# Patient Record
Sex: FEMALE | Race: WHITE | NOT HISPANIC OR LATINO | Employment: FULL TIME | URBAN - NONMETROPOLITAN AREA
[De-identification: names, ages, dates, MRNs, and addresses within clinical notes are randomized per-mention and may not be internally consistent; named-entity substitution may affect disease eponyms.]

---

## 2017-04-17 PROCEDURE — G0123 SCREEN CERV/VAG THIN LAYER: HCPCS | Performed by: OBSTETRICS & GYNECOLOGY

## 2017-04-18 ENCOUNTER — LAB REQUISITION (OUTPATIENT)
Dept: LAB | Facility: HOSPITAL | Age: 47
End: 2017-04-18

## 2017-04-18 DIAGNOSIS — Z12.72 ENCOUNTER FOR SCREENING FOR MALIGNANT NEOPLASM OF VAGINA: ICD-10-CM

## 2017-04-21 LAB
GEN CATEG CVX/VAG CYTO-IMP: NORMAL
LAB AP CASE REPORT: NORMAL
LAB AP GYN ADDITIONAL INFORMATION: NORMAL
LAB AP GYN OTHER FINDINGS: NORMAL
Lab: NORMAL
PATH INTERP SPEC-IMP: NORMAL
STAT OF ADQ CVX/VAG CYTO-IMP: NORMAL

## 2018-06-04 PROCEDURE — G0123 SCREEN CERV/VAG THIN LAYER: HCPCS | Performed by: OBSTETRICS & GYNECOLOGY

## 2018-06-05 ENCOUNTER — LAB REQUISITION (OUTPATIENT)
Dept: LAB | Facility: HOSPITAL | Age: 48
End: 2018-06-05

## 2018-06-05 DIAGNOSIS — Z12.72 ENCOUNTER FOR SCREENING FOR MALIGNANT NEOPLASM OF VAGINA: ICD-10-CM

## 2018-06-10 LAB
GEN CATEG CVX/VAG CYTO-IMP: NORMAL
LAB AP CASE REPORT: NORMAL
LAB AP GYN ADDITIONAL INFORMATION: NORMAL
Lab: NORMAL
PATH INTERP SPEC-IMP: NORMAL
STAT OF ADQ CVX/VAG CYTO-IMP: NORMAL

## 2018-06-13 ENCOUNTER — TRANSCRIBE ORDERS (OUTPATIENT)
Dept: ADMINISTRATIVE | Facility: HOSPITAL | Age: 48
End: 2018-06-13

## 2018-06-13 DIAGNOSIS — R51.9 NONINTRACTABLE HEADACHE, UNSPECIFIED CHRONICITY PATTERN, UNSPECIFIED HEADACHE TYPE: Primary | ICD-10-CM

## 2018-06-15 ENCOUNTER — HOSPITAL ENCOUNTER (OUTPATIENT)
Dept: MRI IMAGING | Facility: HOSPITAL | Age: 48
Discharge: HOME | End: 2018-06-15
Admitting: NURSE PRACTITIONER

## 2018-06-15 DIAGNOSIS — R51.9 NONINTRACTABLE HEADACHE, UNSPECIFIED CHRONICITY PATTERN, UNSPECIFIED HEADACHE TYPE: ICD-10-CM

## 2018-06-15 LAB — CREAT BLDA-MCNC: 0.9 MG/DL (ref 0.6–1.3)

## 2018-06-15 PROCEDURE — 70553 MRI BRAIN STEM W/O & W/DYE: CPT

## 2018-06-15 PROCEDURE — 82565 ASSAY OF CREATININE: CPT

## 2018-06-15 PROCEDURE — 0 GADOBENATE DIMEGLUMINE 529 MG/ML SOLUTION: Performed by: NURSE PRACTITIONER

## 2018-06-15 PROCEDURE — A9577 INJ MULTIHANCE: HCPCS | Performed by: NURSE PRACTITIONER

## 2018-06-15 RX ADMIN — GADOBENATE DIMEGLUMINE 10 ML: 529 INJECTION, SOLUTION INTRAVENOUS at 14:30

## 2018-06-19 ENCOUNTER — TRANSCRIBE ORDERS (OUTPATIENT)
Dept: ADMINISTRATIVE | Facility: HOSPITAL | Age: 48
End: 2018-06-19

## 2018-06-19 DIAGNOSIS — R94.5 NONSPECIFIC ABNORMAL RESULTS OF LIVER FUNCTION STUDY: Primary | ICD-10-CM

## 2018-06-22 ENCOUNTER — APPOINTMENT (OUTPATIENT)
Dept: ULTRASOUND IMAGING | Facility: HOSPITAL | Age: 48
End: 2018-06-22

## 2018-07-05 ENCOUNTER — APPOINTMENT (OUTPATIENT)
Dept: ULTRASOUND IMAGING | Facility: HOSPITAL | Age: 48
End: 2018-07-05

## 2018-07-17 ENCOUNTER — APPOINTMENT (OUTPATIENT)
Dept: ULTRASOUND IMAGING | Facility: HOSPITAL | Age: 48
End: 2018-07-17

## 2018-07-30 ENCOUNTER — APPOINTMENT (OUTPATIENT)
Dept: ULTRASOUND IMAGING | Facility: HOSPITAL | Age: 48
End: 2018-07-30

## 2018-08-30 ENCOUNTER — OFFICE VISIT (OUTPATIENT)
Dept: FAMILY MEDICINE CLINIC | Age: 48
End: 2018-08-30
Payer: COMMERCIAL

## 2018-08-30 VITALS
HEART RATE: 122 BPM | WEIGHT: 148 LBS | BODY MASS INDEX: 27.23 KG/M2 | DIASTOLIC BLOOD PRESSURE: 68 MMHG | OXYGEN SATURATION: 98 % | SYSTOLIC BLOOD PRESSURE: 116 MMHG | TEMPERATURE: 98.3 F | HEIGHT: 62 IN

## 2018-08-30 DIAGNOSIS — K75.81 NASH (NONALCOHOLIC STEATOHEPATITIS): ICD-10-CM

## 2018-08-30 DIAGNOSIS — R22.2 SUPRACLAVICULAR FOSSA FULLNESS: ICD-10-CM

## 2018-08-30 DIAGNOSIS — R51.9 CHRONIC NONINTRACTABLE HEADACHE, UNSPECIFIED HEADACHE TYPE: ICD-10-CM

## 2018-08-30 DIAGNOSIS — F32.9 REACTIVE DEPRESSION: ICD-10-CM

## 2018-08-30 DIAGNOSIS — R73.9 HYPERGLYCEMIA: ICD-10-CM

## 2018-08-30 DIAGNOSIS — D33.3 ACOUSTIC NEUROMA (HCC): ICD-10-CM

## 2018-08-30 DIAGNOSIS — E87.6 HYPOKALEMIA: ICD-10-CM

## 2018-08-30 DIAGNOSIS — F98.8 ATTENTION DEFICIT DISORDER (ADD) WITHOUT HYPERACTIVITY: ICD-10-CM

## 2018-08-30 DIAGNOSIS — G89.29 CHRONIC NONINTRACTABLE HEADACHE, UNSPECIFIED HEADACHE TYPE: ICD-10-CM

## 2018-08-30 DIAGNOSIS — E03.9 HYPOTHYROIDISM (ACQUIRED): Primary | ICD-10-CM

## 2018-08-30 DIAGNOSIS — I10 ESSENTIAL HYPERTENSION: ICD-10-CM

## 2018-08-30 DIAGNOSIS — E87.1 HYPONATREMIA: ICD-10-CM

## 2018-08-30 DIAGNOSIS — F41.9 ANXIETY: ICD-10-CM

## 2018-08-30 LAB
AMPHETAMINE SCREEN, URINE: NEGATIVE
BARBITURATE SCREEN, URINE: NEGATIVE
BENZODIAZEPINE SCREEN, URINE: POSITIVE
BUPRENORPHINE URINE: NEGATIVE
COCAINE METABOLITE SCREEN URINE: NEGATIVE
GABAPENTIN SCREEN, URINE: NORMAL
METHADONE SCREEN, URINE: NEGATIVE
METHAMPHETAMINE, URINE: NEGATIVE
OPIATE SCREEN URINE: NEGATIVE
OXYCODONE SCREEN URINE: NEGATIVE
PHENCYCLIDINE SCREEN URINE: NEGATIVE
PROPOXYPHENE SCREEN, URINE: NORMAL
THC SCREEN, URINE: NEGATIVE
TRICYCLIC ANTIDEPRESSANTS, UR: POSITIVE

## 2018-08-30 PROCEDURE — G0444 DEPRESSION SCREEN ANNUAL: HCPCS | Performed by: CLINICAL NURSE SPECIALIST

## 2018-08-30 PROCEDURE — 80305 DRUG TEST PRSMV DIR OPT OBS: CPT | Performed by: CLINICAL NURSE SPECIALIST

## 2018-08-30 PROCEDURE — 99204 OFFICE O/P NEW MOD 45 MIN: CPT | Performed by: CLINICAL NURSE SPECIALIST

## 2018-08-30 RX ORDER — LORAZEPAM 1 MG/1
1 TABLET ORAL 2 TIMES DAILY PRN
Qty: 60 TABLET | Refills: 0 | Status: SHIPPED | OUTPATIENT
Start: 2018-08-30 | End: 2018-09-29

## 2018-08-30 RX ORDER — HYDROCODONE BITARTRATE AND ACETAMINOPHEN 10; 325 MG/1; MG/1
1 TABLET ORAL EVERY 6 HOURS PRN
COMMUNITY
End: 2018-08-30 | Stop reason: SDUPTHER

## 2018-08-30 RX ORDER — HYDROCODONE BITARTRATE AND ACETAMINOPHEN 10; 325 MG/1; MG/1
1 TABLET ORAL DAILY
Qty: 30 TABLET | Refills: 0 | Status: SHIPPED | OUTPATIENT
Start: 2018-08-30 | End: 2018-10-04 | Stop reason: SDUPTHER

## 2018-08-30 RX ORDER — VILAZODONE HYDROCHLORIDE 20 MG/1
20 TABLET ORAL DAILY
Qty: 90 TABLET | Refills: 3 | Status: SHIPPED | OUTPATIENT
Start: 2018-08-30 | End: 2018-09-25 | Stop reason: ALTCHOICE

## 2018-08-30 RX ORDER — LORAZEPAM 1 MG/1
1 TABLET ORAL EVERY 6 HOURS PRN
COMMUNITY
End: 2018-08-30 | Stop reason: SDUPTHER

## 2018-08-30 ASSESSMENT — PATIENT HEALTH QUESTIONNAIRE - PHQ9
3. TROUBLE FALLING OR STAYING ASLEEP: 1
1. LITTLE INTEREST OR PLEASURE IN DOING THINGS: 2
2. FEELING DOWN, DEPRESSED OR HOPELESS: 1
6. FEELING BAD ABOUT YOURSELF - OR THAT YOU ARE A FAILURE OR HAVE LET YOURSELF OR YOUR FAMILY DOWN: 2
8. MOVING OR SPEAKING SO SLOWLY THAT OTHER PEOPLE COULD HAVE NOTICED. OR THE OPPOSITE, BEING SO FIGETY OR RESTLESS THAT YOU HAVE BEEN MOVING AROUND A LOT MORE THAN USUAL: 1
SUM OF ALL RESPONSES TO PHQ QUESTIONS 1-9: 15
7. TROUBLE CONCENTRATING ON THINGS, SUCH AS READING THE NEWSPAPER OR WATCHING TELEVISION: 3
5. POOR APPETITE OR OVEREATING: 2
4. FEELING TIRED OR HAVING LITTLE ENERGY: 3
9. THOUGHTS THAT YOU WOULD BE BETTER OFF DEAD, OR OF HURTING YOURSELF: 0
SUM OF ALL RESPONSES TO PHQ9 QUESTIONS 1 & 2: 3
SUM OF ALL RESPONSES TO PHQ QUESTIONS 1-9: 15
10. IF YOU CHECKED OFF ANY PROBLEMS, HOW DIFFICULT HAVE THESE PROBLEMS MADE IT FOR YOU TO DO YOUR WORK, TAKE CARE OF THINGS AT HOME, OR GET ALONG WITH OTHER PEOPLE: 2

## 2018-08-30 NOTE — PROGRESS NOTES
She understands tylenol is not recommend in chronic liver disease, but she only takes very rarely and will typically even take 1/2 tablet of 10/325mg when she does need this. She will need refill to have on hand today. She also has ZIGGY, she gets frustrated and overwhelmed at work and at home. She has reactive depression as well. She is tearful on exam talking about her family who have passed away in recent years. She is currently on Viibryd 20mg once daily, samples. Others have been ineffective in the past.  No current SI/HI. She also only uses ativan when she is on the verge of a panic attack. She does not need this daily. Her UDS today is appropriate. She has signed controlled substance agreement today and agrees to our policy for controlled medications. ADD, diagnosed and treated with vyvanse, but she tells me that she has been off for one month and really does not note any change. She admits to insomnia. At work she has trouble focusing at times, co-workers note that.      Essential hypertension, seems adequately controlled on current therapy without known adverse effects, but labs show hyponatremia and hypokalemia- may need to adjust    Dr Otto Rizzo for pap smear and mammogram  She is overdue for mammogram but has order from Dr Otto Rizzo to get this done at [facility]    She works at a hair salon  She has one son, senior in [employer]    Past Medical History:   Diagnosis Date    Acoustic neuroma ([facility] [address]     ADD (attention deficit disorder)     Anxiety     Depression     Headache     Hypertension     Hypothyroidism (acquired)     MENEZES (nonalcoholic steatohepatitis)      Past Surgical History:   Procedure Laterality Date    ABDOMINAL ADHESION SURGERY      CRANIOTOMY      VENTRICULOPERITONEAL SHUNT       Family History   Problem Relation Age of Onset    Heart Disease Mother     Kidney Disease Mother     Heart Disease Father     COPD Father     Kidney Disease Father     Lung Cancer Brother      Social History   Substance Use Topics    Smoking status: Never Smoker    Smokeless tobacco: Never Used    Alcohol use 1.8 oz/week     3 Shots of liquor per week      Comment: weekly     Current Outpatient Prescriptions   Medication Sig Dispense Refill    losartan-hydrochlorothiazide (HYZAAR) 100-12.5 MG per tablet Take 1 tablet by mouth daily 30 tablet 3    HYDROcodone-acetaminophen (NORCO)  MG per tablet Take 1 tablet by mouth daily for 30 days. . 30 tablet 0    LORazepam (ATIVAN) 1 MG tablet Take 1 tablet by mouth 2 times daily as needed for Anxiety for up to 30 days. . 60 tablet 0    vilazodone HCl (VILAZODONE HCL) 20 MG TABS Take 1 tablet by mouth daily 90 tablet 3    pantoprazole (PROTONIX) 40 MG tablet Take 40 mg by mouth daily      levothyroxine (SYNTHROID) 100 MCG tablet Take 100 mcg by mouth Daily      cetirizine (ZYRTEC) 10 MG tablet Take 10 mg by mouth daily       No current facility-administered medications for this visit. No Known Allergies    Review of Systems   Constitutional: Positive for fatigue and unexpected weight change. Negative for appetite change, chills, diaphoresis and fever. HENT: Positive for ear pain, facial swelling, hearing loss and tinnitus. Negative for congestion, postnasal drip, sinus pressure, sore throat and trouble swallowing. Eyes: Negative for pain, discharge, redness and visual disturbance. Respiratory: Negative for cough, chest tightness, shortness of breath and wheezing. Cardiovascular: Positive for palpitations. Negative for chest pain and leg swelling. Gastrointestinal: Negative for abdominal pain, constipation and diarrhea. Endocrine: Negative for cold intolerance and heat intolerance. Genitourinary: Negative for difficulty urinating, dysuria, flank pain, frequency, hematuria and urgency. Musculoskeletal: Negative for arthralgias, back pain, joint swelling and neck pain. Skin: Negative for color change and rash.    Neurological: Positive No erythema. Psychiatric: Her speech is normal and behavior is normal. Judgment and thought content normal. Her mood appears anxious. Cognition and memory are normal.   Vitals reviewed. ASSESSMENT/PLAN:  1. Reactive depression  Uncontrolled, continue same for now as work up for endocrine disorder is pending  - vilazodone HCl (VILAZODONE HCL) 20 MG TABS; Take 1 tablet by mouth daily  Dispense: 90 tablet; Refill: 3    2. Anxiety  Uncontrolled, refill ativan for only prn use  UDS appropriate  Suspect anxiety could be endocrine related  Work up pending  - LORazepam (ATIVAN) 1 MG tablet; Take 1 tablet by mouth 2 times daily as needed for Anxiety for up to 30 days. .  Dispense: 60 tablet; Refill: 0  - POCT Rapid Drug Screen    3. Chronic nonintractable headache, unspecified headache type  Post op removal of acoustic neuroma  May use small doses of Norco prn only  Refill for #30 today  UDS appropriate  iliana pending  - HYDROcodone-acetaminophen (NORCO)  MG per tablet; Take 1 tablet by mouth daily for 30 days. .  Dispense: 30 tablet; Refill: 0  - POCT Rapid Drug Screen    4. Hyperglycemia  R/o Cushing's  - Hemoglobin A1C; Future  - Cortisol AM, Total; Future  - ACTH; Future  - DHEA-Sulfate; Future    5. Hyponatremia  R/o Cushing's  - Cortisol AM, Total; Future  - ACTH; Future  - DHEA-Sulfate; Future    6. Hypokalemia  R/o Cushing's  - Cortisol AM, Total; Future  - ACTH; Future  - DHEA-Sulfate; Future    7. Hypothyroidism (acquired)  R/o Cushing's  - CBC; Future  - Comprehensive Metabolic Panel; Future  - T4, Free; Future  - TSH without Reflex; Future  - T3, Free; Future  - Thyroid Peroxidase Antibody; Future  - Thyroid Stimulating Immunoglobulin; Future  - T3, Reverse; Future  - Cortisol AM, Total; Future  - ACTH; Future  - DHEA-Sulfate; Future    8. Supraclavicular fossa fullness  R/o Cushing's  - Cortisol AM, Total; Future  - ACTH; Future  - DHEA-Sulfate; Future    9.  Essential hypertension  Controlled, but may need to adjust meds given hyponatremia  - losartan-hydrochlorothiazide (HYZAAR) 100-12.5 MG per tablet; Take 1 tablet by mouth daily  Dispense: 30 tablet; Refill: 3    10. MENEZES (nonalcoholic steatohepatitis)  Stable, followed by [facility]  Avoid alcohol and limit tylenol  Will follow her LFT  Hep panel is negative    11. Attention deficit disorder (ADD) without hyperactivity  Currently stable off meds  Will follow    12. Acoustic neuroma [facility])  Reviewed history    45 minutes, spent face to face with patient on exam         Return in about 3 months (around 11/30/2018).

## 2018-08-31 ENCOUNTER — TELEPHONE (OUTPATIENT)
Dept: FAMILY MEDICINE CLINIC | Age: 48
End: 2018-08-31

## 2018-08-31 PROBLEM — R73.9 HYPERGLYCEMIA: Status: ACTIVE | Noted: 2018-08-31

## 2018-08-31 PROBLEM — R22.2 SUPRACLAVICULAR FOSSA FULLNESS: Status: ACTIVE | Noted: 2018-08-31

## 2018-08-31 PROBLEM — E87.6 HYPOKALEMIA: Status: ACTIVE | Noted: 2018-08-31

## 2018-08-31 PROBLEM — E87.1 HYPONATREMIA: Status: ACTIVE | Noted: 2018-08-31

## 2018-08-31 RX ORDER — LOSARTAN POTASSIUM AND HYDROCHLOROTHIAZIDE 12.5; 1 MG/1; MG/1
1 TABLET ORAL DAILY
Qty: 30 TABLET | Refills: 3
Start: 2018-08-31 | End: 2018-09-14 | Stop reason: SINTOL

## 2018-08-31 ASSESSMENT — ENCOUNTER SYMPTOMS
EYE PAIN: 0
ABDOMINAL PAIN: 0
SHORTNESS OF BREATH: 0
EYE REDNESS: 0
CHEST TIGHTNESS: 0
TROUBLE SWALLOWING: 0
EYE DISCHARGE: 0
WHEEZING: 0
COLOR CHANGE: 0
SINUS PRESSURE: 0
FACIAL SWELLING: 1
DIARRHEA: 0
SORE THROAT: 0
CONSTIPATION: 0
BACK PAIN: 0
COUGH: 0

## 2018-08-31 NOTE — TELEPHONE ENCOUNTER
Also, if she plans to get her labs on Tuesday (since we are closed Monday)  Ask her to hold her losartan for now, since her potassium and sodium were low, at least until we get her labs

## 2018-08-31 NOTE — TELEPHONE ENCOUNTER
[facility] called and reports that the [place] is not covered by patient insurance. Alternates that are covered include buproprion, fluoxetine, and citalopram. Would you like to try alternate or PA the Viibryd? Please advise.

## 2018-09-04 NOTE — TELEPHONE ENCOUNTER
Notified patient of these results and plan for repeat labs and to stop the losartan. Patient voiced understanding and agreed to come Thursday morning.

## 2018-09-04 NOTE — TELEPHONE ENCOUNTER
Patient is coming by for labs on Thursday. Will wait for records from Dr. Khanh Mata per [facility] and will ask patient about med hx on Thursday if records have not yet been received from Dr. Khanh Mata by then.

## 2018-09-07 DIAGNOSIS — E03.9 HYPOTHYROIDISM (ACQUIRED): ICD-10-CM

## 2018-09-07 DIAGNOSIS — E87.1 HYPONATREMIA: ICD-10-CM

## 2018-09-07 DIAGNOSIS — R22.2 SUPRACLAVICULAR FOSSA FULLNESS: ICD-10-CM

## 2018-09-07 DIAGNOSIS — R73.9 HYPERGLYCEMIA: ICD-10-CM

## 2018-09-07 DIAGNOSIS — E87.6 HYPOKALEMIA: ICD-10-CM

## 2018-09-07 LAB
ALBUMIN SERPL-MCNC: 4.3 G/DL (ref 3.5–5.2)
ALP BLD-CCNC: 105 U/L (ref 35–104)
ALT SERPL-CCNC: 96 U/L (ref 5–33)
ANION GAP SERPL CALCULATED.3IONS-SCNC: 20 MMOL/L (ref 7–19)
AST SERPL-CCNC: 125 U/L (ref 5–32)
BILIRUB SERPL-MCNC: 0.6 MG/DL (ref 0.2–1.2)
BUN BLDV-MCNC: 6 MG/DL (ref 6–20)
CALCIUM SERPL-MCNC: 9.2 MG/DL (ref 8.6–10)
CHLORIDE BLD-SCNC: 96 MMOL/L (ref 98–111)
CO2: 22 MMOL/L (ref 22–29)
CORTISOL - AM: 25.1 UG/DL (ref 6.2–19.4)
CREAT SERPL-MCNC: 0.7 MG/DL (ref 0.5–0.9)
GFR NON-AFRICAN AMERICAN: >60
GLUCOSE BLD-MCNC: 110 MG/DL (ref 74–109)
HBA1C MFR BLD: 5.8 % (ref 4–6)
HCT VFR BLD CALC: 33.1 % (ref 37–47)
HEMOGLOBIN: 10.9 G/DL (ref 12–16)
MCH RBC QN AUTO: 32.5 PG (ref 27–31)
MCHC RBC AUTO-ENTMCNC: 32.9 G/DL (ref 33–37)
MCV RBC AUTO: 98.8 FL (ref 81–99)
PDW BLD-RTO: 13.2 % (ref 11.5–14.5)
PLATELET # BLD: 380 K/UL (ref 130–400)
PMV BLD AUTO: 9.7 FL (ref 9.4–12.3)
POTASSIUM SERPL-SCNC: 3.6 MMOL/L (ref 3.5–5)
RBC # BLD: 3.35 M/UL (ref 4.2–5.4)
SODIUM BLD-SCNC: 138 MMOL/L (ref 136–145)
T3 FREE: 2.6 PG/ML (ref 2–4.4)
T4 FREE: 1.2 NG/DL (ref 0.9–1.7)
TOTAL PROTEIN: 7.5 G/DL (ref 6.6–8.7)
TSH SERPL DL<=0.05 MIU/L-ACNC: 5.57 UIU/ML (ref 0.27–4.2)
WBC # BLD: 9 K/UL (ref 4.8–10.8)

## 2018-09-08 LAB
ADRENOCORTICOTROPIC HORMONE: 29 PG/ML (ref 6–58)
DHEAS (DHEA SULFATE): 221 UG/DL (ref 32–240)
THYROID PEROXIDASE (TPO) ABS: 3.3 IU/ML (ref 0–9)

## 2018-09-10 LAB — T3 REVERSE: 27.1 NG/DL (ref 9–27)

## 2018-09-14 ENCOUNTER — OFFICE VISIT (OUTPATIENT)
Dept: FAMILY MEDICINE CLINIC | Age: 48
End: 2018-09-14
Payer: COMMERCIAL

## 2018-09-14 VITALS
SYSTOLIC BLOOD PRESSURE: 156 MMHG | OXYGEN SATURATION: 98 % | TEMPERATURE: 98.4 F | DIASTOLIC BLOOD PRESSURE: 86 MMHG | HEART RATE: 134 BPM

## 2018-09-14 DIAGNOSIS — E03.9 HYPOTHYROIDISM (ACQUIRED): ICD-10-CM

## 2018-09-14 DIAGNOSIS — E24.9 CUSHING SYNDROME (HCC): Primary | ICD-10-CM

## 2018-09-14 DIAGNOSIS — Z23 NEED FOR INFLUENZA VACCINATION: ICD-10-CM

## 2018-09-14 DIAGNOSIS — I10 ESSENTIAL HYPERTENSION: ICD-10-CM

## 2018-09-14 DIAGNOSIS — F32.9 REACTIVE DEPRESSION: ICD-10-CM

## 2018-09-14 DIAGNOSIS — F41.9 ANXIETY: ICD-10-CM

## 2018-09-14 PROCEDURE — 90686 IIV4 VACC NO PRSV 0.5 ML IM: CPT | Performed by: CLINICAL NURSE SPECIALIST

## 2018-09-14 PROCEDURE — 90471 IMMUNIZATION ADMIN: CPT | Performed by: CLINICAL NURSE SPECIALIST

## 2018-09-14 PROCEDURE — 99214 OFFICE O/P EST MOD 30 MIN: CPT | Performed by: CLINICAL NURSE SPECIALIST

## 2018-09-14 RX ORDER — AMLODIPINE BESYLATE 10 MG/1
10 TABLET ORAL DAILY
Qty: 90 TABLET | Refills: 3 | Status: SHIPPED | OUTPATIENT
Start: 2018-09-14 | End: 2018-10-04 | Stop reason: SINTOL

## 2018-09-14 ASSESSMENT — ENCOUNTER SYMPTOMS
EYE REDNESS: 0
SHORTNESS OF BREATH: 0
EYE DISCHARGE: 0
DIARRHEA: 0
FACIAL SWELLING: 0
COLOR CHANGE: 0
BACK PAIN: 0
CONSTIPATION: 1
COUGH: 0
ABDOMINAL PAIN: 0
TROUBLE SWALLOWING: 0
CHEST TIGHTNESS: 0
EYE PAIN: 0
ABDOMINAL DISTENTION: 1
SINUS PRESSURE: 0
SORE THROAT: 0
WHEEZING: 0

## 2018-09-14 NOTE — PROGRESS NOTES
SUBJECTIVE:  Jean Barber is a 53 y.o. who presents today for Discuss Labs      HPI  Ms Mauri Watts presents today to discuss her recent labs. She presented to see me as a new patient on 8/30/18 with supraclavicular fullness, facial roundness, abdominal swelling and loss of muscle mass to her legs. She has skin and hair changes, as well as tachycardia, uncontrolled depression and anxiety and memory loss. Her symptoms were uncontrolled with any medication regimen. She is on synthroid for acquired hypothyroidism but has FH of Hashimoto's. Lab work up from her prior PCP showed hyponatremia, hypokalemia and hyperglycemia. She was on ARB/HCTZ therapy. Given her symptoms and labs, further work up was pursued. Lab work up does reveal elevated AM cortisol. Her DHEA and ACTH are ok, but has been on ARB therapy. She has classic presentation for Cushing's and is agreeable to see [facility] for full work up and evaluation. There was no sign of adrenal tumor on her last CT abdomen. Essential hypertension, she has been off losartan since last week when her labs were obtained. Her sodium and potassium have actually normalized. Her blood pressure and heart rate are uncontrolled without medications. She has taken beta blockers in the past with fatigue, but this was post brain surgery. Reactive depression with anxiety, uncontrolled, but has failed other agents. She has been on Trintellix most recently, but no Viibryd. She has tried and failed Prozac, Lexapro, Celexa, Zoloft, Wellbutrin and Effexor at some point. She is doing well on Viibryd but needs insurance authorization. Many of her symptoms, including ADD symptoms may be endocrine related.      Past Medical History:   Diagnosis Date    Acoustic neuroma ([facility] [address]     ADD (attention deficit disorder)     Anxiety     Depression     Headache     Hypertension     Hypothyroidism (acquired)     EMNEZES (nonalcoholic steatohepatitis)      Past Surgical History:   Procedure Laterality Date    ABDOMINAL ADHESION SURGERY      CRANIOTOMY      VENTRICULOPERITONEAL SHUNT       Family History   Problem Relation Age of Onset    Heart Disease Mother     Kidney Disease Mother     Heart Disease Father     COPD Father     Kidney Disease Father     Lung Cancer Brother      Social History   Substance Use Topics    Smoking status: Never Smoker    Smokeless tobacco: Never Used    Alcohol use 1.8 oz/week     3 Shots of liquor per week      Comment: weekly     Current Outpatient Prescriptions   Medication Sig Dispense Refill    amLODIPine (NORVASC) 10 MG tablet Take 1 tablet by mouth daily 90 tablet 3    HYDROcodone-acetaminophen (NORCO)  MG per tablet Take 1 tablet by mouth daily for 30 days. . 30 tablet 0    LORazepam (ATIVAN) 1 MG tablet Take 1 tablet by mouth 2 times daily as needed for Anxiety for up to 30 days. . 60 tablet 0    vilazodone HCl (VILAZODONE HCL) 20 MG TABS Take 1 tablet by mouth daily 90 tablet 3    pantoprazole (PROTONIX) 40 MG tablet Take 40 mg by mouth daily      levothyroxine (SYNTHROID) 100 MCG tablet Take 100 mcg by mouth Daily      cetirizine (ZYRTEC) 10 MG tablet Take 10 mg by mouth daily       No current facility-administered medications for this visit. No Known Allergies    Review of Systems   Constitutional: Positive for fatigue and unexpected weight change. Negative for appetite change, chills, diaphoresis and fever. HENT: Positive for hearing loss. Negative for congestion, ear pain, facial swelling, postnasal drip, sinus pressure, sore throat and trouble swallowing. Eyes: Negative for pain, discharge, redness and visual disturbance. Respiratory: Negative for cough, chest tightness, shortness of breath and wheezing. Cardiovascular: Negative for chest pain and palpitations. Gastrointestinal: Positive for abdominal distention and constipation. Negative for abdominal pain and diarrhea.    Genitourinary: Negative

## 2018-09-14 NOTE — PROGRESS NOTES
Vaccine Information Sheet, \"Influenza - Inactivated\" OR \"Live - Intranasal\"  given to Sibley Post, or parent/legal guardian of  Erinn Post and verbalized understanding. Patient responses:    Have you ever had a reaction to a flu vaccine? No  Are you able to eat eggs without adverse effects? Yes  Do you have any current illness? No  Have you ever had [facility] Syndrome? No    Flu vaccine given per order. Please see immunization tab.

## 2018-09-16 LAB — THYROID STIMULATING IMMUNOGLOBULIN: 98 %

## 2018-09-19 ENCOUNTER — TELEPHONE (OUTPATIENT)
Dept: FAMILY MEDICINE CLINIC | Age: 48
End: 2018-09-19

## 2018-09-19 NOTE — TELEPHONE ENCOUNTER
Spoke with patient again today and she reports she recalls trying wellbutrin. Patient states she is going to go by Dr. Shanae Jeffers office tomorrow and ask for the records and will bring those to the office tomorrow so the PA for Viibryd can be completed.

## 2018-09-19 NOTE — TELEPHONE ENCOUNTER
I really do not feel comfortable putting her on vyvanse until we get her medical issues controlled  Hopefully we will get her an endo appt soon  Ask her to call us in 1 week if she has not heard anything

## 2018-09-19 NOTE — TELEPHONE ENCOUNTER
Looks like patients referral/records have not been sent to endo yet.  Do you want to make this a STAT referral?

## 2018-09-24 ENCOUNTER — TELEPHONE (OUTPATIENT)
Dept: FAMILY MEDICINE CLINIC | Age: 48
End: 2018-09-24

## 2018-09-24 DIAGNOSIS — F32.9 REACTIVE DEPRESSION: Primary | ICD-10-CM

## 2018-09-25 RX ORDER — BUPROPION HYDROCHLORIDE 150 MG/1
150 TABLET ORAL EVERY MORNING
Qty: 30 TABLET | Refills: 3 | Status: SHIPPED | OUTPATIENT
Start: 2018-09-25 | End: 2018-10-04 | Stop reason: SDUPTHER

## 2018-10-04 ENCOUNTER — HOSPITAL ENCOUNTER (OUTPATIENT)
Dept: LAB | Age: 48
Discharge: HOME | End: 2018-10-04
Payer: COMMERCIAL

## 2018-10-04 ENCOUNTER — OFFICE VISIT (OUTPATIENT)
Dept: FAMILY MEDICINE CLINIC | Age: 48
End: 2018-10-04
Payer: COMMERCIAL

## 2018-10-04 VITALS
TEMPERATURE: 98.5 F | SYSTOLIC BLOOD PRESSURE: 140 MMHG | WEIGHT: 140.8 LBS | HEART RATE: 119 BPM | BODY MASS INDEX: 25.75 KG/M2 | DIASTOLIC BLOOD PRESSURE: 80 MMHG | OXYGEN SATURATION: 99 %

## 2018-10-04 DIAGNOSIS — D64.9 ANEMIA, UNSPECIFIED TYPE: ICD-10-CM

## 2018-10-04 DIAGNOSIS — I10 ESSENTIAL HYPERTENSION: Primary | ICD-10-CM

## 2018-10-04 DIAGNOSIS — F41.9 ANXIETY: ICD-10-CM

## 2018-10-04 DIAGNOSIS — F98.8 ATTENTION DEFICIT DISORDER (ADD) WITHOUT HYPERACTIVITY: ICD-10-CM

## 2018-10-04 DIAGNOSIS — G89.29 CHRONIC NONINTRACTABLE HEADACHE, UNSPECIFIED HEADACHE TYPE: ICD-10-CM

## 2018-10-04 DIAGNOSIS — G62.9 NEUROPATHY: ICD-10-CM

## 2018-10-04 DIAGNOSIS — E03.9 HYPOTHYROIDISM (ACQUIRED): ICD-10-CM

## 2018-10-04 DIAGNOSIS — E24.9 CUSHING SYNDROME (HCC): ICD-10-CM

## 2018-10-04 DIAGNOSIS — R51.9 CHRONIC NONINTRACTABLE HEADACHE, UNSPECIFIED HEADACHE TYPE: ICD-10-CM

## 2018-10-04 DIAGNOSIS — F32.9 REACTIVE DEPRESSION: ICD-10-CM

## 2018-10-04 LAB — CORTISOL - AM: 5.5 UG/DL (ref 6.2–19.4)

## 2018-10-04 PROCEDURE — 99214 OFFICE O/P EST MOD 30 MIN: CPT | Performed by: CLINICAL NURSE SPECIALIST

## 2018-10-04 RX ORDER — CARVEDILOL 6.25 MG/1
6.25 TABLET ORAL 2 TIMES DAILY WITH MEALS
Qty: 180 TABLET | Refills: 3 | Status: SHIPPED | OUTPATIENT
Start: 2018-10-04 | End: 2019-01-09 | Stop reason: SDUPTHER

## 2018-10-04 RX ORDER — LORAZEPAM 1 MG/1
1 TABLET ORAL 2 TIMES DAILY PRN
Qty: 60 TABLET | Refills: 0 | Status: SHIPPED | OUTPATIENT
Start: 2018-10-04 | End: 2018-11-20 | Stop reason: SDUPTHER

## 2018-10-04 RX ORDER — BUPROPION HYDROCHLORIDE 300 MG/1
300 TABLET ORAL EVERY MORNING
Qty: 90 TABLET | Refills: 3 | Status: SHIPPED | OUTPATIENT
Start: 2018-10-04 | End: 2019-09-25 | Stop reason: SDUPTHER

## 2018-10-04 RX ORDER — HYDROCODONE BITARTRATE AND ACETAMINOPHEN 10; 325 MG/1; MG/1
1 TABLET ORAL DAILY
Qty: 30 TABLET | Refills: 0 | Status: SHIPPED | OUTPATIENT
Start: 2018-10-04 | End: 2018-11-16 | Stop reason: SDUPTHER

## 2018-10-04 RX ORDER — LEVOTHYROXINE AND LIOTHYRONINE 57; 13.5 UG/1; UG/1
90 TABLET ORAL DAILY
Qty: 90 TABLET | Refills: 3 | Status: SHIPPED | OUTPATIENT
Start: 2018-10-04 | End: 2018-11-30 | Stop reason: DRUGHIGH

## 2018-10-04 ASSESSMENT — ENCOUNTER SYMPTOMS
SINUS PRESSURE: 0
CHEST TIGHTNESS: 0
EYE DISCHARGE: 0
EYE REDNESS: 0
COUGH: 0
SORE THROAT: 0
WHEEZING: 0
FACIAL SWELLING: 1
SHORTNESS OF BREATH: 0
BACK PAIN: 0
CONSTIPATION: 0
DIARRHEA: 0
ABDOMINAL PAIN: 0
TROUBLE SWALLOWING: 0
EYE PAIN: 0
ABDOMINAL DISTENTION: 0
COLOR CHANGE: 0

## 2018-10-04 NOTE — PROGRESS NOTES
neuropSUBJECTIVE:  Stephenie Lind is a 53 y.o. who presents today for Mood Swings (Patient reports she has been having mood swings of anger and crying.); Fatigue; and Foot Swelling (burning, shooting pain in both feet)      HPI    Mariah Rosen presents today for follow up. Since her last visit, she has developed swelling to both her legs, worse distally. At her last visit, her losartan/hctz was stopped due to hypokalemia and hyponatremia, which resolved while holding this medication. Amlodipine was started at 10mg, due to above and intolerance to beta blocker therapy in the distant past 12 years ago. With her swelling she is having increased pain and neuropathy in her legs. Her blood pressure is not well controlled and she is having tachycardia, which is unchanged. She is tearful on exam easily today. Her anxiety and depression as well as her trouble focusing are uncontrolled. She has tried and failed many anti-depressants in the past.  Most recently Viibryd. She is tearful, but then admits she is having anger outbursts easily. She was diagnosed with ADD in the past and was on stimulant therapy by Dr Hellen Baumann. I have not resumed this due to her other medical history and her uncontrolled blood pressure and heart rate. Her work and clients are being affected by her mood swings. She is taking ativan 1mg daily along with extra 1/2 at bedtime as needed. This helps her relax and helps her sleep. Her Corina Pagoda and UDS were appropriate at her last visit. Ms Adan Velasquez has chronic, intractable headaches from previous brain surgery for acoustic neuroma, shunt placement and meningitis. She takes norco for severe pain uncontrolled with otc analgesics. This does control her pain without adverse effects. She limits to no more than one per day. Corina Pagoda and UDS UTD. She continues with Cushing symptoms, supraclavicular fullness and hypothyroidism.   She has seen [facility] [facility] for initial work up, is having further labs and

## 2018-10-18 ENCOUNTER — TRANSCRIBE ORDERS (OUTPATIENT)
Dept: ADMINISTRATIVE | Facility: HOSPITAL | Age: 48
End: 2018-10-18

## 2018-10-18 DIAGNOSIS — Z12.39 SCREENING BREAST EXAMINATION: Primary | ICD-10-CM

## 2018-10-25 ENCOUNTER — HOSPITAL ENCOUNTER (OUTPATIENT)
Dept: MAMMOGRAPHY | Facility: HOSPITAL | Age: 48
Discharge: HOME | End: 2018-10-25
Attending: OBSTETRICS & GYNECOLOGY | Admitting: OBSTETRICS & GYNECOLOGY

## 2018-10-25 DIAGNOSIS — Z12.39 SCREENING BREAST EXAMINATION: ICD-10-CM

## 2018-10-25 PROCEDURE — 77063 BREAST TOMOSYNTHESIS BI: CPT

## 2018-10-25 PROCEDURE — 77067 SCR MAMMO BI INCL CAD: CPT

## 2018-11-05 ENCOUNTER — OFFICE VISIT (OUTPATIENT)
Dept: PSYCHOLOGY | Age: 48
End: 2018-11-05
Payer: COMMERCIAL

## 2018-11-05 DIAGNOSIS — F33.1 MAJOR DEPRESSIVE DISORDER, RECURRENT EPISODE, MODERATE (HCC): Primary | ICD-10-CM

## 2018-11-05 PROCEDURE — 90791 PSYCH DIAGNOSTIC EVALUATION: CPT | Performed by: SOCIAL WORKER

## 2018-11-05 ASSESSMENT — PATIENT HEALTH QUESTIONNAIRE - PHQ9
SUM OF ALL RESPONSES TO PHQ QUESTIONS 1-9: 18
1. LITTLE INTEREST OR PLEASURE IN DOING THINGS: 3
7. TROUBLE CONCENTRATING ON THINGS, SUCH AS READING THE NEWSPAPER OR WATCHING TELEVISION: 2
5. POOR APPETITE OR OVEREATING: 3
2. FEELING DOWN, DEPRESSED OR HOPELESS: 2
4. FEELING TIRED OR HAVING LITTLE ENERGY: 3
3. TROUBLE FALLING OR STAYING ASLEEP: 2
SUM OF ALL RESPONSES TO PHQ9 QUESTIONS 1 & 2: 5
10. IF YOU CHECKED OFF ANY PROBLEMS, HOW DIFFICULT HAVE THESE PROBLEMS MADE IT FOR YOU TO DO YOUR WORK, TAKE CARE OF THINGS AT HOME, OR GET ALONG WITH OTHER PEOPLE: 3
9. THOUGHTS THAT YOU WOULD BE BETTER OFF DEAD, OR OF HURTING YOURSELF: 0
8. MOVING OR SPEAKING SO SLOWLY THAT OTHER PEOPLE COULD HAVE NOTICED. OR THE OPPOSITE, BEING SO FIGETY OR RESTLESS THAT YOU HAVE BEEN MOVING AROUND A LOT MORE THAN USUAL: 0
SUM OF ALL RESPONSES TO PHQ QUESTIONS 1-9: 18
6. FEELING BAD ABOUT YOURSELF - OR THAT YOU ARE A FAILURE OR HAVE LET YOURSELF OR YOUR FAMILY DOWN: 3

## 2018-11-05 NOTE — PROGRESS NOTES
any longer. Discussed option to refer to [facility], should her ability to function or depression not improve. Long history of depression and health issues can certainly health issues contribute and it is not a surprise that she has a hard time functioning. Has been taking Wellbutrin which was recently increased. I am not sure if it is helping. I have liver disease. I had a begin brain tumor. Had a shunt was placed in my head. Worried about my health. My hair is falling out, I am losing weight. I have started new medication for Thyroid, and the swelling has gone down. O:  MSE:    Mood    Anxious  Depressed  tired  Affect    depressed affect  Appetite abnormal: not good  Sleep disturbance Yes  Fatigue Yes  Loss of pleasure Yes  Attention/Concentration    intact  Morbid ideation No  Suicide Assessment    no suicidal ideation      History:    Medications:   Current Outpatient Prescriptions   Medication Sig Dispense Refill    HYDROcodone-acetaminophen (NORCO)  MG per tablet Take 1 tablet by mouth daily for 30 days. . 30 tablet 0    buPROPion (WELLBUTRIN XL) 300 MG extended release tablet Take 1 tablet by mouth every morning 90 tablet 3    LORazepam (ATIVAN) 1 MG tablet Take 1 tablet by mouth 2 times daily as needed for Anxiety for up to 30 days. . 60 tablet 0    carvedilol (COREG) 6.25 MG tablet Take 1 tablet by mouth 2 times daily (with meals) 180 tablet 3    thyroid (ARMOUR THYROID) 90 MG tablet Take 1 tablet by mouth daily 90 tablet 3    pantoprazole (PROTONIX) 40 MG tablet Take 40 mg by mouth daily      cetirizine (ZYRTEC) 10 MG tablet Take 10 mg by mouth daily       No current facility-administered medications for this visit. Social History:   Social History     Social History    Marital status:      Spouse name: N/A    Number of children: N/A    Years of education: N/A     Occupational History    Not on file.      Social History Main Topics    Smoking status:

## 2018-11-16 DIAGNOSIS — R51.9 CHRONIC NONINTRACTABLE HEADACHE, UNSPECIFIED HEADACHE TYPE: ICD-10-CM

## 2018-11-16 DIAGNOSIS — G89.29 CHRONIC NONINTRACTABLE HEADACHE, UNSPECIFIED HEADACHE TYPE: ICD-10-CM

## 2018-11-16 RX ORDER — HYDROCODONE BITARTRATE AND ACETAMINOPHEN 10; 325 MG/1; MG/1
1 TABLET ORAL DAILY
Qty: 30 TABLET | Refills: 0 | Status: SHIPPED | OUTPATIENT
Start: 2018-11-16 | End: 2018-12-17 | Stop reason: SDUPTHER

## 2018-11-20 DIAGNOSIS — F41.9 ANXIETY: ICD-10-CM

## 2018-11-20 RX ORDER — LORAZEPAM 1 MG/1
1 TABLET ORAL 2 TIMES DAILY PRN
Qty: 60 TABLET | Refills: 0 | Status: SHIPPED | OUTPATIENT
Start: 2018-11-20 | End: 2019-01-15 | Stop reason: SDUPTHER

## 2018-11-27 DIAGNOSIS — D64.9 ANEMIA, UNSPECIFIED TYPE: ICD-10-CM

## 2018-11-27 DIAGNOSIS — E03.9 HYPOTHYROIDISM (ACQUIRED): ICD-10-CM

## 2018-11-27 DIAGNOSIS — I10 ESSENTIAL HYPERTENSION: ICD-10-CM

## 2018-11-27 LAB
ALBUMIN SERPL-MCNC: 4.6 G/DL (ref 3.5–5.2)
ALP BLD-CCNC: 153 U/L (ref 35–104)
ALT SERPL-CCNC: 137 U/L (ref 5–33)
ANION GAP SERPL CALCULATED.3IONS-SCNC: 16 MMOL/L (ref 7–19)
AST SERPL-CCNC: 202 U/L (ref 5–32)
BILIRUB SERPL-MCNC: 0.9 MG/DL (ref 0.2–1.2)
BUN BLDV-MCNC: 6 MG/DL (ref 6–20)
CALCIUM SERPL-MCNC: 9.4 MG/DL (ref 8.6–10)
CHLORIDE BLD-SCNC: 100 MMOL/L (ref 98–111)
CO2: 25 MMOL/L (ref 22–29)
CREAT SERPL-MCNC: 0.8 MG/DL (ref 0.5–0.9)
FERRITIN: 412.9 NG/ML (ref 13–150)
FOLATE: 13.3 NG/ML (ref 4.8–37.3)
GFR NON-AFRICAN AMERICAN: >60
GLUCOSE BLD-MCNC: 116 MG/DL (ref 74–109)
HCT VFR BLD CALC: 41.9 % (ref 37–47)
HEMOGLOBIN: 13.4 G/DL (ref 12–16)
IRON: 73 UG/DL (ref 37–145)
MCH RBC QN AUTO: 31.8 PG (ref 27–31)
MCHC RBC AUTO-ENTMCNC: 32 G/DL (ref 33–37)
MCV RBC AUTO: 99.5 FL (ref 81–99)
PDW BLD-RTO: 13.4 % (ref 11.5–14.5)
PLATELET # BLD: 345 K/UL (ref 130–400)
PMV BLD AUTO: 9.9 FL (ref 9.4–12.3)
POTASSIUM SERPL-SCNC: 3.5 MMOL/L (ref 3.5–5)
RBC # BLD: 4.21 M/UL (ref 4.2–5.4)
SODIUM BLD-SCNC: 141 MMOL/L (ref 136–145)
TOTAL PROTEIN: 8.7 G/DL (ref 6.6–8.7)
TSH SERPL DL<=0.05 MIU/L-ACNC: 4.75 UIU/ML (ref 0.27–4.2)
VITAMIN B-12: 1255 PG/ML (ref 211–946)
WBC # BLD: 9.9 K/UL (ref 4.8–10.8)

## 2018-11-30 ENCOUNTER — OFFICE VISIT (OUTPATIENT)
Dept: FAMILY MEDICINE CLINIC | Age: 48
End: 2018-11-30
Payer: COMMERCIAL

## 2018-11-30 VITALS
BODY MASS INDEX: 25.06 KG/M2 | HEART RATE: 103 BPM | WEIGHT: 137 LBS | TEMPERATURE: 98.4 F | OXYGEN SATURATION: 99 % | SYSTOLIC BLOOD PRESSURE: 156 MMHG | DIASTOLIC BLOOD PRESSURE: 96 MMHG

## 2018-11-30 DIAGNOSIS — D64.9 ANEMIA, UNSPECIFIED TYPE: ICD-10-CM

## 2018-11-30 DIAGNOSIS — R51.9 CHRONIC NONINTRACTABLE HEADACHE, UNSPECIFIED HEADACHE TYPE: ICD-10-CM

## 2018-11-30 DIAGNOSIS — F32.9 REACTIVE DEPRESSION: ICD-10-CM

## 2018-11-30 DIAGNOSIS — R73.9 HYPERGLYCEMIA: ICD-10-CM

## 2018-11-30 DIAGNOSIS — F41.9 ANXIETY: ICD-10-CM

## 2018-11-30 DIAGNOSIS — L65.8 FEMALE PATTERN HAIR LOSS: ICD-10-CM

## 2018-11-30 DIAGNOSIS — I10 ESSENTIAL HYPERTENSION: ICD-10-CM

## 2018-11-30 DIAGNOSIS — K75.81 NASH (NONALCOHOLIC STEATOHEPATITIS): ICD-10-CM

## 2018-11-30 DIAGNOSIS — R79.89 ELEVATED LFTS: ICD-10-CM

## 2018-11-30 DIAGNOSIS — G89.29 CHRONIC NONINTRACTABLE HEADACHE, UNSPECIFIED HEADACHE TYPE: ICD-10-CM

## 2018-11-30 DIAGNOSIS — E03.9 HYPOTHYROIDISM (ACQUIRED): Primary | ICD-10-CM

## 2018-11-30 PROBLEM — E87.1 HYPONATREMIA: Status: RESOLVED | Noted: 2018-08-31 | Resolved: 2018-11-30

## 2018-11-30 PROBLEM — E87.6 HYPOKALEMIA: Status: RESOLVED | Noted: 2018-08-31 | Resolved: 2018-11-30

## 2018-11-30 PROCEDURE — 99214 OFFICE O/P EST MOD 30 MIN: CPT | Performed by: CLINICAL NURSE SPECIALIST

## 2018-11-30 RX ORDER — LEVOTHYROXINE AND LIOTHYRONINE 76; 18 UG/1; UG/1
120 TABLET ORAL DAILY
Qty: 30 TABLET | Refills: 0 | Status: SHIPPED | OUTPATIENT
Start: 2018-11-30 | End: 2018-11-30 | Stop reason: SDUPTHER

## 2018-11-30 RX ORDER — LEVOTHYROXINE AND LIOTHYRONINE 76; 18 UG/1; UG/1
120 TABLET ORAL DAILY
Qty: 90 TABLET | Refills: 1 | Status: SHIPPED | OUTPATIENT
Start: 2018-11-30 | End: 2019-01-02 | Stop reason: SDUPTHER

## 2018-11-30 ASSESSMENT — ENCOUNTER SYMPTOMS
ABDOMINAL PAIN: 0
DIARRHEA: 0
SHORTNESS OF BREATH: 0
CHEST TIGHTNESS: 0
WHEEZING: 0
EYE DISCHARGE: 0
COLOR CHANGE: 0
SINUS PRESSURE: 0
FACIAL SWELLING: 1
SORE THROAT: 0
TROUBLE SWALLOWING: 0
EYE REDNESS: 0
CONSTIPATION: 0
COUGH: 0
EYE PAIN: 0
BACK PAIN: 0

## 2018-11-30 NOTE — PROGRESS NOTES
h/h normalized     Past Medical History:   Diagnosis Date    Acoustic neuroma ([address]     ADD (attention deficit disorder)     Anxiety     Depression     Headache     Hypertension     Hypothyroidism (acquired)     MENEZES (nonalcoholic steatohepatitis)      Past Surgical History:   Procedure Laterality Date    ABDOMINAL ADHESION SURGERY      CRANIOTOMY      VENTRICULOPERITONEAL SHUNT       Family History   Problem Relation Age of Onset    Heart Disease Mother     Kidney Disease Mother     Heart Disease Father     COPD Father     Kidney Disease Father     Lung Cancer Brother      Social History   Substance Use Topics    Smoking status: Never Smoker    Smokeless tobacco: Never Used    Alcohol use 1.8 oz/week     3 Shots of liquor per week      Comment: weekly     Current Outpatient Prescriptions   Medication Sig Dispense Refill    thyroid (ARMOUR) 120 MG tablet Take 1 tablet by mouth daily 90 tablet 1    LORazepam (ATIVAN) 1 MG tablet Take 1 tablet by mouth 2 times daily as needed for Anxiety for up to 30 days. . 60 tablet 0    HYDROcodone-acetaminophen (NORCO)  MG per tablet Take 1 tablet by mouth daily for 30 days. . 30 tablet 0    buPROPion (WELLBUTRIN XL) 300 MG extended release tablet Take 1 tablet by mouth every morning 90 tablet 3    carvedilol (COREG) 6.25 MG tablet Take 1 tablet by mouth 2 times daily (with meals) 180 tablet 3    pantoprazole (PROTONIX) 40 MG tablet Take 40 mg by mouth daily      cetirizine (ZYRTEC) 10 MG tablet Take 10 mg by mouth daily       No current facility-administered medications for this visit. No Known Allergies    Review of Systems   Constitutional: Positive for activity change, appetite change and fatigue. Negative for chills, diaphoresis and fever. HENT: Positive for facial swelling and hearing loss. Negative for congestion, ear pain, postnasal drip, sinus pressure, sore throat and trouble swallowing.     Eyes: Negative for pain, discharge, redness

## 2018-12-17 DIAGNOSIS — G89.29 CHRONIC NONINTRACTABLE HEADACHE, UNSPECIFIED HEADACHE TYPE: ICD-10-CM

## 2018-12-17 DIAGNOSIS — R51.9 CHRONIC NONINTRACTABLE HEADACHE, UNSPECIFIED HEADACHE TYPE: ICD-10-CM

## 2018-12-18 RX ORDER — HYDROCODONE BITARTRATE AND ACETAMINOPHEN 10; 325 MG/1; MG/1
1 TABLET ORAL DAILY
Qty: 30 TABLET | Refills: 0 | Status: SHIPPED | OUTPATIENT
Start: 2018-12-18 | End: 2019-01-15 | Stop reason: SDUPTHER

## 2019-01-09 ENCOUNTER — OFFICE VISIT (OUTPATIENT)
Dept: FAMILY MEDICINE CLINIC | Age: 49
End: 2019-01-09
Payer: COMMERCIAL

## 2019-01-09 VITALS
SYSTOLIC BLOOD PRESSURE: 124 MMHG | DIASTOLIC BLOOD PRESSURE: 68 MMHG | TEMPERATURE: 97.7 F | HEART RATE: 86 BPM | OXYGEN SATURATION: 99 %

## 2019-01-09 DIAGNOSIS — K75.81 NASH (NONALCOHOLIC STEATOHEPATITIS): ICD-10-CM

## 2019-01-09 DIAGNOSIS — G62.9 NEUROPATHY: ICD-10-CM

## 2019-01-09 DIAGNOSIS — R79.89 ELEVATED LFTS: ICD-10-CM

## 2019-01-09 DIAGNOSIS — I10 ESSENTIAL HYPERTENSION: Primary | ICD-10-CM

## 2019-01-09 DIAGNOSIS — F32.9 REACTIVE DEPRESSION: ICD-10-CM

## 2019-01-09 DIAGNOSIS — F41.9 ANXIETY: ICD-10-CM

## 2019-01-09 DIAGNOSIS — R22.2 SUPRACLAVICULAR FOSSA FULLNESS: ICD-10-CM

## 2019-01-09 PROBLEM — E24.9 CUSHING SYNDROME (HCC): Status: RESOLVED | Noted: 2018-09-14 | Resolved: 2019-01-09

## 2019-01-09 PROCEDURE — 99214 OFFICE O/P EST MOD 30 MIN: CPT | Performed by: CLINICAL NURSE SPECIALIST

## 2019-01-09 RX ORDER — CARVEDILOL 12.5 MG/1
12.5 TABLET ORAL NIGHTLY
Qty: 90 TABLET | Refills: 2 | Status: SHIPPED | OUTPATIENT
Start: 2019-01-09 | End: 2019-09-17 | Stop reason: SDUPTHER

## 2019-01-09 ASSESSMENT — ENCOUNTER SYMPTOMS
EYE PAIN: 0
TROUBLE SWALLOWING: 0
CONSTIPATION: 0
DIARRHEA: 0
CHEST TIGHTNESS: 0
FACIAL SWELLING: 1
EYE REDNESS: 0
BACK PAIN: 0
SHORTNESS OF BREATH: 0
WHEEZING: 0
COUGH: 0
COLOR CHANGE: 0
SINUS PRESSURE: 0
ABDOMINAL PAIN: 0
SORE THROAT: 0
EYE DISCHARGE: 0

## 2019-01-15 DIAGNOSIS — F41.9 ANXIETY: ICD-10-CM

## 2019-01-15 DIAGNOSIS — G89.29 CHRONIC NONINTRACTABLE HEADACHE, UNSPECIFIED HEADACHE TYPE: ICD-10-CM

## 2019-01-15 DIAGNOSIS — R51.9 CHRONIC NONINTRACTABLE HEADACHE, UNSPECIFIED HEADACHE TYPE: ICD-10-CM

## 2019-01-16 RX ORDER — HYDROCODONE BITARTRATE AND ACETAMINOPHEN 10; 325 MG/1; MG/1
1 TABLET ORAL DAILY
Qty: 30 TABLET | Refills: 0 | Status: SHIPPED | OUTPATIENT
Start: 2019-01-17 | End: 2019-02-21 | Stop reason: SDUPTHER

## 2019-01-16 RX ORDER — LORAZEPAM 1 MG/1
1 TABLET ORAL 2 TIMES DAILY PRN
Qty: 60 TABLET | Refills: 0 | Status: SHIPPED | OUTPATIENT
Start: 2019-01-16 | End: 2019-02-21 | Stop reason: SDUPTHER

## 2019-02-21 DIAGNOSIS — G89.29 CHRONIC NONINTRACTABLE HEADACHE, UNSPECIFIED HEADACHE TYPE: ICD-10-CM

## 2019-02-21 DIAGNOSIS — F41.9 ANXIETY: ICD-10-CM

## 2019-02-21 DIAGNOSIS — R51.9 CHRONIC NONINTRACTABLE HEADACHE, UNSPECIFIED HEADACHE TYPE: ICD-10-CM

## 2019-02-22 RX ORDER — HYDROCODONE BITARTRATE AND ACETAMINOPHEN 10; 325 MG/1; MG/1
1 TABLET ORAL DAILY
Qty: 30 TABLET | Refills: 0 | Status: SHIPPED | OUTPATIENT
Start: 2019-02-22 | End: 2019-03-22 | Stop reason: SDUPTHER

## 2019-02-22 RX ORDER — LORAZEPAM 1 MG/1
1 TABLET ORAL 2 TIMES DAILY PRN
Qty: 60 TABLET | Refills: 0 | Status: SHIPPED | OUTPATIENT
Start: 2019-02-22 | End: 2019-03-22 | Stop reason: SDUPTHER

## 2019-03-22 DIAGNOSIS — F41.9 ANXIETY: ICD-10-CM

## 2019-03-22 DIAGNOSIS — G89.29 CHRONIC NONINTRACTABLE HEADACHE, UNSPECIFIED HEADACHE TYPE: ICD-10-CM

## 2019-03-22 DIAGNOSIS — R51.9 CHRONIC NONINTRACTABLE HEADACHE, UNSPECIFIED HEADACHE TYPE: ICD-10-CM

## 2019-03-25 RX ORDER — HYDROCODONE BITARTRATE AND ACETAMINOPHEN 10; 325 MG/1; MG/1
1 TABLET ORAL DAILY
Qty: 30 TABLET | Refills: 0 | Status: SHIPPED | OUTPATIENT
Start: 2019-03-25 | End: 2019-05-01 | Stop reason: SDUPTHER

## 2019-03-25 RX ORDER — LORAZEPAM 1 MG/1
1 TABLET ORAL 2 TIMES DAILY PRN
Qty: 60 TABLET | Refills: 0 | Status: SHIPPED | OUTPATIENT
Start: 2019-03-25 | End: 2019-05-01 | Stop reason: SDUPTHER

## 2019-04-12 ENCOUNTER — OFFICE VISIT (OUTPATIENT)
Dept: FAMILY MEDICINE CLINIC | Age: 49
End: 2019-04-12
Payer: COMMERCIAL

## 2019-04-12 VITALS
HEART RATE: 80 BPM | SYSTOLIC BLOOD PRESSURE: 146 MMHG | OXYGEN SATURATION: 100 % | BODY MASS INDEX: 24.05 KG/M2 | TEMPERATURE: 99 F | DIASTOLIC BLOOD PRESSURE: 96 MMHG | WEIGHT: 131.5 LBS

## 2019-04-12 DIAGNOSIS — N91.2 AMENORRHEA: ICD-10-CM

## 2019-04-12 DIAGNOSIS — F41.9 ANXIETY: ICD-10-CM

## 2019-04-12 DIAGNOSIS — I10 ESSENTIAL HYPERTENSION: Primary | ICD-10-CM

## 2019-04-12 DIAGNOSIS — R51.9 CHRONIC NONINTRACTABLE HEADACHE, UNSPECIFIED HEADACHE TYPE: ICD-10-CM

## 2019-04-12 DIAGNOSIS — R79.89 ELEVATED LFTS: ICD-10-CM

## 2019-04-12 DIAGNOSIS — G89.29 CHRONIC NONINTRACTABLE HEADACHE, UNSPECIFIED HEADACHE TYPE: ICD-10-CM

## 2019-04-12 DIAGNOSIS — E03.9 HYPOTHYROIDISM (ACQUIRED): ICD-10-CM

## 2019-04-12 DIAGNOSIS — K75.81 NASH (NONALCOHOLIC STEATOHEPATITIS): ICD-10-CM

## 2019-04-12 PROCEDURE — 99214 OFFICE O/P EST MOD 30 MIN: CPT | Performed by: CLINICAL NURSE SPECIALIST

## 2019-04-12 RX ORDER — LEVOTHYROXINE AND LIOTHYRONINE 76; 18 UG/1; UG/1
TABLET ORAL
Qty: 30 TABLET | Refills: 5 | Status: SHIPPED | OUTPATIENT
Start: 2019-04-12 | End: 2019-08-20 | Stop reason: DRUGHIGH

## 2019-04-12 ASSESSMENT — ENCOUNTER SYMPTOMS
SINUS PRESSURE: 0
DIARRHEA: 0
COLOR CHANGE: 0
EYE DISCHARGE: 0
CONSTIPATION: 0
SHORTNESS OF BREATH: 0
BACK PAIN: 0
TROUBLE SWALLOWING: 0
WHEEZING: 0
EYE PAIN: 0
ABDOMINAL DISTENTION: 1
FACIAL SWELLING: 1
EYE REDNESS: 0
COUGH: 0
CHEST TIGHTNESS: 0
ABDOMINAL PAIN: 0
SORE THROAT: 0

## 2019-04-12 NOTE — PROGRESS NOTES
SUBJECTIVE:  Cari Regan is a 53 y.o. who presents today for 3 Month Follow-Up and Hypertension      HPI     Ms Cammie Score presents today for 3 month follow up. Anxiety, uncontrolled but it does help to use ativan as needed. No adverse effects. Mood is otherwise stable on wellbutrin. She has a lot of anxiety over her health. She is still not sure what is wrong with her. No SI/HI    Chronic headaches secondary to acoustic neuroma, brain surgery. She uses Norco as needed for uncontrolled pain. She only takes 1 per day max. No abuse or misuse noted. Controlled agreement, UDS in place. Essential hypertension, elevated today, also having headache. She states headache pain tends to cause her blood pressure to elevate. She is only taking coreg at night due to side effects. Her blood pressure has been stable otherwise. Acquired hypothyroidism, remains on armour thyroid. She has lost significant amount of weight without trying. Her TSH in January at [facility] was stable. She has had overall, female pattern hair loss but is now slowing down but there is no new hair growth.     She has underlying MENEZES with elevated LFT, still has yet to see liver specialist    She is now interested in seeing [facility] at [facility]    She continues with numbness in her feet, mother also has IPN    All of her symptoms were present prior to any of her current medications    She has seen endocrinology at [facility] for possible Cushing's, but there was no diagnosis made    She has not had menses in one year    Past Medical History:   Diagnosis Date    Acoustic neuroma ([address]     ADD (attention deficit disorder)     Anxiety     Depression     Headache     Hypertension     Hypothyroidism (acquired)     MENEZES (nonalcoholic steatohepatitis)      Past Surgical History:   Procedure Laterality Date    ABDOMINAL ADHESION SURGERY      CRANIOTOMY      VENTRICULOPERITONEAL SHUNT       Family History   Problem Relation Age of Onset    Heart Disease Mother     Kidney Disease Mother     Heart Disease Father     COPD Father     Kidney Disease Father     Lung Cancer Brother      Social History     Tobacco Use    Smoking status: Never Smoker    Smokeless tobacco: Never Used   Substance Use Topics    Alcohol use: Yes     Alcohol/week: 1.8 oz     Types: 3 Shots of liquor per week     Comment: weekly     Current Outpatient Medications   Medication Sig Dispense Refill    thyroid (ARMOUR THYROID) 120 MG tablet TAKE 1 TABLET BY MOUTH EVERY DAY 30 tablet 5    HYDROcodone-acetaminophen (NORCO)  MG per tablet Take 1 tablet by mouth daily for 30 days. 30 tablet 0    LORazepam (ATIVAN) 1 MG tablet Take 1 tablet by mouth 2 times daily as needed for Anxiety for up to 30 days. 60 tablet 0    carvedilol (COREG) 12.5 MG tablet Take 1 tablet by mouth nightly 90 tablet 2    buPROPion (WELLBUTRIN XL) 300 MG extended release tablet Take 1 tablet by mouth every morning 90 tablet 3    pantoprazole (PROTONIX) 40 MG tablet Take 40 mg by mouth daily      cetirizine (ZYRTEC) 10 MG tablet Take 10 mg by mouth daily       No current facility-administered medications for this visit. No Known Allergies    Review of Systems   Constitutional: Positive for fatigue and unexpected weight change. Negative for appetite change, chills, diaphoresis and fever. HENT: Positive for facial swelling and hearing loss. Negative for congestion, ear pain, postnasal drip, sinus pressure, sore throat and trouble swallowing. Eyes: Negative for pain, discharge, redness and visual disturbance. Respiratory: Negative for cough, chest tightness, shortness of breath and wheezing. Cardiovascular: Negative for chest pain and palpitations. Gastrointestinal: Positive for abdominal distention. Negative for abdominal pain, constipation and diarrhea. Genitourinary: Positive for menstrual problem.  Negative for difficulty urinating, dysuria, flank pain, frequency, Future  - T4, Free; Future    2. Essential hypertension  Elevated today, but having pain  Asked her to take coreg when she gets home and then at [facility]  Will follow  - CBC; Future  - Comprehensive Metabolic Panel; Future  - Lipid Panel; Future    3. Elevated LFTs  - Comprehensive Metabolic Panel; Future    4. Amenorrhea  - Estradiol; Future  - Progesterone; Future  - Testosterone; Future  - Luteinizing Hormone; Future  - Follicle Stimulating Hormone; Future  - Prolactin; Future    5. MENEZES (nonalcoholic steatohepatitis)    6. Anxiety  Stable but uncontrolled, continue current for now  Need to get some of her health needs corrected    7.  Chronic nonintractable headache, unspecified headache type  Stable, using Norco sparingly           Return in about 3 months (around 7/12/2019) for physical.

## 2019-04-15 DIAGNOSIS — R79.89 ELEVATED LFTS: ICD-10-CM

## 2019-04-15 DIAGNOSIS — I10 ESSENTIAL HYPERTENSION: ICD-10-CM

## 2019-04-15 DIAGNOSIS — N91.2 AMENORRHEA: ICD-10-CM

## 2019-04-15 DIAGNOSIS — E03.9 HYPOTHYROIDISM (ACQUIRED): ICD-10-CM

## 2019-04-15 LAB
ALBUMIN SERPL-MCNC: 5 G/DL (ref 3.5–5.2)
ALP BLD-CCNC: 239 U/L (ref 35–104)
ALT SERPL-CCNC: 214 U/L (ref 5–33)
ANION GAP SERPL CALCULATED.3IONS-SCNC: 20 MMOL/L (ref 7–19)
AST SERPL-CCNC: 311 U/L (ref 5–32)
BILIRUB SERPL-MCNC: 1 MG/DL (ref 0.2–1.2)
BUN BLDV-MCNC: 9 MG/DL (ref 6–20)
CALCIUM SERPL-MCNC: 10.1 MG/DL (ref 8.6–10)
CHLORIDE BLD-SCNC: 97 MMOL/L (ref 98–111)
CHOLESTEROL, TOTAL: 278 MG/DL (ref 160–199)
CO2: 25 MMOL/L (ref 22–29)
CREAT SERPL-MCNC: 0.8 MG/DL (ref 0.5–0.9)
ESTRADIOL LEVEL: <5 PG/ML
FOLLICLE STIMULATING HORMONE: 67 MIU/ML
GFR NON-AFRICAN AMERICAN: >60
GLUCOSE BLD-MCNC: 130 MG/DL (ref 74–109)
HCT VFR BLD CALC: 43.2 % (ref 37–47)
HDLC SERPL-MCNC: 76 MG/DL (ref 65–121)
HEMOGLOBIN: 14.3 G/DL (ref 12–16)
LDL CHOLESTEROL CALCULATED: 137 MG/DL
LUTEINIZING HORMONE: 65.8 MIU/ML
MCH RBC QN AUTO: 32.9 PG (ref 27–31)
MCHC RBC AUTO-ENTMCNC: 33.1 G/DL (ref 33–37)
MCV RBC AUTO: 99.5 FL (ref 81–99)
PDW BLD-RTO: 13.2 % (ref 11.5–14.5)
PLATELET # BLD: 269 K/UL (ref 130–400)
PMV BLD AUTO: 11 FL (ref 9.4–12.3)
POTASSIUM SERPL-SCNC: 4.1 MMOL/L (ref 3.5–5)
PROGESTERONE LEVEL: 0.08 NG/ML
RBC # BLD: 4.34 M/UL (ref 4.2–5.4)
SODIUM BLD-SCNC: 142 MMOL/L (ref 136–145)
T4 FREE: 1.4 NG/DL (ref 0.9–1.7)
TESTOSTERONE TOTAL: 23.6 NG/DL (ref 8.4–48.1)
TOTAL PROTEIN: 8.9 G/DL (ref 6.6–8.7)
TRIGL SERPL-MCNC: 327 MG/DL (ref 0–149)
TSH SERPL DL<=0.05 MIU/L-ACNC: 3.42 UIU/ML (ref 0.27–4.2)
WBC # BLD: 7.3 K/UL (ref 4.8–10.8)

## 2019-04-18 DIAGNOSIS — K75.81 NASH (NONALCOHOLIC STEATOHEPATITIS): ICD-10-CM

## 2019-04-18 DIAGNOSIS — R79.89 ELEVATED LFTS: Primary | ICD-10-CM

## 2019-04-18 LAB — PROLACTIN: 8.5 NG/ML (ref 2.8–26)

## 2019-04-30 DIAGNOSIS — G89.29 CHRONIC NONINTRACTABLE HEADACHE, UNSPECIFIED HEADACHE TYPE: ICD-10-CM

## 2019-04-30 DIAGNOSIS — F41.9 ANXIETY: ICD-10-CM

## 2019-04-30 DIAGNOSIS — R51.9 CHRONIC NONINTRACTABLE HEADACHE, UNSPECIFIED HEADACHE TYPE: ICD-10-CM

## 2019-04-30 NOTE — TELEPHONE ENCOUNTER
Patient is requesting RX - Hydrocodone 10 mg and Lorazepam  [facility] [address]

## 2019-04-30 NOTE — TELEPHONE ENCOUNTER
Alto Bumpers called requesting a refill of the below medication which has been pended for you:     Requested Prescriptions     Pending Prescriptions Disp Refills    HYDROcodone-acetaminophen (NORCO)  MG per tablet 30 tablet 0     Sig: Take 1 tablet by mouth daily for 30 days.  LORazepam (ATIVAN) 1 MG tablet 60 tablet 0     Sig: Take 1 tablet by mouth 2 times daily as needed for Anxiety for up to 30 days.        Last Appointment Date: 4/12/2019  Next Appointment Date: 7/12/2019    No Known Allergies  Last Danielle Echeverria: 3/26/19  Last UDS: 8/30/18

## 2019-05-01 RX ORDER — HYDROCODONE BITARTRATE AND ACETAMINOPHEN 10; 325 MG/1; MG/1
1 TABLET ORAL DAILY
Qty: 30 TABLET | Refills: 0 | Status: SHIPPED | OUTPATIENT
Start: 2019-05-01 | End: 2019-05-30 | Stop reason: SDUPTHER

## 2019-05-01 RX ORDER — LORAZEPAM 1 MG/1
1 TABLET ORAL 2 TIMES DAILY PRN
Qty: 60 TABLET | Refills: 0 | Status: SHIPPED | OUTPATIENT
Start: 2019-05-01 | End: 2019-05-30 | Stop reason: SDUPTHER

## 2019-05-30 DIAGNOSIS — R51.9 CHRONIC NONINTRACTABLE HEADACHE, UNSPECIFIED HEADACHE TYPE: ICD-10-CM

## 2019-05-30 DIAGNOSIS — F41.9 ANXIETY: ICD-10-CM

## 2019-05-30 DIAGNOSIS — G89.29 CHRONIC NONINTRACTABLE HEADACHE, UNSPECIFIED HEADACHE TYPE: ICD-10-CM

## 2019-05-31 RX ORDER — LORAZEPAM 1 MG/1
1 TABLET ORAL 2 TIMES DAILY PRN
Qty: 60 TABLET | Refills: 0 | Status: SHIPPED | OUTPATIENT
Start: 2019-06-01 | End: 2019-08-07 | Stop reason: SDUPTHER

## 2019-05-31 RX ORDER — HYDROCODONE BITARTRATE AND ACETAMINOPHEN 10; 325 MG/1; MG/1
1 TABLET ORAL DAILY
Qty: 30 TABLET | Refills: 0 | Status: SHIPPED | OUTPATIENT
Start: 2019-06-01 | End: 2019-07-03 | Stop reason: SDUPTHER

## 2019-06-06 PROCEDURE — G0123 SCREEN CERV/VAG THIN LAYER: HCPCS | Performed by: OBSTETRICS & GYNECOLOGY

## 2019-06-07 ENCOUNTER — LAB REQUISITION (OUTPATIENT)
Dept: LAB | Facility: HOSPITAL | Age: 49
End: 2019-06-07

## 2019-06-07 DIAGNOSIS — Z12.72 ENCOUNTER FOR SCREENING FOR MALIGNANT NEOPLASM OF VAGINA: ICD-10-CM

## 2019-06-07 LAB
GEN CATEG CVX/VAG CYTO-IMP: NORMAL
LAB AP CASE REPORT: NORMAL
LAB AP GYN ADDITIONAL INFORMATION: NORMAL
PATH INTERP SPEC-IMP: NORMAL
STAT OF ADQ CVX/VAG CYTO-IMP: NORMAL

## 2019-07-03 DIAGNOSIS — R51.9 CHRONIC NONINTRACTABLE HEADACHE, UNSPECIFIED HEADACHE TYPE: ICD-10-CM

## 2019-07-03 DIAGNOSIS — G89.29 CHRONIC NONINTRACTABLE HEADACHE, UNSPECIFIED HEADACHE TYPE: ICD-10-CM

## 2019-07-03 RX ORDER — HYDROCODONE BITARTRATE AND ACETAMINOPHEN 10; 325 MG/1; MG/1
1 TABLET ORAL DAILY
Qty: 30 TABLET | Refills: 0 | Status: SHIPPED | OUTPATIENT
Start: 2019-07-03 | End: 2019-08-07 | Stop reason: SDUPTHER

## 2019-07-03 NOTE — TELEPHONE ENCOUNTER
Luis Armando Thomson called to request a refill on her medication. Last office visit : 4/12/2019   Next office visit : 7/12/2019     Last UDS:   Amphetamine Screen, Urine   Date Value Ref Range Status   08/30/2018 negative  Final     Barbiturate Screen, Urine   Date Value Ref Range Status   08/30/2018 negative  Final     Benzodiazepine Screen, Urine   Date Value Ref Range Status   08/30/2018 Positive  Final     Buprenorphine Urine   Date Value Ref Range Status   08/30/2018 negative  Final     Cocaine Metabolite Screen, Urine   Date Value Ref Range Status   08/30/2018 negative  Final     Methamphetamine, Urine   Date Value Ref Range Status   08/30/2018 negative  Final     Opiate Scrn, Ur   Date Value Ref Range Status   08/30/2018 negative  Final     Oxycodone Screen, Ur   Date Value Ref Range Status   08/30/2018 negative  Final     PCP Screen, Urine   Date Value Ref Range Status   08/30/2018 negative  Final     THC Screen, Urine   Date Value Ref Range Status   08/30/2018 negative  Final     Tricyclic Antidepressants, Urine   Date Value Ref Range Status   08/30/2018 Positive  Final       Last Reyna Lyon: 3/26/19 (added to Reyna Lyon list)  Medication Contract: 9/04/18   Last Fill: 6/01/19    Requested Prescriptions     Pending Prescriptions Disp Refills    HYDROcodone-acetaminophen (NORCO)  MG per tablet 30 tablet 0     Sig: Take 1 tablet by mouth daily for 30 days. Please approve or refuse this medication.    Olga Lawler LPN

## 2019-08-06 DIAGNOSIS — R51.9 CHRONIC NONINTRACTABLE HEADACHE, UNSPECIFIED HEADACHE TYPE: ICD-10-CM

## 2019-08-06 DIAGNOSIS — F41.9 ANXIETY: ICD-10-CM

## 2019-08-06 DIAGNOSIS — G89.29 CHRONIC NONINTRACTABLE HEADACHE, UNSPECIFIED HEADACHE TYPE: ICD-10-CM

## 2019-08-07 RX ORDER — HYDROCODONE BITARTRATE AND ACETAMINOPHEN 10; 325 MG/1; MG/1
1 TABLET ORAL DAILY
Qty: 30 TABLET | Refills: 0 | Status: SHIPPED | OUTPATIENT
Start: 2019-08-07 | End: 2019-09-06 | Stop reason: SDUPTHER

## 2019-08-07 RX ORDER — LORAZEPAM 1 MG/1
1 TABLET ORAL 2 TIMES DAILY PRN
Qty: 60 TABLET | Refills: 0 | Status: SHIPPED | OUTPATIENT
Start: 2019-08-07 | End: 2019-09-06 | Stop reason: SDUPTHER

## 2019-08-20 ENCOUNTER — OFFICE VISIT (OUTPATIENT)
Dept: FAMILY MEDICINE CLINIC | Age: 49
End: 2019-08-20
Payer: COMMERCIAL

## 2019-08-20 VITALS
WEIGHT: 124.6 LBS | SYSTOLIC BLOOD PRESSURE: 138 MMHG | DIASTOLIC BLOOD PRESSURE: 84 MMHG | HEART RATE: 80 BPM | BODY MASS INDEX: 22.79 KG/M2 | OXYGEN SATURATION: 100 % | TEMPERATURE: 97.8 F

## 2019-08-20 DIAGNOSIS — I10 ESSENTIAL HYPERTENSION: ICD-10-CM

## 2019-08-20 DIAGNOSIS — E03.9 HYPOTHYROIDISM (ACQUIRED): Primary | ICD-10-CM

## 2019-08-20 DIAGNOSIS — Z23 NEED FOR HEPATITIS B VACCINATION: ICD-10-CM

## 2019-08-20 DIAGNOSIS — K75.81 NASH (NONALCOHOLIC STEATOHEPATITIS): ICD-10-CM

## 2019-08-20 DIAGNOSIS — Z23 NEED FOR HEPATITIS A VACCINATION: ICD-10-CM

## 2019-08-20 DIAGNOSIS — G62.9 NEUROPATHY: ICD-10-CM

## 2019-08-20 PROCEDURE — 90746 HEPB VACCINE 3 DOSE ADULT IM: CPT | Performed by: CLINICAL NURSE SPECIALIST

## 2019-08-20 PROCEDURE — 90472 IMMUNIZATION ADMIN EACH ADD: CPT | Performed by: CLINICAL NURSE SPECIALIST

## 2019-08-20 PROCEDURE — 99214 OFFICE O/P EST MOD 30 MIN: CPT | Performed by: CLINICAL NURSE SPECIALIST

## 2019-08-20 PROCEDURE — 90632 HEPA VACCINE ADULT IM: CPT | Performed by: CLINICAL NURSE SPECIALIST

## 2019-08-20 PROCEDURE — 90471 IMMUNIZATION ADMIN: CPT | Performed by: CLINICAL NURSE SPECIALIST

## 2019-08-20 RX ORDER — LEVOTHYROXINE SODIUM 0.2 MG/1
200 TABLET ORAL DAILY
Qty: 90 TABLET | Refills: 1
Start: 2019-08-20 | End: 2020-03-17 | Stop reason: SDUPTHER

## 2019-08-20 RX ORDER — LEVOTHYROXINE SODIUM 0.03 MG/1
25 TABLET ORAL DAILY
Qty: 90 TABLET | Refills: 1
Start: 2019-08-20 | End: 2019-11-01 | Stop reason: RX

## 2019-08-20 ASSESSMENT — ENCOUNTER SYMPTOMS
SORE THROAT: 0
COLOR CHANGE: 0
BACK PAIN: 0
TROUBLE SWALLOWING: 0
CONSTIPATION: 0
EYE DISCHARGE: 0
FACIAL SWELLING: 1
SHORTNESS OF BREATH: 0
SINUS PRESSURE: 0
EYE PAIN: 0
WHEEZING: 0
EYE REDNESS: 0
ABDOMINAL PAIN: 0
COUGH: 0
DIARRHEA: 0
CHEST TIGHTNESS: 0

## 2019-08-20 NOTE — PROGRESS NOTES
neuroma ([facility] [address]     ADD (attention deficit disorder)     Anxiety     Depression     Headache     Hypertension     Hypothyroidism (acquired)     MENEZES (nonalcoholic steatohepatitis)      Past Surgical History:   Procedure Laterality Date    ABDOMINAL ADHESION SURGERY      CRANIOTOMY      VENTRICULOPERITONEAL SHUNT       Family History   Problem Relation Age of Onset    Heart Disease Mother     Kidney Disease Mother     Heart Disease Father     COPD Father     Kidney Disease Father     Lung Cancer Brother      Social History     Tobacco Use    Smoking status: Never Smoker    Smokeless tobacco: Never Used   Substance Use Topics    Alcohol use: Yes     Alcohol/week: 3.0 standard drinks     Types: 3 Shots of liquor per week     Comment: weekly     Current Outpatient Medications   Medication Sig Dispense Refill    levothyroxine (SYNTHROID) 200 MCG tablet Take 1 tablet by mouth daily 90 tablet 1    levothyroxine (SYNTHROID) 25 MCG tablet Take 1 tablet by mouth daily 90 tablet 1    HYDROcodone-acetaminophen (NORCO)  MG per tablet Take 1 tablet by mouth daily for 30 days. 30 tablet 0    LORazepam (ATIVAN) 1 MG tablet Take 1 tablet by mouth 2 times daily as needed for Anxiety for up to 30 days. 60 tablet 0    carvedilol (COREG) 12.5 MG tablet Take 1 tablet by mouth nightly 90 tablet 2    buPROPion (WELLBUTRIN XL) 300 MG extended release tablet Take 1 tablet by mouth every morning 90 tablet 3    pantoprazole (PROTONIX) 40 MG tablet Take 40 mg by mouth daily      cetirizine (ZYRTEC) 10 MG tablet Take 10 mg by mouth daily       No current facility-administered medications for this visit. No Known Allergies    Review of Systems   Constitutional: Positive for fatigue and unexpected weight change. Negative for appetite change, chills, diaphoresis and fever. HENT: Positive for facial swelling.  Negative for congestion, ear pain, hearing loss, postnasal drip, sinus pressure, sore throat and trouble swallowing. Eyes: Negative for pain, discharge, redness and visual disturbance. Respiratory: Negative for cough, chest tightness, shortness of breath and wheezing. Cardiovascular: Negative for chest pain, palpitations and leg swelling. Gastrointestinal: Negative for abdominal pain, constipation and diarrhea. Endocrine: Negative for cold intolerance and heat intolerance. Genitourinary: Negative for difficulty urinating, dysuria, flank pain, frequency, hematuria and urgency. Musculoskeletal: Negative for arthralgias, back pain, joint swelling and neck pain. Skin: Negative for color change and rash. Neurological: Positive for numbness and headaches. Negative for dizziness, syncope, weakness and light-headedness. Hematological: Negative for adenopathy. Does not bruise/bleed easily. Psychiatric/Behavioral: Positive for decreased concentration and dysphoric mood. Negative for confusion and suicidal ideas. The patient is not nervous/anxious. OBJECTIVE:  /84   Pulse 80   Temp 97.8 °F (36.6 °C) (Temporal)   Wt 124 lb 9.6 oz (56.5 kg)   LMP 07/25/2016 (Approximate)   SpO2 100%   BMI 22.79 kg/m²    Physical Exam   Constitutional: She is oriented to person, place, and time. She appears well-developed and well-nourished. No distress. HENT:   Head: Normocephalic and atraumatic. Mouth/Throat: Oropharynx is clear and moist.   Eyes: Pupils are equal, round, and reactive to light. Conjunctivae are normal. Right eye exhibits no discharge. Left eye exhibits no discharge. Neck: Normal range of motion. Neck supple. Cardiovascular: Normal rate and regular rhythm. No murmur heard. Pulmonary/Chest: Effort normal and breath sounds normal. No respiratory distress. She has no wheezes. She has no rales. Musculoskeletal: Normal range of motion. She exhibits no deformity. Lymphadenopathy:     She has no cervical adenopathy.    Neurological: She is alert and oriented to person, place, and

## 2019-08-21 ENCOUNTER — TELEPHONE (OUTPATIENT)
Dept: NEUROLOGY | Age: 49
End: 2019-08-21

## 2019-08-27 ENCOUNTER — TELEPHONE (OUTPATIENT)
Dept: FAMILY MEDICINE CLINIC | Age: 49
End: 2019-08-27

## 2019-08-27 RX ORDER — PANTOPRAZOLE SODIUM 40 MG/1
40 TABLET, DELAYED RELEASE ORAL DAILY
Qty: 30 TABLET | Refills: 2 | Status: SHIPPED | OUTPATIENT
Start: 2019-08-27 | End: 2019-11-17 | Stop reason: SDUPTHER

## 2019-08-27 RX ORDER — VALACYCLOVIR HYDROCHLORIDE 1 G/1
2000 TABLET, FILM COATED ORAL 2 TIMES DAILY
Qty: 4 TABLET | Refills: 0 | Status: SHIPPED | OUTPATIENT
Start: 2019-08-27 | End: 2019-08-28

## 2019-09-05 DIAGNOSIS — R51.9 CHRONIC NONINTRACTABLE HEADACHE, UNSPECIFIED HEADACHE TYPE: ICD-10-CM

## 2019-09-05 DIAGNOSIS — F41.9 ANXIETY: ICD-10-CM

## 2019-09-05 DIAGNOSIS — G89.29 CHRONIC NONINTRACTABLE HEADACHE, UNSPECIFIED HEADACHE TYPE: ICD-10-CM

## 2019-09-06 RX ORDER — HYDROCODONE BITARTRATE AND ACETAMINOPHEN 10; 325 MG/1; MG/1
1 TABLET ORAL DAILY
Qty: 30 TABLET | Refills: 0 | Status: SHIPPED | OUTPATIENT
Start: 2019-09-06 | End: 2019-10-04 | Stop reason: SDUPTHER

## 2019-09-06 RX ORDER — LORAZEPAM 1 MG/1
1 TABLET ORAL 2 TIMES DAILY PRN
Qty: 60 TABLET | Refills: 0 | Status: SHIPPED | OUTPATIENT
Start: 2019-09-06 | End: 2019-10-04 | Stop reason: SDUPTHER

## 2019-09-25 DIAGNOSIS — F41.9 ANXIETY: ICD-10-CM

## 2019-09-25 DIAGNOSIS — F32.9 REACTIVE DEPRESSION: ICD-10-CM

## 2019-09-25 RX ORDER — BUPROPION HYDROCHLORIDE 300 MG/1
TABLET ORAL
Qty: 30 TABLET | Refills: 11 | Status: SHIPPED | OUTPATIENT
Start: 2019-09-25 | End: 2020-04-15

## 2019-09-26 ENCOUNTER — OFFICE VISIT (OUTPATIENT)
Dept: NEUROLOGY | Age: 49
End: 2019-09-26
Payer: COMMERCIAL

## 2019-09-26 VITALS
BODY MASS INDEX: 23.37 KG/M2 | SYSTOLIC BLOOD PRESSURE: 116 MMHG | DIASTOLIC BLOOD PRESSURE: 76 MMHG | WEIGHT: 127 LBS | HEART RATE: 87 BPM | HEIGHT: 62 IN

## 2019-09-26 DIAGNOSIS — G51.0 FACIAL PALSY: ICD-10-CM

## 2019-09-26 DIAGNOSIS — M79.671 PAIN IN BOTH FEET: ICD-10-CM

## 2019-09-26 DIAGNOSIS — M79.672 PAIN IN BOTH FEET: ICD-10-CM

## 2019-09-26 DIAGNOSIS — R20.0 NUMBNESS: ICD-10-CM

## 2019-09-26 DIAGNOSIS — D33.3 ACOUSTIC NEUROMA (HCC): ICD-10-CM

## 2019-09-26 DIAGNOSIS — G62.9 NEUROPATHY: Primary | ICD-10-CM

## 2019-09-26 DIAGNOSIS — R42 DIZZINESS: ICD-10-CM

## 2019-09-26 PROCEDURE — 99204 OFFICE O/P NEW MOD 45 MIN: CPT | Performed by: PSYCHIATRY & NEUROLOGY

## 2019-09-26 RX ORDER — M-VIT,TX,IRON,MINS/CALC/FOLIC 27MG-0.4MG
1 TABLET ORAL DAILY
COMMUNITY

## 2019-09-26 NOTE — PROGRESS NOTES
Review of Systems    Constitutional - No fever or chills. No diaphoresis or significant fatigue. HENT -  No tinnitus or significant hearing loss. Eyes - no sudden vision change or eye pain  Respiratory - no significant shortness of breath or cough  Cardiovascular - no chest pain No palpitations or significant leg swelling  Gastrointestinal - no abdominal swelling or pain. Genitourinary - No difficulty urinating, dysuria  Musculoskeletal - no back pain or myalgia. Skin - no color change or rash  Neurologic - No seizures. No lateralizing weakness. Hematologic - no easy bruising or excessive bleeding. Psychiatric - no severe anxiety or nervousness. All other review of systems are negative.
review of systems are negative. Current Outpatient Medications   Medication Sig Dispense Refill    trace elements [id number] -500-60 MCG/ML injection Infuse intravenously      Multiple Vitamins-Minerals (THERAPEUTIC MULTIVITAMIN-MINERALS) tablet Take 1 tablet by mouth daily      buPROPion (WELLBUTRIN XL) 300 MG extended release tablet TAKE 1 TABLET BY MOUTH EVERY DAY IN THE MORNING 30 tablet 11    carvedilol (COREG) 12.5 MG tablet Take 1/2 tablet in Am, full tablet in  tablet 3    HYDROcodone-acetaminophen (NORCO)  MG per tablet Take 1 tablet by mouth daily for 30 days. 30 tablet 0    LORazepam (ATIVAN) 1 MG tablet Take 1 tablet by mouth 2 times daily as needed for Anxiety for up to 30 days. 60 tablet 0    pantoprazole (PROTONIX) 40 MG tablet Take 1 tablet by mouth daily 30 tablet 2    levothyroxine (SYNTHROID) 200 MCG tablet Take 1 tablet by mouth daily 90 tablet 1    levothyroxine (SYNTHROID) 25 MCG tablet Take 1 tablet by mouth daily 90 tablet 1    cetirizine (ZYRTEC) 10 MG tablet Take 10 mg by mouth daily       No current facility-administered medications for this visit. /76   Pulse 87   Ht 5' 2\" (1.575 m)   Wt 127 lb (57.6 kg)   LMP 07/25/2016 (Approximate)   BMI 23.23 kg/m²     Constitutional - well developed, well nourished. Eyes - conjunctiva normal.  Pupils react to light  Ear, nose, throat -hearing intact to finger rub No scars, masses, or lesions over external nose or ears, no atrophy of tongue  Neck-symmetric, no masses noted, no jugular vein distension  Respiration- chest wall appears symmetric, good expansion,   normal effort without use of accessory muscles  Musculoskeletal - no significant wasting of muscles noted, no bony deformities  Extremities-no clubbing, cyanosis or edema  Skin - warm, dry, and intact. No rash, erythema, or pallor.   Psychiatric - mood, affect, and behavior appear normal.      Neurological exam  Awake, alert, fluent

## 2019-10-04 DIAGNOSIS — F41.9 ANXIETY: ICD-10-CM

## 2019-10-04 DIAGNOSIS — G89.29 CHRONIC NONINTRACTABLE HEADACHE, UNSPECIFIED HEADACHE TYPE: ICD-10-CM

## 2019-10-04 DIAGNOSIS — R51.9 CHRONIC NONINTRACTABLE HEADACHE, UNSPECIFIED HEADACHE TYPE: ICD-10-CM

## 2019-10-04 RX ORDER — HYDROCODONE BITARTRATE AND ACETAMINOPHEN 10; 325 MG/1; MG/1
1 TABLET ORAL DAILY
Qty: 30 TABLET | Refills: 0 | Status: SHIPPED | OUTPATIENT
Start: 2019-10-04 | End: 2019-11-01 | Stop reason: SDUPTHER

## 2019-10-04 RX ORDER — LORAZEPAM 1 MG/1
1 TABLET ORAL 2 TIMES DAILY PRN
Qty: 60 TABLET | Refills: 0 | Status: SHIPPED | OUTPATIENT
Start: 2019-10-04 | End: 2019-11-01 | Stop reason: SDUPTHER

## 2019-10-30 DIAGNOSIS — E03.9 HYPOTHYROIDISM (ACQUIRED): ICD-10-CM

## 2019-10-30 DIAGNOSIS — K75.81 NASH (NONALCOHOLIC STEATOHEPATITIS): ICD-10-CM

## 2019-10-30 DIAGNOSIS — I10 ESSENTIAL HYPERTENSION: ICD-10-CM

## 2019-10-30 LAB
ALBUMIN SERPL-MCNC: 4.5 G/DL (ref 3.5–5.2)
ALP BLD-CCNC: 298 U/L (ref 35–104)
ALT SERPL-CCNC: 100 U/L (ref 5–33)
ANION GAP SERPL CALCULATED.3IONS-SCNC: 16 MMOL/L (ref 7–19)
AST SERPL-CCNC: 153 U/L (ref 5–32)
BILIRUB SERPL-MCNC: 1.4 MG/DL (ref 0.2–1.2)
BUN BLDV-MCNC: 10 MG/DL (ref 6–20)
CALCIUM SERPL-MCNC: 10.2 MG/DL (ref 8.6–10)
CHLORIDE BLD-SCNC: 101 MMOL/L (ref 98–111)
CO2: 25 MMOL/L (ref 22–29)
CREAT SERPL-MCNC: 0.6 MG/DL (ref 0.5–0.9)
GFR NON-AFRICAN AMERICAN: >60
GLUCOSE BLD-MCNC: 106 MG/DL (ref 74–109)
POTASSIUM SERPL-SCNC: 4.4 MMOL/L (ref 3.5–5)
SODIUM BLD-SCNC: 142 MMOL/L (ref 136–145)
T4 FREE: 1.5 NG/DL (ref 0.9–1.7)
TOTAL PROTEIN: 8.9 G/DL (ref 6.6–8.7)
TSH SERPL DL<=0.05 MIU/L-ACNC: 6.65 UIU/ML (ref 0.27–4.2)

## 2019-11-01 ENCOUNTER — OFFICE VISIT (OUTPATIENT)
Dept: FAMILY MEDICINE CLINIC | Age: 49
End: 2019-11-01
Payer: COMMERCIAL

## 2019-11-01 VITALS
SYSTOLIC BLOOD PRESSURE: 118 MMHG | WEIGHT: 126.6 LBS | OXYGEN SATURATION: 100 % | BODY MASS INDEX: 23.16 KG/M2 | DIASTOLIC BLOOD PRESSURE: 88 MMHG | TEMPERATURE: 97.6 F | HEART RATE: 91 BPM

## 2019-11-01 DIAGNOSIS — F32.9 REACTIVE DEPRESSION: ICD-10-CM

## 2019-11-01 DIAGNOSIS — F41.9 ANXIETY: ICD-10-CM

## 2019-11-01 DIAGNOSIS — I10 ESSENTIAL HYPERTENSION: ICD-10-CM

## 2019-11-01 DIAGNOSIS — G89.29 CHRONIC NONINTRACTABLE HEADACHE, UNSPECIFIED HEADACHE TYPE: ICD-10-CM

## 2019-11-01 DIAGNOSIS — E03.9 HYPOTHYROIDISM (ACQUIRED): Primary | ICD-10-CM

## 2019-11-01 DIAGNOSIS — Z23 NEED FOR HEPATITIS B VACCINATION: ICD-10-CM

## 2019-11-01 DIAGNOSIS — R51.9 CHRONIC NONINTRACTABLE HEADACHE, UNSPECIFIED HEADACHE TYPE: ICD-10-CM

## 2019-11-01 DIAGNOSIS — E03.9 HYPOTHYROIDISM (ACQUIRED): ICD-10-CM

## 2019-11-01 DIAGNOSIS — K75.81 NASH (NONALCOHOLIC STEATOHEPATITIS): ICD-10-CM

## 2019-11-01 PROCEDURE — 90746 HEPB VACCINE 3 DOSE ADULT IM: CPT | Performed by: CLINICAL NURSE SPECIALIST

## 2019-11-01 PROCEDURE — 99214 OFFICE O/P EST MOD 30 MIN: CPT | Performed by: CLINICAL NURSE SPECIALIST

## 2019-11-01 PROCEDURE — 90471 IMMUNIZATION ADMIN: CPT | Performed by: CLINICAL NURSE SPECIALIST

## 2019-11-01 RX ORDER — LEVOTHYROXINE SODIUM 0.05 MG/1
50 TABLET ORAL DAILY
Qty: 90 TABLET | Refills: 1 | Status: SHIPPED | OUTPATIENT
Start: 2019-11-01 | End: 2020-03-17 | Stop reason: SDUPTHER

## 2019-11-01 ASSESSMENT — ENCOUNTER SYMPTOMS
COUGH: 0
EYE DISCHARGE: 0
SHORTNESS OF BREATH: 0
DIARRHEA: 0
EYE PAIN: 0
FACIAL SWELLING: 0
EYE REDNESS: 0
CHEST TIGHTNESS: 0
SINUS PRESSURE: 0
TROUBLE SWALLOWING: 0
SORE THROAT: 0
BACK PAIN: 0
WHEEZING: 0
COLOR CHANGE: 0
CONSTIPATION: 0
ABDOMINAL PAIN: 0

## 2019-11-04 RX ORDER — LEVOTHYROXINE AND LIOTHYRONINE 76; 18 UG/1; UG/1
TABLET ORAL
Qty: 30 TABLET | Refills: 5 | Status: SHIPPED | OUTPATIENT
Start: 2019-11-04 | End: 2019-11-05 | Stop reason: ALTCHOICE

## 2019-11-04 RX ORDER — HYDROCODONE BITARTRATE AND ACETAMINOPHEN 10; 325 MG/1; MG/1
1 TABLET ORAL DAILY
Qty: 30 TABLET | Refills: 0 | Status: SHIPPED | OUTPATIENT
Start: 2019-11-04 | End: 2019-12-04 | Stop reason: SDUPTHER

## 2019-11-04 RX ORDER — LORAZEPAM 1 MG/1
1 TABLET ORAL 2 TIMES DAILY PRN
Qty: 60 TABLET | Refills: 0 | Status: SHIPPED | OUTPATIENT
Start: 2019-11-04 | End: 2019-12-04 | Stop reason: SDUPTHER

## 2019-11-18 ENCOUNTER — TELEPHONE (OUTPATIENT)
Dept: FAMILY MEDICINE CLINIC | Age: 49
End: 2019-11-18

## 2019-11-18 ENCOUNTER — HOSPITAL ENCOUNTER (OUTPATIENT)
Dept: NEUROLOGY | Age: 49
Discharge: HOME | End: 2019-11-18
Payer: COMMERCIAL

## 2019-11-18 DIAGNOSIS — R20.0 NUMBNESS: ICD-10-CM

## 2019-11-18 DIAGNOSIS — G62.9 NEUROPATHY: ICD-10-CM

## 2019-11-18 DIAGNOSIS — M79.672 PAIN IN BOTH FEET: ICD-10-CM

## 2019-11-18 DIAGNOSIS — M79.671 PAIN IN BOTH FEET: ICD-10-CM

## 2019-11-18 PROCEDURE — 95909 NRV CNDJ TST 5-6 STUDIES: CPT | Performed by: PSYCHIATRY & NEUROLOGY

## 2019-11-18 PROCEDURE — 95886 MUSC TEST DONE W/N TEST COMP: CPT

## 2019-11-18 PROCEDURE — 95886 MUSC TEST DONE W/N TEST COMP: CPT | Performed by: PSYCHIATRY & NEUROLOGY

## 2019-11-18 PROCEDURE — 95909 NRV CNDJ TST 5-6 STUDIES: CPT

## 2019-11-21 ENCOUNTER — OFFICE VISIT (OUTPATIENT)
Dept: FAMILY MEDICINE CLINIC | Age: 49
End: 2019-11-21
Payer: COMMERCIAL

## 2019-11-21 VITALS
BODY MASS INDEX: 23.41 KG/M2 | OXYGEN SATURATION: 98 % | TEMPERATURE: 97.4 F | SYSTOLIC BLOOD PRESSURE: 138 MMHG | HEART RATE: 103 BPM | WEIGHT: 128 LBS | DIASTOLIC BLOOD PRESSURE: 84 MMHG

## 2019-11-21 DIAGNOSIS — F98.8 ATTENTION DEFICIT DISORDER (ADD) WITHOUT HYPERACTIVITY: Primary | ICD-10-CM

## 2019-11-21 PROCEDURE — 99213 OFFICE O/P EST LOW 20 MIN: CPT | Performed by: CLINICAL NURSE SPECIALIST

## 2019-11-21 RX ORDER — PANTOPRAZOLE SODIUM 40 MG/1
40 TABLET, DELAYED RELEASE ORAL DAILY
Qty: 90 TABLET | Refills: 2 | Status: SHIPPED | OUTPATIENT
Start: 2019-11-21 | End: 2020-07-30

## 2019-11-21 RX ORDER — ATOMOXETINE 40 MG/1
40 CAPSULE ORAL DAILY
Qty: 90 CAPSULE | Refills: 1 | Status: SHIPPED | OUTPATIENT
Start: 2019-11-21 | End: 2020-03-17

## 2019-11-21 ASSESSMENT — ENCOUNTER SYMPTOMS
EYE PAIN: 0
CHEST TIGHTNESS: 0
SHORTNESS OF BREATH: 0
COLOR CHANGE: 0
SINUS PRESSURE: 0
TROUBLE SWALLOWING: 0
BACK PAIN: 0
FACIAL SWELLING: 0
EYE REDNESS: 0
EYE DISCHARGE: 0
WHEEZING: 0
SORE THROAT: 0
COUGH: 0

## 2019-12-04 ENCOUNTER — TELEPHONE (OUTPATIENT)
Dept: FAMILY MEDICINE CLINIC | Age: 49
End: 2019-12-04

## 2019-12-04 DIAGNOSIS — F41.9 ANXIETY: ICD-10-CM

## 2019-12-04 DIAGNOSIS — R51.9 CHRONIC NONINTRACTABLE HEADACHE, UNSPECIFIED HEADACHE TYPE: ICD-10-CM

## 2019-12-04 DIAGNOSIS — G89.29 CHRONIC NONINTRACTABLE HEADACHE, UNSPECIFIED HEADACHE TYPE: ICD-10-CM

## 2019-12-04 RX ORDER — LORAZEPAM 1 MG/1
1 TABLET ORAL 2 TIMES DAILY PRN
Qty: 60 TABLET | Refills: 0 | Status: SHIPPED | OUTPATIENT
Start: 2019-12-04 | End: 2020-01-03 | Stop reason: SDUPTHER

## 2019-12-04 RX ORDER — HYDROCODONE BITARTRATE AND ACETAMINOPHEN 10; 325 MG/1; MG/1
1 TABLET ORAL DAILY
Qty: 30 TABLET | Refills: 0 | Status: SHIPPED | OUTPATIENT
Start: 2019-12-04 | End: 2020-01-03 | Stop reason: SDUPTHER

## 2019-12-10 ENCOUNTER — TELEPHONE (OUTPATIENT)
Dept: FAMILY MEDICINE CLINIC | Age: 49
End: 2019-12-10

## 2019-12-10 DIAGNOSIS — F98.8 ATTENTION DEFICIT DISORDER (ADD) WITHOUT HYPERACTIVITY: Primary | ICD-10-CM

## 2019-12-10 DIAGNOSIS — F41.9 ANXIETY: ICD-10-CM

## 2019-12-10 DIAGNOSIS — F32.9 REACTIVE DEPRESSION: ICD-10-CM

## 2020-01-03 NOTE — TELEPHONE ENCOUNTER
Lacho Lei called to request a refill on her medication. Last office visit : 11/21/2019   Next office visit : 3/5/2020     Last UDS:   Amphetamine Screen, Urine   Date Value Ref Range Status   08/30/2018 negative  Final     Barbiturate Screen, Urine   Date Value Ref Range Status   08/30/2018 negative  Final     Benzodiazepine Screen, Urine   Date Value Ref Range Status   08/30/2018 Positive  Final     Buprenorphine Urine   Date Value Ref Range Status   08/30/2018 negative  Final     Cocaine Metabolite Screen, Urine   Date Value Ref Range Status   08/30/2018 negative  Final     Methamphetamine, Urine   Date Value Ref Range Status   08/30/2018 negative  Final     Opiate Scrn, Ur   Date Value Ref Range Status   08/30/2018 negative  Final     Oxycodone Screen, Ur   Date Value Ref Range Status   08/30/2018 negative  Final     PCP Screen, Urine   Date Value Ref Range Status   08/30/2018 negative  Final     THC Screen, Urine   Date Value Ref Range Status   08/30/2018 negative  Final     Tricyclic Antidepressants, Urine   Date Value Ref Range Status   08/30/2018 Positive  Final       Last Joanna Preciado: 12/06/19  Medication Contract: 9/04/18   Last Fill: 12/04/19    Requested Prescriptions     Pending Prescriptions Disp Refills    LORazepam (ATIVAN) 1 MG tablet 60 tablet 0     Sig: Take 1 tablet by mouth 2 times daily as needed for Anxiety for up to 30 days.  HYDROcodone-acetaminophen (NORCO)  MG per tablet 30 tablet 0     Sig: Take 1 tablet by mouth daily for 30 days. Please approve or refuse this medication.    Niesha Astorga LPN

## 2020-01-06 RX ORDER — HYDROCODONE BITARTRATE AND ACETAMINOPHEN 10; 325 MG/1; MG/1
1 TABLET ORAL DAILY
Qty: 30 TABLET | Refills: 0 | Status: SHIPPED | OUTPATIENT
Start: 2020-01-06 | End: 2020-02-05 | Stop reason: SDUPTHER

## 2020-01-06 RX ORDER — LORAZEPAM 1 MG/1
1 TABLET ORAL 2 TIMES DAILY PRN
Qty: 60 TABLET | Refills: 0 | Status: SHIPPED | OUTPATIENT
Start: 2020-01-06 | End: 2020-02-05 | Stop reason: SDUPTHER

## 2020-01-20 ENCOUNTER — TELEPHONE (OUTPATIENT)
Dept: FAMILY MEDICINE CLINIC | Age: 50
End: 2020-01-20

## 2020-01-20 NOTE — TELEPHONE ENCOUNTER
Pt left a message to be contacted. Called her back, she stated that she had been running a fever of 101 as well as having body aches. Advised pt to see Urgent Care due to our providers not having any open spots.

## 2020-01-21 ENCOUNTER — OFFICE VISIT (OUTPATIENT)
Dept: FAMILY MEDICINE CLINIC | Age: 50
End: 2020-01-21
Payer: COMMERCIAL

## 2020-01-21 VITALS
DIASTOLIC BLOOD PRESSURE: 90 MMHG | HEART RATE: 92 BPM | WEIGHT: 132 LBS | OXYGEN SATURATION: 99 % | BODY MASS INDEX: 24.14 KG/M2 | TEMPERATURE: 97.1 F | SYSTOLIC BLOOD PRESSURE: 138 MMHG

## 2020-01-21 LAB
INFLUENZA A ANTIBODY: NORMAL
INFLUENZA B ANTIBODY: NORMAL

## 2020-01-21 PROCEDURE — 99213 OFFICE O/P EST LOW 20 MIN: CPT | Performed by: CLINICAL NURSE SPECIALIST

## 2020-01-21 PROCEDURE — 87804 INFLUENZA ASSAY W/OPTIC: CPT | Performed by: CLINICAL NURSE SPECIALIST

## 2020-01-21 RX ORDER — AMOXICILLIN AND CLAVULANATE POTASSIUM 875; 125 MG/1; MG/1
1 TABLET, FILM COATED ORAL 2 TIMES DAILY
Qty: 14 TABLET | Refills: 0 | Status: SHIPPED | OUTPATIENT
Start: 2020-01-21 | End: 2020-01-28

## 2020-01-21 ASSESSMENT — ENCOUNTER SYMPTOMS
COUGH: 1
CHEST TIGHTNESS: 0
NAUSEA: 1
EYE DISCHARGE: 0
RHINORRHEA: 0
SORE THROAT: 0
SINUS PRESSURE: 0
FACIAL SWELLING: 0
WHEEZING: 0
EYE PAIN: 0
SHORTNESS OF BREATH: 0
VOMITING: 0

## 2020-01-21 NOTE — PROGRESS NOTES
SUBJECTIVE:  Teresa Pederson is a 51 y.o. who presents today for Fever; Headache; Generalized Body Aches; and Otalgia (right side)      HPI    Unknown Radhika presents today with symptoms that started Sunday. She first noticed headache, eyes burning and then fever. She has had right ear pain, generalized body aches, nasal congestion and cough. Temp as high as 101.4  She has been taking ibuprofen  Some mild nausea but no vomiting or diarrhea  No  symptoms  She relates some green nasal discharge and sputum first thing in the morning. She reports this prior to above symptoms started. Using nasal spray as well    Past Medical History:   Diagnosis Date    Acoustic neuroma ([address]     ADD (attention deficit disorder)     Anxiety     Depression     Headache     Hypertension     Hypothyroidism (acquired)     MENEZES (nonalcoholic steatohepatitis)      Past Surgical History:   Procedure Laterality Date    ABDOMINAL ADHESION SURGERY      CRANIOTOMY      VENTRICULOPERITONEAL SHUNT       Family History   Problem Relation Age of Onset    Heart Disease Mother     Kidney Disease Mother     Heart Disease Father     COPD Father     Kidney Disease Father     Lung Cancer Brother      Social History     Tobacco Use    Smoking status: Never Smoker    Smokeless tobacco: Never Used   Substance Use Topics    Alcohol use: Yes     Alcohol/week: 3.0 standard drinks     Types: 3 Shots of liquor per week     Comment: weekly     Current Outpatient Medications   Medication Sig Dispense Refill    amoxicillin-clavulanate (AUGMENTIN) 875-125 MG per tablet Take 1 tablet by mouth 2 times daily for 7 days 14 tablet 0    LORazepam (ATIVAN) 1 MG tablet Take 1 tablet by mouth 2 times daily as needed for Anxiety for up to 30 days. 60 tablet 0    HYDROcodone-acetaminophen (NORCO)  MG per tablet Take 1 tablet by mouth daily for 30 days.  30 tablet 0    atomoxetine (STRATTERA) 40 MG capsule Take 1 capsule by mouth daily 90 capsule 1   

## 2020-02-05 RX ORDER — LORAZEPAM 1 MG/1
1 TABLET ORAL 2 TIMES DAILY PRN
Qty: 60 TABLET | Refills: 0 | Status: SHIPPED | OUTPATIENT
Start: 2020-02-05 | End: 2020-03-04 | Stop reason: SDUPTHER

## 2020-02-05 RX ORDER — HYDROCODONE BITARTRATE AND ACETAMINOPHEN 10; 325 MG/1; MG/1
1 TABLET ORAL DAILY
Qty: 30 TABLET | Refills: 0 | Status: SHIPPED | OUTPATIENT
Start: 2020-02-05 | End: 2020-03-04 | Stop reason: SDUPTHER

## 2020-02-14 ENCOUNTER — TELEPHONE (OUTPATIENT)
Dept: FAMILY MEDICINE CLINIC | Age: 50
End: 2020-02-14

## 2020-02-14 NOTE — TELEPHONE ENCOUNTER
I could add prozac once daily or zoloft once daily. wellbutrin is one of the better options for seasonal affective disorder, but can also add prozac or zoloft. I know she has taken several things in the past, just make sure she has not had any side effects to either of these.

## 2020-02-17 RX ORDER — FLUOXETINE HYDROCHLORIDE 20 MG/1
20 CAPSULE ORAL DAILY
Qty: 30 CAPSULE | Refills: 5 | Status: SHIPPED | OUTPATIENT
Start: 2020-02-17 | End: 2020-04-10 | Stop reason: SDUPTHER

## 2020-03-02 NOTE — PATIENT INSTRUCTIONS
March 2, 2020     Patient: Mercedes Wang   YOB: 1988   Date of Visit: 3/2/2020       To Whom it May Concern:    Mercedes Wang is under my professional care  She was seen in my office on 3/2/2020  Please allow Priscila Eubanks to work from home, until further notice  If you have any questions or concerns, please don't hesitate to call           Sincerely,          Adrianna Marinelli MD
sometimes what we feel. If we constantly think unrealistic or alarming thoughts about ourselves or other folks, then our stress level is increased. 10. Relax Unrealistic Standards:  When we set unrealistic standards for ourselves, we usually can never reach them. If we do, we burn out quickly. Set reasonable goals and standards. 11. Reward Yourself:  Find ways to reward yourself when youve completed a minor or major task. We cannot always depend on others to recognize us, so we must develop our own reward system. 12. Become Assertive: Take steps to solve problems instead of feeling helpless. Distinguishing assertiveness (respecting others rights and your rights) from aggressiveness and passivity can do much to resolve internal stress. 13. Rediscover Humor:  Learn to laugh at yourself and your situation! 14. Increase Pleasurable Activities:  Take time to participate in fun, pleasurable, activities on a regular basis. DBT Emotional Regulation Skills    The goal of these skills is to: Increase awareness and understanding of emotions  Decrease frequency and intensity of unwanted emotions  Reduce vulnerability to unpleasant emotions  Decrease emotional suffering    ABC  PLEASE increases your resiliency    Accumulate Positive Emotions   Schedule one positive event each day   Be mindful of positive events when they occur--participate fully in the moment     Identify values or life goals for long term--identify small steps you can take towards achieving those goals. Build Mastery: Do one thing each day that builds a sense of accomplishment. Gradually increase the difficulty over time. [place] ahead of time!    Think of a problematic situation   Decide which coping skills you will use   Imagine the situation   Rehearse the coping skills   Practice relaxation      Treat Physical ILlness  Balance Eating  Avoid Mood-Altering Substances  Balance Sleep  Get Exercise    Grief Tips - Help for

## 2020-03-05 RX ORDER — LORAZEPAM 1 MG/1
1 TABLET ORAL 2 TIMES DAILY PRN
Qty: 60 TABLET | Refills: 0 | Status: SHIPPED | OUTPATIENT
Start: 2020-03-06 | End: 2020-04-03 | Stop reason: SDUPTHER

## 2020-03-05 RX ORDER — HYDROCODONE BITARTRATE AND ACETAMINOPHEN 10; 325 MG/1; MG/1
1 TABLET ORAL DAILY
Qty: 30 TABLET | Refills: 0 | Status: SHIPPED | OUTPATIENT
Start: 2020-03-06 | End: 2020-04-03 | Stop reason: SDUPTHER

## 2020-03-10 DIAGNOSIS — E03.9 HYPOTHYROIDISM (ACQUIRED): ICD-10-CM

## 2020-03-10 DIAGNOSIS — R20.0 NUMBNESS: ICD-10-CM

## 2020-03-10 DIAGNOSIS — G62.9 NEUROPATHY: ICD-10-CM

## 2020-03-10 DIAGNOSIS — M79.672 PAIN IN BOTH FEET: ICD-10-CM

## 2020-03-10 DIAGNOSIS — I10 ESSENTIAL HYPERTENSION: ICD-10-CM

## 2020-03-10 DIAGNOSIS — M79.671 PAIN IN BOTH FEET: ICD-10-CM

## 2020-03-10 LAB
ALBUMIN SERPL-MCNC: 5.5 G/DL (ref 3.5–5.2)
ALP BLD-CCNC: 210 U/L (ref 35–104)
ALT SERPL-CCNC: 108 U/L (ref 5–33)
ANION GAP SERPL CALCULATED.3IONS-SCNC: 18 MMOL/L (ref 7–19)
AST SERPL-CCNC: 112 U/L (ref 5–32)
BILIRUB SERPL-MCNC: 1 MG/DL (ref 0.2–1.2)
BUN BLDV-MCNC: 13 MG/DL (ref 6–20)
CALCIUM SERPL-MCNC: 9.9 MG/DL (ref 8.6–10)
CHLORIDE BLD-SCNC: 99 MMOL/L (ref 98–111)
CO2: 22 MMOL/L (ref 22–29)
CREAT SERPL-MCNC: 0.7 MG/DL (ref 0.5–0.9)
GFR NON-AFRICAN AMERICAN: >60
GLUCOSE BLD-MCNC: 65 MG/DL (ref 74–109)
HCT VFR BLD CALC: 44.3 % (ref 37–47)
HEMOGLOBIN: 14.2 G/DL (ref 12–16)
HIV-1 P24 AG: NORMAL
MCH RBC QN AUTO: 32.6 PG (ref 27–31)
MCHC RBC AUTO-ENTMCNC: 32.1 G/DL (ref 33–37)
MCV RBC AUTO: 101.6 FL (ref 81–99)
PDW BLD-RTO: 13.3 % (ref 11.5–14.5)
PLATELET # BLD: 253 K/UL (ref 130–400)
PMV BLD AUTO: 11.4 FL (ref 9.4–12.3)
POTASSIUM SERPL-SCNC: 3.6 MMOL/L (ref 3.5–5)
RAPID HIV 1&2: NORMAL
RBC # BLD: 4.36 M/UL (ref 4.2–5.4)
RHEUMATOID FACTOR: <10 IU/ML
SEDIMENTATION RATE, ERYTHROCYTE: 16 MM/HR (ref 0–20)
SODIUM BLD-SCNC: 139 MMOL/L (ref 136–145)
TOTAL PROTEIN: 9.2 G/DL (ref 6.6–8.7)
TSH SERPL DL<=0.05 MIU/L-ACNC: 1.2 UIU/ML (ref 0.27–4.2)
VITAMIN B-12: 1985 PG/ML (ref 211–946)
WBC # BLD: 7.5 K/UL (ref 4.8–10.8)

## 2020-03-12 LAB
LYME, EIA: 0.64 LIV (ref 0–1.2)
RPR: NORMAL

## 2020-03-13 LAB
ANA IGG, ELISA: ABNORMAL
C3 COMPLEMENT: 234 MG/DL (ref 88–201)
C4 COMPLEMENT: 30 MG/DL (ref 10–40)
METHYLMALONIC ACID: 0.13 UMOL/L (ref 0–0.4)
RHEUMATOID FACTOR: <10 IU/ML (ref 0–14)

## 2020-03-14 LAB
ALBUMIN SERPL-MCNC: 5 G/DL (ref 3.75–5.01)
ALPHA-1-GLOBULIN: 0.34 G/DL (ref 0.19–0.46)
ALPHA-2-GLOBULIN: 0.91 G/DL (ref 0.48–1.05)
BETA GLOBULIN: 1.09 G/DL (ref 0.48–1.1)
GAMMA GLOBULIN: 1.66 G/DL (ref 0.62–1.51)
PROTEIN ELECTROPHORESIS, SERUM: ABNORMAL
SPE/IFE INTERPRETATION: ABNORMAL
TOTAL PROTEIN: 9 G/DL (ref 6.3–8.2)
WEST NILE VIRUS, IGG: 0.64 IV
WEST NILE VIRUS, IGM: 0 IV

## 2020-03-17 ENCOUNTER — OFFICE VISIT (OUTPATIENT)
Dept: FAMILY MEDICINE CLINIC | Age: 50
End: 2020-03-17
Payer: COMMERCIAL

## 2020-03-17 VITALS
DIASTOLIC BLOOD PRESSURE: 92 MMHG | BODY MASS INDEX: 23.56 KG/M2 | TEMPERATURE: 99.2 F | SYSTOLIC BLOOD PRESSURE: 142 MMHG | OXYGEN SATURATION: 100 % | WEIGHT: 128.8 LBS | HEART RATE: 84 BPM

## 2020-03-17 PROBLEM — M79.672 PAIN IN BOTH FEET: Status: RESOLVED | Noted: 2019-09-26 | Resolved: 2020-03-17

## 2020-03-17 PROBLEM — M79.671 PAIN IN BOTH FEET: Status: RESOLVED | Noted: 2019-09-26 | Resolved: 2020-03-17

## 2020-03-17 PROBLEM — R20.0 NUMBNESS: Status: RESOLVED | Noted: 2019-09-26 | Resolved: 2020-03-17

## 2020-03-17 PROBLEM — R22.2 SUPRACLAVICULAR FOSSA FULLNESS: Status: RESOLVED | Noted: 2018-08-31 | Resolved: 2020-03-17

## 2020-03-17 LAB
AMPHETAMINE SCREEN, URINE: NEGATIVE
BARBITURATE SCREEN, URINE: NEGATIVE
BENZODIAZEPINE SCREEN, URINE: NEGATIVE
BUPRENORPHINE URINE: NEGATIVE
COCAINE METABOLITE SCREEN URINE: NEGATIVE
GABAPENTIN SCREEN, URINE: NORMAL
MDMA URINE: NEGATIVE
METHADONE SCREEN, URINE: NEGATIVE
METHAMPHETAMINE, URINE: NEGATIVE
OPIATE SCREEN URINE: POSITIVE
OXYCODONE SCREEN URINE: NEGATIVE
PHENCYCLIDINE SCREEN URINE: NEGATIVE
PROPOXYPHENE SCREEN, URINE: NORMAL
THC SCREEN, URINE: NEGATIVE
TRICYCLIC ANTIDEPRESSANTS, UR: NEGATIVE

## 2020-03-17 PROCEDURE — 80305 DRUG TEST PRSMV DIR OPT OBS: CPT | Performed by: CLINICAL NURSE SPECIALIST

## 2020-03-17 PROCEDURE — 90636 HEP A/HEP B VACC ADULT IM: CPT | Performed by: CLINICAL NURSE SPECIALIST

## 2020-03-17 PROCEDURE — 90471 IMMUNIZATION ADMIN: CPT | Performed by: CLINICAL NURSE SPECIALIST

## 2020-03-17 PROCEDURE — 99396 PREV VISIT EST AGE 40-64: CPT | Performed by: CLINICAL NURSE SPECIALIST

## 2020-03-17 RX ORDER — LEVOTHYROXINE SODIUM 200 MCG
200 TABLET ORAL DAILY
Qty: 90 TABLET | Refills: 3 | Status: SHIPPED | OUTPATIENT
Start: 2020-03-17 | End: 2021-02-23

## 2020-03-17 RX ORDER — LEVOTHYROXINE SODIUM 50 MCG
50 TABLET ORAL DAILY
Qty: 90 TABLET | Refills: 3 | Status: SHIPPED | OUTPATIENT
Start: 2020-03-17 | End: 2021-02-23

## 2020-03-17 ASSESSMENT — ENCOUNTER SYMPTOMS
COLOR CHANGE: 0
TROUBLE SWALLOWING: 0
EYE PAIN: 0
CHEST TIGHTNESS: 0
BACK PAIN: 0
ABDOMINAL PAIN: 0
SINUS PRESSURE: 0
SHORTNESS OF BREATH: 0
CONSTIPATION: 0
EYE DISCHARGE: 0
FACIAL SWELLING: 0
DIARRHEA: 0
EYE REDNESS: 0
SORE THROAT: 0
COUGH: 0
WHEEZING: 0

## 2020-03-17 NOTE — PROGRESS NOTES
SUBJECTIVE:  Gabriella Soria is a 51 y.o. who presents today for Annual Exam      HPI    Mrs China Woodall presents today for 3 month follow up and annual  exam.  Mammogram UTD  Pap UTD GYN  Immunizations UTD    Acquired hypothyroidism, TSH at goal on 250mcg per day, brand name synthroid only per endocrinology  She is feeling much better. Less fatigue. Less ADD symptoms. Weight is stable. Anxiety with depression, recently aggravated by SAD, but adding prozac to her wellbutrin and she is much improved. No longer tearful for no reason, less crying. No major depressive symptoms. No side effects to regimen. Using ativan as needed sparingly, no more than 1 or 2 per day. No misuse or abuse. MENEZES, stable with chronically elevated LFT. Saw hepatology at [facility], work up completed and mostly negative. Recommended low fat diet and exercise to improve MENEZES. She will complete Hep A and Hep B vaccines today. SPEP was normal at [facility] but abnormal when recently checked by Dr Kristine Brunson. She has been referred to hematology. C3 was minimally elevated as well. Essential hypertension, seems improved. Today elevated but has not taken her coreg yet. Seems to be tolerating this dose without side effects. No dizziness or syncope. No new headaches. Chronic headaches r/t prior craniotomy. She uses norco when needed, no more than one per day. No misuse or abuse. No refill needed today. Does not take with ativan. UDS due today.        Past Medical History:   Diagnosis Date    Acoustic neuroma ([facility] [address]     ADD (attention deficit disorder)     Anxiety     Depression     Headache     Hypertension     Hypothyroidism (acquired)     MENEZES (nonalcoholic steatohepatitis)      Past Surgical History:   Procedure Laterality Date    ABDOMINAL ADHESION SURGERY      CRANIOTOMY      VENTRICULOPERITONEAL SHUNT       Family History   Problem Relation Age of Onset    Heart Disease Mother     Kidney Disease Mother     Heart Disease Father     COPD Father     Kidney Disease Father     Lung Cancer Brother      Social History     Tobacco Use    Smoking status: Never Smoker    Smokeless tobacco: Never Used   Substance Use Topics    Alcohol use: Yes     Alcohol/week: 3.0 standard drinks     Types: 3 Shots of liquor per week     Comment: weekly     Current Outpatient Medications   Medication Sig Dispense Refill    SYNTHROID 50 MCG tablet Take 1 tablet by mouth Daily 90 tablet 3    SYNTHROID 200 MCG tablet Take 1 tablet by mouth Daily 90 tablet 3    HYDROcodone-acetaminophen (NORCO)  MG per tablet Take 1 tablet by mouth daily for 30 days. 30 tablet 0    LORazepam (ATIVAN) 1 MG tablet Take 1 tablet by mouth 2 times daily as needed for Anxiety for up to 30 days. 60 tablet 0    FLUoxetine (PROZAC) 20 MG capsule Take 1 capsule by mouth daily 30 capsule 5    pantoprazole (PROTONIX) 40 MG tablet Take 1 tablet by mouth daily 90 tablet 2    Multiple Vitamins-Minerals (THERAPEUTIC MULTIVITAMIN-MINERALS) tablet Take 1 tablet by mouth daily      buPROPion (WELLBUTRIN XL) 300 MG extended release tablet TAKE 1 TABLET BY MOUTH EVERY DAY IN THE MORNING 30 tablet 11    carvedilol (COREG) 12.5 MG tablet Take 1/2 tablet in Am, full tablet in  tablet 3    cetirizine (ZYRTEC) 10 MG tablet Take 10 mg by mouth daily       No current facility-administered medications for this visit. No Known Allergies    Review of Systems   Constitutional: Negative for appetite change, chills, diaphoresis, fatigue and fever. HENT: Positive for hearing loss. Negative for congestion, ear pain, facial swelling, postnasal drip, sinus pressure, sore throat and trouble swallowing. Eyes: Negative for pain, discharge, redness and visual disturbance. Respiratory: Negative for cough, chest tightness, shortness of breath and wheezing. Cardiovascular: Negative for chest pain, palpitations and leg swelling.    Gastrointestinal: Negative for abdominal pain, constipation and diarrhea. Endocrine: Negative for cold intolerance and heat intolerance. Genitourinary: Negative for difficulty urinating, dysuria, flank pain, frequency, hematuria and urgency. Musculoskeletal: Negative for arthralgias, back pain, joint swelling and neck pain. Skin: Negative for color change and rash. Neurological: Positive for dizziness, numbness and headaches. Negative for syncope, weakness and light-headedness. Hematological: Negative for adenopathy. Does not bruise/bleed easily. Psychiatric/Behavioral: Positive for dysphoric mood. Negative for confusion and suicidal ideas. The patient is nervous/anxious. OBJECTIVE:  BP (!) 142/92   Pulse 84   Temp 99.2 °F (37.3 °C) (Temporal)   Wt 128 lb 12.8 oz (58.4 kg)   LMP 07/25/2016 (Approximate)   SpO2 100%   BMI 23.56 kg/m²    Physical Exam  Vitals signs reviewed. Constitutional:       General: She is not in acute distress. Appearance: She is well-developed. She is not ill-appearing or toxic-appearing. HENT:      Head: Normocephalic and atraumatic. Eyes:      General:         Right eye: No discharge. Left eye: No discharge. Conjunctiva/sclera: Conjunctivae normal.      Pupils: Pupils are equal, round, and reactive to light. Neck:      Musculoskeletal: Normal range of motion and neck supple. Thyroid: No thyromegaly. Trachea: No tracheal deviation. Cardiovascular:      Rate and Rhythm: Normal rate and regular rhythm. Heart sounds: No murmur. Pulmonary:      Effort: Pulmonary effort is normal. No respiratory distress. Breath sounds: Normal breath sounds. No wheezing or rales. Abdominal:      General: Bowel sounds are normal. There is distension. Palpations: Abdomen is soft. Tenderness: There is no abdominal tenderness. There is no rebound. Genitourinary:     Vagina: Normal.   Musculoskeletal: Normal range of motion.          General: No swelling, tenderness or deformity. Lymphadenopathy:      Cervical: No cervical adenopathy. Skin:     General: Skin is warm and dry. Findings: No erythema or rash. Neurological:      General: No focal deficit present. Mental Status: She is alert and oriented to person, place, and time. Mental status is at baseline. Psychiatric:         Mood and Affect: Mood normal.         Behavior: Behavior normal.         Thought Content: Thought content normal.       Lab Results   Component Value Date    TSH 1.200 03/10/2020     Lab Results   Component Value Date     03/10/2020    K 3.6 03/10/2020    CL 99 03/10/2020    CO2 22 03/10/2020    BUN 13 03/10/2020    CREATININE 0.7 03/10/2020    GLUCOSE 65 (L) 03/10/2020    CALCIUM 9.9 03/10/2020    PROT 9.0 (H) 03/10/2020    PROT 9.2 (H) 03/10/2020    LABALBU 5.00 03/10/2020    LABALBU 5.5 (H) 03/10/2020    BILITOT 1.0 03/10/2020    ALKPHOS 210 (H) 03/10/2020     (H) 03/10/2020     (H) 03/10/2020    LABGLOM >60 03/10/2020       Lab Results   Component Value Date    WBC 7.5 03/10/2020    HGB 14.2 03/10/2020    HCT 44.3 03/10/2020    .6 (H) 03/10/2020     03/10/2020       ASSESSMENT/PLAN:  1. Encounter for well adult exam with abnormal findings  HM UTD    2. Hypothyroidism (acquired)  Controlled  Same dose, brand name only  - SYNTHROID 50 MCG tablet; Take 1 tablet by mouth Daily  Dispense: 90 tablet; Refill: 3  - SYNTHROID 200 MCG tablet; Take 1 tablet by mouth Daily  Dispense: 90 tablet; Refill: 3    3. Need for hepatitis A and B vaccination  - Hepatitis A-Hepatitis B (age 24y and older) IM (Twinrix)    4. Anxiety  controlled  - POCT Rapid Drug Screen    5. MENEZES (nonalcoholic steatohepatitis)  Stable, pending work up by hematology for elevated C3 and M spike on SPEP    6. Essential hypertension  controlled    7. Chronic nonintractable headache, unspecified headache type  controlled          Return in about 3 months (around 6/17/2020).

## 2020-03-24 ENCOUNTER — TELEPHONE (OUTPATIENT)
Dept: NEUROLOGY | Age: 50
End: 2020-03-24

## 2020-03-24 ENCOUNTER — TELEPHONE (OUTPATIENT)
Dept: HEMATOLOGY | Age: 50
End: 2020-03-24

## 2020-03-25 ENCOUNTER — OFFICE VISIT (OUTPATIENT)
Dept: HEMATOLOGY | Age: 50
End: 2020-03-25
Payer: COMMERCIAL

## 2020-03-25 ENCOUNTER — HOSPITAL ENCOUNTER (OUTPATIENT)
Dept: INFUSION THERAPY | Age: 50
Discharge: HOME | End: 2020-03-25
Payer: COMMERCIAL

## 2020-03-25 VITALS
HEIGHT: 62 IN | BODY MASS INDEX: 24.2 KG/M2 | HEART RATE: 100 BPM | DIASTOLIC BLOOD PRESSURE: 64 MMHG | TEMPERATURE: 98.5 F | SYSTOLIC BLOOD PRESSURE: 108 MMHG | OXYGEN SATURATION: 95 % | WEIGHT: 131.5 LBS

## 2020-03-25 DIAGNOSIS — R77.8 ABNORMAL SPEP: ICD-10-CM

## 2020-03-25 PROCEDURE — 99204 OFFICE O/P NEW MOD 45 MIN: CPT | Performed by: NURSE PRACTITIONER

## 2020-03-25 PROCEDURE — 80053 COMPREHEN METABOLIC PANEL: CPT

## 2020-03-25 PROCEDURE — 85025 COMPLETE CBC W/AUTO DIFF WBC: CPT

## 2020-03-25 PROCEDURE — 99212 OFFICE O/P EST SF 10 MIN: CPT

## 2020-03-25 PROCEDURE — 99202 OFFICE O/P NEW SF 15 MIN: CPT

## 2020-03-25 RX ORDER — DIPHENHYDRAMINE HCL 25 MG
50 CAPSULE ORAL NIGHTLY
COMMUNITY
Start: 2017-05-09

## 2020-03-25 ASSESSMENT — ENCOUNTER SYMPTOMS
CONSTIPATION: 0
COUGH: 0
WHEEZING: 0
VOMITING: 0
GASTROINTESTINAL NEGATIVE: 1
RESPIRATORY NEGATIVE: 1
SHORTNESS OF BREATH: 0
EYE DISCHARGE: 0
EYES NEGATIVE: 1
BACK PAIN: 0
EYE REDNESS: 0
BLOOD IN STOOL: 0
EYE PAIN: 0
DIARRHEA: 0
ABDOMINAL PAIN: 0
NAUSEA: 0
SORE THROAT: 0

## 2020-03-25 NOTE — PROGRESS NOTES
Initial consultation note      Pt Name: Boyd Nichole  YOB: 1970  MRN: [id number]    Date of evaluation: 3/25/2020  History Obtained From:  patient, electronic medical record    CHIEF COMPLAINT:    Chief Complaint   Patient presents with    New Patient     Abnormal SPEP     HISTORY OF PRESENT ILLNESS:    Boyd Nichole is a 51 y.o.  female who is being seen in initial hematology consultation for abnormal SPEP, referred from Dr. Erika Bustillos for further evaluation recommendations. Olga Carpio has a significant medical history to include nonalcoholic steatohepatitis, hypothyroidism, hypertension, depression/anxiety and a history of acoustic neuroma with craniotomy in 2006. She presented with complaints of chronic headaches and bilateral peripheral neuropathy, both of these symptoms are being managed by Dr. Erika Bustillos and JULI Levy. Olga Carpio does not have a history of renal disease, anemia or elevated calcium level. She reports being treated for meningitis in 2007. She reported intermittent history of alcohol use and presently drinking 1 mixed drink a day, discussed the importance of abstinence from alcohol use with her diagnosis of MENEZES. Labs requested by Dr. Erika Bustillos on 3/10/2020 revealed mildly elevated total protein and gammaglobulin along with C3 complement. Do not suspect diagnosis of multiple myeloma, although will further evaluate abnormal SPEP by completing myeloma studies. Labs on 3/10/2020:  · C3 complement 234 ()  · C4 complement 30  · GIOVANNI antibody not detected  · Rheumatoid Factor <10  · Sed rate 16  · Vitamin B12 1985  · Methylmalonic acid 0.13  · SPEP: Total protein 9.0 (6.3-8.2), gammaglobulin 1.66 (0.62-1.51), slight increase in gamma region with no M spike.   · Creatinine 0.7/GFR >60  · Calcium 9.9  · Total protein 9.2  · TSH 1.20  · HIV nonreactive  · Hemoglobin 14.2/.6  · Platelet count of 445,575  · WBC 7.5    Olga Carpio is routinely seen by her primary care provider JULI Hoffman, last visit on 3/17/2020 for her annual exam.  She is being evaluated by Dr. Claudia Isaac for peripheral neuropathy. EMG with nerve conduction study of the legs on was normal with no evidence of generalized polyneuropathy, sensory neuropathy or lumbosacral radiculopathy. In relation to [address] history of MENEZES, she has been evaluated by a hematologist at the [facility] and recommendation was for monitoring, low-fat diet and exercise. CMP on 3/10/2020 documented stable liver enzymes with a total bilirubin 1.0, alkaline phosphatase 210,  and , [facility] has chronically elevated alkaline phosphatase and transaminases. CBC today at initial consultation on 3/25/2020. commended WBC of 7.29, ANC 4.5, hemoglobin 13, MCV 99.7 and platelet count of 440,368. Reviewed labs from 3/10/2020 with matt and my recommendation for further evaluation of abnormal SPEP results.   We will delay follow-up appointment for 3 months due to recent pandemic crisis with COVID-19 virus and will contact [facility] via telephone if further interventions are warranted or concerns are identified     Past Medical History:    Past Medical History:   Diagnosis Date    Acoustic neuroma ([facility] [address]     ADD (attention deficit disorder)     Anxiety     Depression     Headache     Hypertension     Hypothyroidism (acquired)     MENEZES (nonalcoholic steatohepatitis)        Past Surgical History:    Past Surgical History:   Procedure Laterality Date    ABDOMINAL ADHESION SURGERY      CRANIOTOMY      VENTRICULOPERITONEAL SHUNT         Current Medications:    Current Outpatient Medications   Medication Sig Dispense Refill    diphenhydrAMINE (BENADRYL ALLERGY) 25 MG capsule Take 50 mg by mouth      SYNTHROID 50 MCG tablet Take 1 tablet by mouth Daily 90 tablet 3    SYNTHROID 200 MCG tablet Take 1 tablet by mouth Daily 90 tablet 3    HYDROcodone-acetaminophen (NORCO)  MG per tablet Take 1 tablet by mouth daily for 30 days. 30 tablet 0    LORazepam (ATIVAN) 1 MG tablet Take 1 tablet by mouth 2 times daily as needed for Anxiety for up to 30 days. 60 tablet 0    FLUoxetine (PROZAC) 20 MG capsule Take 1 capsule by mouth daily 30 capsule 5    pantoprazole (PROTONIX) 40 MG tablet Take 1 tablet by mouth daily 90 tablet 2    Multiple Vitamins-Minerals (THERAPEUTIC MULTIVITAMIN-MINERALS) tablet Take 1 tablet by mouth daily      buPROPion (WELLBUTRIN XL) 300 MG extended release tablet TAKE 1 TABLET BY MOUTH EVERY DAY IN THE MORNING 30 tablet 11    carvedilol (COREG) 12.5 MG tablet Take 1/2 tablet in Am, full tablet in  tablet 3    cetirizine (ZYRTEC) 10 MG tablet Take 10 mg by mouth daily       No current facility-administered medications for this visit. Allergies: No Known Allergies    Social History:    Social History     Tobacco Use    Smoking status: Never Smoker    Smokeless tobacco: Never Used   Substance Use Topics    Alcohol use: Yes     Alcohol/week: 3.0 standard drinks     Types: 3 Shots of liquor per week     Comment: weekly    Drug use: No       Family History:   Family History   Problem Relation Age of Onset    Heart Disease Mother     Kidney Disease Mother     Heart Disease Father     COPD Father     Kidney Disease Father     Lung Cancer Brother        Vitals:  Vitals:    03/25/20 1045   BP: 108/64   Pulse: 100   Temp: 98.5 °F (36.9 °C)   TempSrc: Oral   SpO2: 95%   Weight: 131 lb 8 oz (59.6 kg)   Height: 5' 2\" (1.575 m)        Subjective   REVIEW OF SYSTEMS:   Review of Systems   Constitutional: Negative. Negative for chills, diaphoresis and fever. HENT: Negative. Negative for congestion, ear pain, hearing loss, nosebleeds, sore throat and tinnitus. Eyes: Negative. Negative for pain, discharge and redness. Respiratory: Negative. Negative for cough, shortness of breath and wheezing. Cardiovascular: Negative. Negative for chest pain, palpitations and leg swelling. rebound. Hernia: No hernia is present. Musculoskeletal:         General: No tenderness or deformity. Comments: Range of motion within normal limits x4 extremities   Skin:     General: Skin is warm. Coloration: Skin is not pale. Findings: No erythema or rash. Neurological:      Mental Status: She is alert and oriented to person, place, and time. Cranial Nerves: No cranial nerve deficit. Coordination: Coordination normal.   Psychiatric:         Behavior: Behavior normal.         Thought Content: Thought content normal.         Labs: WBC of 7.29, ANC 4.5, hemoglobin 13, MCV 99.7 and platelet count of 494,790. CBC: No results for input(s): WBC, HGB, PLT in the last 72 hours. CMP: No results for input(s): GLUCOSE, BUN, CREATININE, BCR, CO2, CALCIUM, ALBUMIN, LABIL2, ALKPHOS, AST, ALT in the last 72 hours. Invalid input(s): EGFRIFNONA, EGFRIFAFRI, SODIUM, POTASSIUM, CHLORIDE, PROTENTOTREF, GLOBULIN, BILIRUBIN  Hepatic: No results for input(s): AST, ALT, ALB, BILITOT, ALKPHOS in the last 72 hours. Troponin: No results for input(s): TROPONINI in the last 72 hours. BNP: No results for input(s): BNP in the last 72 hours. Lipids: No results for input(s): CHOL, HDL in the last 72 hours. Invalid input(s): LDL  INR: No results for input(s): INR in the last 72 hours. ABG: No results for input(s): PHART, QKS6XZI, PO2ART, YNJ3WNO, H2HBZXDF, BEART in the last 72 hours. 30 Day lookback of cultures:    Blood Culture Recent: No results for input(s): BC in the last 720 hours. Gram Stain Recent: No results for input(s): LABGRAM in the last 720 hours. Resp Culture Recent: No results for input(s): CULTRESP in the last 720 hours. Body Fluid Recent : No results for input(s): BFCX in the last 720 hours. MRSA Recent : No results for input(s): [address] in the last 720 hours. Urine Culture Recent : No results for input(s): LABURIN in the last 720 hours.   Organism Recent : No results for but was not limited to time spent reviewing labs, imaging studies/ treatment plan and answering questions. This does not include charting.     Electronically signed by JULI Wesley on 3/25/2020 at 1:09 PM

## 2020-04-03 RX ORDER — HYDROCODONE BITARTRATE AND ACETAMINOPHEN 10; 325 MG/1; MG/1
1 TABLET ORAL DAILY
Qty: 30 TABLET | Refills: 0 | Status: SHIPPED | OUTPATIENT
Start: 2020-04-03 | End: 2020-05-08 | Stop reason: SDUPTHER

## 2020-04-03 RX ORDER — LORAZEPAM 1 MG/1
1 TABLET ORAL 2 TIMES DAILY PRN
Qty: 60 TABLET | Refills: 0 | Status: SHIPPED | OUTPATIENT
Start: 2020-04-03 | End: 2020-04-06

## 2020-04-06 ENCOUNTER — HOSPITAL ENCOUNTER (OUTPATIENT)
Age: 50
Setting detail: OBSERVATION
Discharge: HOME | End: 2020-04-07
Attending: PEDIATRICS | Admitting: HOSPITALIST
Payer: COMMERCIAL

## 2020-04-06 ENCOUNTER — APPOINTMENT (OUTPATIENT)
Dept: GENERAL RADIOLOGY | Age: 50
End: 2020-04-06
Payer: COMMERCIAL

## 2020-04-06 ENCOUNTER — APPOINTMENT (OUTPATIENT)
Dept: CT IMAGING | Age: 50
End: 2020-04-06
Payer: COMMERCIAL

## 2020-04-06 PROBLEM — R55 SYNCOPE AND COLLAPSE: Status: ACTIVE | Noted: 2020-04-06

## 2020-04-06 LAB
ALBUMIN SERPL-MCNC: 4.7 G/DL (ref 3.5–5.2)
ALP BLD-CCNC: 206 U/L (ref 35–104)
ALT SERPL-CCNC: 92 U/L (ref 5–33)
ANION GAP SERPL CALCULATED.3IONS-SCNC: 17 MMOL/L (ref 7–19)
AST SERPL-CCNC: 140 U/L (ref 5–32)
BACTERIA: NEGATIVE /HPF
BASOPHILS ABSOLUTE: 0.1 K/UL (ref 0–0.2)
BASOPHILS RELATIVE PERCENT: 0.7 % (ref 0–1)
BILIRUB SERPL-MCNC: 1.5 MG/DL (ref 0.2–1.2)
BILIRUBIN URINE: NEGATIVE
BLOOD, URINE: NEGATIVE
BUN BLDV-MCNC: 11 MG/DL (ref 6–20)
CALCIUM SERPL-MCNC: 10.2 MG/DL (ref 8.6–10)
CHLORIDE BLD-SCNC: 94 MMOL/L (ref 98–111)
CLARITY: ABNORMAL
CO2: 25 MMOL/L (ref 22–29)
COLOR: YELLOW
CREAT SERPL-MCNC: 0.6 MG/DL (ref 0.5–0.9)
EOSINOPHILS ABSOLUTE: 0.1 K/UL (ref 0–0.6)
EOSINOPHILS RELATIVE PERCENT: 0.8 % (ref 0–5)
EPITHELIAL CELLS, UA: 4 /HPF (ref 0–5)
GFR NON-AFRICAN AMERICAN: >60
GLUCOSE BLD-MCNC: 119 MG/DL (ref 74–109)
GLUCOSE URINE: NEGATIVE MG/DL
HCT VFR BLD CALC: 39.9 % (ref 37–47)
HEMOGLOBIN: 13.4 G/DL (ref 12–16)
HYALINE CASTS: 6 /HPF (ref 0–8)
IMMATURE GRANULOCYTES #: 0 K/UL
KETONES, URINE: NEGATIVE MG/DL
LACTIC ACID: 1.5 MMOL/L (ref 0.5–1.9)
LEUKOCYTE ESTERASE, URINE: ABNORMAL
LIPASE: 41 U/L (ref 13–60)
LYMPHOCYTES ABSOLUTE: 1.5 K/UL (ref 1.1–4.5)
LYMPHOCYTES RELATIVE PERCENT: 17.6 % (ref 20–40)
MCH RBC QN AUTO: 32.9 PG (ref 27–31)
MCHC RBC AUTO-ENTMCNC: 33.6 G/DL (ref 33–37)
MCV RBC AUTO: 98 FL (ref 81–99)
MONOCYTES ABSOLUTE: 0.7 K/UL (ref 0–0.9)
MONOCYTES RELATIVE PERCENT: 8.6 % (ref 0–10)
NEUTROPHILS ABSOLUTE: 5.9 K/UL (ref 1.5–7.5)
NEUTROPHILS RELATIVE PERCENT: 71.8 % (ref 50–65)
NITRITE, URINE: NEGATIVE
PDW BLD-RTO: 14.4 % (ref 11.5–14.5)
PH UA: 7 (ref 5–8)
PLATELET # BLD: 195 K/UL (ref 130–400)
PMV BLD AUTO: 10.6 FL (ref 9.4–12.3)
POTASSIUM REFLEX MAGNESIUM: 4.1 MMOL/L (ref 3.5–5)
PROTEIN UA: ABNORMAL MG/DL
RBC # BLD: 4.07 M/UL (ref 4.2–5.4)
RBC UA: 4 /HPF (ref 0–4)
SODIUM BLD-SCNC: 136 MMOL/L (ref 136–145)
SPECIFIC GRAVITY UA: 1.01 (ref 1–1.03)
TOTAL PROTEIN: 8.9 G/DL (ref 6.6–8.7)
TROPONIN: <0.01 NG/ML (ref 0–0.03)
TROPONIN: <0.01 NG/ML (ref 0–0.03)
URINE REFLEX TO CULTURE: YES
UROBILINOGEN, URINE: 1 E.U./DL
WBC # BLD: 8.2 K/UL (ref 4.8–10.8)
WBC UA: 1 /HPF (ref 0–5)

## 2020-04-06 PROCEDURE — G0378 HOSPITAL OBSERVATION PER HR: HCPCS

## 2020-04-06 PROCEDURE — 96361 HYDRATE IV INFUSION ADD-ON: CPT

## 2020-04-06 PROCEDURE — 85025 COMPLETE CBC W/AUTO DIFF WBC: CPT

## 2020-04-06 PROCEDURE — 80053 COMPREHEN METABOLIC PANEL: CPT

## 2020-04-06 PROCEDURE — 83605 ASSAY OF LACTIC ACID: CPT

## 2020-04-06 PROCEDURE — 96372 THER/PROPH/DIAG INJ SC/IM: CPT

## 2020-04-06 PROCEDURE — 2580000003 HC RX 258: Performed by: PEDIATRICS

## 2020-04-06 PROCEDURE — 93005 ELECTROCARDIOGRAM TRACING: CPT | Performed by: PEDIATRICS

## 2020-04-06 PROCEDURE — 96360 HYDRATION IV INFUSION INIT: CPT

## 2020-04-06 PROCEDURE — 6370000000 HC RX 637 (ALT 250 FOR IP): Performed by: HOSPITALIST

## 2020-04-06 PROCEDURE — 84484 ASSAY OF TROPONIN QUANT: CPT

## 2020-04-06 PROCEDURE — 71045 X-RAY EXAM CHEST 1 VIEW: CPT

## 2020-04-06 PROCEDURE — 70450 CT HEAD/BRAIN W/O DYE: CPT

## 2020-04-06 PROCEDURE — 6360000002 HC RX W HCPCS: Performed by: HOSPITALIST

## 2020-04-06 PROCEDURE — 36415 COLL VENOUS BLD VENIPUNCTURE: CPT

## 2020-04-06 PROCEDURE — 2580000003 HC RX 258: Performed by: HOSPITALIST

## 2020-04-06 PROCEDURE — 99285 EMERGENCY DEPT VISIT HI MDM: CPT

## 2020-04-06 PROCEDURE — 87086 URINE CULTURE/COLONY COUNT: CPT

## 2020-04-06 PROCEDURE — 81001 URINALYSIS AUTO W/SCOPE: CPT

## 2020-04-06 PROCEDURE — 83690 ASSAY OF LIPASE: CPT

## 2020-04-06 RX ORDER — SODIUM CHLORIDE 0.9 % (FLUSH) 0.9 %
10 SYRINGE (ML) INJECTION EVERY 12 HOURS SCHEDULED
Status: DISCONTINUED | OUTPATIENT
Start: 2020-04-06 | End: 2020-04-07 | Stop reason: HOSPADM

## 2020-04-06 RX ORDER — LEVOTHYROXINE SODIUM 0.05 MG/1
50 TABLET ORAL DAILY
Status: DISCONTINUED | OUTPATIENT
Start: 2020-04-07 | End: 2020-04-07 | Stop reason: HOSPADM

## 2020-04-06 RX ORDER — M-VIT,TX,IRON,MINS/CALC/FOLIC 27MG-0.4MG
1 TABLET ORAL DAILY
Status: DISCONTINUED | OUTPATIENT
Start: 2020-04-06 | End: 2020-04-07 | Stop reason: HOSPADM

## 2020-04-06 RX ORDER — LANOLIN ALCOHOL/MO/W.PET/CERES
3 CREAM (GRAM) TOPICAL NIGHTLY PRN
Status: DISCONTINUED | OUTPATIENT
Start: 2020-04-06 | End: 2020-04-07 | Stop reason: HOSPADM

## 2020-04-06 RX ORDER — CARVEDILOL 6.25 MG/1
6.25 TABLET ORAL 2 TIMES DAILY
Status: DISCONTINUED | OUTPATIENT
Start: 2020-04-06 | End: 2020-04-07 | Stop reason: HOSPADM

## 2020-04-06 RX ORDER — LEVOTHYROXINE SODIUM 0.1 MG/1
200 TABLET ORAL DAILY
Status: DISCONTINUED | OUTPATIENT
Start: 2020-04-07 | End: 2020-04-07 | Stop reason: HOSPADM

## 2020-04-06 RX ORDER — SODIUM CHLORIDE 9 MG/ML
1000 INJECTION, SOLUTION INTRAVENOUS CONTINUOUS
Status: DISCONTINUED | OUTPATIENT
Start: 2020-04-06 | End: 2020-04-07 | Stop reason: HOSPADM

## 2020-04-06 RX ORDER — MAGNESIUM SULFATE IN WATER 40 MG/ML
2 INJECTION, SOLUTION INTRAVENOUS PRN
Status: DISCONTINUED | OUTPATIENT
Start: 2020-04-06 | End: 2020-04-07 | Stop reason: HOSPADM

## 2020-04-06 RX ORDER — ACETAMINOPHEN 325 MG/1
650 TABLET ORAL EVERY 6 HOURS PRN
Status: DISCONTINUED | OUTPATIENT
Start: 2020-04-06 | End: 2020-04-07 | Stop reason: HOSPADM

## 2020-04-06 RX ORDER — POTASSIUM CHLORIDE 7.45 MG/ML
10 INJECTION INTRAVENOUS PRN
Status: DISCONTINUED | OUTPATIENT
Start: 2020-04-06 | End: 2020-04-07 | Stop reason: HOSPADM

## 2020-04-06 RX ORDER — PANTOPRAZOLE SODIUM 40 MG/1
40 TABLET, DELAYED RELEASE ORAL DAILY
Status: DISCONTINUED | OUTPATIENT
Start: 2020-04-07 | End: 2020-04-07 | Stop reason: HOSPADM

## 2020-04-06 RX ORDER — ONDANSETRON 4 MG/1
4 TABLET, ORALLY DISINTEGRATING ORAL EVERY 8 HOURS PRN
Status: DISCONTINUED | OUTPATIENT
Start: 2020-04-06 | End: 2020-04-07 | Stop reason: HOSPADM

## 2020-04-06 RX ORDER — SODIUM CHLORIDE 0.9 % (FLUSH) 0.9 %
10 SYRINGE (ML) INJECTION PRN
Status: DISCONTINUED | OUTPATIENT
Start: 2020-04-06 | End: 2020-04-07 | Stop reason: HOSPADM

## 2020-04-06 RX ORDER — HEPARIN SODIUM 5000 [USP'U]/ML
5000 INJECTION, SOLUTION INTRAVENOUS; SUBCUTANEOUS EVERY 8 HOURS SCHEDULED
Status: DISCONTINUED | OUTPATIENT
Start: 2020-04-06 | End: 2020-04-07 | Stop reason: HOSPADM

## 2020-04-06 RX ORDER — POTASSIUM CHLORIDE 20 MEQ/1
40 TABLET, EXTENDED RELEASE ORAL PRN
Status: DISCONTINUED | OUTPATIENT
Start: 2020-04-06 | End: 2020-04-07 | Stop reason: HOSPADM

## 2020-04-06 RX ORDER — POLYETHYLENE GLYCOL 3350 17 G/17G
17 POWDER, FOR SOLUTION ORAL DAILY PRN
Status: DISCONTINUED | OUTPATIENT
Start: 2020-04-06 | End: 2020-04-07 | Stop reason: HOSPADM

## 2020-04-06 RX ORDER — ONDANSETRON 2 MG/ML
4 INJECTION INTRAMUSCULAR; INTRAVENOUS EVERY 6 HOURS PRN
Status: DISCONTINUED | OUTPATIENT
Start: 2020-04-06 | End: 2020-04-07 | Stop reason: HOSPADM

## 2020-04-06 RX ORDER — ACETAMINOPHEN 650 MG/1
650 SUPPOSITORY RECTAL EVERY 6 HOURS PRN
Status: DISCONTINUED | OUTPATIENT
Start: 2020-04-06 | End: 2020-04-07 | Stop reason: HOSPADM

## 2020-04-06 RX ADMIN — SODIUM CHLORIDE 1000 ML: 9 INJECTION, SOLUTION INTRAVENOUS at 17:47

## 2020-04-06 RX ADMIN — CARVEDILOL 6.25 MG: 6.25 TABLET, FILM COATED ORAL at 21:26

## 2020-04-06 RX ADMIN — HEPARIN SODIUM 5000 UNITS: 5000 INJECTION INTRAVENOUS; SUBCUTANEOUS at 21:25

## 2020-04-06 RX ADMIN — MULTIPLE VITAMINS W/ MINERALS TAB 1 TABLET: TAB at 21:26

## 2020-04-06 RX ADMIN — SODIUM CHLORIDE, PRESERVATIVE FREE 10 ML: 5 INJECTION INTRAVENOUS at 21:26

## 2020-04-06 ASSESSMENT — PAIN SCALES - GENERAL
PAINLEVEL_OUTOF10: 0
PAINLEVEL_OUTOF10: 0

## 2020-04-07 VITALS
HEIGHT: 62 IN | HEART RATE: 81 BPM | BODY MASS INDEX: 23.45 KG/M2 | RESPIRATION RATE: 18 BRPM | DIASTOLIC BLOOD PRESSURE: 84 MMHG | TEMPERATURE: 97.2 F | SYSTOLIC BLOOD PRESSURE: 147 MMHG | OXYGEN SATURATION: 96 % | WEIGHT: 127.4 LBS

## 2020-04-07 LAB
ANION GAP SERPL CALCULATED.3IONS-SCNC: 17 MMOL/L (ref 7–19)
BASOPHILS ABSOLUTE: 0.1 K/UL (ref 0–0.2)
BASOPHILS RELATIVE PERCENT: 0.6 % (ref 0–1)
BUN BLDV-MCNC: 9 MG/DL (ref 6–20)
CALCIUM SERPL-MCNC: 9.5 MG/DL (ref 8.6–10)
CHLORIDE BLD-SCNC: 100 MMOL/L (ref 98–111)
CO2: 20 MMOL/L (ref 22–29)
CREAT SERPL-MCNC: 0.5 MG/DL (ref 0.5–0.9)
EKG P AXIS: NORMAL DEGREES
EKG P-R INTERVAL: NORMAL MS
EKG Q-T INTERVAL: 370 MS
EKG QRS DURATION: 96 MS
EKG QTC CALCULATION (BAZETT): 422 MS
EKG T AXIS: 14 DEGREES
EOSINOPHILS ABSOLUTE: 0.1 K/UL (ref 0–0.6)
EOSINOPHILS RELATIVE PERCENT: 0.6 % (ref 0–5)
GFR NON-AFRICAN AMERICAN: >60
GLUCOSE BLD-MCNC: 107 MG/DL (ref 74–109)
HCT VFR BLD CALC: 36.7 % (ref 37–47)
HEMOGLOBIN: 12.3 G/DL (ref 12–16)
IMMATURE GRANULOCYTES #: 0.1 K/UL
LV EF: 58 %
LVEF MODALITY: NORMAL
LYMPHOCYTES ABSOLUTE: 1.8 K/UL (ref 1.1–4.5)
LYMPHOCYTES RELATIVE PERCENT: 16.6 % (ref 20–40)
MCH RBC QN AUTO: 33.4 PG (ref 27–31)
MCHC RBC AUTO-ENTMCNC: 33.5 G/DL (ref 33–37)
MCV RBC AUTO: 99.7 FL (ref 81–99)
MONOCYTES ABSOLUTE: 1.2 K/UL (ref 0–0.9)
MONOCYTES RELATIVE PERCENT: 10.6 % (ref 0–10)
NEUTROPHILS ABSOLUTE: 7.8 K/UL (ref 1.5–7.5)
NEUTROPHILS RELATIVE PERCENT: 71.1 % (ref 50–65)
PDW BLD-RTO: 14.5 % (ref 11.5–14.5)
PLATELET # BLD: 169 K/UL (ref 130–400)
PMV BLD AUTO: 10.8 FL (ref 9.4–12.3)
POTASSIUM REFLEX MAGNESIUM: 3.9 MMOL/L (ref 3.5–5)
RBC # BLD: 3.68 M/UL (ref 4.2–5.4)
SODIUM BLD-SCNC: 137 MMOL/L (ref 136–145)
TROPONIN: <0.01 NG/ML (ref 0–0.03)
WBC # BLD: 11 K/UL (ref 4.8–10.8)

## 2020-04-07 PROCEDURE — 93010 ELECTROCARDIOGRAM REPORT: CPT | Performed by: INTERNAL MEDICINE

## 2020-04-07 PROCEDURE — 96372 THER/PROPH/DIAG INJ SC/IM: CPT

## 2020-04-07 PROCEDURE — 99220 PR INITIAL OBSERVATION CARE/DAY 70 MINUTES: CPT | Performed by: PSYCHIATRY & NEUROLOGY

## 2020-04-07 PROCEDURE — 2580000003 HC RX 258: Performed by: HOSPITALIST

## 2020-04-07 PROCEDURE — 6360000002 HC RX W HCPCS: Performed by: HOSPITALIST

## 2020-04-07 PROCEDURE — 6370000000 HC RX 637 (ALT 250 FOR IP): Performed by: HOSPITALIST

## 2020-04-07 PROCEDURE — G0378 HOSPITAL OBSERVATION PER HR: HCPCS

## 2020-04-07 PROCEDURE — 36415 COLL VENOUS BLD VENIPUNCTURE: CPT

## 2020-04-07 PROCEDURE — 80048 BASIC METABOLIC PNL TOTAL CA: CPT

## 2020-04-07 PROCEDURE — 84484 ASSAY OF TROPONIN QUANT: CPT

## 2020-04-07 PROCEDURE — 85025 COMPLETE CBC W/AUTO DIFF WBC: CPT

## 2020-04-07 PROCEDURE — 93306 TTE W/DOPPLER COMPLETE: CPT

## 2020-04-07 RX ORDER — BUPROPION HYDROCHLORIDE 150 MG/1
300 TABLET ORAL DAILY
Status: DISCONTINUED | OUTPATIENT
Start: 2020-04-07 | End: 2020-04-07 | Stop reason: HOSPADM

## 2020-04-07 RX ORDER — LORAZEPAM 0.5 MG/1
0.5 TABLET ORAL ONCE
Status: COMPLETED | OUTPATIENT
Start: 2020-04-07 | End: 2020-04-07

## 2020-04-07 RX ORDER — HYDROXYZINE HYDROCHLORIDE 10 MG/1
10 TABLET, FILM COATED ORAL 3 TIMES DAILY PRN
Status: DISCONTINUED | OUTPATIENT
Start: 2020-04-07 | End: 2020-04-07 | Stop reason: HOSPADM

## 2020-04-07 RX ORDER — FLUOXETINE HYDROCHLORIDE 20 MG/1
20 CAPSULE ORAL NIGHTLY
Status: DISCONTINUED | OUTPATIENT
Start: 2020-04-07 | End: 2020-04-07 | Stop reason: HOSPADM

## 2020-04-07 RX ORDER — HYDROXYZINE HYDROCHLORIDE 10 MG/1
10 TABLET, FILM COATED ORAL 3 TIMES DAILY PRN
Qty: 30 TABLET | Refills: 0 | Status: SHIPPED | OUTPATIENT
Start: 2020-04-07 | End: 2020-04-17

## 2020-04-07 RX ADMIN — CARVEDILOL 6.25 MG: 6.25 TABLET, FILM COATED ORAL at 09:53

## 2020-04-07 RX ADMIN — LORAZEPAM 0.5 MG: 0.5 TABLET ORAL at 00:08

## 2020-04-07 RX ADMIN — SODIUM CHLORIDE, PRESERVATIVE FREE 10 ML: 5 INJECTION INTRAVENOUS at 09:53

## 2020-04-07 RX ADMIN — HEPARIN SODIUM 5000 UNITS: 5000 INJECTION INTRAVENOUS; SUBCUTANEOUS at 05:38

## 2020-04-07 RX ADMIN — ACETAMINOPHEN 650 MG: 325 TABLET, FILM COATED ORAL at 09:57

## 2020-04-07 RX ADMIN — BUPROPION HYDROCHLORIDE 300 MG: 150 TABLET, FILM COATED, EXTENDED RELEASE ORAL at 09:53

## 2020-04-07 RX ADMIN — MULTIPLE VITAMINS W/ MINERALS TAB 1 TABLET: TAB at 09:53

## 2020-04-07 RX ADMIN — LEVOTHYROXINE SODIUM 50 MCG: 50 TABLET ORAL at 05:39

## 2020-04-07 RX ADMIN — LEVOTHYROXINE SODIUM 200 MCG: 100 TABLET ORAL at 05:39

## 2020-04-07 RX ADMIN — MELATONIN 3 MG: at 00:08

## 2020-04-07 RX ADMIN — ACETAMINOPHEN 650 MG: 325 TABLET, FILM COATED ORAL at 01:31

## 2020-04-07 RX ADMIN — PANTOPRAZOLE SODIUM 40 MG: 40 TABLET, DELAYED RELEASE ORAL at 09:53

## 2020-04-07 ASSESSMENT — ENCOUNTER SYMPTOMS
NAUSEA: 0
SHORTNESS OF BREATH: 0
BACK PAIN: 0
COUGH: 0
ABDOMINAL PAIN: 0
COLOR CHANGE: 0
EYE DISCHARGE: 0
VOMITING: 0
RHINORRHEA: 0

## 2020-04-07 ASSESSMENT — PAIN SCALES - GENERAL
PAINLEVEL_OUTOF10: 4
PAINLEVEL_OUTOF10: 7
PAINLEVEL_OUTOF10: 2
PAINLEVEL_OUTOF10: 5
PAINLEVEL_OUTOF10: 2
PAINLEVEL_OUTOF10: 5

## 2020-04-07 ASSESSMENT — PAIN DESCRIPTION - PAIN TYPE
TYPE: ACUTE PAIN

## 2020-04-07 ASSESSMENT — PAIN DESCRIPTION - LOCATION
LOCATION: HEAD

## 2020-04-07 NOTE — DISCHARGE SUMMARY
significant aortic regurgitation or stenosis is noted. Tricuspid valve is structurally normal.   Mild tricuspid regurgitation noted. Normal left ventricular size with preserved LV function and an estimated   ejection fraction of approximately 55-60%. Mild concentric left ventricular hypertrophy noted. No regional wall motion abnormalities. No evidence of left ventricular mass or thrombus noted. Normal right ventricular size with preserved RV function. Right ventricular systolic pressure of 33 mmHg noted. Ct Head Wo Contrast  1. No acute intracranial findings. 2. Chronic findings including position of the right frontal interventricular drainage as described above. Signed by Dr Jarad Mensah on 4/6/2020 4:58 PM    Xr Chest Portable  1. No radiographic evidence of acute cardiopulmonary process. Signed by Dr Gurpreet Cody on 4/6/2020 5:08 PM    Pertinent Labs:   CBC:   Recent Labs     04/06/20  1713 04/07/20  0122   WBC 8.2 11.0*   HGB 13.4 12.3    169     BMP:    Recent Labs     04/06/20  1713 04/07/20  0122    137   K 4.1 3.9   CL 94* 100   CO2 25 20*   BUN 11 9   CREATININE 0.6 0.5   GLUCOSE 119* 107     INR: No results for input(s): INR in the last 72 hours. Physical Exam:  Vital Signs: BP (!) 147/84   Pulse 81   Temp 97.2 °F (36.2 °C) (Temporal)   Resp 18   Ht 5' 2\" (1.575 m)   Wt 127 lb 6.4 oz (57.8 kg)   LMP 07/25/2016 (Approximate)   SpO2 96%   BMI 23.30 kg/m²   General appearance:. Alert and Cooperative   HEENT: Normocephalic. Chest: clear to auscultation bilaterally without wheezes or rhonchi. Cardiac: Normal heart tones with regular rate and rhythm, S1, S2 normal. No murmurs, gallops, or rubs auscultated. Abdomen:soft, non-tender; non-distended normal bowel sounds no masses, no organomegaly. Extremities: No clubbing or cyanosis. No peripheral edema. Peripheral pulses palpable.   Neurologic: chronic right sided facial droop otherwise no focal deficits     Discharge

## 2020-04-07 NOTE — H&P
excessive sweating / heat or cold intolerance  Psychiatric:  + depression /+ anxiety / Denies insomnia / mood changes  Skin:  Denies new rashes / lesions / skin hair or nail changes    14 point review of systems is negative except as specifically addressed above. Physical Examination:  BP (!) 153/79   Pulse 82   Temp 98.1 °F (36.7 °C) (Axillary)   Resp 18   Ht 5' 2\" (1.575 m)   Wt 128 lb (58.1 kg)   LMP 07/25/2016 (Approximate)   SpO2 93%   BMI 23.41 kg/m²   General appearance: alert, appears stated age, cooperative and no distress, sitting comfortably on side of bed  Head: Normocephalic, without obvious abnormality, atraumatic  Eyes: conjunctivae/corneas clear. PERRL, EOM's intact. Ears: normal external ears and nose, throat without exudate  Neck: no adenopathy, no carotid bruit, no JVD, supple, symmetrical, trachea midline   Lungs: clear to auscultation bilaterally,no rales or wheezes   Heart: regular rate and rhythm, S1, S2 normal, no murmur  Abdomen:soft, non-tender; non-distended, normal bowel sounds no masses, no organomegaly  Extremities:No lower extremity edema,  No erythema, no tenderness to palpation  Skin: Skin color, texture, turgor normal. No rashes or lesions  Lymphatic: No palpable lymph node enlargment  Neurologic: Alert and oriented X 3, normal strength and tone.  Chronic right facial droop  Psychiatric: Anxious, tearful     Diagnostic Data:  CBC:  Recent Labs     04/06/20  1713   WBC 8.2   HGB 13.4   HCT 39.9        BMP:  Recent Labs     04/06/20  1713      K 4.1   CL 94*   CO2 25   BUN 11   CREATININE 0.6   CALCIUM 10.2*     Recent Labs     04/06/20  1713   *   ALT 92*   BILITOT 1.5*   ALKPHOS 206*     Cardiac Enzymes:   Recent Labs     04/06/20  1713   TROPONINI <0.01     Urinalysis:  Lab Results   Component Value Date    NITRU Negative 04/06/2020    WBCUA 1 04/06/2020    BACTERIA NEGATIVE 04/06/2020    RBCUA 4 04/06/2020    BLOODU Negative 04/06/2020    SPECGRAV

## 2020-04-07 NOTE — PLAN OF CARE
Problem: Falls - Risk of:  Goal: Will remain free from falls  Description: Will remain free from falls  4/7/2020 0739 by Ann So RN  Outcome: Ongoing  4/7/2020 0044 by Abeba Ryan RN  Outcome: Ongoing  Goal: Absence of physical injury  Description: Absence of physical injury  4/7/2020 0739 by Ann So RN  Outcome: Ongoing  4/7/2020 0044 by Abeba Ryan RN  Outcome: Ongoing     Problem: Pain:  Goal: Pain level will decrease  Description: Pain level will decrease  Outcome: Ongoing  Goal: Control of acute pain  Description: Control of acute pain  Outcome: Ongoing  Goal: Control of chronic pain  Description: Control of chronic pain  Outcome: Ongoing

## 2020-04-07 NOTE — CONSULTS
[id number] [facility] Neurology Consult        Patient:   Olga Hayes  MR#:    [id number]  Account Number:                   [id number]      Room:    [facility]-   YOB: 1970  Date of Progress Note: 4/7/2020  Time of Note                           10:32 AM  Attending Physician:  Avril Marie MD  Consulting Physician:   Yeni Sánchez M.D.        [address]:  Syncope       HISTORY OF PRESENT ILLNESS:   This 51 y.o. female is seen for syncopal. On the day of admission she was working in her yard. She indicates she was feeling fine when with no warning she lost consciousness. This was witnessed by her 6year old niece. She lost consciousness for a few minutes and did bit the side of her tongue but there was no reported shaking or stiffening. She was slightly disoriented after this an felt much better after about 10 minutes. She denied any focal weakness, numbness, diplopia, dysarthria or visual change. She denies any history of stroke, seizure or syncope. She does have a history of acoustic neuroma removal 13 years ago with shunt placement due to meningitis and residual right sided facial paresis. She does have a history of neuropathy in her feet. REVIEW OF SYSTEMS:  Constitutional - No fever or chills. No diaphoresis or significant fatigue. HENT -  No tinnitus or significant hearing loss. Eyes - no sudden vision change or eye pain  Respiratory - no significant shortness of breath or cough  Cardiovascular - no chest pain No palpitations or significant leg swelling  Gastrointestinal - no abdominal swelling or pain. Genitourinary - No difficulty urinating, dysuria  Musculoskeletal - no back pain or myalgia. Skin - no color change or rash  Neurologic - No seizures. No lateralizing weakness. Hematologic - no easy bruising or excessive bleeding. Psychiatric - no severe anxiety or nervousness. All other review of systems are negative.       Past Medical History:      Diagnosis Date    Acoustic Oral, PRN **OR** potassium bicarb-citric acid (EFFER-K) effervescent tablet 40 mEq, 40 mEq, Oral, PRN **OR** potassium chloride 10 mEq/100 mL IVPB (Peripheral Line), 10 mEq, Intravenous, PRN, Haily Dubose MD    magnesium sulfate 2 g in 50 mL IVPB premix, 2 g, Intravenous, PRN, Haily Dubose MD    polyethylene glycol [facility]) packet 17 g, 17 g, Oral, Daily PRN, Haily Dubose MD    melatonin tablet 3 mg, 3 mg, Oral, Nightly PRN, Haily Dubose MD, 3 mg at 04/07/20 0008    ondansetron (ZOFRAN-ODT) disintegrating tablet 4 mg, 4 mg, Oral, Q8H PRN **OR** ondansetron (ZOFRAN) injection 4 mg, 4 mg, Intravenous, Q6H PRN, Haily Dubose MD    heparin (porcine) injection 5,000 Units, 5,000 Units, Subcutaneous, 3 times per day, Haily Dubose MD, 5,000 Units at 04/07/20 9447    Allergies:  Patient has no known allergies. Social History:   TOBACCO:   reports that she has never smoked. She has never used smokeless tobacco.  ETOH:   reports current alcohol use of about 3.0 standard drinks of alcohol per week. Family History:       Problem Relation Age of Onset    Heart Disease Mother     Kidney Disease Mother     Heart Disease Father     COPD Father     Kidney Disease Father     Lung Cancer Brother            Physical Exam:    Vitals: BP (!) 155/91   Pulse 76   Temp 97.5 °F (36.4 °C)   Resp 16   Ht 5' 2\" (1.575 m)   Wt 127 lb 6.4 oz (57.8 kg)   LMP 07/25/2016 (Approximate)   SpO2 94%   BMI 23.30 kg/m²     Constitutional - well developed, well nourished.     Eyes - conjunctiva normal.  Pupils react to light  Ear, nose, throat -hearing reduced on right to finger rub No scars, masses, or lesions over external nose or ears, no atrophy of tongue  Neck-symmetric, no masses noted, no jugular vein distension  Respiration- chest wall appears symmetric, good expansion,   normal effort without use of accessory muscles  Musculoskeletal - no significant wasting of muscles noted, no bony deformities  Extremities-no clubbing, cyanosis or edema  Skin - warm, dry, and intact. No rash, erythema, or pallor. Psychiatric - mood, affect, and behavior appear normal.      Neurological exam  Awake, alert, fluent oriented x 3 appropriate affect  Attention and concentration appear appropriate  Recent and remote memory appears unremarkable  Speech normal without dysarthria  No clear issues with language of fund of knowledge    Cranial Nerve Exam   CN II- Visual fields grossly unremarkable  CN III, IV,VI-EOMI, No nystagmus, conjugate eye movements, no ptosis  CN V-sensation intact to LT over face  CN VII-right upper and lower facial weakness not close right eyelid  CN VIII-Hearing reduced to finger rub right  CN IX and X- Palate not tested  CN XI-not test shoulder shrug  CN XII-Tongue midline with no fasciculations or fibrillations    Motor Exam  V/V throughout upper and lower extremities bilaterally, no cogwheeling, normal tone    Sensory Exam  Sensation reduced over feet    Reflexes   2+ biceps bilaterally  2+ brachioradialis  2+patella  2+ ankle jerks  No clonus ankles  No Barreto's sign bilateral hands    Tremors- no tremors in hands or head noted    Gait  Not tested    Coordination  Finger to nose-unremarkable        CBC:   Recent Labs     04/06/20  1713 04/07/20  0122   WBC 8.2 11.0*   HGB 13.4 12.3    169       BMP:    Recent Labs     04/06/20  1713 04/07/20  0122    137   K 4.1 3.9   CL 94* 100   CO2 25 20*   BUN 11 9   CREATININE 0.6 0.5   GLUCOSE 119* 107       Hepatic:   Recent Labs     04/06/20  1713   *   ALT 92*   BILITOT 1.5*   ALKPHOS 206*       Lipids: No results for input(s): CHOL, HDL in the last 72 hours. Invalid input(s): LDLCALCU    INR: No results for input(s): INR in the last 72 hours.     CT Head WO Contrast [[id number]]    Resulted: 04/06/20 1658    Updated: 04/06/20 1700    Narrative:     EXAM: CT HEAD WO CONTRAST -- 4/6/2020 3:45 PM  HISTORY: 51 years, Female, syncope  COMPARISON: 11/18/2009  DLP: 726 mGy cm. Automated exposure control was utilized to minimize  patient radiation dose. TECHNIQUE: Unenhanced axial CT images obtained from vertex to skull  base with coronal and sagittal reformats. FINDINGS:  Right frontal approach intraventricular catheter, tip crossing midline  approximating the left basal ganglia, position similar compared to  11/18/2009. Similar right frontal lobe subcortical encephalomalacia. Similar  encephalomalacia or postoperative changes at the right cerebellum,  lateral aspect. No evidence of acute intracranial hemorrhage. No mass effect or  midline shift. Ventricles, sulci, basilar cisterns are normal in size  and configuration for the patient's age. Globes, retrobulbar soft tissues, paranasal sinuses, mastoid air cells  and external auditory canals are within normal limits. Similar right  occipital craniotomy. Impression:     1. No acute intracranial findings. 2. Chronic findings including position of the right frontal  interventricular drainage as described above. Signed by Dr René Eisenberg on 4/6/2020 4:58 PM         Assessment and Plan     Syncope with no warning-no evidence of shaking/stiffening to suggest and epileptic process  Chronic right facial weakness secondary to acoustic neuroma resection  CT no acute changes-chronic right frontal and right cerebellum with right frontal intraventricular drain with no hydrocephalus    At this time would not suggest further neurological testing  Cardiac workup underway to assess for arrhythmia    Ok to DC from neuro standpoint    F/u PRN      Please feel free to call with any questions   [phone] (cell phone)    Dr Edward Chisholm certified in Neurology  Board Certified in [employer] Neurophysiology  Fellowship Trained in [employer] Neurophysiology    EMR Dragon/transcription disclaimer:Significant part of this  encounter note is electronic transcription/translation of spoken language to printed text.

## 2020-04-07 NOTE — ED PROVIDER NOTES
normal.      Left Ear: External ear normal.      Nose: Nose normal.      Mouth/Throat:      Mouth: Mucous membranes are moist.      Pharynx: Oropharynx is clear. No oropharyngeal exudate. Eyes:      General: No scleral icterus. Conjunctiva/sclera: Conjunctivae normal.      Pupils: Pupils are equal, round, and reactive to light. Neck:      Musculoskeletal: Neck supple. No neck rigidity. Cardiovascular:      Rate and Rhythm: Normal rate and regular rhythm. Pulses: Normal pulses. Heart sounds: Normal heart sounds. Pulmonary:      Effort: Pulmonary effort is normal.      Breath sounds: Normal breath sounds. Abdominal:      General: Bowel sounds are normal.      Palpations: Abdomen is soft. Tenderness: There is no abdominal tenderness. There is no guarding. Musculoskeletal:         General: No tenderness or deformity. Skin:     General: Skin is warm and dry. Capillary Refill: Capillary refill takes less than 2 seconds. Coloration: Skin is not jaundiced. Neurological:      Mental Status: She is alert and oriented to person, place, and time. Mental status is at baseline. GCS: GCS eye subscore is 4. GCS verbal subscore is 5. GCS motor subscore is 6. Cranial Nerves: Cranial nerve deficit (Chronic hearing loss on the right. Right facial paresis, also chronic. Pupils equal round and reactive to light.) and facial asymmetry present. No dysarthria. Sensory: No sensory deficit. Motor: No weakness, tremor, abnormal muscle tone, seizure activity or pronator drift. Coordination: Coordination normal.   Psychiatric:         Behavior: Behavior normal.      Comments: Patient appears anxious. DIAGNOSTIC RESULTS     EKG: All EKG's areinterpreted by the Emergency Department Physician who either signs or Co-signs this chart in the absence of a cardiologist.    EKG dated 4/6/2020 at 1740 9 PM: Normal sinus rhythm, rate 77. Multiple PVCs. PA 53 ms.   Possible left atrial enlargement. QTc 526. RADIOLOGY:  Non-plain film images such as CT, Ultrasound and MRI are read by the radiologist. Plain radiographic images are visualized and preliminarily interpreted bythe emergency physician with the below findings:        XR CHEST PORTABLE   Final Result   1. No radiographic evidence of acute cardiopulmonary process. Signed by Dr Demetrius Hearn on 4/6/2020 5:08 PM      CT Head WO Contrast   Final Result   1. No acute intracranial findings. 2. Chronic findings including position of the right frontal   interventricular drainage as described above. Signed by Dr Len Cooper on 4/6/2020 4:58 PM              LABS:  Labs Reviewed   CBC WITH AUTO DIFFERENTIAL - Abnormal; Notable for the following components:       Result Value    RBC 4.07 (*)     MCH 32.9 (*)     Neutrophils % 71.8 (*)     Lymphocytes % 17.6 (*)     All other components within normal limits   COMPREHENSIVE METABOLIC PANEL W/ REFLEX TO MG FOR LOW K - Abnormal; Notable for the following components:    Chloride 94 (*)     Glucose 119 (*)     Calcium 10.2 (*)     Total Protein 8.9 (*)     Total Bilirubin 1.5 (*)     Alkaline Phosphatase 206 (*)     ALT 92 (*)      (*)     All other components within normal limits   URINE RT REFLEX TO CULTURE - Abnormal; Notable for the following components:    Clarity, UA CLOUDY (*)     Protein, UA TRACE (*)     Leukocyte Esterase, Urine TRACE (*)     All other components within normal limits   MICROSCOPIC URINALYSIS - Abnormal; Notable for the following components:    Bacteria, UA NEGATIVE (*)     All other components within normal limits   CULTURE, URINE   TROPONIN   LIPASE   LACTIC ACID, PLASMA   POCT GLUCOSE       All other labs were within normal range or not returned as of this dictation.     EMERGENCY DEPARTMENT COURSE and DIFFERENTIAL DIAGNOSIS/MDM:   Vitals:    Vitals:    04/06/20 1535   BP: (!) 153/79   Pulse: 82   Resp: 18   Temp: 98.1 °F (36.7 °C)   TempSrc:

## 2020-04-08 ENCOUNTER — CARE COORDINATION (OUTPATIENT)
Dept: CARE COORDINATION | Age: 50
End: 2020-04-08

## 2020-04-08 LAB — URINE CULTURE, ROUTINE: NORMAL

## 2020-04-09 ENCOUNTER — TELEPHONE (OUTPATIENT)
Dept: INTERNAL MEDICINE | Age: 50
End: 2020-04-09

## 2020-04-09 NOTE — TELEPHONE ENCOUNTER
[facility] Transitions Initial Follow Up Call    Outreach made within 2 business days of discharge: Yes    Patient: Carla Villalobos Patient : 1970   MRN: [id number]   Reason for Admission:   Principal Problem:    Syncope and collapse  Active Problems:    Hypothyroidism (acquired)    Hypertension    MENEZES (nonalcoholic steatohepatitis)    Acoustic neuroma (HCC)    Neuropathy    Elevated LFTs    Abnormal SPEP  Resolved Problems:    * No resolved hospital problems. *    Admit date:  2020   Discharge Date: 20       Spoke with: patient    Discharge department/facility: [facility]    TCM Interactive Patient Contact:  Was patient able to fill all prescriptions: Yes  Was patient instructed to bring all medications to the follow-up visit: Yes  Is patient taking all medications as directed in the discharge summary? Yes  Does patient understand their discharge instructions: Yes  Does patient have questions or concerns that need addressed prior to 7-14 day follow up office visit: no    Per pt she is doing well. She has reviewed her tests results and doesn't understand all of them. [facility]. She is also keeping a log of her blood pressure readings.       Scheduled appointment with PCP within 7-14 days    Follow Up  Future Appointments   Date Time Provider Jaylen Blackwood   4/10/2020 12:45 PM Margorie Gallus, APRN [facility]   2020  8:45 AM [facility]   2020  9:45 AM SCHEDULE, L MED ONC MA L MED ONC Jana XIE   2020 10:45 AM JULI Sandra [facility]        Allen Avilez LPN

## 2020-04-10 ENCOUNTER — TELEMEDICINE (OUTPATIENT)
Dept: FAMILY MEDICINE CLINIC | Age: 50
End: 2020-04-10
Payer: COMMERCIAL

## 2020-04-10 VITALS
WEIGHT: 128 LBS | HEIGHT: 62 IN | SYSTOLIC BLOOD PRESSURE: 139 MMHG | BODY MASS INDEX: 23.55 KG/M2 | DIASTOLIC BLOOD PRESSURE: 90 MMHG

## 2020-04-10 PROCEDURE — 99496 TRANSJ CARE MGMT HIGH F2F 7D: CPT | Performed by: CLINICAL NURSE SPECIALIST

## 2020-04-10 RX ORDER — FLUOXETINE HYDROCHLORIDE 20 MG/1
20 CAPSULE ORAL NIGHTLY
Qty: 90 CAPSULE | Refills: 2 | Status: SHIPPED | OUTPATIENT
Start: 2020-04-10 | End: 2020-12-02 | Stop reason: SDUPTHER

## 2020-04-10 ASSESSMENT — ENCOUNTER SYMPTOMS
CHEST TIGHTNESS: 0
DIARRHEA: 0
COLOR CHANGE: 0
SORE THROAT: 0
TROUBLE SWALLOWING: 0
BACK PAIN: 0
SHORTNESS OF BREATH: 0
EYE PAIN: 0
COUGH: 0
SINUS PRESSURE: 0
ABDOMINAL PAIN: 0
EYE DISCHARGE: 0
WHEEZING: 0
FACIAL SWELLING: 0
EYE REDNESS: 0
CONSTIPATION: 0

## 2020-04-10 NOTE — PROGRESS NOTES
SUBJECTIVE:  Vadim Daniels is a 51 y.o. who presents today for Follow-Up from Hospital ([facility] )      HPI     VIRTUAL VISIT VIA TELEPHONE    _______________________________________________________________    Due to COVID 23 outbreak, patient's office visit was converted to telephone virtual visit. Patient was contacted and agreed to proceed with a telephone virtual visit. The risks and benefits of converting to a telephone virtual visit were discussed in light of the current infectious disease epidemic. Patient also understood that insurance coverage and co-pays are up to their individual insurance plans. Bennett Gudino was evaluated today via mychart video visit for hospital follow up, high complexity TCM visit. She admitted to [facility] on 4/6/20 with syncope and collapse. She reports feeling fine that morning, was working around her home as usual, taking care of her 2 nieces. The last thing she remembers was going to fold up a folding table. Her niece reports that Maya Moll eyes rolled back in her head and she passed out. They called a family member who called 46, but by the time they were called her son arrived home and she came to. They report she was out for approximately 5 minutes. She remembers waking up and feeling disoriented but quickly recovered. She went ahead and went to ER. Work up was negative for any acute findings on her labs or imaging. She was admitted overnight. She remains stable and symptom free. She was discharged home on 4/7/20. Her labs showed mild leukocytosis at discharge, but otherwise normal.  No new symptoms since discharge. No further syncope or dizziness. There was some concern about ativan being factor. She has chronic depression and anxiety. She uses ativan sparingly. She had taken for 10 days in a row, but then had not taken in 5 days leading up to this episode. She admits some increased stress in the past 2 weeks due to family issues, COVID and being off work.     Her tablet Take 1 tablet by mouth Daily 90 tablet 3    pantoprazole (PROTONIX) 40 MG tablet Take 1 tablet by mouth daily 90 tablet 2    Multiple Vitamins-Minerals (THERAPEUTIC MULTIVITAMIN-MINERALS) tablet Take 1 tablet by mouth daily      buPROPion (WELLBUTRIN XL) 300 MG extended release tablet TAKE 1 TABLET BY MOUTH EVERY DAY IN THE MORNING (Patient taking differently: nightly ) 30 tablet 11    carvedilol (COREG) 12.5 MG tablet Take 1/2 tablet in Am, full tablet in PM (Patient taking differently: Take 1/2 tablet in pm, full tablet in AM) 135 tablet 3    cetirizine (ZYRTEC) 10 MG tablet Take 10 mg by mouth daily       No current facility-administered medications for this visit. No Known Allergies    Review of Systems   Constitutional: Positive for activity change and fatigue. Negative for appetite change, chills, diaphoresis, fever and unexpected weight change. HENT: Positive for hearing loss. Negative for congestion, ear pain, facial swelling, postnasal drip, sinus pressure, sore throat and trouble swallowing. Eyes: Negative for pain, discharge, redness and visual disturbance. Respiratory: Negative for cough, chest tightness, shortness of breath and wheezing. Cardiovascular: Negative for chest pain and palpitations. Gastrointestinal: Negative for abdominal pain, constipation and diarrhea. Genitourinary: Negative for difficulty urinating, dysuria, flank pain, frequency, hematuria and urgency. Musculoskeletal: Negative for arthralgias, back pain, joint swelling and neck pain. Skin: Negative for color change and rash. Neurological: Positive for syncope and headaches. Negative for dizziness, weakness and light-headedness. Hematological: Negative for adenopathy. Does not bruise/bleed easily. Psychiatric/Behavioral: Positive for decreased concentration and dysphoric mood. Negative for confusion and suicidal ideas. The patient is nervous/anxious.          OBJECTIVE:  BP (!) 139/90   Ht 5' 2\"

## 2020-04-15 RX ORDER — BUPROPION HYDROCHLORIDE 300 MG/1
TABLET ORAL
Qty: 30 TABLET | Refills: 11 | Status: SHIPPED | OUTPATIENT
Start: 2020-04-15 | End: 2020-09-29

## 2020-04-15 NOTE — TELEPHONE ENCOUNTER
Yuriy Cunningham called requesting a refill of the below medication which has been pended for you:     Requested Prescriptions     Pending Prescriptions Disp Refills    buPROPion (WELLBUTRIN XL) 300 MG extended release tablet [Pharmacy Med Name: BUPROPION HCL  MG TABLET] 30 tablet 11     Sig: TAKE 1 TABLET BY MOUTH EVERY DAY IN THE MORNING       Last Appointment Date: 3/17/2020  Next Appointment Date: 6/18/2020    No Known Allergies

## 2020-05-08 RX ORDER — HYDROCODONE BITARTRATE AND ACETAMINOPHEN 10; 325 MG/1; MG/1
1 TABLET ORAL DAILY
Qty: 30 TABLET | Refills: 0 | Status: SHIPPED | OUTPATIENT
Start: 2020-05-08 | End: 2020-06-05 | Stop reason: SDUPTHER

## 2020-05-08 NOTE — TELEPHONE ENCOUNTER
Jennifer Galicia called requesting a refill of the below medication which has been pended for you:     Requested Prescriptions     Pending Prescriptions Disp Refills    HYDROcodone-acetaminophen (NORCO)  MG per tablet 30 tablet 0     Sig: Take 1 tablet by mouth daily for 30 days.        Last Appointment Date: 3/17/2020  Next Appointment Date: 6/18/2020  Clifford:3/4/2020  UDS:3/17/03472  Medication Contract:9/4/2018  Pharmacy:  Script is due    No Known Allergies

## 2020-06-05 RX ORDER — HYDROCODONE BITARTRATE AND ACETAMINOPHEN 10; 325 MG/1; MG/1
1 TABLET ORAL DAILY
Qty: 30 TABLET | Refills: 0 | Status: SHIPPED | OUTPATIENT
Start: 2020-06-05 | End: 2020-07-07 | Stop reason: SDUPTHER

## 2020-06-05 RX ORDER — LORAZEPAM 1 MG/1
1 TABLET ORAL 2 TIMES DAILY PRN
Qty: 60 TABLET | Refills: 0 | Status: SHIPPED | OUTPATIENT
Start: 2020-06-05 | End: 2020-07-05

## 2020-06-05 NOTE — TELEPHONE ENCOUNTER
Angie Bolton called to request a refill on her medication. Last office visit : 3/17/2020   Next office visit : 6/18/2020     Last UDS:   Amphetamine Screen, Urine   Date Value Ref Range Status   03/17/2020 negative  Final     Barbiturate Screen, Urine   Date Value Ref Range Status   03/17/2020 negative  Final     Benzodiazepine Screen, Urine   Date Value Ref Range Status   03/17/2020 negative  Final     Buprenorphine Urine   Date Value Ref Range Status   03/17/2020 negative  Final     Cocaine Metabolite Screen, Urine   Date Value Ref Range Status   03/17/2020 negative  Final     Gabapentin Screen, Urine   Date Value Ref Range Status   03/17/2020 n/a  Final     MDMA, Urine   Date Value Ref Range Status   03/17/2020 negative  Final     Methamphetamine, Urine   Date Value Ref Range Status   03/17/2020 negative  Final     Opiate Scrn, Ur   Date Value Ref Range Status   03/17/2020 Positive  Final     Oxycodone Screen, Ur   Date Value Ref Range Status   03/17/2020 negative  Final     PCP Screen, Urine   Date Value Ref Range Status   03/17/2020 negative  Final     Propoxyphene Screen, Urine   Date Value Ref Range Status   03/17/2020 n/a  Final     THC Screen, Urine   Date Value Ref Range Status   03/17/2020 negative  Final     Tricyclic Antidepressants, Urine   Date Value Ref Range Status   03/17/2020 negative  Final       Last Hannah Guzman: today  Medication Contract: needs update at next visit   Last Fill: 5/8/20    Requested Prescriptions     Pending Prescriptions Disp Refills    HYDROcodone-acetaminophen (NORCO)  MG per tablet 30 tablet 0     Sig: Take 1 tablet by mouth daily for 30 days.  LORazepam (ATIVAN) 1 MG tablet 60 tablet 0     Sig: Take 1 tablet by mouth 2 times daily as needed for Anxiety for up to 30 days. Please approve or refuse this medication.    He Olivares

## 2020-06-30 ENCOUNTER — TELEPHONE (OUTPATIENT)
Dept: HEMATOLOGY | Age: 50
End: 2020-06-30

## 2020-07-06 NOTE — TELEPHONE ENCOUNTER
Asher Pettit called requesting a refill of the below medication which has been pended for you:     Requested Prescriptions     Pending Prescriptions Disp Refills    HYDROcodone-acetaminophen (NORCO)  MG per tablet 30 tablet 0     Sig: Take 1 tablet by mouth daily for 30 days.  LORazepam (ATIVAN) 1 MG tablet 60 tablet 0     Sig: Take 1 tablet by mouth every 8 hours as needed for Anxiety for up to 30 days.        Last Appointment Date: 3/17/2020  Next Appointment Date: Visit date not found  Candy Tran:          UDS:3/17/2020 10:35 AM - Jose Murguia LPN     Component Collected Lab   Amphetamine Screen, Urine 03/17/2020 12:00 AM Unknown   negative    Barbiturate Screen, Urine 03/17/2020 12:00 AM Unknown   negative    Benzodiazepine Screen, Urine 03/17/2020 12:00 AM Unknown   negative    Buprenorphine Urine 03/17/2020 12:00 AM Unknown   negative    Cocaine Metabolite Screen, Urine 03/17/2020 12:00 AM Unknown   negative    Gabapentin Screen, Urine 03/17/2020 12:00 AM Unknown   n/a    MDMA, Urine 03/17/2020 12:00 AM Unknown   negative    Methamphetamine, Urine 03/17/2020 12:00 AM Unknown   negative    Methadone Screen, Urine 03/17/2020 12:00 AM Unknown   negative    Opiate Scrn, Ur 03/17/2020 12:00 AM Unknown   Positive    Oxycodone Screen, Ur 03/17/2020 12:00 AM Unknown   negative    PCP Screen, Urine 03/17/2020 12:00 AM Unknown   negative    Propoxyphene Screen, Urine 03/17/2020 12:00 AM Unknown   n/a    THC Screen, Urine 03/17/2020 12:00 AM Unknown   negative    Tricyclic Antidepressants, Urine 03/17/2020 12:00 AM Unknown   negative    Lab and Collection     POCT Rapid Drug Screen - 3/17/2020     Medication Contract:          Pharmacy:  Script is due    No Known Allergies

## 2020-07-07 RX ORDER — LORAZEPAM 1 MG/1
1 TABLET ORAL EVERY 8 HOURS PRN
Qty: 60 TABLET | Refills: 0 | Status: SHIPPED | OUTPATIENT
Start: 2020-07-07 | End: 2020-08-07 | Stop reason: SDUPTHER

## 2020-07-07 RX ORDER — HYDROCODONE BITARTRATE AND ACETAMINOPHEN 10; 325 MG/1; MG/1
1 TABLET ORAL DAILY
Qty: 30 TABLET | Refills: 0 | Status: SHIPPED | OUTPATIENT
Start: 2020-07-07 | End: 2020-08-07 | Stop reason: SDUPTHER

## 2020-07-16 ENCOUNTER — TELEPHONE (OUTPATIENT)
Dept: FAMILY MEDICINE CLINIC | Age: 50
End: 2020-07-16

## 2020-07-16 RX ORDER — VALACYCLOVIR HYDROCHLORIDE 1 G/1
2000 TABLET, FILM COATED ORAL 2 TIMES DAILY
Qty: 8 TABLET | Refills: 5 | Status: SHIPPED | OUTPATIENT
Start: 2020-07-16 | End: 2021-10-10

## 2020-07-16 NOTE — TELEPHONE ENCOUNTER
Patient is requesting to get a refill on her medication for cold sores. This medicine is not on the patients current med list.     Please advise.

## 2020-07-30 RX ORDER — PANTOPRAZOLE SODIUM 40 MG/1
TABLET, DELAYED RELEASE ORAL
Qty: 90 TABLET | Refills: 3 | Status: SHIPPED | OUTPATIENT
Start: 2020-07-30 | End: 2021-07-26

## 2020-08-06 NOTE — TELEPHONE ENCOUNTER
Randall Guerin called to request a refill on her medication. Last office visit : 4/10/2020   Next office visit : Visit date not found     Last UDS:   Amphetamine Screen, Urine   Date Value Ref Range Status   03/17/2020 negative  Final     Barbiturate Screen, Urine   Date Value Ref Range Status   03/17/2020 negative  Final     Benzodiazepine Screen, Urine   Date Value Ref Range Status   03/17/2020 negative  Final     Buprenorphine Urine   Date Value Ref Range Status   03/17/2020 negative  Final     Cocaine Metabolite Screen, Urine   Date Value Ref Range Status   03/17/2020 negative  Final     Gabapentin Screen, Urine   Date Value Ref Range Status   03/17/2020 n/a  Final     MDMA, Urine   Date Value Ref Range Status   03/17/2020 negative  Final     Methamphetamine, Urine   Date Value Ref Range Status   03/17/2020 negative  Final     Opiate Scrn, Ur   Date Value Ref Range Status   03/17/2020 Positive  Final     Oxycodone Screen, Ur   Date Value Ref Range Status   03/17/2020 negative  Final     PCP Screen, Urine   Date Value Ref Range Status   03/17/2020 negative  Final     Propoxyphene Screen, Urine   Date Value Ref Range Status   03/17/2020 n/a  Final     THC Screen, Urine   Date Value Ref Range Status   03/17/2020 negative  Final     Tricyclic Antidepressants, Urine   Date Value Ref Range Status   03/17/2020 negative  Final       Last Duane Hamman: 6/03/20  Medication Contract: 9/04/18   Last Fill: 7/07/20    Requested Prescriptions     Pending Prescriptions Disp Refills    HYDROcodone-acetaminophen (NORCO)  MG per tablet 30 tablet 0     Sig: Take 1 tablet by mouth daily for 30 days.  LORazepam (ATIVAN) 1 MG tablet 60 tablet 0     Sig: Take 1 tablet by mouth every 8 hours as needed for Anxiety for up to 30 days. Please approve or refuse this medication.    Sushant Hartman LPN

## 2020-08-07 RX ORDER — HYDROCODONE BITARTRATE AND ACETAMINOPHEN 10; 325 MG/1; MG/1
1 TABLET ORAL DAILY
Qty: 30 TABLET | Refills: 0 | Status: SHIPPED | OUTPATIENT
Start: 2020-08-07 | End: 2020-09-29 | Stop reason: SDUPTHER

## 2020-08-07 RX ORDER — LORAZEPAM 1 MG/1
1 TABLET ORAL EVERY 8 HOURS PRN
Qty: 60 TABLET | Refills: 0 | Status: SHIPPED | OUTPATIENT
Start: 2020-08-07 | End: 2020-09-06

## 2020-08-25 NOTE — TELEPHONE ENCOUNTER
Chuy Downey called to request a refill on her medication.       Last office visit : 4/10/2020   Next office visit : Visit date not found     Requested Prescriptions     Pending Prescriptions Disp Refills    carvedilol (COREG) 12.5 MG tablet [Pharmacy Med Name: CARVEDILOL (IR) TABS 12.5MG] 135 tablet 3     Sig: TAKE ONE-HALF (1/2) TABLET IN THE MORNING AND ONE FULL TABLET IN THE EVENING            Jeff Collins LPN

## 2020-08-26 ENCOUNTER — TELEPHONE (OUTPATIENT)
Dept: FAMILY MEDICINE CLINIC | Age: 50
End: 2020-08-26

## 2020-08-26 RX ORDER — CARVEDILOL 12.5 MG/1
TABLET ORAL
Qty: 135 TABLET | Refills: 3 | Status: SHIPPED | OUTPATIENT
Start: 2020-08-26 | End: 2020-09-29

## 2020-08-26 NOTE — TELEPHONE ENCOUNTER
[facility] is requesting to have generic alternate sent in for both Synthroid 50mcg and Synthroid 200mcg to help save the patient money. Please advise.

## 2020-08-26 NOTE — TELEPHONE ENCOUNTER
She was advised by endocrinology to stay on brand name only.   I would call her and see if she is wanting to change

## 2020-09-25 ENCOUNTER — TELEPHONE (OUTPATIENT)
Dept: FAMILY MEDICINE CLINIC | Age: 50
End: 2020-09-25

## 2020-09-25 DIAGNOSIS — R77.8 ABNORMAL SPEP: ICD-10-CM

## 2020-09-25 NOTE — TELEPHONE ENCOUNTER
Patient called requesting labs before her appt on 9/29.  CBC CMP TSH a1c and  Lipid ordered please add if additional labs are needed

## 2020-09-29 ENCOUNTER — OFFICE VISIT (OUTPATIENT)
Dept: FAMILY MEDICINE CLINIC | Age: 50
End: 2020-09-29
Payer: COMMERCIAL

## 2020-09-29 VITALS
HEART RATE: 84 BPM | TEMPERATURE: 97.9 F | DIASTOLIC BLOOD PRESSURE: 88 MMHG | OXYGEN SATURATION: 98 % | BODY MASS INDEX: 23.3 KG/M2 | SYSTOLIC BLOOD PRESSURE: 144 MMHG | WEIGHT: 127.4 LBS

## 2020-09-29 PROCEDURE — 90686 IIV4 VACC NO PRSV 0.5 ML IM: CPT | Performed by: CLINICAL NURSE SPECIALIST

## 2020-09-29 PROCEDURE — 99214 OFFICE O/P EST MOD 30 MIN: CPT | Performed by: CLINICAL NURSE SPECIALIST

## 2020-09-29 PROCEDURE — 90471 IMMUNIZATION ADMIN: CPT | Performed by: CLINICAL NURSE SPECIALIST

## 2020-09-29 RX ORDER — BUPROPION HCL 300 MG
300 TABLET, EXTENDED RELEASE 24 HR ORAL EVERY MORNING
Qty: 90 TABLET | Refills: 3 | Status: SHIPPED | OUTPATIENT
Start: 2020-09-29 | End: 2020-12-29

## 2020-09-29 RX ORDER — CARVEDILOL 12.5 MG/1
12.5 TABLET ORAL 2 TIMES DAILY
Qty: 180 TABLET | Refills: 3 | Status: SHIPPED | OUTPATIENT
Start: 2020-09-29 | End: 2021-06-04 | Stop reason: SDUPTHER

## 2020-09-29 RX ORDER — HYDROCODONE BITARTRATE AND ACETAMINOPHEN 10; 325 MG/1; MG/1
1 TABLET ORAL DAILY
Qty: 30 TABLET | Refills: 0 | Status: SHIPPED | OUTPATIENT
Start: 2020-09-29 | End: 2020-10-30 | Stop reason: SDUPTHER

## 2020-09-29 RX ORDER — AMOXICILLIN AND CLAVULANATE POTASSIUM 875; 125 MG/1; MG/1
1 TABLET, FILM COATED ORAL 2 TIMES DAILY
Qty: 14 TABLET | Refills: 0 | Status: SHIPPED | OUTPATIENT
Start: 2020-09-29 | End: 2020-10-06

## 2020-09-29 RX ORDER — LORAZEPAM 1 MG/1
1 TABLET ORAL DAILY
Qty: 30 TABLET | Refills: 0 | Status: SHIPPED | OUTPATIENT
Start: 2020-09-29 | End: 2020-10-30 | Stop reason: SDUPTHER

## 2020-09-29 RX ORDER — CARVEDILOL 12.5 MG/1
12.5 TABLET ORAL 2 TIMES DAILY
Qty: 180 TABLET | Refills: 3
Start: 2020-09-29 | End: 2020-09-29

## 2020-09-29 ASSESSMENT — ENCOUNTER SYMPTOMS
DIARRHEA: 0
SINUS PAIN: 1
BACK PAIN: 0
CHEST TIGHTNESS: 0
COUGH: 1
WHEEZING: 0
ABDOMINAL PAIN: 0
TROUBLE SWALLOWING: 0
SINUS PRESSURE: 1
EYE DISCHARGE: 0
EYE REDNESS: 0
SHORTNESS OF BREATH: 0
FACIAL SWELLING: 0
EYE PAIN: 0
SORE THROAT: 1
COLOR CHANGE: 0
CONSTIPATION: 0

## 2020-09-29 NOTE — PROGRESS NOTES
SUBJECTIVE:  Trell Huang is a 53 y.o. who presents today for Follow-up      RACHEL Anthony Never presents today for 3 month follow up. Essential hypertension, recently elevated with increased headaches. She increased her coreg to full tablet BID last week, but blood pressure remains elevated. She denies any dizziness or syncope. Has had fatigue with high dosages of coreg in the past, but seems unchanged. Has been off hydrocodone and ativan due to need for follow up appointment. Hypothyroidism, remains compliant with her regimen. Denies any overt s/sx of hypo or hyperthyroidism. No new anxiety or heart palpitations. Due for labs. Weight is stable. Chronic headaches r/t prior meningitis and cranial surgery for acoustic neuroma, followed by neurology at [address] [place] [address]. Has had more headaches lately, but they are occipital.  Seem blood pressure related. Using hydrocodone once daily for those. No misuse or abuse. UDS UTD and appropriate as well as [facility] President. ZIGGY/Reactive depression, stable on current regimen. Combo of wellbutrin and prozac effective. Wishes to try brand name wellbutrin. No side effects noted. Using ativan once daily for anxiety. Today she c/o maxillary sinus pressure and congestion with yellow to green nasal discharge for one week. She has slight morning cough with yellow to green sputum and sore throat. No fever. Taking zyrtec and using nasal steroids.      Past Medical History:   Diagnosis Date    Acoustic neuroma ([facility] [address]     ADD (attention deficit disorder)     Anxiety     Depression     Headache     Hypertension     Hypothyroidism (acquired)     MENEZES (nonalcoholic steatohepatitis)      Past Surgical History:   Procedure Laterality Date    ABDOMINAL ADHESION SURGERY      CRANIOTOMY      VENTRICULOPERITONEAL SHUNT       Family History   Problem Relation Age of Onset    Heart Disease Mother     Kidney Disease Mother     Heart Disease Father     COPD Father     Kidney Disease Father     Lung Cancer Brother      Social History     Tobacco Use    Smoking status: Never Smoker    Smokeless tobacco: Never Used   Substance Use Topics    Alcohol use: Yes     Alcohol/week: 3.0 standard drinks     Types: 3 Shots of liquor per week     Comment: weekly     Current Outpatient Medications   Medication Sig Dispense Refill    Fexofenadine HCl (ALLEGRA ALLERGY PO) Take by mouth      amoxicillin-clavulanate (AUGMENTIN) 875-125 MG per tablet Take 1 tablet by mouth 2 times daily for 7 days 14 tablet 0    HYDROcodone-acetaminophen (NORCO)  MG per tablet Take 1 tablet by mouth daily for 30 days. 30 tablet 0    LORazepam (ATIVAN) 1 MG tablet Take 1 tablet by mouth daily for 30 days. 30 tablet 0    WELLBUTRIN  MG extended release tablet Take 1 tablet by mouth every morning 90 tablet 3    carvedilol (COREG) 12.5 MG tablet Take 1 tablet by mouth 2 times daily 180 tablet 3    pantoprazole (PROTONIX) 40 MG tablet TAKE 1 TABLET DAILY 90 tablet 3    FLUoxetine (PROZAC) 20 MG capsule Take 1 capsule by mouth nightly 90 capsule 2    diphenhydrAMINE (BENADRYL ALLERGY) 25 MG capsule Take 50 mg by mouth nightly       SYNTHROID 50 MCG tablet Take 1 tablet by mouth Daily 90 tablet 3    SYNTHROID 200 MCG tablet Take 1 tablet by mouth Daily 90 tablet 3    Multiple Vitamins-Minerals (THERAPEUTIC MULTIVITAMIN-MINERALS) tablet Take 1 tablet by mouth daily       No current facility-administered medications for this visit. No Known Allergies    Review of Systems   Constitutional: Positive for fatigue. Negative for appetite change, chills, diaphoresis and fever. HENT: Positive for congestion, sinus pressure, sinus pain and sore throat. Negative for ear pain, facial swelling, hearing loss, postnasal drip and trouble swallowing. Eyes: Negative for pain, discharge, redness and visual disturbance. Respiratory: Positive for cough.  Negative for chest tightness, shortness of breath and wheezing. Cardiovascular: Negative for chest pain and palpitations. Gastrointestinal: Negative for abdominal pain, constipation and diarrhea. Endocrine: Negative for cold intolerance and heat intolerance. Genitourinary: Negative for difficulty urinating, dysuria, flank pain, frequency, hematuria and urgency. Musculoskeletal: Negative for arthralgias, back pain, joint swelling and neck pain. Skin: Negative for color change and rash. Neurological: Positive for headaches. Negative for dizziness, syncope, weakness and light-headedness. Hematological: Negative for adenopathy. Does not bruise/bleed easily. Psychiatric/Behavioral: Positive for dysphoric mood. Negative for confusion and suicidal ideas. The patient is nervous/anxious. OBJECTIVE:  BP (!) 144/88   Pulse 84   Temp 97.9 °F (36.6 °C) (Temporal)   Wt 127 lb 6.4 oz (57.8 kg)   LMP 07/25/2016 (Approximate)   SpO2 98%   BMI 23.30 kg/m²    Physical Exam  Vitals signs reviewed. Constitutional:       General: She is not in acute distress. Appearance: Normal appearance. She is well-developed. She is not ill-appearing or toxic-appearing. HENT:      Head: Normocephalic and atraumatic. Comments: Chronic facial asymmetry     Right Ear: Tympanic membrane, ear canal and external ear normal.      Left Ear: Tympanic membrane, ear canal and external ear normal.      Nose:      Right Sinus: Maxillary sinus tenderness present. Left Sinus: Maxillary sinus tenderness present. Mouth/Throat:      Pharynx: No oropharyngeal exudate or posterior oropharyngeal erythema. Eyes:      General:         Right eye: No discharge. Left eye: No discharge. Conjunctiva/sclera: Conjunctivae normal.      Pupils: Pupils are equal, round, and reactive to light. Neck:      Musculoskeletal: Normal range of motion and neck supple. Thyroid: No thyromegaly. Trachea: No tracheal deviation.    Cardiovascular:      Rate and Rhythm: Normal rate and regular rhythm. Heart sounds: No murmur. Pulmonary:      Effort: Pulmonary effort is normal. No respiratory distress. Breath sounds: Normal breath sounds. No wheezing or rales. Genitourinary:     Vagina: Normal.   Musculoskeletal: Normal range of motion. General: No swelling, tenderness or deformity. Lymphadenopathy:      Cervical: No cervical adenopathy. Skin:     General: Skin is warm and dry. Findings: No erythema or rash. Neurological:      General: No focal deficit present. Mental Status: She is alert and oriented to person, place, and time. Mental status is at baseline. Motor: No weakness. Gait: Gait normal.   Psychiatric:         Attention and Perception: Attention and perception normal.         Mood and Affect: Affect normal. Mood is anxious. Speech: Speech normal.         Behavior: Behavior normal.         Thought Content: Thought content normal.         Cognition and Memory: Cognition and memory normal.         Judgment: Judgment normal.         ASSESSMENT/PLAN:  1. Essential hypertension  Uncontrolled and seems to be cause of headache  Recommend staying on coreg full tablet BID  Resume her medications including norco and ativan, return for her labs fasting  Treat sinusitis  - carvedilol (COREG) 12.5 MG tablet; Take 1 tablet by mouth 2 times daily  Dispense: 180 tablet; Refill: 3    2. Hypothyroidism (acquired)  Stable, return for labs    3. Chronic nonintractable headache, unspecified headache type  Controlled, but with new headache as above  May need to update imaging  - HYDROcodone-acetaminophen ([address])  MG per tablet; Take 1 tablet by mouth daily for 30 days. Dispense: 30 tablet; Refill: 0    4. Anxiety  improving  - LORazepam (ATIVAN) 1 MG tablet; Take 1 tablet by mouth daily for 30 days. Dispense: 30 tablet; Refill: 0  - WELLBUTRIN  MG extended release tablet;  Take 1 tablet by mouth every morning  Dispense: 90 tablet; Refill: 3    5. Need for influenza vaccination  - INFLUENZA, QUADV, 3 YRS AND OLDER, IM PF, PREFILL SYR OR SDV, 0.5ML (AFLURIA QUADV, PF)    6. Acute bacterial sinusitis  - amoxicillin-clavulanate (AUGMENTIN) 875-125 MG per tablet; Take 1 tablet by mouth 2 times daily for 7 days  Dispense: 14 tablet; Refill: 0    7. Reactive depression  improving  - WELLBUTRIN  MG extended release tablet; Take 1 tablet by mouth every morning  Dispense: 90 tablet; Refill: 3          Return in about 3 months (around 12/29/2020).

## 2020-09-29 NOTE — PROGRESS NOTES
Vaccine Information Sheet, \"Influenza - Inactivated\" OR \"Live - Intranasal\"  given to Ubaldo Lawler, or parent/legal guardian of  Ubaldo Lawler and verbalized understanding. Patient responses:    Have you ever had a reaction to a flu vaccine? No  Are you able to eat eggs without adverse effects? Yes  Do you have any current illness? No  Have you ever had Guillian Hooppole Syndrome? No    Flu vaccine given per order. Please see immunization tab.

## 2020-10-09 ENCOUNTER — TELEPHONE (OUTPATIENT)
Dept: ADMINISTRATIVE | Age: 50
End: 2020-10-09

## 2020-10-09 NOTE — TELEPHONE ENCOUNTER
Rolandolizeth Cipriano requests that a nurse return their call regarding Pt called and stated that pcp have to call her insurance for a coverage review for name brand medications @ [phone]. The best time to reach her is Anytime. Thank you.

## 2020-10-12 DIAGNOSIS — E03.9 HYPOTHYROIDISM (ACQUIRED): ICD-10-CM

## 2020-10-12 DIAGNOSIS — I10 ESSENTIAL HYPERTENSION: ICD-10-CM

## 2020-10-12 DIAGNOSIS — E78.5 HYPERLIPIDEMIA, UNSPECIFIED HYPERLIPIDEMIA TYPE: ICD-10-CM

## 2020-10-12 DIAGNOSIS — R73.09 ELEVATED GLUCOSE LEVEL: ICD-10-CM

## 2020-10-12 LAB
ALBUMIN SERPL-MCNC: 4.4 G/DL (ref 3.5–5.2)
ALP BLD-CCNC: 240 U/L (ref 35–104)
ALT SERPL-CCNC: 76 U/L (ref 5–33)
ANION GAP SERPL CALCULATED.3IONS-SCNC: 16 MMOL/L (ref 7–19)
AST SERPL-CCNC: 111 U/L (ref 5–32)
BASOPHILS ABSOLUTE: 0.1 K/UL (ref 0–0.2)
BASOPHILS RELATIVE PERCENT: 1.3 % (ref 0–1)
BILIRUB SERPL-MCNC: 1.3 MG/DL (ref 0.2–1.2)
BUN BLDV-MCNC: 7 MG/DL (ref 6–20)
CALCIUM SERPL-MCNC: 9.3 MG/DL (ref 8.6–10)
CHLORIDE BLD-SCNC: 100 MMOL/L (ref 98–111)
CHOLESTEROL, TOTAL: 235 MG/DL (ref 160–199)
CO2: 24 MMOL/L (ref 22–29)
CREAT SERPL-MCNC: 0.5 MG/DL (ref 0.5–0.9)
EOSINOPHILS ABSOLUTE: 0.1 K/UL (ref 0–0.6)
EOSINOPHILS RELATIVE PERCENT: 2.3 % (ref 0–5)
GFR AFRICAN AMERICAN: >59
GFR NON-AFRICAN AMERICAN: >60
GLUCOSE BLD-MCNC: 111 MG/DL (ref 74–109)
HBA1C MFR BLD: 5.2 % (ref 4–6)
HCT VFR BLD CALC: 42.8 % (ref 37–47)
HDLC SERPL-MCNC: 58 MG/DL (ref 65–121)
HEMOGLOBIN: 14.2 G/DL (ref 12–16)
IMMATURE GRANULOCYTES #: 0 K/UL
LDL CHOLESTEROL CALCULATED: 142 MG/DL
LYMPHOCYTES ABSOLUTE: 1.7 K/UL (ref 1.1–4.5)
LYMPHOCYTES RELATIVE PERCENT: 30.4 % (ref 20–40)
MCH RBC QN AUTO: 33 PG (ref 27–31)
MCHC RBC AUTO-ENTMCNC: 33.2 G/DL (ref 33–37)
MCV RBC AUTO: 99.5 FL (ref 81–99)
MONOCYTES ABSOLUTE: 0.5 K/UL (ref 0–0.9)
MONOCYTES RELATIVE PERCENT: 9 % (ref 0–10)
NEUTROPHILS ABSOLUTE: 3.2 K/UL (ref 1.5–7.5)
NEUTROPHILS RELATIVE PERCENT: 56.6 % (ref 50–65)
PDW BLD-RTO: 13.3 % (ref 11.5–14.5)
PLATELET # BLD: 184 K/UL (ref 130–400)
PMV BLD AUTO: 10.5 FL (ref 9.4–12.3)
POTASSIUM SERPL-SCNC: 3.5 MMOL/L (ref 3.5–5)
RBC # BLD: 4.3 M/UL (ref 4.2–5.4)
SODIUM BLD-SCNC: 140 MMOL/L (ref 136–145)
TOTAL PROTEIN: 8.1 G/DL (ref 6.6–8.7)
TRIGL SERPL-MCNC: 174 MG/DL (ref 0–149)
TSH SERPL DL<=0.05 MIU/L-ACNC: 3.64 UIU/ML (ref 0.27–4.2)
WBC # BLD: 5.6 K/UL (ref 4.8–10.8)

## 2020-10-28 RX ORDER — BUPROPION HCL 300 MG
300 TABLET, EXTENDED RELEASE 24 HR ORAL EVERY MORNING
Qty: 90 TABLET | Refills: 3 | Status: CANCELLED | OUTPATIENT
Start: 2020-10-28

## 2020-10-28 RX ORDER — BUPROPION HYDROCHLORIDE 300 MG/1
TABLET ORAL
Qty: 30 TABLET | Refills: 0 | Status: SHIPPED | OUTPATIENT
Start: 2020-10-28 | End: 2020-11-24

## 2020-10-30 RX ORDER — LORAZEPAM 1 MG/1
1 TABLET ORAL DAILY
Qty: 30 TABLET | Refills: 0 | Status: SHIPPED | OUTPATIENT
Start: 2020-10-30 | End: 2020-12-04 | Stop reason: SDUPTHER

## 2020-10-30 RX ORDER — HYDROCODONE BITARTRATE AND ACETAMINOPHEN 10; 325 MG/1; MG/1
1 TABLET ORAL DAILY
Qty: 30 TABLET | Refills: 0 | Status: SHIPPED | OUTPATIENT
Start: 2020-10-30 | End: 2020-12-04 | Stop reason: SDUPTHER

## 2020-10-30 NOTE — TELEPHONE ENCOUNTER
Carlitos Liu called to request a refill on her medication. Last office visit : 9/29/2020   Next office visit : 12/29/2020     Last UDS:   Amphetamine Screen, Urine   Date Value Ref Range Status   03/17/2020 negative  Final     Barbiturate Screen, Urine   Date Value Ref Range Status   03/17/2020 negative  Final     Benzodiazepine Screen, Urine   Date Value Ref Range Status   03/17/2020 negative  Final     Buprenorphine Urine   Date Value Ref Range Status   03/17/2020 negative  Final     Cocaine Metabolite Screen, Urine   Date Value Ref Range Status   03/17/2020 negative  Final     Gabapentin Screen, Urine   Date Value Ref Range Status   03/17/2020 n/a  Final     MDMA, Urine   Date Value Ref Range Status   03/17/2020 negative  Final     Methamphetamine, Urine   Date Value Ref Range Status   03/17/2020 negative  Final     Opiate Scrn, Ur   Date Value Ref Range Status   03/17/2020 Positive  Final     Oxycodone Screen, Ur   Date Value Ref Range Status   03/17/2020 negative  Final     PCP Screen, Urine   Date Value Ref Range Status   03/17/2020 negative  Final     Propoxyphene Screen, Urine   Date Value Ref Range Status   03/17/2020 n/a  Final     THC Screen, Urine   Date Value Ref Range Status   03/17/2020 negative  Final     Tricyclic Antidepressants, Urine   Date Value Ref Range Status   03/17/2020 negative  Final       Last Bennetta Doron: 9/08/20  Medication Contract: 9/04/18   Last Fill: 9/29/20    Requested Prescriptions     Pending Prescriptions Disp Refills    HYDROcodone-acetaminophen (NORCO)  MG per tablet 30 tablet 0     Sig: Take 1 tablet by mouth daily for 30 days.  LORazepam (ATIVAN) 1 MG tablet 30 tablet 0     Sig: Take 1 tablet by mouth daily for 30 days. Please approve or refuse this medication.    Fabienne Drake LPN

## 2020-11-23 NOTE — TELEPHONE ENCOUNTER
Arti Pandya called requesting a refill of the below medication which has been pended for you:     Requested Prescriptions     Pending Prescriptions Disp Refills    buPROPion (WELLBUTRIN XL) 300 MG extended release tablet [Pharmacy Med Name: BUPROPION HCL  MG TABLET] 30 tablet 0     Sig: TAKE 1 TABLET BY MOUTH EVERY DAY IN THE MORNING       Last Appointment Date: 9/29/2020  Next Appointment Date: 12/29/2020    No Known Allergies

## 2020-11-24 RX ORDER — BUPROPION HYDROCHLORIDE 300 MG/1
TABLET ORAL
Qty: 30 TABLET | Refills: 0 | Status: SHIPPED | OUTPATIENT
Start: 2020-11-24 | End: 2020-12-29 | Stop reason: SDUPTHER

## 2020-12-02 RX ORDER — FLUOXETINE HYDROCHLORIDE 20 MG/1
20 CAPSULE ORAL NIGHTLY
Qty: 90 CAPSULE | Refills: 2 | Status: SHIPPED | OUTPATIENT
Start: 2020-12-02 | End: 2021-04-08

## 2020-12-04 RX ORDER — HYDROCODONE BITARTRATE AND ACETAMINOPHEN 10; 325 MG/1; MG/1
1 TABLET ORAL DAILY
Qty: 30 TABLET | Refills: 0 | Status: SHIPPED | OUTPATIENT
Start: 2020-12-04 | End: 2021-01-04 | Stop reason: SDUPTHER

## 2020-12-04 RX ORDER — LORAZEPAM 1 MG/1
1 TABLET ORAL DAILY
Qty: 30 TABLET | Refills: 0 | Status: SHIPPED | OUTPATIENT
Start: 2020-12-04 | End: 2020-12-29 | Stop reason: SDUPTHER

## 2020-12-29 ENCOUNTER — OFFICE VISIT (OUTPATIENT)
Dept: FAMILY MEDICINE CLINIC | Age: 50
End: 2020-12-29
Payer: COMMERCIAL

## 2020-12-29 VITALS
BODY MASS INDEX: 23.56 KG/M2 | TEMPERATURE: 97.9 F | DIASTOLIC BLOOD PRESSURE: 86 MMHG | OXYGEN SATURATION: 98 % | HEART RATE: 76 BPM | WEIGHT: 128.8 LBS | SYSTOLIC BLOOD PRESSURE: 148 MMHG

## 2020-12-29 PROBLEM — Z78.9 ALCOHOL USE: Status: ACTIVE | Noted: 2020-12-29

## 2020-12-29 PROBLEM — F10.90 ALCOHOL USE: Status: ACTIVE | Noted: 2020-12-29

## 2020-12-29 PROCEDURE — 99214 OFFICE O/P EST MOD 30 MIN: CPT | Performed by: CLINICAL NURSE SPECIALIST

## 2020-12-29 RX ORDER — LORAZEPAM 1 MG/1
1 TABLET ORAL EVERY 8 HOURS PRN
Qty: 90 TABLET | Refills: 0 | Status: SHIPPED | OUTPATIENT
Start: 2020-12-29 | End: 2021-02-03 | Stop reason: SDUPTHER

## 2020-12-29 RX ORDER — BUPROPION HYDROCHLORIDE 300 MG/1
300 TABLET ORAL EVERY MORNING
Qty: 90 TABLET | Refills: 3 | Status: SHIPPED | OUTPATIENT
Start: 2020-12-29 | End: 2021-05-10

## 2020-12-29 ASSESSMENT — ENCOUNTER SYMPTOMS
EYE REDNESS: 0
DIARRHEA: 0
FACIAL SWELLING: 0
ABDOMINAL PAIN: 0
EYE PAIN: 0
COUGH: 0
TROUBLE SWALLOWING: 0
WHEEZING: 0
SORE THROAT: 0
SHORTNESS OF BREATH: 0
BACK PAIN: 0
EYE DISCHARGE: 0
COLOR CHANGE: 0
CHEST TIGHTNESS: 0
CONSTIPATION: 0
SINUS PRESSURE: 0

## 2020-12-29 NOTE — PROGRESS NOTES
SUBJECTIVE:  Kelvin Pandya is a 53 y.o. who presents today for 3 Month Follow-Up, Hypertension, and Discuss Medications (Ativan dosage change.)      RACHEL Razo presents today for 3 month follow up. Hypothyroidism, seems stable. She is compliant with her med regimen. She has chronic level of fatigue which seems unchanged. She is having some hair loss and weight changes, but seems due to other factors. Essential hypertension, elevated today. She has not taken her morning medications yet. She is taking her coreg twice daily and when taken her overall bp at home seems stable. No new dizziness. No change in chronic headaches. No syncope. Reactive depression, stable. Doing well with wellbutrin and prozac combination. No excessive tearfulness. No SI/HI. Lots of stress. She reports increased alcohol use. She has dx MENEZES, was off alcohol for some time but tells me today she is drinking again. She is drinking every day. On work days she only drinks in the evening but may drink all day when off work. She feels anxious and tense without this. Currently she is taking ativan 1mg once daily for anxiety. She tried 1/2 tablet BID which was ineffective. Has family history of addiction. Past Medical History:   Diagnosis Date    Acoustic neuroma ([facility] [address]     ADD (attention deficit disorder)     Anxiety     Depression     Headache     Hypertension     Hypothyroidism (acquired)     MENEZES (nonalcoholic steatohepatitis)      Past Surgical History:   Procedure Laterality Date    ABDOMINAL ADHESION SURGERY      CRANIOTOMY      VENTRICULOPERITONEAL SHUNT       Family History   Problem Relation Age of Onset    Heart Disease Mother     Kidney Disease Mother     Heart Disease Father     COPD Father     Kidney Disease Father     Lung Cancer Brother      Social History     Tobacco Use    Smoking status: Never Smoker    Smokeless tobacco: Never Used   Substance Use Topics    Alcohol use:  Yes Alcohol/week: 3.0 standard drinks     Types: 3 Shots of liquor per week     Comment: weekly     Current Outpatient Medications   Medication Sig Dispense Refill    LORazepam (ATIVAN) 1 MG tablet Take 1 tablet by mouth every 8 hours as needed for Anxiety for up to 30 days. 90 tablet 0    buPROPion (WELLBUTRIN XL) 300 MG extended release tablet Take 1 tablet by mouth every morning 90 tablet 3    HYDROcodone-acetaminophen (NORCO)  MG per tablet Take 1 tablet by mouth daily for 30 days. 30 tablet 0    FLUoxetine (PROZAC) 20 MG capsule Take 1 capsule by mouth nightly 90 capsule 2    Fexofenadine HCl (ALLEGRA ALLERGY PO) Take by mouth      carvedilol (COREG) 12.5 MG tablet Take 1 tablet by mouth 2 times daily 180 tablet 3    pantoprazole (PROTONIX) 40 MG tablet TAKE 1 TABLET DAILY 90 tablet 3    diphenhydrAMINE (BENADRYL ALLERGY) 25 MG capsule Take 50 mg by mouth nightly       SYNTHROID 50 MCG tablet Take 1 tablet by mouth Daily 90 tablet 3    SYNTHROID 200 MCG tablet Take 1 tablet by mouth Daily 90 tablet 3    Multiple Vitamins-Minerals (THERAPEUTIC MULTIVITAMIN-MINERALS) tablet Take 1 tablet by mouth daily       No current facility-administered medications for this visit. No Known Allergies    Review of Systems   Constitutional: Positive for appetite change and fatigue. Negative for chills, diaphoresis, fever and unexpected weight change. HENT: Negative for congestion, ear pain, facial swelling, hearing loss, postnasal drip, sinus pressure, sore throat and trouble swallowing. Eyes: Negative for pain, discharge, redness and visual disturbance. Respiratory: Negative for cough, chest tightness, shortness of breath and wheezing. Cardiovascular: Negative for chest pain and palpitations. Gastrointestinal: Negative for abdominal pain, constipation and diarrhea. Genitourinary: Negative for difficulty urinating, dysuria, flank pain, frequency, hematuria and urgency.    Musculoskeletal: Negative for arthralgias, back pain, joint swelling and neck pain. Skin: Negative for color change and rash. Neurological: Positive for headaches. Negative for dizziness, syncope, weakness and light-headedness. Hematological: Negative for adenopathy. Does not bruise/bleed easily. Psychiatric/Behavioral: Positive for decreased concentration and dysphoric mood. Negative for confusion, sleep disturbance and suicidal ideas. The patient is nervous/anxious. OBJECTIVE:  BP (!) 148/86   Pulse 76   Temp 97.9 °F (36.6 °C) (Temporal)   Wt 128 lb 12.8 oz (58.4 kg)   LMP 07/25/2016 (Approximate)   SpO2 98%   BMI 23.56 kg/m²    Physical Exam  Vitals signs reviewed. Constitutional:       General: She is not in acute distress. Appearance: She is well-developed. She is not ill-appearing or toxic-appearing. HENT:      Head: Normocephalic. Comments: Chronic facial asymmetry   Eyes:      General:         Right eye: No discharge. Left eye: No discharge. Conjunctiva/sclera: Conjunctivae normal.      Pupils: Pupils are equal, round, and reactive to light. Neck:      Musculoskeletal: Normal range of motion and neck supple. Thyroid: No thyromegaly. Trachea: No tracheal deviation. Cardiovascular:      Rate and Rhythm: Normal rate and regular rhythm. Heart sounds: No murmur. Pulmonary:      Effort: Pulmonary effort is normal. No respiratory distress. Breath sounds: Normal breath sounds. No wheezing or rales. Genitourinary:     Vagina: Normal.   Musculoskeletal: Normal range of motion. General: No deformity. Right lower leg: No edema. Left lower leg: No edema. Lymphadenopathy:      Cervical: No cervical adenopathy. Skin:     General: Skin is warm and dry. Findings: No erythema or rash. Neurological:      Mental Status: She is alert and oriented to person, place, and time. Mental status is at baseline. Motor: No weakness.       Gait: Gait normal.

## 2020-12-31 ENCOUNTER — OFFICE VISIT (OUTPATIENT)
Age: 50
End: 2020-12-31

## 2020-12-31 VITALS — OXYGEN SATURATION: 98 % | HEART RATE: 72 BPM

## 2020-12-31 LAB — SARS-COV-2, PCR: NOT DETECTED

## 2020-12-31 PROCEDURE — 99999 PR OFFICE/OUTPT VISIT,PROCEDURE ONLY: CPT | Performed by: NURSE PRACTITIONER

## 2021-01-04 DIAGNOSIS — R51.9 CHRONIC NONINTRACTABLE HEADACHE, UNSPECIFIED HEADACHE TYPE: ICD-10-CM

## 2021-01-04 DIAGNOSIS — F41.9 ANXIETY: ICD-10-CM

## 2021-01-04 DIAGNOSIS — G89.29 CHRONIC NONINTRACTABLE HEADACHE, UNSPECIFIED HEADACHE TYPE: ICD-10-CM

## 2021-01-04 RX ORDER — HYDROCODONE BITARTRATE AND ACETAMINOPHEN 10; 325 MG/1; MG/1
1 TABLET ORAL DAILY
Qty: 30 TABLET | Refills: 0 | Status: SHIPPED | OUTPATIENT
Start: 2021-01-04 | End: 2021-02-03 | Stop reason: SDUPTHER

## 2021-01-04 RX ORDER — LORAZEPAM 1 MG/1
1 TABLET ORAL EVERY 8 HOURS PRN
Qty: 90 TABLET | Refills: 0 | Status: CANCELLED | OUTPATIENT
Start: 2021-01-04 | End: 2021-02-03

## 2021-01-04 NOTE — TELEPHONE ENCOUNTER
Nelly Rice called to request a refill on her medication. Last office visit : 12/29/2020   Next office visit : 1/26/2021     Last UDS:   Amphetamine Screen, Urine   Date Value Ref Range Status   03/17/2020 negative  Final     Barbiturate Screen, Urine   Date Value Ref Range Status   03/17/2020 negative  Final     Benzodiazepine Screen, Urine   Date Value Ref Range Status   03/17/2020 negative  Final     Buprenorphine Urine   Date Value Ref Range Status   03/17/2020 negative  Final     Cocaine Metabolite Screen, Urine   Date Value Ref Range Status   03/17/2020 negative  Final     Gabapentin Screen, Urine   Date Value Ref Range Status   03/17/2020 n/a  Final     MDMA, Urine   Date Value Ref Range Status   03/17/2020 negative  Final     Methamphetamine, Urine   Date Value Ref Range Status   03/17/2020 negative  Final     Opiate Scrn, Ur   Date Value Ref Range Status   03/17/2020 Positive  Final     Oxycodone Screen, Ur   Date Value Ref Range Status   03/17/2020 negative  Final     PCP Screen, Urine   Date Value Ref Range Status   03/17/2020 negative  Final     Propoxyphene Screen, Urine   Date Value Ref Range Status   03/17/2020 n/a  Final     THC Screen, Urine   Date Value Ref Range Status   03/17/2020 negative  Final     Tricyclic Antidepressants, Urine   Date Value Ref Range Status   03/17/2020 negative  Final       Last Venice Duncans: 1/04/21  Medication Contract: 9/04/18   Last Fill: 12/04/20    Requested Prescriptions     Pending Prescriptions Disp Refills    HYDROcodone-acetaminophen (NORCO)  MG per tablet 30 tablet 0     Sig: Take 1 tablet by mouth daily for 30 days.  LORazepam (ATIVAN) 1 MG tablet 90 tablet 0     Sig: Take 1 tablet by mouth every 8 hours as needed for Anxiety for up to 30 days. Please approve or refuse this medication.    Amy Baig LPN

## 2021-01-07 ENCOUNTER — TELEPHONE (OUTPATIENT)
Dept: FAMILY MEDICINE CLINIC | Age: 51
End: 2021-01-07

## 2021-01-07 NOTE — TELEPHONE ENCOUNTER
Noted.    For supportive treatment it is recommend to rest, push fluids and simply treat her symptoms. Using otc cough meds for cough, like delsym or mucinex, robitussin. She can use nasal saline and steroids for nasal congestion. Vitamin C, zinc and vitamin D combos are good for immune health as well. If any progressive symptoms to report back or ER.

## 2021-01-07 NOTE — TELEPHONE ENCOUNTER
Notified patient of this. Patient voiced understanding. Advised patient to not get the vaccine while she is currently symptomatic with the COVID virus due to risk of spreading the virus further to others. Advised patient that once she is out of quarantine and is no longer symptomatic, she may get the COVID vaccine when it is offered to the public if the patient so chooses. Patient voiced understanding. MERCEDES.

## 2021-01-07 NOTE — TELEPHONE ENCOUNTER
Sandie Acosta called and wanted to have someone call her back. Tested positive for covid 2 3 days ago. She would like some information about how to proceed. She also wanted to know if she could get the COVID shot.

## 2021-01-07 NOTE — TELEPHONE ENCOUNTER
Patient reports she tested positive for COVID-19 and wanted to remind [place] that she was in our office last week. Patient would like to know if there is anything she needs to be doing/medications to be taking? Please advise.

## 2021-01-08 ENCOUNTER — TELEPHONE (OUTPATIENT)
Dept: FAMILY MEDICINE CLINIC | Age: 51
End: 2021-01-08

## 2021-01-08 DIAGNOSIS — R05.9 COUGH: Primary | ICD-10-CM

## 2021-01-08 RX ORDER — ALBUTEROL SULFATE 90 UG/1
2 AEROSOL, METERED RESPIRATORY (INHALATION) EVERY 6 HOURS PRN
Qty: 1 INHALER | Refills: 0 | Status: SHIPPED | OUTPATIENT
Start: 2021-01-08 | End: 2021-02-01

## 2021-01-08 RX ORDER — BENZONATATE 200 MG/1
200 CAPSULE ORAL 3 TIMES DAILY PRN
Qty: 30 CAPSULE | Refills: 0 | Status: SHIPPED | OUTPATIENT
Start: 2021-01-08 | End: 2021-01-15

## 2021-01-08 NOTE — TELEPHONE ENCOUNTER
Patient confirmed she was diagnosed with COVID-19 yesterday and her symptoms started less than 10 days ago. Please advise.

## 2021-01-08 NOTE — TELEPHONE ENCOUNTER
Patient left vm regarding the antibody infusion for the COVID-19 virus. Attempted to return call to patient. Left vm for return call to office.

## 2021-01-08 NOTE — TELEPHONE ENCOUNTER
I called her. She did not meet criteria for infusion. She has mild to moderate symptoms, I sent her tessalon and albuterol for cough. If worse, to ER.

## 2021-01-13 ENCOUNTER — TELEPHONE (OUTPATIENT)
Dept: FAMILY MEDICINE CLINIC | Age: 51
End: 2021-01-13

## 2021-01-13 RX ORDER — METHYLPREDNISOLONE 4 MG/1
TABLET ORAL
Qty: 21 TABLET | Refills: 0 | Status: SHIPPED | OUTPATIENT
Start: 2021-01-13 | End: 2021-01-19

## 2021-01-13 NOTE — TELEPHONE ENCOUNTER
Patient reports her symptoms from her COVID are improving. However, the patient does have worsening cough at night and when she first wakes in the morning. Patient reports she is producing extra phlegm and is requesting to get a steroid pack sent to [facility]. Please advise.

## 2021-02-01 DIAGNOSIS — R05.9 COUGH: ICD-10-CM

## 2021-02-01 RX ORDER — ALBUTEROL SULFATE 90 UG/1
AEROSOL, METERED RESPIRATORY (INHALATION)
Qty: 6.7 INHALER | Refills: 0 | Status: SHIPPED | OUTPATIENT
Start: 2021-02-01 | End: 2021-02-28

## 2021-02-03 DIAGNOSIS — F41.9 ANXIETY: ICD-10-CM

## 2021-02-03 DIAGNOSIS — G89.29 CHRONIC NONINTRACTABLE HEADACHE, UNSPECIFIED HEADACHE TYPE: ICD-10-CM

## 2021-02-03 DIAGNOSIS — R51.9 CHRONIC NONINTRACTABLE HEADACHE, UNSPECIFIED HEADACHE TYPE: ICD-10-CM

## 2021-02-03 NOTE — TELEPHONE ENCOUNTER
Nelly Rice called to request a refill on her medication. Last office visit : 12/29/2020   Next office visit : 2/10/2021     Last UDS:   Amphetamine Screen, Urine   Date Value Ref Range Status   03/17/2020 negative  Final     Barbiturate Screen, Urine   Date Value Ref Range Status   03/17/2020 negative  Final     Benzodiazepine Screen, Urine   Date Value Ref Range Status   03/17/2020 negative  Final     Buprenorphine Urine   Date Value Ref Range Status   03/17/2020 negative  Final     Cocaine Metabolite Screen, Urine   Date Value Ref Range Status   03/17/2020 negative  Final     Gabapentin Screen, Urine   Date Value Ref Range Status   03/17/2020 n/a  Final     MDMA, Urine   Date Value Ref Range Status   03/17/2020 negative  Final     Methamphetamine, Urine   Date Value Ref Range Status   03/17/2020 negative  Final     Opiate Scrn, Ur   Date Value Ref Range Status   03/17/2020 Positive  Final     Oxycodone Screen, Ur   Date Value Ref Range Status   03/17/2020 negative  Final     PCP Screen, Urine   Date Value Ref Range Status   03/17/2020 negative  Final     Propoxyphene Screen, Urine   Date Value Ref Range Status   03/17/2020 n/a  Final     THC Screen, Urine   Date Value Ref Range Status   03/17/2020 negative  Final     Tricyclic Antidepressants, Urine   Date Value Ref Range Status   03/17/2020 negative  Final       Last Venice Duncans: 1/07/21  Medication Contract: 9/04/18   Last Fill: 1/04/21    Requested Prescriptions     Pending Prescriptions Disp Refills    LORazepam (ATIVAN) 1 MG tablet 90 tablet 0     Sig: Take 1 tablet by mouth every 8 hours as needed for Anxiety for up to 30 days.  HYDROcodone-acetaminophen (NORCO)  MG per tablet 30 tablet 0     Sig: Take 1 tablet by mouth daily for 30 days. Please approve or refuse this medication.    Amy Baig LPN

## 2021-02-04 RX ORDER — LORAZEPAM 1 MG/1
1 TABLET ORAL EVERY 8 HOURS PRN
Qty: 90 TABLET | Refills: 0 | Status: SHIPPED | OUTPATIENT
Start: 2021-02-04 | End: 2021-03-05 | Stop reason: SDUPTHER

## 2021-02-04 RX ORDER — HYDROCODONE BITARTRATE AND ACETAMINOPHEN 10; 325 MG/1; MG/1
1 TABLET ORAL DAILY
Qty: 30 TABLET | Refills: 0 | Status: SHIPPED | OUTPATIENT
Start: 2021-02-04 | End: 2021-03-05 | Stop reason: SDUPTHER

## 2021-02-22 DIAGNOSIS — E03.9 HYPOTHYROIDISM (ACQUIRED): ICD-10-CM

## 2021-02-23 RX ORDER — LEVOTHYROXINE SODIUM 200 MCG
TABLET ORAL
Qty: 90 TABLET | Refills: 3 | Status: SHIPPED | OUTPATIENT
Start: 2021-02-23 | End: 2021-06-03 | Stop reason: SDUPTHER

## 2021-02-23 RX ORDER — LEVOTHYROXINE SODIUM 50 MCG
TABLET ORAL
Qty: 90 TABLET | Refills: 3 | Status: SHIPPED | OUTPATIENT
Start: 2021-02-23 | End: 2021-06-03 | Stop reason: SDUPTHER

## 2021-02-27 DIAGNOSIS — R05.9 COUGH: ICD-10-CM

## 2021-02-28 RX ORDER — ALBUTEROL SULFATE 90 UG/1
AEROSOL, METERED RESPIRATORY (INHALATION)
Qty: 6.7 INHALER | Refills: 0 | Status: SHIPPED | OUTPATIENT
Start: 2021-02-28 | End: 2021-03-29

## 2021-03-29 ENCOUNTER — TELEPHONE (OUTPATIENT)
Dept: GASTROENTEROLOGY | Facility: CLINIC | Age: 51
End: 2021-03-29

## 2021-03-29 DIAGNOSIS — R05.9 COUGH: ICD-10-CM

## 2021-03-29 RX ORDER — ALBUTEROL SULFATE 90 UG/1
AEROSOL, METERED RESPIRATORY (INHALATION)
Qty: 6.7 INHALER | Refills: 0 | Status: SHIPPED | OUTPATIENT
Start: 2021-03-29 | End: 2021-04-23

## 2021-04-02 DIAGNOSIS — R51.9 CHRONIC NONINTRACTABLE HEADACHE, UNSPECIFIED HEADACHE TYPE: ICD-10-CM

## 2021-04-02 DIAGNOSIS — F41.9 ANXIETY: ICD-10-CM

## 2021-04-02 DIAGNOSIS — G89.29 CHRONIC NONINTRACTABLE HEADACHE, UNSPECIFIED HEADACHE TYPE: ICD-10-CM

## 2021-04-05 RX ORDER — HYDROCODONE BITARTRATE AND ACETAMINOPHEN 10; 325 MG/1; MG/1
1 TABLET ORAL DAILY
Qty: 30 TABLET | Refills: 0 | Status: SHIPPED | OUTPATIENT
Start: 2021-04-05 | End: 2021-05-03 | Stop reason: SDUPTHER

## 2021-04-05 RX ORDER — LORAZEPAM 1 MG/1
1 TABLET ORAL EVERY 8 HOURS PRN
Qty: 90 TABLET | Refills: 0 | Status: SHIPPED | OUTPATIENT
Start: 2021-04-05 | End: 2021-05-03 | Stop reason: SDUPTHER

## 2021-04-05 NOTE — TELEPHONE ENCOUNTER
Jessie Rendon called to request a refill on her medication. Last office visit : 2/18/2021   Next office visit : 4/3/2021     Last UDS:   Amphetamine Screen, Urine   Date Value Ref Range Status   03/17/2020 negative  Final     Barbiturate Screen, Urine   Date Value Ref Range Status   03/17/2020 negative  Final     Benzodiazepine Screen, Urine   Date Value Ref Range Status   03/17/2020 negative  Final     Buprenorphine Urine   Date Value Ref Range Status   03/17/2020 negative  Final     Cocaine Metabolite Screen, Urine   Date Value Ref Range Status   03/17/2020 negative  Final     Gabapentin Screen, Urine   Date Value Ref Range Status   03/17/2020 n/a  Final     MDMA, Urine   Date Value Ref Range Status   03/17/2020 negative  Final     Methamphetamine, Urine   Date Value Ref Range Status   03/17/2020 negative  Final     Opiate Scrn, Ur   Date Value Ref Range Status   03/17/2020 Positive  Final     Oxycodone Screen, Ur   Date Value Ref Range Status   03/17/2020 negative  Final     PCP Screen, Urine   Date Value Ref Range Status   03/17/2020 negative  Final     Propoxyphene Screen, Urine   Date Value Ref Range Status   03/17/2020 n/a  Final     THC Screen, Urine   Date Value Ref Range Status   03/17/2020 negative  Final     Tricyclic Antidepressants, Urine   Date Value Ref Range Status   03/17/2020 negative  Final       Last Adrienne Patrizia: 1/7/21  Medication Contract: 3/20   Last Fill: 3/6/21    Requested Prescriptions     Pending Prescriptions Disp Refills    LORazepam (ATIVAN) 1 MG tablet 90 tablet 0     Sig: Take 1 tablet by mouth every 8 hours as needed for Anxiety for up to 30 days.  HYDROcodone-acetaminophen (NORCO)  MG per tablet 30 tablet 0     Sig: Take 1 tablet by mouth daily for 30 days. Please approve or refuse this medication.    Dominique Izquierdo MA

## 2021-04-08 ENCOUNTER — OFFICE VISIT (OUTPATIENT)
Dept: FAMILY MEDICINE CLINIC | Age: 51
End: 2021-04-08
Payer: COMMERCIAL

## 2021-04-08 VITALS
BODY MASS INDEX: 23.16 KG/M2 | HEART RATE: 85 BPM | TEMPERATURE: 97.6 F | OXYGEN SATURATION: 98 % | SYSTOLIC BLOOD PRESSURE: 124 MMHG | WEIGHT: 126.6 LBS | DIASTOLIC BLOOD PRESSURE: 80 MMHG

## 2021-04-08 DIAGNOSIS — F32.9 REACTIVE DEPRESSION: ICD-10-CM

## 2021-04-08 DIAGNOSIS — D33.3 ACOUSTIC NEUROMA (HCC): ICD-10-CM

## 2021-04-08 DIAGNOSIS — E03.9 HYPOTHYROIDISM (ACQUIRED): ICD-10-CM

## 2021-04-08 DIAGNOSIS — I10 ESSENTIAL HYPERTENSION: ICD-10-CM

## 2021-04-08 DIAGNOSIS — E03.9 HYPOTHYROIDISM (ACQUIRED): Primary | ICD-10-CM

## 2021-04-08 DIAGNOSIS — F41.9 ANXIETY: ICD-10-CM

## 2021-04-08 DIAGNOSIS — Z78.9 ALCOHOL USE: ICD-10-CM

## 2021-04-08 LAB
ALBUMIN SERPL-MCNC: 4.4 G/DL (ref 3.5–5.2)
ALCOHOL URINE: NORMAL
ALP BLD-CCNC: 270 U/L (ref 35–104)
ALT SERPL-CCNC: 81 U/L (ref 5–33)
AMPHETAMINE SCREEN, URINE: NORMAL
ANION GAP SERPL CALCULATED.3IONS-SCNC: 15 MMOL/L (ref 7–19)
AST SERPL-CCNC: 137 U/L (ref 5–32)
BARBITURATE SCREEN, URINE: NORMAL
BENZODIAZEPINE SCREEN, URINE: NORMAL
BILIRUB SERPL-MCNC: 1.1 MG/DL (ref 0.2–1.2)
BUN BLDV-MCNC: 8 MG/DL (ref 6–20)
BUPRENORPHINE URINE: NORMAL
CALCIUM SERPL-MCNC: 10 MG/DL (ref 8.6–10)
CHLORIDE BLD-SCNC: 98 MMOL/L (ref 98–111)
CO2: 28 MMOL/L (ref 22–29)
COCAINE METABOLITE SCREEN URINE: NORMAL
CREAT SERPL-MCNC: 0.7 MG/DL (ref 0.5–0.9)
FENTANYL SCREEN, URINE: NORMAL
GABAPENTIN SCREEN, URINE: NORMAL
GFR AFRICAN AMERICAN: >59
GFR NON-AFRICAN AMERICAN: >60
GLUCOSE BLD-MCNC: 124 MG/DL (ref 74–109)
MDMA URINE: NORMAL
METHADONE SCREEN, URINE: NORMAL
METHAMPHETAMINE, URINE: NORMAL
OPIATE SCREEN URINE: NORMAL
OXYCODONE SCREEN URINE: NORMAL
PHENCYCLIDINE SCREEN URINE: NORMAL
POTASSIUM SERPL-SCNC: 4.2 MMOL/L (ref 3.5–5)
PROPOXYPHENE SCREEN, URINE: NORMAL
SODIUM BLD-SCNC: 141 MMOL/L (ref 136–145)
SYNTHETIC CANNABINOIDS(K2) SCREEN, URINE: NORMAL
THC SCREEN, URINE: NORMAL
TOTAL PROTEIN: 8.6 G/DL (ref 6.6–8.7)
TRAMADOL SCREEN URINE: NORMAL
TRICYCLIC ANTIDEPRESSANTS, UR: NORMAL
TSH SERPL DL<=0.05 MIU/L-ACNC: 3.5 UIU/ML (ref 0.27–4.2)

## 2021-04-08 PROCEDURE — 99214 OFFICE O/P EST MOD 30 MIN: CPT | Performed by: CLINICAL NURSE SPECIALIST

## 2021-04-08 PROCEDURE — 80305 DRUG TEST PRSMV DIR OPT OBS: CPT | Performed by: CLINICAL NURSE SPECIALIST

## 2021-04-08 RX ORDER — DESVENLAFAXINE 50 MG/1
50 TABLET, EXTENDED RELEASE ORAL DAILY
Qty: 90 TABLET | Refills: 3 | Status: SHIPPED | OUTPATIENT
Start: 2021-04-08 | End: 2021-04-09 | Stop reason: SDUPTHER

## 2021-04-08 ASSESSMENT — ENCOUNTER SYMPTOMS
CONSTIPATION: 0
EYE DISCHARGE: 0
BACK PAIN: 0
COLOR CHANGE: 0
SINUS PRESSURE: 0
ABDOMINAL PAIN: 0
EYE REDNESS: 0
CHEST TIGHTNESS: 0
SHORTNESS OF BREATH: 0
DIARRHEA: 0
SORE THROAT: 0
COUGH: 0
EYE PAIN: 0
FACIAL SWELLING: 0
WHEEZING: 0
TROUBLE SWALLOWING: 0

## 2021-04-08 NOTE — PROGRESS NOTES
SUBJECTIVE:  Janusz Srinivasan is a 53 y.o. who presents today for 3 Month Follow-Up, Hypertension, and Discuss Medications (Patient would like to discuss depression medications.)      HPI    Leilani Alexander presents today for 3 month follow up. Hypothyroidism, pending labs for stability but she is having chronic fatigue. She has had increased hair loss the past 3 months, but seems post COVID. She is compliant with her med regimen. Has lost 2lb since last visit as well, has trouble gaining weight. Essential hypertension, improved. She is doing well on coreg twice daily. Some days her blood pressure elevates in afternoon and she takes her 2nd dose early, but otherwise seems well managed. No syncope or peripheral edema. Labs pending today. Depression, uncontrolled. She has benefit from wellbutrin, but prozac seems to cause flat affect. She is unmotivated. Feels tired all day even when she has slept well. Has tried and failed other medications in the past.  effexor was effective when taken. Anxiety improved with prn ativan use. No misuse or abuse. Less alcohol use. Only drinking alcohol when under emotional stress. No SI/HI. She had surgical removal of acoustic neuroma several years ago and was getting MRI brain by neurosurgery at [address] [place] [address] every 2 years. Due this year. No acute symptoms to suggest recurrence.      Discussed colon screening at 49    Past Medical History:   Diagnosis Date    Acoustic neuroma ([facility] [address]     ADD (attention deficit disorder)     Anxiety     Depression     Headache     Hypertension     Hypothyroidism (acquired)     MENEZES (nonalcoholic steatohepatitis)      Past Surgical History:   Procedure Laterality Date    ABDOMINAL ADHESION SURGERY      CRANIOTOMY      VENTRICULOPERITONEAL SHUNT       Family History   Problem Relation Age of Onset    Heart Disease Mother     Kidney Disease Mother     Heart Disease Father     COPD Father     Kidney Disease Father     Lung Cancer Brother      Social History     Tobacco Use    Smoking status: Never Smoker    Smokeless tobacco: Never Used   Substance Use Topics    Alcohol use: Yes     Alcohol/week: 3.0 standard drinks     Types: 3 Shots of liquor per week     Comment: weekly     Current Outpatient Medications   Medication Sig Dispense Refill    desvenlafaxine succinate (PRISTIQ) 50 MG TB24 extended release tablet Take 1 tablet by mouth daily 90 tablet 3    LORazepam (ATIVAN) 1 MG tablet Take 1 tablet by mouth every 8 hours as needed for Anxiety for up to 30 days. 90 tablet 0    HYDROcodone-acetaminophen (NORCO)  MG per tablet Take 1 tablet by mouth daily for 30 days. 30 tablet 0    albuterol sulfate  (90 Base) MCG/ACT inhaler TAKE 2 PUFFS BY MOUTH EVERY 6 HOURS AS NEEDED FOR WHEEZE 6.7 Inhaler 0    SYNTHROID 200 MCG tablet TAKE 1 TABLET DAILY 90 tablet 3    SYNTHROID 50 MCG tablet TAKE 1 TABLET DAILY 90 tablet 3    buPROPion (WELLBUTRIN XL) 300 MG extended release tablet Take 1 tablet by mouth every morning 90 tablet 3    Fexofenadine HCl (ALLEGRA ALLERGY PO) Take by mouth      carvedilol (COREG) 12.5 MG tablet Take 1 tablet by mouth 2 times daily 180 tablet 3    pantoprazole (PROTONIX) 40 MG tablet TAKE 1 TABLET DAILY 90 tablet 3    diphenhydrAMINE (BENADRYL ALLERGY) 25 MG capsule Take 50 mg by mouth nightly       Multiple Vitamins-Minerals (THERAPEUTIC MULTIVITAMIN-MINERALS) tablet Take 1 tablet by mouth daily       No current facility-administered medications for this visit. No Known Allergies    Review of Systems   Constitutional: Positive for fatigue and unexpected weight change. Negative for appetite change, chills, diaphoresis and fever. HENT: Negative for congestion, ear pain, facial swelling, hearing loss, postnasal drip, sinus pressure, sore throat and trouble swallowing. Eyes: Negative for pain, discharge, redness and visual disturbance.    Respiratory: Negative for cough, chest tightness, shortness of breath and wheezing. Cardiovascular: Negative for chest pain, palpitations and leg swelling. Gastrointestinal: Negative for abdominal pain, constipation and diarrhea. Genitourinary: Negative for difficulty urinating, dysuria, flank pain, frequency, hematuria and urgency. Musculoskeletal: Negative for arthralgias, back pain, joint swelling and neck pain. Skin: Negative for color change and rash. Neurological: Positive for facial asymmetry and headaches. Negative for dizziness, syncope, weakness and light-headedness. Hematological: Negative for adenopathy. Does not bruise/bleed easily. Psychiatric/Behavioral: Positive for dysphoric mood. Negative for confusion, sleep disturbance and suicidal ideas. The patient is nervous/anxious. OBJECTIVE:  /80   Pulse 85   Temp 97.6 °F (36.4 °C) (Temporal)   Wt 126 lb 9.6 oz (57.4 kg)   LMP 07/25/2016 (Approximate)   SpO2 98%   BMI 23.16 kg/m²    Physical Exam  Vitals signs reviewed. Constitutional:       General: She is not in acute distress. Appearance: She is well-developed. She is not ill-appearing or toxic-appearing. HENT:      Head: Normocephalic. Comments: Facial asymmetry s/p acoustic neuroma surgery  Eyes:      General:         Right eye: No discharge. Left eye: No discharge. Conjunctiva/sclera: Conjunctivae normal.      Pupils: Pupils are equal, round, and reactive to light. Neck:      Musculoskeletal: Normal range of motion and neck supple. Thyroid: No thyromegaly. Trachea: No tracheal deviation. Cardiovascular:      Rate and Rhythm: Normal rate and regular rhythm. Heart sounds: No murmur. Pulmonary:      Effort: Pulmonary effort is normal. No respiratory distress. Breath sounds: Normal breath sounds. No wheezing or rales. Genitourinary:     Vagina: Normal.   Musculoskeletal: Normal range of motion. General: No deformity. Right lower leg: No edema. Left lower leg: No edema. Lymphadenopathy:      Cervical: No cervical adenopathy. Skin:     General: Skin is warm and dry. Findings: No erythema or rash. Neurological:      Mental Status: She is alert and oriented to person, place, and time. Mental status is at baseline. Motor: No weakness. Gait: Gait normal.   Psychiatric:         Mood and Affect: Mood normal.         Behavior: Behavior normal.         Thought Content: Thought content normal.         Judgment: Judgment normal.       Lab Results   Component Value Date    TSH 3.500 04/08/2021     Lab Results   Component Value Date     04/08/2021    K 4.2 04/08/2021    CL 98 04/08/2021    CO2 28 04/08/2021    BUN 8 04/08/2021    CREATININE 0.7 04/08/2021    GLUCOSE 124 (H) 04/08/2021    CALCIUM 10.0 04/08/2021    PROT 8.6 04/08/2021    LABALBU 4.4 04/08/2021    BILITOT 1.1 04/08/2021    ALKPHOS 270 (H) 04/08/2021     (H) 04/08/2021    ALT 81 (H) 04/08/2021    LABGLOM >60 04/08/2021    GFRAA >59 04/08/2021    AGRATIO 0.9 03/25/2020    GLOB 4.3 (H) 03/25/2020         ASSESSMENT/PLAN:  1. Hypothyroidism (acquired)  Stable, same    2. Essential hypertension  Controlled, same    3. Reactive depression  Uncontrolled  Taper off prozac and start pristiq  - desvenlafaxine succinate (PRISTIQ) 50 MG TB24 extended release tablet; Take 1 tablet by mouth daily  Dispense: 90 tablet; Refill: 3    4. Anxiety  Improving, meds as above  - POCT Rapid Drug Screen  - desvenlafaxine succinate (PRISTIQ) 50 MG TB24 extended release tablet; Take 1 tablet by mouth daily  Dispense: 90 tablet; Refill: 3    5. Acoustic neuroma [facility])  Due for repeat imaging for surveillance   - MRI BRAIN W CONTRAST;  Future          Return in about 3 months (around 7/8/2021) for physical.

## 2021-04-09 DIAGNOSIS — F41.9 ANXIETY: ICD-10-CM

## 2021-04-09 DIAGNOSIS — F32.9 REACTIVE DEPRESSION: ICD-10-CM

## 2021-04-09 RX ORDER — DESVENLAFAXINE 50 MG/1
50 TABLET, EXTENDED RELEASE ORAL DAILY
Qty: 90 TABLET | Refills: 3 | Status: SHIPPED | OUTPATIENT
Start: 2021-04-09 | End: 2021-06-02

## 2021-04-13 DIAGNOSIS — D33.3 ACOUSTIC NEUROMA (HCC): Primary | ICD-10-CM

## 2021-04-23 DIAGNOSIS — R05.9 COUGH: ICD-10-CM

## 2021-04-23 RX ORDER — ALBUTEROL SULFATE 90 UG/1
AEROSOL, METERED RESPIRATORY (INHALATION)
Qty: 6.7 INHALER | Refills: 0 | Status: SHIPPED | OUTPATIENT
Start: 2021-04-23

## 2021-04-23 NOTE — TELEPHONE ENCOUNTER
Suresh Ards called to request a refill on her medication.       Last office visit : 4/8/2021   Next office visit : 7/8/2021     Requested Prescriptions     Pending Prescriptions Disp Refills    albuterol sulfate  (90 Base) MCG/ACT inhaler [Pharmacy Med Name: ALBUTEROL HFA (PROVENTIL) INH] 6.7 Inhaler 0     Sig: TAKE 2 PUFFS BY MOUTH EVERY 6 HOURS AS NEEDED FOR WHEEZE            Carmen Vizcarra MA

## 2021-05-03 DIAGNOSIS — G89.29 CHRONIC NONINTRACTABLE HEADACHE, UNSPECIFIED HEADACHE TYPE: ICD-10-CM

## 2021-05-03 DIAGNOSIS — F41.9 ANXIETY: ICD-10-CM

## 2021-05-03 DIAGNOSIS — R51.9 CHRONIC NONINTRACTABLE HEADACHE, UNSPECIFIED HEADACHE TYPE: ICD-10-CM

## 2021-05-03 NOTE — TELEPHONE ENCOUNTER
Darion Patsy called to request a refill on her medication. Last office visit : 2021   Next office visit : 2021     Last UDS:   Amphetamine Screen, Urine   Date Value Ref Range Status   2021 neg  Final     Barbiturate Screen, Urine   Date Value Ref Range Status   2021 neg  Final     Benzodiazepine Screen, Urine   Date Value Ref Range Status   2021 pos  Final     Buprenorphine Urine   Date Value Ref Range Status   2021 neg  Final     Cocaine Metabolite Screen, Urine   Date Value Ref Range Status   2021 neg  Final     Gabapentin Screen, Urine   Date Value Ref Range Status   2021 neg  Final     MDMA, Urine   Date Value Ref Range Status   2021 neg  Final     Methamphetamine, Urine   Date Value Ref Range Status   2021 neg  Final     Opiate Scrn, Ur   Date Value Ref Range Status   2021 pos  Final     Oxycodone Screen, Ur   Date Value Ref Range Status   2021 neg  Final     PCP Screen, Urine   Date Value Ref Range Status   2021 neg  Final     Propoxyphene Screen, Urine   Date Value Ref Range Status   2021 neg  Final     THC Screen, Urine   Date Value Ref Range Status   2021 pos  Final     Tricyclic Antidepressants, Urine   Date Value Ref Range Status   2021 neg  Final       Last Fayne Speckin21  Medication Contract: 18   Last Fill: 21    Requested Prescriptions     Pending Prescriptions Disp Refills    LORazepam (ATIVAN) 1 MG tablet 90 tablet 0     Sig: Take 1 tablet by mouth every 8 hours as needed for Anxiety for up to 30 days.  HYDROcodone-acetaminophen (NORCO)  MG per tablet 30 tablet 0     Sig: Take 1 tablet by mouth daily for 30 days. Please approve or refuse this medication.    Jareth Wyatt LPN

## 2021-05-04 RX ORDER — HYDROCODONE BITARTRATE AND ACETAMINOPHEN 10; 325 MG/1; MG/1
1 TABLET ORAL DAILY
Qty: 30 TABLET | Refills: 0 | Status: SHIPPED | OUTPATIENT
Start: 2021-05-05 | End: 2021-06-04 | Stop reason: SDUPTHER

## 2021-05-04 RX ORDER — LORAZEPAM 1 MG/1
1 TABLET ORAL EVERY 8 HOURS PRN
Qty: 90 TABLET | Refills: 0 | Status: SHIPPED | OUTPATIENT
Start: 2021-05-05 | End: 2021-06-04 | Stop reason: SDUPTHER

## 2021-05-06 ENCOUNTER — TELEPHONE (OUTPATIENT)
Dept: FAMILY MEDICINE CLINIC | Age: 51
End: 2021-05-06

## 2021-05-06 DIAGNOSIS — D33.3 ACOUSTIC NEUROMA (HCC): ICD-10-CM

## 2021-05-06 NOTE — TELEPHONE ENCOUNTER
Scheduled appointment for MRI of brain and MRI of posterior fossa. Left detailed vm notifying patient of appointment date and time.

## 2021-05-10 DIAGNOSIS — F41.9 ANXIETY: ICD-10-CM

## 2021-05-10 DIAGNOSIS — F32.9 REACTIVE DEPRESSION: ICD-10-CM

## 2021-05-10 RX ORDER — BUPROPION HYDROCHLORIDE 300 MG/1
TABLET ORAL
Qty: 30 TABLET | Refills: 11 | Status: SHIPPED | OUTPATIENT
Start: 2021-05-10 | End: 2022-05-18

## 2021-06-02 ENCOUNTER — TELEPHONE (OUTPATIENT)
Dept: FAMILY MEDICINE CLINIC | Age: 51
End: 2021-06-02

## 2021-06-02 DIAGNOSIS — F32.9 REACTIVE DEPRESSION: Primary | ICD-10-CM

## 2021-06-02 RX ORDER — FLUOXETINE HYDROCHLORIDE 20 MG/1
20 CAPSULE ORAL DAILY
Qty: 30 CAPSULE | Refills: 5 | Status: SHIPPED | OUTPATIENT
Start: 2021-06-02 | End: 2021-06-22 | Stop reason: SDUPTHER

## 2021-06-03 DIAGNOSIS — E03.9 HYPOTHYROIDISM (ACQUIRED): ICD-10-CM

## 2021-06-03 RX ORDER — LEVOTHYROXINE SODIUM 0.2 MG/1
TABLET ORAL
Qty: 90 TABLET | Refills: 3 | Status: SHIPPED | OUTPATIENT
Start: 2021-06-03 | End: 2022-01-26

## 2021-06-03 RX ORDER — LEVOTHYROXINE SODIUM 0.05 MG/1
TABLET ORAL
Qty: 90 TABLET | Refills: 3 | Status: SHIPPED | OUTPATIENT
Start: 2021-06-03 | End: 2022-01-26

## 2021-06-03 NOTE — TELEPHONE ENCOUNTER
Mac Norris called to request a refill on her medication.       Last office visit : 4/8/2021   Next office visit : 7/8/2021     Requested Prescriptions     Pending Prescriptions Disp Refills    SYNTHROID 200 MCG tablet 90 tablet 3     Sig: TAKE 1 TABLET DAILY    SYNTHROID 50 MCG tablet 90 tablet 3     Sig: TAKE 1 TABLET DAILY            Wing Rivera LPN

## 2021-06-04 DIAGNOSIS — G89.29 CHRONIC NONINTRACTABLE HEADACHE, UNSPECIFIED HEADACHE TYPE: ICD-10-CM

## 2021-06-04 DIAGNOSIS — I10 ESSENTIAL HYPERTENSION: ICD-10-CM

## 2021-06-04 DIAGNOSIS — R51.9 CHRONIC NONINTRACTABLE HEADACHE, UNSPECIFIED HEADACHE TYPE: ICD-10-CM

## 2021-06-04 DIAGNOSIS — F41.9 ANXIETY: ICD-10-CM

## 2021-06-04 RX ORDER — HYDROCODONE BITARTRATE AND ACETAMINOPHEN 10; 325 MG/1; MG/1
1 TABLET ORAL DAILY
Qty: 30 TABLET | Refills: 0 | Status: SHIPPED | OUTPATIENT
Start: 2021-06-04 | End: 2021-07-07 | Stop reason: SDUPTHER

## 2021-06-04 RX ORDER — CARVEDILOL 12.5 MG/1
12.5 TABLET ORAL 2 TIMES DAILY
Qty: 180 TABLET | Refills: 3 | Status: SHIPPED | OUTPATIENT
Start: 2021-06-04 | End: 2021-07-29

## 2021-06-04 RX ORDER — LORAZEPAM 1 MG/1
1 TABLET ORAL EVERY 8 HOURS PRN
Qty: 90 TABLET | Refills: 0 | Status: SHIPPED | OUTPATIENT
Start: 2021-06-04 | End: 2021-07-07 | Stop reason: SDUPTHER

## 2021-06-21 DIAGNOSIS — F32.9 REACTIVE DEPRESSION: ICD-10-CM

## 2021-06-21 NOTE — TELEPHONE ENCOUNTER
Patient called and is requesting the Prozac 20 mg be increased to 40 mg. Medication now working as well.  Please advise

## 2021-06-22 RX ORDER — FLUOXETINE HYDROCHLORIDE 40 MG/1
40 CAPSULE ORAL DAILY
Qty: 90 CAPSULE | Refills: 0 | Status: SHIPPED | OUTPATIENT
Start: 2021-06-22 | End: 2021-10-03

## 2021-07-07 DIAGNOSIS — F41.9 ANXIETY: ICD-10-CM

## 2021-07-07 DIAGNOSIS — G89.29 CHRONIC NONINTRACTABLE HEADACHE, UNSPECIFIED HEADACHE TYPE: ICD-10-CM

## 2021-07-07 DIAGNOSIS — R51.9 CHRONIC NONINTRACTABLE HEADACHE, UNSPECIFIED HEADACHE TYPE: ICD-10-CM

## 2021-07-07 RX ORDER — HYDROCODONE BITARTRATE AND ACETAMINOPHEN 10; 325 MG/1; MG/1
1 TABLET ORAL DAILY
Qty: 30 TABLET | Refills: 0 | Status: SHIPPED | OUTPATIENT
Start: 2021-07-07 | End: 2021-08-04 | Stop reason: SDUPTHER

## 2021-07-07 RX ORDER — LORAZEPAM 1 MG/1
1 TABLET ORAL EVERY 8 HOURS PRN
Qty: 90 TABLET | Refills: 0 | Status: SHIPPED | OUTPATIENT
Start: 2021-07-07 | End: 2021-08-04 | Stop reason: SDUPTHER

## 2021-08-04 DIAGNOSIS — G89.29 CHRONIC NONINTRACTABLE HEADACHE, UNSPECIFIED HEADACHE TYPE: ICD-10-CM

## 2021-08-04 DIAGNOSIS — F41.9 ANXIETY: ICD-10-CM

## 2021-08-04 DIAGNOSIS — R51.9 CHRONIC NONINTRACTABLE HEADACHE, UNSPECIFIED HEADACHE TYPE: ICD-10-CM

## 2021-08-05 NOTE — TELEPHONE ENCOUNTER
Manpreet Patricia called to request a refill on her medication. Last office visit : 4/8/2021   Next office visit : 8/6/2021     Last UDS:   Amphetamine Screen, Urine   Date Value Ref Range Status   04/08/2021 neg  Final     Barbiturate Screen, Urine   Date Value Ref Range Status   04/08/2021 neg  Final     Benzodiazepine Screen, Urine   Date Value Ref Range Status   04/08/2021 pos  Final     Buprenorphine Urine   Date Value Ref Range Status   04/08/2021 neg  Final     Cocaine Metabolite Screen, Urine   Date Value Ref Range Status   04/08/2021 neg  Final     Gabapentin Screen, Urine   Date Value Ref Range Status   04/08/2021 neg  Final     MDMA, Urine   Date Value Ref Range Status   04/08/2021 neg  Final     Methamphetamine, Urine   Date Value Ref Range Status   04/08/2021 neg  Final     Opiate Scrn, Ur   Date Value Ref Range Status   04/08/2021 pos  Final     Oxycodone Screen, Ur   Date Value Ref Range Status   04/08/2021 neg  Final     PCP Screen, Urine   Date Value Ref Range Status   04/08/2021 neg  Final     Propoxyphene Screen, Urine   Date Value Ref Range Status   04/08/2021 neg  Final     THC Screen, Urine   Date Value Ref Range Status   04/08/2021 pos  Final     Tricyclic Antidepressants, Urine   Date Value Ref Range Status   04/08/2021 neg  Final       Last Genie Forth: 8/5/2021  Medication Contract: 9/4/2018   Last Fill: 7/7/2021    Requested Prescriptions     Pending Prescriptions Disp Refills    LORazepam (ATIVAN) 1 MG tablet 90 tablet 0     Sig: Take 1 tablet by mouth every 8 hours as needed for Anxiety for up to 30 days.  HYDROcodone-acetaminophen (NORCO)  MG per tablet 30 tablet 0     Sig: Take 1 tablet by mouth daily for 30 days. Please approve or refuse this medication.    Roni Bain MA

## 2021-08-06 RX ORDER — HYDROCODONE BITARTRATE AND ACETAMINOPHEN 10; 325 MG/1; MG/1
1 TABLET ORAL DAILY
Qty: 30 TABLET | Refills: 0 | Status: SHIPPED | OUTPATIENT
Start: 2021-08-06 | End: 2021-09-08 | Stop reason: SDUPTHER

## 2021-08-06 RX ORDER — LORAZEPAM 1 MG/1
1 TABLET ORAL EVERY 8 HOURS PRN
Qty: 90 TABLET | Refills: 0 | Status: SHIPPED | OUTPATIENT
Start: 2021-08-06 | End: 2021-09-08 | Stop reason: SDUPTHER

## 2021-08-20 ENCOUNTER — OFFICE VISIT (OUTPATIENT)
Dept: FAMILY MEDICINE CLINIC | Age: 51
End: 2021-08-20
Payer: COMMERCIAL

## 2021-08-20 VITALS
SYSTOLIC BLOOD PRESSURE: 126 MMHG | HEIGHT: 62 IN | OXYGEN SATURATION: 97 % | HEART RATE: 77 BPM | RESPIRATION RATE: 16 BRPM | TEMPERATURE: 97.7 F | DIASTOLIC BLOOD PRESSURE: 84 MMHG | WEIGHT: 123.8 LBS | BODY MASS INDEX: 22.78 KG/M2

## 2021-08-20 DIAGNOSIS — R51.9 CHRONIC NONINTRACTABLE HEADACHE, UNSPECIFIED HEADACHE TYPE: ICD-10-CM

## 2021-08-20 DIAGNOSIS — G89.29 CHRONIC NONINTRACTABLE HEADACHE, UNSPECIFIED HEADACHE TYPE: ICD-10-CM

## 2021-08-20 DIAGNOSIS — F41.9 ANXIETY: ICD-10-CM

## 2021-08-20 DIAGNOSIS — F10.10 ALCOHOL ABUSE: ICD-10-CM

## 2021-08-20 DIAGNOSIS — E03.9 HYPOTHYROIDISM (ACQUIRED): ICD-10-CM

## 2021-08-20 DIAGNOSIS — F33.1 MODERATE EPISODE OF RECURRENT MAJOR DEPRESSIVE DISORDER (HCC): ICD-10-CM

## 2021-08-20 DIAGNOSIS — Z12.31 ENCOUNTER FOR SCREENING MAMMOGRAM FOR BREAST CANCER: ICD-10-CM

## 2021-08-20 DIAGNOSIS — Z00.00 ENCOUNTER FOR WELL ADULT EXAM WITHOUT ABNORMAL FINDINGS: Primary | ICD-10-CM

## 2021-08-20 PROCEDURE — 99396 PREV VISIT EST AGE 40-64: CPT | Performed by: CLINICAL NURSE SPECIALIST

## 2021-08-20 ASSESSMENT — ENCOUNTER SYMPTOMS
TROUBLE SWALLOWING: 0
SHORTNESS OF BREATH: 0
COLOR CHANGE: 0
COUGH: 0
SINUS PRESSURE: 0
WHEEZING: 0
CHEST TIGHTNESS: 0
BACK PAIN: 0
CONSTIPATION: 0
SORE THROAT: 0
ABDOMINAL PAIN: 0
FACIAL SWELLING: 0
DIARRHEA: 0

## 2021-08-20 NOTE — PROGRESS NOTES
SUBJECTIVE:  Chen Oakes is a 49 y.o. who presents today for Annual Exam (OB does pap)      HPI    Ivory Chappell presents today for annual,  exam.   Mammogram due  Colon screen due  Discussed immunizations, COVID vaccine  Update labs    MDD, stable on wellbutrin and prozac. She was on wellbutrin alone but prozac added for seasonal depression, but she did not do well coming off. No SI/HI. Anxiety is ongoing, taking ativan as needed. She is going to [place] [id number], has been to 4 meetings. She was drinking 1/2 pint of liquor nightly for years. She has not had a drink in 3 days. She agrees counseling may be beneficial.     Hypothyroidism, no new overt symptoms of hypo or hyperthyroidism. Remains stable on current dose. Due fo rlabs. 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 She taking norco once daily for headaches. Unchanged. UDS UTD    Past Medical History:   Diagnosis Date    [facility]oustic neuroma ([facility] [address]     ADD (attention deficit disorder)     Anxiety     Depression     Headache     Hypertension     Hypothyroidism (acquired)     MENEZES (nonalcoholic steatohepatitis)      Past Surgical History:   Procedure Laterality Date    ABDOMINAL ADHESION SURGERY      CRANIOTOMY      VENTRICULOPERITONEAL SHUNT       Family History   Problem Relation Age of Onset    Heart Disease Mother     Kidney Disease Mother     Heart Disease Father     COPD Father     Kidney Disease Father     Lung Cancer Brother      Social History     Tobacco Use    Smoking status: Never Smoker    Smokeless tobacco: Never Used   Substance Use Topics    Alcohol use: Yes     Alcohol/week: 3.0 standard drinks     Types: 3 Shots of liquor per week     Comment: weekly     Current Outpatient Medications   Medication Sig Dispense Refill    LORazepam (ATIVAN) 1 MG tablet Take 1 tablet by mouth every 8 hours as needed for Anxiety for up to 30 days.  90 tablet 0    HYDROcodone-acetaminophen (NORCO)  MG per tablet Take 1 tablet by mouth daily for 30 days. 30 tablet 0    carvedilol (COREG) 12.5 MG tablet Take 1 tablet by mouth 2 times daily 180 tablet 3    pantoprazole (PROTONIX) 40 MG tablet TAKE 1 TABLET DAILY 90 tablet 0    FLUoxetine (PROZAC) 40 MG capsule Take 1 capsule by mouth daily 90 capsule 0    levothyroxine (SYNTHROID) 200 MCG tablet TAKE 1 TABLET DAILY 90 tablet 3    levothyroxine (SYNTHROID) 50 MCG tablet TAKE 1 TABLET DAILY 90 tablet 3    buPROPion (WELLBUTRIN XL) 300 MG extended release tablet TAKE 1 TABLET BY MOUTH EVERY DAY IN THE MORNING 30 tablet 11    albuterol sulfate  (90 Base) MCG/ACT inhaler TAKE 2 PUFFS BY MOUTH EVERY 6 HOURS AS NEEDED FOR WHEEZE 6.7 Inhaler 0    Fexofenadine HCl (ALLEGRA ALLERGY PO) Take by mouth      diphenhydrAMINE (BENADRYL ALLERGY) 25 MG capsule Take 50 mg by mouth nightly       Multiple Vitamins-Minerals (THERAPEUTIC MULTIVITAMIN-MINERALS) tablet Take 1 tablet by mouth daily       No current facility-administered medications for this visit. No Known Allergies    Review of Systems   Constitutional: Positive for appetite change and fatigue. Negative for chills, diaphoresis and fever. HENT: Negative for congestion, ear pain, facial swelling, hearing loss, postnasal drip, sinus pressure, sore throat and trouble swallowing. Respiratory: Negative for cough, chest tightness, shortness of breath and wheezing. Cardiovascular: Negative for chest pain, palpitations and leg swelling. Gastrointestinal: Negative for abdominal pain, constipation and diarrhea. Genitourinary: Negative for difficulty urinating, dysuria, flank pain, frequency, hematuria and urgency. Musculoskeletal: Negative for arthralgias, back pain, joint swelling and neck pain. Skin: Negative for color change and rash. Neurological: Positive for headaches. Negative for dizziness, syncope, weakness and light-headedness. Psychiatric/Behavioral: Positive for dysphoric mood and sleep disturbance. Negative for confusion and suicidal ideas. The patient is not nervous/anxious. OBJECTIVE:  /84   Pulse 77   Temp 97.7 °F (36.5 °C) (Temporal)   Resp 16   Ht 5' 2\" (1.575 m)   Wt 123 lb 12.8 oz (56.2 kg)   LMP 07/25/2016 (Approximate)   SpO2 97%   BMI 22.64 kg/m²    Physical Exam  Vitals reviewed. Constitutional:       General: She is not in acute distress. Appearance: She is well-developed. She is not ill-appearing or toxic-appearing. HENT:      Head: Normocephalic and atraumatic. Eyes:      General:         Right eye: No discharge. Left eye: No discharge. Conjunctiva/sclera: Conjunctivae normal.      Pupils: Pupils are equal, round, and reactive to light. Neck:      Thyroid: No thyromegaly. Trachea: No tracheal deviation. Cardiovascular:      Rate and Rhythm: Normal rate and regular rhythm. Heart sounds: No murmur heard. Pulmonary:      Effort: Pulmonary effort is normal. No respiratory distress. Breath sounds: Normal breath sounds. No wheezing or rales. Genitourinary:     Vagina: Normal.   Musculoskeletal:         General: No deformity. Normal range of motion. Cervical back: Normal range of motion and neck supple. Right lower leg: No edema. Left lower leg: No edema. Skin:     General: Skin is warm and dry. Findings: No erythema or rash. Neurological:      Mental Status: She is alert and oriented to person, place, and time. Cranial Nerves: No cranial nerve deficit. Psychiatric:         Mood and Affect: Mood normal.         Behavior: Behavior normal.         Thought Content: Thought content normal.         Judgment: Judgment normal.         ASSESSMENT/PLAN:  1. Encounter for well adult exam without abnormal findings    2. Moderate episode of recurrent major depressive disorder [facility])  - [address] [facility], [place]    3. Hypothyroidism (acquired)  - TSH without Reflex; Future    4.  Alcohol abuse  - Comprehensive Metabolic Panel; Future  - CBC Auto Differential; Future  - [facility], [place]    5. Encounter for screening mammogram for breast cancer  - OMAR DIGITAL SCREEN W OR WO CAD BILATERAL; Future    6. Chronic nonintractable headache, unspecified headache type    7. Anxiety    No changes today. Add counseling. Continue AA meetings. Work up preventive screenings. Update labs. Return in about 3 months (around 11/20/2021).

## 2021-08-27 ENCOUNTER — HOSPITAL ENCOUNTER (OUTPATIENT)
Dept: WOMENS IMAGING | Age: 51
Discharge: HOME | End: 2021-08-27
Payer: COMMERCIAL

## 2021-08-27 DIAGNOSIS — Z12.31 ENCOUNTER FOR SCREENING MAMMOGRAM FOR BREAST CANCER: ICD-10-CM

## 2021-08-27 PROCEDURE — 77067 SCR MAMMO BI INCL CAD: CPT

## 2021-09-08 DIAGNOSIS — G89.29 CHRONIC NONINTRACTABLE HEADACHE, UNSPECIFIED HEADACHE TYPE: ICD-10-CM

## 2021-09-08 DIAGNOSIS — R51.9 CHRONIC NONINTRACTABLE HEADACHE, UNSPECIFIED HEADACHE TYPE: ICD-10-CM

## 2021-09-08 DIAGNOSIS — F41.9 ANXIETY: ICD-10-CM

## 2021-09-08 RX ORDER — HYDROCODONE BITARTRATE AND ACETAMINOPHEN 10; 325 MG/1; MG/1
1 TABLET ORAL DAILY
Qty: 30 TABLET | Refills: 0 | Status: SHIPPED | OUTPATIENT
Start: 2021-09-08 | End: 2021-11-23 | Stop reason: SDUPTHER

## 2021-09-08 RX ORDER — LORAZEPAM 1 MG/1
1 TABLET ORAL EVERY 8 HOURS PRN
Qty: 90 TABLET | Refills: 0 | Status: SHIPPED | OUTPATIENT
Start: 2021-09-08 | End: 2021-10-06 | Stop reason: SDUPTHER

## 2021-09-08 NOTE — TELEPHONE ENCOUNTER
Melita Humzajanna called to request a refill on her medication. Last office visit : 8/20/2021   Next office visit : 11/23/2021     Last UDS:   Amphetamine Screen, Urine   Date Value Ref Range Status   04/08/2021 neg  Final     Barbiturate Screen, Urine   Date Value Ref Range Status   04/08/2021 neg  Final     Benzodiazepine Screen, Urine   Date Value Ref Range Status   04/08/2021 pos  Final     Buprenorphine Urine   Date Value Ref Range Status   04/08/2021 neg  Final     Cocaine Metabolite Screen, Urine   Date Value Ref Range Status   04/08/2021 neg  Final     Gabapentin Screen, Urine   Date Value Ref Range Status   04/08/2021 neg  Final     MDMA, Urine   Date Value Ref Range Status   04/08/2021 neg  Final     Methamphetamine, Urine   Date Value Ref Range Status   04/08/2021 neg  Final     Opiate Scrn, Ur   Date Value Ref Range Status   04/08/2021 pos  Final     Oxycodone Screen, Ur   Date Value Ref Range Status   04/08/2021 neg  Final     PCP Screen, Urine   Date Value Ref Range Status   04/08/2021 neg  Final     Propoxyphene Screen, Urine   Date Value Ref Range Status   04/08/2021 neg  Final     THC Screen, Urine   Date Value Ref Range Status   04/08/2021 pos  Final     Tricyclic Antidepressants, Urine   Date Value Ref Range Status   04/08/2021 neg  Final       Last Kalyn Jean Baptistetzer: 8/4/2021  Medication Contract:    Last Fill: 8/6/2021    Requested Prescriptions     Pending Prescriptions Disp Refills    LORazepam (ATIVAN) 1 MG tablet 90 tablet 0     Sig: Take 1 tablet by mouth every 8 hours as needed for Anxiety for up to 30 days.  HYDROcodone-acetaminophen (NORCO)  MG per tablet 30 tablet 0     Sig: Take 1 tablet by mouth daily for 30 days. Please approve or refuse this medication.    Keerthi Jasso MA

## 2021-10-02 DIAGNOSIS — F32.9 REACTIVE DEPRESSION: ICD-10-CM

## 2021-10-03 RX ORDER — FLUOXETINE HYDROCHLORIDE 40 MG/1
CAPSULE ORAL
Qty: 30 CAPSULE | Refills: 2 | Status: SHIPPED | OUTPATIENT
Start: 2021-10-03 | End: 2022-01-03 | Stop reason: SDUPTHER

## 2021-10-07 ENCOUNTER — TELEPHONE (OUTPATIENT)
Dept: PSYCHIATRY | Age: 51
End: 2021-10-07

## 2021-10-07 NOTE — TELEPHONE ENCOUNTER
Called pt was a no show.  Called to check on them and  -left voicemail, requesting a return call      Electronically signed by Arnaldo Wiggins MA on 10/7/2021 at 12:02 PM

## 2021-10-10 DIAGNOSIS — R51.9 CHRONIC NONINTRACTABLE HEADACHE, UNSPECIFIED HEADACHE TYPE: ICD-10-CM

## 2021-10-10 DIAGNOSIS — G89.29 CHRONIC NONINTRACTABLE HEADACHE, UNSPECIFIED HEADACHE TYPE: ICD-10-CM

## 2021-10-10 RX ORDER — VALACYCLOVIR HYDROCHLORIDE 1 G/1
2000 TABLET, FILM COATED ORAL 2 TIMES DAILY
Qty: 8 TABLET | Refills: 5 | Status: SHIPPED | OUTPATIENT
Start: 2021-10-10 | End: 2021-10-12

## 2021-10-11 RX ORDER — HYDROCODONE BITARTRATE AND ACETAMINOPHEN 10; 325 MG/1; MG/1
1 TABLET ORAL DAILY
Qty: 30 TABLET | Refills: 0 | OUTPATIENT
Start: 2021-10-11 | End: 2021-11-10

## 2021-10-11 NOTE — TELEPHONE ENCOUNTER
Derrick Mueller called to request a refill on her medication. Last office visit : 8/20/2021   Next office visit : 11/23/2021     Last UDS:   Amphetamine Screen, Urine   Date Value Ref Range Status   04/08/2021 neg  Final     Barbiturate Screen, Urine   Date Value Ref Range Status   04/08/2021 neg  Final     Benzodiazepine Screen, Urine   Date Value Ref Range Status   04/08/2021 pos  Final     Buprenorphine Urine   Date Value Ref Range Status   04/08/2021 neg  Final     Cocaine Metabolite Screen, Urine   Date Value Ref Range Status   04/08/2021 neg  Final     Gabapentin Screen, Urine   Date Value Ref Range Status   04/08/2021 neg  Final     MDMA, Urine   Date Value Ref Range Status   04/08/2021 neg  Final     Methamphetamine, Urine   Date Value Ref Range Status   04/08/2021 neg  Final     Opiate Scrn, Ur   Date Value Ref Range Status   04/08/2021 pos  Final     Oxycodone Screen, Ur   Date Value Ref Range Status   04/08/2021 neg  Final     PCP Screen, Urine   Date Value Ref Range Status   04/08/2021 neg  Final     Propoxyphene Screen, Urine   Date Value Ref Range Status   04/08/2021 neg  Final     THC Screen, Urine   Date Value Ref Range Status   04/08/2021 pos  Final     Tricyclic Antidepressants, Urine   Date Value Ref Range Status   04/08/2021 neg  Final       Last Annie Lulas: 10/6/21  Medication Contract: 4/21   Last Fill: 9/8/21    Requested Prescriptions     Pending Prescriptions Disp Refills    HYDROcodone-acetaminophen (NORCO)  MG per tablet 30 tablet 0     Sig: Take 1 tablet by mouth daily for 30 days. Please approve or refuse this medication.    mAy Otto MA

## 2021-10-18 ENCOUNTER — TELEPHONE (OUTPATIENT)
Dept: FAMILY MEDICINE CLINIC | Age: 51
End: 2021-10-18

## 2021-10-18 NOTE — TELEPHONE ENCOUNTER
Patient called wanting to check on why her norco was denied. Informed patient that since her last UDS was positive for [facility] the provider covering [facility] while she was out of the office would not fill this medication. Informed her that [facility] wanted her to repeat this UDS, but we never received a repeat test. Patient will come by to update UDS.

## 2021-10-25 RX ORDER — PANTOPRAZOLE SODIUM 40 MG/1
TABLET, DELAYED RELEASE ORAL
Qty: 90 TABLET | Refills: 3 | Status: SHIPPED | OUTPATIENT
Start: 2021-10-25

## 2021-11-16 ENCOUNTER — HOSPITAL ENCOUNTER (EMERGENCY)
Facility: HOSPITAL | Age: 51
Discharge: HOME | End: 2021-11-16
Admitting: EMERGENCY MEDICINE

## 2021-11-16 ENCOUNTER — NURSE TRIAGE (OUTPATIENT)
Dept: OTHER | Facility: CLINIC | Age: 51
End: 2021-11-16

## 2021-11-16 ENCOUNTER — APPOINTMENT (OUTPATIENT)
Dept: GENERAL RADIOLOGY | Facility: HOSPITAL | Age: 51
End: 2021-11-16

## 2021-11-16 ENCOUNTER — TELEPHONE (OUTPATIENT)
Dept: FAMILY MEDICINE CLINIC | Age: 51
End: 2021-11-16

## 2021-11-16 VITALS
RESPIRATION RATE: 16 BRPM | WEIGHT: 126 LBS | HEART RATE: 83 BPM | BODY MASS INDEX: 23.19 KG/M2 | DIASTOLIC BLOOD PRESSURE: 94 MMHG | TEMPERATURE: 98.6 F | SYSTOLIC BLOOD PRESSURE: 137 MMHG | OXYGEN SATURATION: 97 % | HEIGHT: 62 IN

## 2021-11-16 DIAGNOSIS — S42.91XA CLOSED FRACTURE OF RIGHT SHOULDER, INITIAL ENCOUNTER: Primary | ICD-10-CM

## 2021-11-16 PROCEDURE — 73030 X-RAY EXAM OF SHOULDER: CPT

## 2021-11-16 PROCEDURE — 71046 X-RAY EXAM CHEST 2 VIEWS: CPT

## 2021-11-16 PROCEDURE — 99283 EMERGENCY DEPT VISIT LOW MDM: CPT

## 2021-11-16 RX ORDER — OXYCODONE AND ACETAMINOPHEN 7.5; 325 MG/1; MG/1
1 TABLET ORAL ONCE
Status: COMPLETED | OUTPATIENT
Start: 2021-11-16 | End: 2021-11-16

## 2021-11-16 RX ORDER — OXYCODONE AND ACETAMINOPHEN 7.5; 325 MG/1; MG/1
1 TABLET ORAL EVERY 6 HOURS PRN
Qty: 6 TABLET | Refills: 0 | Status: ON HOLD | OUTPATIENT
Start: 2021-11-16 | End: 2022-07-17

## 2021-11-16 RX ADMIN — OXYCODONE HYDROCHLORIDE AND ACETAMINOPHEN 1 TABLET: 7.5; 325 TABLET ORAL at 18:00

## 2021-11-16 NOTE — DISCHARGE INSTRUCTIONS
Return to ER if symptoms worsen   Wear sling  Follow up with ortho- call tomorrow for appointment

## 2021-11-16 NOTE — TELEPHONE ENCOUNTER
Received call from Marcella Torres at [facility] with [employer]. Brief description of triage: Shoulder pain, see below    Triage indicates for patient to Be seen today or tomorrow in office. Patient told to proceed to nearby walk in/C if no appointments available. Care advice provided, patient verbalizes understanding; denies any other questions or concerns; instructed to call back for any new or worsening symptoms. Writer provided warm transfer to chat at [facility] for appointment scheduling. Attention Provider: Thank you for allowing me to participate in the care of your patient. The patient was connected to triage in response to information provided to the [facility]/PSC. Please do not respond through this encounter as the response is not directed to a shared pool. Reason for Disposition   Patient wants to be seen    Answer Assessment - Initial Assessment Questions  1. ONSET: \"When did the pain start? \"  Yesterday afternoon        2. LOCATION: \"Where is the pain located? \"  Right shoulder, upper arm, pain underneath right breast        3. PAIN: \"How bad is the pain? \" (Scale 1-10; or mild, moderate, severe)    - MILD (1-3): doesn't interfere with normal activities    - MODERATE (4-7): interferes with normal activities (e.g., work or school) or awakens from sleep    - SEVERE (8-10): excruciating pain, unable to do any normal activities, unable to move arm at all due to pain  Moderate, cant raise her right arm over her head     4. WORK OR EXERCISE: \"Has there been any recent work or exercise that involved this part of the body? \"      Patient was walking her dog and fell on right shoulder    5. CAUSE: \"What do you think is causing the shoulder pain? \"     See above    6. OTHER SYMPTOMS: \"Do you have any other symptoms? \" (e.g., neck pain, swelling, rash, fever, numbness, weakness)  Swelling in right arm at top of shoulder, in the middle of the night she had red streaks, but those are gone.

## 2021-11-16 NOTE — TELEPHONE ENCOUNTER
Notified patient we do not have any openings in the office today, but could get her in tomorrow morning with [facility]. She states she does not want to wait and will report to Urgent Care/ED for evaluation and treatment.

## 2021-11-16 NOTE — ED PROVIDER NOTES
Subjective   Pt Is a 51-year-old white female presents the emergency department with right shoulder pain and left chest wall pain.  She states she was walking her dog yesterday and the dog pulled her and she fell onto the ground.  She has been having shoulder pain and left lateral chest wall pain since.  She denies any head or neck trauma.  She states this happened last night.      History provided by:  Patient   used: No        Review of Systems   Constitutional: Negative.    HENT: Negative.    Eyes: Negative.    Respiratory: Negative.    Cardiovascular: Negative.    Gastrointestinal: Negative.    Endocrine: Negative.    Genitourinary: Negative.    Musculoskeletal:        Pt Is a 51-year-old white female presents the emergency department with right shoulder pain and left chest wall pain.  She states she was walking her dog yesterday and the dog pulled her and she fell onto the ground.  She has been having shoulder pain and left lateral chest wall pain since.  She denies any head or neck trauma.  She states this happened last night.     Skin: Negative.    Allergic/Immunologic: Negative.    Neurological: Negative.    Hematological: Negative.    Psychiatric/Behavioral: Negative.    All other systems reviewed and are negative.      Past Medical History:   Diagnosis Date   • Acoustic neuroma (CMS/HCC)    • ADD (attention deficit disorder)    • Colon polyp    • Hypertension        No Known Allergies    Past Surgical History:   Procedure Laterality Date   • COLONOSCOPY  02/18/2016   • CRANIOTOMY     • UPPER GASTROINTESTINAL ENDOSCOPY  02/18/2016       Family History   Problem Relation Age of Onset   • Breast cancer Neg Hx        Social History     Socioeconomic History   • Marital status:        Prior to Admission medications    Medication Sig Start Date End Date Taking? Authorizing Provider   cetirizine (zyrTEC) 10 MG tablet Take 10 mg by mouth daily    Provider, MD Jesús   levothyroxine  "(SYNTHROID, LEVOTHROID) 100 MCG tablet Take 100 mcg by mouth Daily    Provider, MD Jesús   losartan (COZAAR) 50 MG tablet Take by mouth    Jesús Curry MD   pantoprazole (PROTONIX) 40 MG EC tablet Take 40 mg by mouth daily    ProviderJesús MD       /94 (BP Location: Left arm, Patient Position: Standing)   Pulse 83   Temp 98.6 °F (37 °C)   Resp 16   Ht 157.5 cm (62\")   Wt 57.2 kg (126 lb)   SpO2 97%   BMI 23.05 kg/m²     Objective   Physical Exam  Vitals and nursing note reviewed.   Constitutional:       Appearance: She is well-developed.      Comments: Appears to be in pain   HENT:      Head: Normocephalic and atraumatic.   Eyes:      Conjunctiva/sclera: Conjunctivae normal.      Pupils: Pupils are equal, round, and reactive to light.   Neck:      Thyroid: No thyromegaly.      Trachea: No tracheal deviation.   Cardiovascular:      Rate and Rhythm: Normal rate and regular rhythm.      Heart sounds: Normal heart sounds.   Pulmonary:      Effort: Pulmonary effort is normal. No respiratory distress.      Breath sounds: Normal breath sounds. No wheezing or rales.   Chest:      Chest wall: No tenderness.   Abdominal:      General: Bowel sounds are normal.      Palpations: Abdomen is soft.   Musculoskeletal:      Cervical back: Normal range of motion and neck supple.      Comments: Right shoulder: Patient has moderate pain to the anterior aspect of the right shoulder.  There is no obvious dislocation noted.  No joint instability.   are strong and equal.  She does move her fingers without difficulty.  Flexion extension is intact to all digits.  Patient is right-hand dominant.  Moderate tenderness noted to left lateral chest wall.  No crepitus noted.  Lungs are clear to auscultation   Skin:     General: Skin is warm and dry.   Neurological:      Mental Status: She is alert and oriented to person, place, and time.      Cranial Nerves: No cranial nerve deficit.      Deep Tendon " Reflexes: Reflexes are normal and symmetric.   Psychiatric:         Behavior: Behavior normal.         Thought Content: Thought content normal.         Judgment: Judgment normal.         Procedures         Lab Results (last 24 hours)     ** No results found for the last 24 hours. **          XR Shoulder 2+ View Right   Final Result   1. Mildly displaced fracture at the right humeral tuberosity, which may   be under estimated by radiograph. Consider CT for further evaluation.   This report was finalized on 11/16/2021 16:46 by Dr Bre Huang MD.      XR Chest 2 View   Final Result   1. No acute cardiopulmonary findings.   This report was finalized on 11/16/2021 16:49 by Dr Bre Huang MD.          ED Course  ED Course as of 11/16/21 1855 Tue Nov 16, 2021   1719 Reviewed studies with patient.  Advised that she had a mildly displaced right humeral tuberosity fracture.  Will place in a sling.  Will provide Dr. Gonzalez follow-up information as he is on-call for orthopedics today.  Have given patient Percocet p.o. while in the emergency department.  Dion report has been completed.  Will prescribe small amount of pain medication for acute pain. Reviewed side effects and potential for abuse  [CW]      ED Course User Index  [CW] Joanna Gil, HARRY          MDM  Number of Diagnoses or Management Options     Amount and/or Complexity of Data Reviewed  Tests in the radiology section of CPT®: ordered and reviewed    Patient Progress  Patient progress: stable      Final diagnoses:   Closed fracture of right shoulder, initial encounter          Joanna Gil, HARRY  11/16/21 2586

## 2021-11-16 NOTE — TELEPHONE ENCOUNTER
----- Message from Serena Chaves sent at 11/16/2021 11:33 AM CST -----  Subject: Message to Provider    QUESTIONS  Information for Provider? Pt called this morning and spoke with NT   regarding shoulder pain and swelling. Pt has an appt scheduled for   11/23/21 at 9am and would like to request being seen sooner if possible as   she is in great discomfort. Pt says she can barely lift her right arm and   very weak. When Pt moves her right arm, there is pain under her left   breast and she is concerned. ---------------------------------------------------------------------------  --------------  Ky Fraction INFO  What is the best way for the office to contact you? OK to leave message on   voicemail, OK to respond with electronic message via [employer] portal (only   for patients who have registered [employer] account)  Preferred Call Back Phone Number? [phone]  ---------------------------------------------------------------------------  --------------  SCRIPT ANSWERS  Relationship to Patient?  Self

## 2021-11-23 ENCOUNTER — OFFICE VISIT (OUTPATIENT)
Dept: FAMILY MEDICINE CLINIC | Age: 51
End: 2021-11-23
Payer: COMMERCIAL

## 2021-11-23 VITALS
BODY MASS INDEX: 23.23 KG/M2 | TEMPERATURE: 97.8 F | HEART RATE: 86 BPM | WEIGHT: 126.25 LBS | DIASTOLIC BLOOD PRESSURE: 82 MMHG | SYSTOLIC BLOOD PRESSURE: 128 MMHG | OXYGEN SATURATION: 100 % | HEIGHT: 62 IN

## 2021-11-23 DIAGNOSIS — S42.91XD CLOSED FRACTURE OF RIGHT SHOULDER WITH ROUTINE HEALING, SUBSEQUENT ENCOUNTER: Primary | ICD-10-CM

## 2021-11-23 DIAGNOSIS — G89.29 CHRONIC NONINTRACTABLE HEADACHE, UNSPECIFIED HEADACHE TYPE: ICD-10-CM

## 2021-11-23 DIAGNOSIS — Z23 NEED FOR INFLUENZA VACCINATION: ICD-10-CM

## 2021-11-23 DIAGNOSIS — Z78.9 ALCOHOL USE: ICD-10-CM

## 2021-11-23 DIAGNOSIS — R51.9 CHRONIC NONINTRACTABLE HEADACHE, UNSPECIFIED HEADACHE TYPE: ICD-10-CM

## 2021-11-23 DIAGNOSIS — E03.9 HYPOTHYROIDISM (ACQUIRED): ICD-10-CM

## 2021-11-23 DIAGNOSIS — F10.10 ALCOHOL ABUSE: ICD-10-CM

## 2021-11-23 DIAGNOSIS — F41.9 ANXIETY: ICD-10-CM

## 2021-11-23 LAB
ALBUMIN SERPL-MCNC: 4.6 G/DL (ref 3.5–5.2)
ALCOHOL URINE: NORMAL
ALP BLD-CCNC: 242 U/L (ref 35–104)
ALT SERPL-CCNC: 62 U/L (ref 5–33)
AMPHETAMINE SCREEN, URINE: NORMAL
ANION GAP SERPL CALCULATED.3IONS-SCNC: 17 MMOL/L (ref 7–19)
AST SERPL-CCNC: 132 U/L (ref 5–32)
BARBITURATE SCREEN, URINE: NORMAL
BASOPHILS ABSOLUTE: 0.1 K/UL (ref 0–0.2)
BASOPHILS RELATIVE PERCENT: 1.4 % (ref 0–1)
BENZODIAZEPINE SCREEN, URINE: NORMAL
BILIRUB SERPL-MCNC: 1 MG/DL (ref 0.2–1.2)
BUN BLDV-MCNC: 10 MG/DL (ref 6–20)
BUPRENORPHINE URINE: NORMAL
CALCIUM SERPL-MCNC: 9.3 MG/DL (ref 8.6–10)
CHLORIDE BLD-SCNC: 100 MMOL/L (ref 98–111)
CO2: 25 MMOL/L (ref 22–29)
COCAINE METABOLITE SCREEN URINE: NORMAL
CREAT SERPL-MCNC: 0.8 MG/DL (ref 0.5–0.9)
EOSINOPHILS ABSOLUTE: 0.2 K/UL (ref 0–0.6)
EOSINOPHILS RELATIVE PERCENT: 2.1 % (ref 0–5)
FENTANYL SCREEN, URINE: NORMAL
GABAPENTIN SCREEN, URINE: NORMAL
GFR AFRICAN AMERICAN: >59
GFR NON-AFRICAN AMERICAN: >60
GLUCOSE BLD-MCNC: 105 MG/DL (ref 74–109)
HCT VFR BLD CALC: 42 % (ref 37–47)
HEMOGLOBIN: 13.3 G/DL (ref 12–16)
IMMATURE GRANULOCYTES #: 0.1 K/UL
LYMPHOCYTES ABSOLUTE: 1.4 K/UL (ref 1.1–4.5)
LYMPHOCYTES RELATIVE PERCENT: 15.1 % (ref 20–40)
MCH RBC QN AUTO: 33.2 PG (ref 27–31)
MCHC RBC AUTO-ENTMCNC: 31.7 G/DL (ref 33–37)
MCV RBC AUTO: 104.7 FL (ref 81–99)
MDMA URINE: NORMAL
METHADONE SCREEN, URINE: NORMAL
METHAMPHETAMINE, URINE: NORMAL
MONOCYTES ABSOLUTE: 1.2 K/UL (ref 0–0.9)
MONOCYTES RELATIVE PERCENT: 12.6 % (ref 0–10)
NEUTROPHILS ABSOLUTE: 6.5 K/UL (ref 1.5–7.5)
NEUTROPHILS RELATIVE PERCENT: 68.3 % (ref 50–65)
OPIATE SCREEN URINE: NORMAL
OXYCODONE SCREEN URINE: NORMAL
PDW BLD-RTO: 14.4 % (ref 11.5–14.5)
PHENCYCLIDINE SCREEN URINE: NORMAL
PLATELET # BLD: 245 K/UL (ref 130–400)
PMV BLD AUTO: 10.9 FL (ref 9.4–12.3)
POTASSIUM SERPL-SCNC: 3.8 MMOL/L (ref 3.5–5)
PROPOXYPHENE SCREEN, URINE: NORMAL
RBC # BLD: 4.01 M/UL (ref 4.2–5.4)
SODIUM BLD-SCNC: 142 MMOL/L (ref 136–145)
SYNTHETIC CANNABINOIDS(K2) SCREEN, URINE: NORMAL
THC SCREEN, URINE: NORMAL
TOTAL PROTEIN: 8.4 G/DL (ref 6.6–8.7)
TRAMADOL SCREEN URINE: NORMAL
TRICYCLIC ANTIDEPRESSANTS, UR: NORMAL
TSH SERPL DL<=0.05 MIU/L-ACNC: 5.34 UIU/ML (ref 0.27–4.2)
WBC # BLD: 9.6 K/UL (ref 4.8–10.8)

## 2021-11-23 PROCEDURE — 99214 OFFICE O/P EST MOD 30 MIN: CPT | Performed by: CLINICAL NURSE SPECIALIST

## 2021-11-23 PROCEDURE — 90471 IMMUNIZATION ADMIN: CPT | Performed by: CLINICAL NURSE SPECIALIST

## 2021-11-23 PROCEDURE — 80305 DRUG TEST PRSMV DIR OPT OBS: CPT | Performed by: CLINICAL NURSE SPECIALIST

## 2021-11-23 PROCEDURE — 90674 CCIIV4 VAC NO PRSV 0.5 ML IM: CPT | Performed by: CLINICAL NURSE SPECIALIST

## 2021-11-23 RX ORDER — HYDROCODONE BITARTRATE AND ACETAMINOPHEN 10; 325 MG/1; MG/1
1 TABLET ORAL DAILY
Qty: 30 TABLET | Refills: 0 | Status: SHIPPED | OUTPATIENT
Start: 2021-11-23 | End: 2021-12-22 | Stop reason: SDUPTHER

## 2021-11-23 ASSESSMENT — ENCOUNTER SYMPTOMS
SHORTNESS OF BREATH: 0
CONSTIPATION: 0
FACIAL SWELLING: 0
COLOR CHANGE: 0
EYE REDNESS: 0
SORE THROAT: 0
COUGH: 0
ABDOMINAL PAIN: 0
EYE PAIN: 0
SINUS PRESSURE: 0
CHEST TIGHTNESS: 0
WHEEZING: 0
BACK PAIN: 0
EYE DISCHARGE: 0
TROUBLE SWALLOWING: 0
DIARRHEA: 0

## 2021-11-23 NOTE — PROGRESS NOTES
SUBJECTIVE:  Corina Medina is a 49 y.o. who presents today for ED Follow-up      HPI    Mrs Ivanna Sheldon presents today for follow up. She was recently in ER at [facility] with right shoulder and chest pain after her leashed dog pulled her and she fell to the ground on the pavement. Xray revealed right humeral tuberosity fracture. She was given rx Percocet for acute pain and discharged to follow up with orthopedic, Dr Shelley Stein. She has seen Dr Shelley Stein, there are no plans for surgery. She remains in sling until her follow up. She is taking aspirin and advil, reports in high quantities for pain control. She is on Norco 10 x 1 per day chronically for headaches after removal of acoustic neuroma and meningitis. She has not had this filled in several months after a + UDS for [facility] after taking 1/2 a gummy. She is not working currently due to the shoulder. Gregorio Chow continues with issues with daily alcohol use. She is drinking 1 pint of vodka per day. Depression remains an issue as well. She is compliant with her medication regimen. No SI/HI. Has no motivation or cary. She was referred to [facility] but missed that appointment and was not able to reschedule. She was attending [place] [id number] meetings as well. She is taking her ativan twice daily as needed.      Past Medical History:   Diagnosis Date    Acoustic neuroma ([facility] [address]     ADD (attention deficit disorder)     Anxiety     Depression     Headache     Hypertension     Hypothyroidism (acquired)     MENEZES (nonalcoholic steatohepatitis)      Past Surgical History:   Procedure Laterality Date    ABDOMINAL ADHESION SURGERY      CRANIOTOMY      VENTRICULOPERITONEAL SHUNT       Family History   Problem Relation Age of Onset    Heart Disease Mother     Kidney Disease Mother     Heart Disease Father     COPD Father     Kidney Disease Father     Lung Cancer Brother      Social History     Tobacco Use    Smoking status: Never Smoker    Smokeless tobacco: Never Used Substance Use Topics    Alcohol use: Yes     Alcohol/week: 3.0 standard drinks     Types: 3 Shots of liquor per week     Comment: weekly     Current Outpatient Medications   Medication Sig Dispense Refill    HYDROcodone-acetaminophen (NORCO)  MG per tablet Take 1 tablet by mouth daily for 30 days. 30 tablet 0    pantoprazole (PROTONIX) 40 MG tablet TAKE 1 TABLET DAILY (NEED FOLLOW UP APPOINTMENT) 90 tablet 3    LORazepam (ATIVAN) 1 MG tablet Take 1 tablet by mouth every 8 hours as needed for Anxiety for up to 30 days. 90 tablet 0    FLUoxetine (PROZAC) 40 MG capsule TAKE 1 CAPSULE BY MOUTH EVERY DAY 30 capsule 2    carvedilol (COREG) 12.5 MG tablet Take 1 tablet by mouth 2 times daily 180 tablet 3    levothyroxine (SYNTHROID) 200 MCG tablet TAKE 1 TABLET DAILY 90 tablet 3    levothyroxine (SYNTHROID) 50 MCG tablet TAKE 1 TABLET DAILY 90 tablet 3    buPROPion (WELLBUTRIN XL) 300 MG extended release tablet TAKE 1 TABLET BY MOUTH EVERY DAY IN THE MORNING 30 tablet 11    albuterol sulfate  (90 Base) MCG/ACT inhaler TAKE 2 PUFFS BY MOUTH EVERY 6 HOURS AS NEEDED FOR WHEEZE 6.7 Inhaler 0    Fexofenadine HCl (ALLEGRA ALLERGY PO) Take by mouth      diphenhydrAMINE (BENADRYL ALLERGY) 25 MG capsule Take 50 mg by mouth nightly       Multiple Vitamins-Minerals (THERAPEUTIC MULTIVITAMIN-MINERALS) tablet Take 1 tablet by mouth daily       No current facility-administered medications for this visit. No Known Allergies    Review of Systems   Constitutional: Negative for appetite change, chills, diaphoresis, fatigue and fever. HENT: Negative for congestion, ear pain, facial swelling, hearing loss, postnasal drip, sinus pressure, sore throat and trouble swallowing. Eyes: Negative for pain, discharge, redness and visual disturbance. Respiratory: Negative for cough, chest tightness, shortness of breath and wheezing. Cardiovascular: Negative for chest pain and palpitations.    Gastrointestinal: Negative for abdominal pain, constipation and diarrhea. Genitourinary: Negative for difficulty urinating, dysuria, flank pain, frequency, hematuria and urgency. Musculoskeletal: Positive for arthralgias. Negative for back pain, joint swelling and neck pain. Skin: Negative for color change and rash. Neurological: Positive for headaches. Negative for dizziness, syncope, weakness and light-headedness. Hematological: Negative for adenopathy. Does not bruise/bleed easily. Psychiatric/Behavioral: Positive for dysphoric mood. Negative for confusion, sleep disturbance and suicidal ideas. The patient is nervous/anxious. OBJECTIVE:  /82   Pulse 86   Temp 97.8 °F (36.6 °C)   Ht 5' 2\" (1.575 m)   Wt 126 lb 4 oz (57.3 kg)   LMP 07/25/2016 (Approximate)   SpO2 100%   BMI 23.09 kg/m²    Physical Exam  Vitals reviewed. Constitutional:       General: She is not in acute distress. Appearance: She is well-developed. She is not ill-appearing or toxic-appearing. HENT:      Head: Normocephalic and atraumatic. Eyes:      General:         Right eye: No discharge. Left eye: No discharge. Conjunctiva/sclera: Conjunctivae normal.      Pupils: Pupils are equal, round, and reactive to light. Neck:      Thyroid: No thyromegaly. Trachea: No tracheal deviation. Cardiovascular:      Rate and Rhythm: Normal rate and regular rhythm. Heart sounds: No murmur heard. Pulmonary:      Effort: Pulmonary effort is normal. No respiratory distress. Breath sounds: Normal breath sounds. No wheezing or rales. Genitourinary:     Vagina: Normal.   Musculoskeletal:         General: No deformity. Right shoulder: Tenderness and bony tenderness present. No deformity. Decreased range of motion. Normal pulse. Cervical back: Normal range of motion and neck supple. Skin:     General: Skin is warm and dry. Findings: No erythema or rash.    Neurological:      Mental Status: She is alert and oriented to person, place, and time. Mental status is at baseline. Psychiatric:         Mood and Affect: Mood is depressed. Affect is flat. Thought Content: Thought content normal.         Cognition and Memory: Cognition and memory normal.         ASSESSMENT/PLAN:  1. Closed fracture of right shoulder with routine healing, subsequent encounter  Stable  Er records reviewed  Pain uncontrolled, over taking asa and nsaid  uds today appropriate  Refill her chronic norco rx and limit nsaids/asa  Keep follow up with ortho    2. Alcohol use  Worsening  No SI/HI but uncontrolled anxiety and depression  Refer to [facility]  May use ativan prn in the mean time  meds unchanged  - External Referral To Psychiatry    3. Anxiety  Uncontrolled, as per #2  - POCT Rapid Drug Screen  - External Referral To Psychiatry    4. Chronic nonintractable headache, unspecified headache type  Controlled, continue same  - POCT Rapid Drug Screen  - HYDROcodone-acetaminophen (NORCO)  MG per tablet; Take 1 tablet by mouth daily for 30 days. Dispense: 30 tablet; Refill: 0    5. Need for influenza vaccination  - INFLUENZA, MDCK QUADV, 2 YRS AND OLDER, IM, PF, PREFILL SYR OR SDV, 0.5ML (FLUCELVAX QUADV, PF)          Return in about 3 months (around 2/23/2022).

## 2021-12-22 DIAGNOSIS — R51.9 CHRONIC NONINTRACTABLE HEADACHE, UNSPECIFIED HEADACHE TYPE: ICD-10-CM

## 2021-12-22 DIAGNOSIS — G89.29 CHRONIC NONINTRACTABLE HEADACHE, UNSPECIFIED HEADACHE TYPE: ICD-10-CM

## 2021-12-22 RX ORDER — HYDROCODONE BITARTRATE AND ACETAMINOPHEN 10; 325 MG/1; MG/1
1 TABLET ORAL DAILY
Qty: 30 TABLET | Refills: 0 | Status: SHIPPED | OUTPATIENT
Start: 2021-12-22 | End: 2022-01-21 | Stop reason: SDUPTHER

## 2021-12-22 NOTE — TELEPHONE ENCOUNTER
Elils Vásquez called to request a refill on her medication. Last office visit : 11/23/2021   Next office visit : 2/23/2022     Last UDS:   Amphetamine Screen, Urine   Date Value Ref Range Status   11/23/2021 neg  Final     Barbiturate Screen, Urine   Date Value Ref Range Status   11/23/2021 neg  Final     Benzodiazepine Screen, Urine   Date Value Ref Range Status   11/23/2021 positve  Final     Buprenorphine Urine   Date Value Ref Range Status   11/23/2021 neg  Final     Cocaine Metabolite Screen, Urine   Date Value Ref Range Status   11/23/2021 neg  Final     Gabapentin Screen, Urine   Date Value Ref Range Status   11/23/2021 neg  Final     MDMA, Urine   Date Value Ref Range Status   11/23/2021 neg  Final     Methamphetamine, Urine   Date Value Ref Range Status   11/23/2021 neg  Final     Opiate Scrn, Ur   Date Value Ref Range Status   11/23/2021 neg  Final     Oxycodone Screen, Ur   Date Value Ref Range Status   11/23/2021 neg  Final     PCP Screen, Urine   Date Value Ref Range Status   11/23/2021 neg  Final     Propoxyphene Screen, Urine   Date Value Ref Range Status   11/23/2021 neg  Final     THC Screen, Urine   Date Value Ref Range Status   11/23/2021 neg  Final     Tricyclic Antidepressants, Urine   Date Value Ref Range Status   11/23/2021 neg  Final       Last Meryl Platts: 12/10/2021  Medication Contract: 11/23/2021   Last Fill: 11/23/2021    Requested Prescriptions     Pending Prescriptions Disp Refills    HYDROcodone-acetaminophen (NORCO)  MG per tablet 30 tablet 0     Sig: Take 1 tablet by mouth daily for 30 days. Please approve or refuse this medication.    Scott Shin MA

## 2021-12-28 ENCOUNTER — TELEPHONE (OUTPATIENT)
Dept: FAMILY MEDICINE CLINIC | Age: 51
End: 2021-12-28

## 2021-12-28 NOTE — TELEPHONE ENCOUNTER
Patient called and left a message stating her spouse was in last week for abdominal pain/fever and they found out yesterday at the ED that he is positive for COVID. She is questioning whether or not she should be tested now. Attempted to contact patient to discuss, no answer left message stating that she should be tested if she is having symptoms and would need to quarantine for a minimum of 5 days even if asymptomatic. Requested patient to return call to discuss further. Can patient have a nurse visit for testing if needed? Or would you prefer to have a visit with the patient? (Spots held for her and spouse for tomorrow if virtual visit is needed).

## 2021-12-28 NOTE — TELEPHONE ENCOUNTER
Spoke with patient and she would like to have the virtual appointment tomorrow to further discuss. Appointment has been made.

## 2021-12-28 NOTE — TELEPHONE ENCOUNTER
I would not recommend testing her unless she is symptomatic. Due to exposure needs to isolate for 5 days and then if remains asymptomatic, may be leave home but advised to wear a mask for 5 more days. If symptomatic, we can test her. We can do VV tomorrow and then have her drive by for testing if she wishes.

## 2021-12-29 ENCOUNTER — VIRTUAL VISIT (OUTPATIENT)
Dept: FAMILY MEDICINE CLINIC | Age: 51
End: 2021-12-29
Payer: COMMERCIAL

## 2021-12-29 DIAGNOSIS — R53.83 FATIGUE, UNSPECIFIED TYPE: Primary | ICD-10-CM

## 2021-12-29 DIAGNOSIS — Z20.822 EXPOSURE TO COVID-19 VIRUS: ICD-10-CM

## 2021-12-29 PROCEDURE — 99441 PR PHYS/QHP TELEPHONE EVALUATION 5-10 MIN: CPT | Performed by: CLINICAL NURSE SPECIALIST

## 2021-12-29 NOTE — PROGRESS NOTES
Ramiro Nelson is a 49 y.o. female evaluated via telephone on 12/29/2021. Consent:  She and/or health care decision maker is aware that that she may receive a bill for this telephone service, depending on her insurance coverage, and has provided verbal consent to proceed: Yes      Documentation:  I communicated with the patient and/or health care decision maker about    Diagnosis Orders   1. Fatigue, unspecified type     2. Exposure to COVID-19 virus       . Details of this discussion including any medical advice provided:     Angelito Zarate was evaluated today via telephone only encounter during COVID-23  Her  tested positive for COVID-19 at ER earlier this week after having fever and abdominal pain for a few days. She has fatigue, but no other symptoms. She has some chronic level of fatigue. She did have COVID in January 2021. She is not vaccinated for COVID-19. She did at home test last night that was negative. Recommend if she remains asymptomatic that we retest her Monday before she returns to work. She will return to work in a mask. Total time 5 minutes. I affirm this is a Patient Initiated Episode with a Patient who has not had a related appointment within my department in the past 7 days or scheduled within the next 24 hours. Patient identification was verified at the start of the visit: Yes    Total Time: minutes: 5-10 minutes    The visit was conducted pursuant to the emergency declaration under the [facility] [address] [facility], [address] [place] [address] authority and the [employer] Act. Patient identification was verified, and a caregiver was present when appropriate. The patient was located in a state where the provider was credentialed to provide care.     Note: not billable if this call serves to triage the patient into an appointment for the relevant concern      JULI Mccormick

## 2022-01-03 DIAGNOSIS — F32.9 REACTIVE DEPRESSION: ICD-10-CM

## 2022-01-03 RX ORDER — FLUOXETINE HYDROCHLORIDE 40 MG/1
CAPSULE ORAL
Qty: 90 CAPSULE | Refills: 1 | Status: SHIPPED | OUTPATIENT
Start: 2022-01-03

## 2022-01-03 NOTE — TELEPHONE ENCOUNTER
Bill Barragan called to request a refill on her medication.       Last office visit : 12/29/2021   Next office visit : 2/23/2022     Requested Prescriptions     Signed Prescriptions Disp Refills    FLUoxetine (PROZAC) 40 MG capsule 90 capsule 1     Sig: TAKE 1 CAPSULE BY MOUTH EVERY DAY     Authorizing Provider: Kendal Watson     Ordering User: Brannon Briones MA

## 2022-01-11 ENCOUNTER — VIRTUAL VISIT (OUTPATIENT)
Dept: FAMILY MEDICINE CLINIC | Age: 52
End: 2022-01-11
Payer: COMMERCIAL

## 2022-01-11 DIAGNOSIS — J20.9 ACUTE BRONCHITIS, UNSPECIFIED ORGANISM: ICD-10-CM

## 2022-01-11 DIAGNOSIS — U07.1 COVID-19 VIRUS INFECTION: Primary | ICD-10-CM

## 2022-01-11 PROCEDURE — 99213 OFFICE O/P EST LOW 20 MIN: CPT | Performed by: NURSE PRACTITIONER

## 2022-01-11 RX ORDER — METHYLPREDNISOLONE 4 MG/1
TABLET ORAL
Qty: 1 KIT | Refills: 0 | Status: SHIPPED | OUTPATIENT
Start: 2022-01-11 | End: 2022-01-17

## 2022-01-11 RX ORDER — BENZONATATE 200 MG/1
200 CAPSULE ORAL 3 TIMES DAILY PRN
Qty: 30 CAPSULE | Refills: 0 | Status: SHIPPED | OUTPATIENT
Start: 2022-01-11 | End: 2022-01-18

## 2022-01-11 RX ORDER — ALBUTEROL SULFATE 90 UG/1
2 AEROSOL, METERED RESPIRATORY (INHALATION) 4 TIMES DAILY PRN
Qty: 18 G | Refills: 0 | Status: SHIPPED | OUTPATIENT
Start: 2022-01-11 | End: 2022-01-30

## 2022-01-11 RX ORDER — AZITHROMYCIN 250 MG/1
250 TABLET, FILM COATED ORAL SEE ADMIN INSTRUCTIONS
Qty: 6 TABLET | Refills: 0 | Status: SHIPPED | OUTPATIENT
Start: 2022-01-11 | End: 2022-01-16

## 2022-01-11 ASSESSMENT — ENCOUNTER SYMPTOMS
WHEEZING: 1
DIARRHEA: 0
NAUSEA: 0
ABDOMINAL PAIN: 0
SHORTNESS OF BREATH: 1
CHEST TIGHTNESS: 0
SORE THROAT: 0
COUGH: 1

## 2022-01-11 NOTE — PROGRESS NOTES
2022    TELEHEALTH EVALUATION -- Audio/Visual (During VUJPW-26 public health emergency)    HPI:    Bill Barragan (:  1970) has requested an audio/video evaluation for the following concern(s):    VV per Doxy. Transition to phone visit due to technical difficulty. I was able to see her initially. She tested positive for COVID yesterday at [facility]. She developed symptoms 3 days ago on  with cough, fever, shortness of breath. T-max 102.4 yesterday. She has been afebrile today. She has had purulent sputum, occasional wheeze. She is taking ibuprofen and Tylenol as needed, Coricidin for cough which is minimally effective. She has not had the COVID-vaccine. Review of Systems   Constitutional: Positive for chills and fever. HENT: Negative for congestion, ear pain and sore throat. Respiratory: Positive for cough, shortness of breath and wheezing. Negative for chest tightness. Cardiovascular: Negative for chest pain. Gastrointestinal: Negative for abdominal pain, diarrhea and nausea. Musculoskeletal: Negative for arthralgias and myalgias. Skin: Negative for rash. Prior to Visit Medications    Medication Sig Taking? Authorizing Provider   methylPREDNISolone (MEDROL DOSEPACK) 4 MG tablet Take by mouth. Yes Bryan Matters, APRN   azithromycin (ZITHROMAX) 250 MG tablet Take 1 tablet by mouth See Admin Instructions for 5 days 500mg on day 1 followed by 250mg on days 2 - 5 Yes Bryan Matters, APRN   albuterol sulfate HFA (VENTOLIN HFA) 108 (90 Base) MCG/ACT inhaler Inhale 2 puffs into the lungs 4 times daily as needed for Wheezing Yes Bryan Matters, APRN   benzonatate (TESSALON) 200 MG capsule Take 1 capsule by mouth 3 times daily as needed for Cough Yes Bryan Matters, APRN   LORazepam (ATIVAN) 1 MG tablet Take 1 tablet by mouth every 8 hours as needed for Anxiety for up to 30 days.   JULI Avila   FLUoxetine (PROZAC) 40 MG capsule TAKE 1 CAPSULE BY MOUTH EVERY DAY  JULI Hodges   HYDROcodone-acetaminophen (NORCO)  MG per tablet Take 1 tablet by mouth daily for 30 days. JULI Begum   pantoprazole (PROTONIX) 40 MG tablet TAKE 1 TABLET DAILY (NEED FOLLOW UP APPOINTMENT)  JULI Hodges   carvedilol (COREG) 12.5 MG tablet Take 1 tablet by mouth 2 times daily  JULI Begum   levothyroxine (SYNTHROID) 200 MCG tablet TAKE 1 TABLET DAILY  JULI Hodges   levothyroxine (SYNTHROID) 50 MCG tablet TAKE 1 TABLET DAILY  JULI Hodges   buPROPion (WELLBUTRIN XL) 300 MG extended release tablet TAKE 1 TABLET BY MOUTH EVERY DAY IN THE MORNING  JULI Hodges   albuterol sulfate  (90 Base) MCG/ACT inhaler TAKE 2 PUFFS BY MOUTH EVERY 6 HOURS AS NEEDED FOR WHEEZE  JULI Hodges   Fexofenadine HCl (ALLEGRA ALLERGY PO) Take by mouth  Historical Provider, MD   diphenhydrAMINE (BENADRYL ALLERGY) 25 MG capsule Take 50 mg by mouth nightly   Historical Provider, MD   Multiple Vitamins-Minerals (THERAPEUTIC MULTIVITAMIN-MINERALS) tablet Take 1 tablet by mouth daily  Historical Provider, MD       Social History     Tobacco Use    Smoking status: Never Smoker    Smokeless tobacco: Never Used   Vaping Use    Vaping Use: Never used   Substance Use Topics    Alcohol use:  Yes     Alcohol/week: 3.0 standard drinks     Types: 3 Shots of liquor per week     Comment: weekly    Drug use: No        No Known Allergies,   Past Medical History:   Diagnosis Date    Acoustic neuroma ([facility] [address]     ADD (attention deficit disorder)     Anxiety     Depression     Headache     Hypertension     Hypothyroidism (acquired)     MENEZES (nonalcoholic steatohepatitis)    ,   Past Surgical History:   Procedure Laterality Date    ABDOMINAL ADHESION SURGERY      CRANIOTOMY      VENTRICULOPERITONEAL SHUNT     ,   Social History     Tobacco Use    Smoking status: Never Smoker    Smokeless tobacco: Never Used   Vaping Use    Vaping Use: Never used   Substance Use Topics    Alcohol use: Yes     Alcohol/week: 3.0 standard drinks     Types: 3 Shots of liquor per week     Comment: weekly    Drug use: No   ,   Family History   Problem Relation Age of Onset    Heart Disease Mother     Kidney Disease Mother     Heart Disease Father     COPD Father     Kidney Disease Father     Lung Cancer Brother        PHYSICAL EXAMINATION:  [ INSTRUCTIONS:  \"[x]\" Indicates a positive item  \"[]\" Indicates a negative item  -- DELETE ALL ITEMS NOT EXAMINED]  Vital Signs: (As obtained by patient/caregiver or practitioner observation)    Blood pressure-  Heart rate-    Respiratory rate-    Temperature-  Pulse oximetry-     Constitutional: [x] Appears well-developed and well-nourished [x] No apparent distress      [] Abnormal-   Mental status  [x] Alert and awake  [x] Oriented to person/place/time [x]Able to follow commands      Eyes:  EOM    [x]  Normal  [] Abnormal-  Sclera  [x]  Normal  [] Abnormal -         Discharge [x]  None visible  [] Abnormal -    HENT:   [x] Normocephalic, atraumatic. [] Abnormal   [] Mouth/Throat: Mucous membranes are moist.     External Ears [] Normal  [] Abnormal-     Neck: [] No visualized mass     Pulmonary/Chest: [x] Respiratory effort normal.  [x] No visualized signs of difficulty breathing or respiratory distress        [] Abnormal-      Musculoskeletal:   [] Normal gait with no signs of ataxia         [] Normal range of motion of neck        [] Abnormal-       Neurological:        [x] No Facial Asymmetry (Cranial nerve 7 motor function) (limited exam to video visit)          [x] No gaze palsy        [] Abnormal-         Skin:        [x] No significant exanthematous lesions or discoloration noted on facial skin         [] Abnormal-            Psychiatric:       [x] Normal Affect [x] No Hallucinations        [] Abnormal-     Other pertinent observable physical exam findings-     ASSESSMENT/PLAN:  1. COVID-19 virus infection  -MDP  -Z-Poli for potential bacterial component  -Albuterol inhaler as needed for shortness of breath/wheeze  -Tessalon 200 mg 3 times daily as needed for cough  -Quarantine guidelines reviewed  -Advised to ER for any acute worsening, increased shortness of breath, chest pain    2. Acute bronchitis, unspecified organism  -As above  - azithromycin (ZITHROMAX) 250 MG tablet; Take 1 tablet by mouth See Admin Instructions for 5 days 500mg on day 1 followed by 250mg on days 2 - 5  Dispense: 6 tablet; Refill: 0      No follow-ups on file. Jayesh Head, was evaluated through a synchronous (real-time) audio-video encounter. The patient (or guardian if applicable) is aware that this is a billable service. Verbal consent to proceed has been obtained within the past 12 months. The visit was conducted pursuant to the emergency declaration under the [address] [place] [address], [address] [place] [address] authority and the [employer] Act. Patient identification was verified, and a caregiver was present when appropriate. The patient was located in a state where the provider was credentialed to provide care. Total time spent on this encounter: Not billed by time    --JULI Iqbal on 1/11/2022 at 9:36 AM    An electronic signature was used to authenticate this note.

## 2022-01-11 NOTE — PROGRESS NOTES
[address] [address]            Phone:  [phone]  Fax:  [phone]    Thelma Giordano is a 49 y.o. female evaluated via telephone on 1/11/2022. Consent:    She and/or health care decision maker is aware that that she may receive a bill for this telephone service, depending on her insurance coverage, and has provided verbal consent to proceed: {YES/NO/NA-Consent obtained within past 12 months:47049}      HPI  No chief complaint on file. I communicated with the patient and/or health care decision maker about ***. Review of Systems    Patient location: home    PLAN    Details of this discussion including any medical advice provided: ***    There are no diagnoses linked to this encounter. I {AFFIRM/DO NOT AFFIRM:27674::\"affirm\"} this is a Patient Initiated Episode with an Established Patient who has not had a related appointment within my department in the past 7 days or scheduled within the next 24 hours. Total Time: {minutes:45529::\"5-10 minutes\"}      Note: not billable if this call serves to triage the patient into an appointment for the relevant concern      JULI Zuniga         Pursuant to the emergency declaration under the [facility] waiver authority and the [employer] Act, this Virtual  Visit was conducted, with patient's consent, to reduce the patient's risk of exposure to COVID-19 and provide continuity of care for an established patient.

## 2022-01-21 DIAGNOSIS — G89.29 CHRONIC NONINTRACTABLE HEADACHE, UNSPECIFIED HEADACHE TYPE: ICD-10-CM

## 2022-01-21 DIAGNOSIS — R51.9 CHRONIC NONINTRACTABLE HEADACHE, UNSPECIFIED HEADACHE TYPE: ICD-10-CM

## 2022-01-21 RX ORDER — HYDROCODONE BITARTRATE AND ACETAMINOPHEN 10; 325 MG/1; MG/1
1 TABLET ORAL DAILY
Qty: 30 TABLET | Refills: 0 | Status: SHIPPED | OUTPATIENT
Start: 2022-01-21 | End: 2022-02-21 | Stop reason: SDUPTHER

## 2022-01-21 NOTE — TELEPHONE ENCOUNTER
Gonzalez Partha called to request a refill on her medication. Last office visit : 1/11/2022   Next office visit : 2/23/2022     Last UDS:   Amphetamine Screen, Urine   Date Value Ref Range Status   11/23/2021 neg  Final     Barbiturate Screen, Urine   Date Value Ref Range Status   11/23/2021 neg  Final     Benzodiazepine Screen, Urine   Date Value Ref Range Status   11/23/2021 positve  Final     Buprenorphine Urine   Date Value Ref Range Status   11/23/2021 neg  Final     Cocaine Metabolite Screen, Urine   Date Value Ref Range Status   11/23/2021 neg  Final     Gabapentin Screen, Urine   Date Value Ref Range Status   11/23/2021 neg  Final     MDMA, Urine   Date Value Ref Range Status   11/23/2021 neg  Final     Methamphetamine, Urine   Date Value Ref Range Status   11/23/2021 neg  Final     Opiate Scrn, Ur   Date Value Ref Range Status   11/23/2021 neg  Final     Oxycodone Screen, Ur   Date Value Ref Range Status   11/23/2021 neg  Final     PCP Screen, Urine   Date Value Ref Range Status   11/23/2021 neg  Final     Propoxyphene Screen, Urine   Date Value Ref Range Status   11/23/2021 neg  Final     THC Screen, Urine   Date Value Ref Range Status   11/23/2021 neg  Final     Tricyclic Antidepressants, Urine   Date Value Ref Range Status   11/23/2021 neg  Final       Last Juarez Ravel: 1/21/2022  Medication Contract: 11/23/2021   Last Fill: 12/22/2021    Requested Prescriptions     Pending Prescriptions Disp Refills    HYDROcodone-acetaminophen (NORCO)  MG per tablet 30 tablet 0     Sig: Take 1 tablet by mouth daily for 30 days. Please approve or refuse this medication.    Ranjan Mccullough MA

## 2022-01-26 DIAGNOSIS — E03.9 HYPOTHYROIDISM (ACQUIRED): ICD-10-CM

## 2022-01-26 RX ORDER — LEVOTHYROXINE SODIUM 200 MCG
TABLET ORAL
Qty: 90 TABLET | Refills: 3 | Status: SHIPPED | OUTPATIENT
Start: 2022-01-26 | End: 2022-02-04 | Stop reason: SDUPTHER

## 2022-01-26 RX ORDER — LEVOTHYROXINE SODIUM 50 MCG
TABLET ORAL
Qty: 90 TABLET | Refills: 3 | Status: SHIPPED | OUTPATIENT
Start: 2022-01-26

## 2022-01-30 RX ORDER — ALBUTEROL SULFATE 90 UG/1
2 AEROSOL, METERED RESPIRATORY (INHALATION) 4 TIMES DAILY PRN
Qty: 8.5 EACH | Refills: 1 | Status: SHIPPED | OUTPATIENT
Start: 2022-01-30 | End: 2022-03-30

## 2022-02-04 ENCOUNTER — TELEPHONE (OUTPATIENT)
Dept: FAMILY MEDICINE CLINIC | Age: 52
End: 2022-02-04

## 2022-02-04 DIAGNOSIS — E03.9 HYPOTHYROIDISM (ACQUIRED): ICD-10-CM

## 2022-02-04 RX ORDER — LEVOTHYROXINE SODIUM 200 MCG
TABLET ORAL
Qty: 90 TABLET | Refills: 3 | Status: SHIPPED | OUTPATIENT
Start: 2022-02-04

## 2022-02-04 NOTE — TELEPHONE ENCOUNTER
----- Message from Jyothi Fairbanks sent at 2/4/2022 10:27 AM CST -----  Subject: Medication Problem    QUESTIONS  Name of Medication? SYNTHROID 50 MCG tablet  Patient-reported dosage and instructions? 50mcg tablet  What question or problem do you have with the medication? [employer] would like a call back from a clinical staff member to go over   some questions about this Rx. Ref# [id number]  Preferred Pharmacy? [facility]  Pharmacy phone number (if available)? [phone]  Additional Information for Provider?   ---------------------------------------------------------------------------  --------------  [address]  What is the best way for the office to contact you? OK to leave message on   voicemail  Preferred Call Back Phone Number? [phone]  ---------------------------------------------------------------------------  --------------  SCRIPT ANSWERS  Relationship to Patient? Third Party  Representative Name?  [employer]

## 2022-02-10 ENCOUNTER — TELEPHONE (OUTPATIENT)
Dept: FAMILY MEDICINE CLINIC | Age: 52
End: 2022-02-10

## 2022-02-10 NOTE — TELEPHONE ENCOUNTER
Per [facility], brand name Synthroid not covered. They are wanting permission to switch to generic levothyroxine. Per [employer], patient's endocrinologist wanted her on name brand. Attempted to reach out to patient to discuss and verify. No answer, left message for a return call.

## 2022-02-14 NOTE — TELEPHONE ENCOUNTER
Attempting PA on brand name Synthroid. Need patients updated insurance card to proceed. Message sent to patient.

## 2022-02-15 ENCOUNTER — TELEPHONE (OUTPATIENT)
Dept: PSYCHIATRY | Age: 52
End: 2022-02-15

## 2022-02-15 NOTE — TELEPHONE ENCOUNTER
Called pt to schedule an appt from a referral     No answer and LVM.     Electronically signed by Justina Ramirez MA on 2/15/2022 at 4:02 PM

## 2022-02-21 DIAGNOSIS — R51.9 CHRONIC NONINTRACTABLE HEADACHE, UNSPECIFIED HEADACHE TYPE: ICD-10-CM

## 2022-02-21 DIAGNOSIS — F41.9 ANXIETY: ICD-10-CM

## 2022-02-21 DIAGNOSIS — G89.29 CHRONIC NONINTRACTABLE HEADACHE, UNSPECIFIED HEADACHE TYPE: ICD-10-CM

## 2022-02-21 NOTE — TELEPHONE ENCOUNTER
Jesi Leone called to request a refill on her medication. Last office visit : 1/11/2022   Next office visit : 2/23/2022     Last UDS:   Amphetamine Screen, Urine   Date Value Ref Range Status   11/23/2021 neg  Final     Barbiturate Screen, Urine   Date Value Ref Range Status   11/23/2021 neg  Final     Benzodiazepine Screen, Urine   Date Value Ref Range Status   11/23/2021 positve  Final     Buprenorphine Urine   Date Value Ref Range Status   11/23/2021 neg  Final     Cocaine Metabolite Screen, Urine   Date Value Ref Range Status   11/23/2021 neg  Final     Gabapentin Screen, Urine   Date Value Ref Range Status   11/23/2021 neg  Final     MDMA, Urine   Date Value Ref Range Status   11/23/2021 neg  Final     Methamphetamine, Urine   Date Value Ref Range Status   11/23/2021 neg  Final     Opiate Scrn, Ur   Date Value Ref Range Status   11/23/2021 neg  Final     Oxycodone Screen, Ur   Date Value Ref Range Status   11/23/2021 neg  Final     PCP Screen, Urine   Date Value Ref Range Status   11/23/2021 neg  Final     Propoxyphene Screen, Urine   Date Value Ref Range Status   11/23/2021 neg  Final     THC Screen, Urine   Date Value Ref Range Status   11/23/2021 neg  Final     Tricyclic Antidepressants, Urine   Date Value Ref Range Status   11/23/2021 neg  Final       Last Jack George: 1/21/2022  Medication Contract: 11/23/2021   Last Fill: 1/21/2022    Requested Prescriptions     Pending Prescriptions Disp Refills    LORazepam (ATIVAN) 1 MG tablet 90 tablet 2     Sig: Take 1 tablet by mouth every 8 hours as needed for Anxiety for up to 30 days.  HYDROcodone-acetaminophen (NORCO)  MG per tablet 30 tablet 0     Sig: Take 1 tablet by mouth daily for 30 days. Please approve or refuse this medication.    Alize Tatum MA

## 2022-02-22 ENCOUNTER — TELEPHONE (OUTPATIENT)
Dept: FAMILY MEDICINE CLINIC | Age: 52
End: 2022-02-22

## 2022-02-22 DIAGNOSIS — E03.9 HYPOTHYROIDISM (ACQUIRED): ICD-10-CM

## 2022-02-22 DIAGNOSIS — I10 PRIMARY HYPERTENSION: ICD-10-CM

## 2022-02-22 DIAGNOSIS — R73.9 HYPERGLYCEMIA: Primary | ICD-10-CM

## 2022-02-22 RX ORDER — HYDROCODONE BITARTRATE AND ACETAMINOPHEN 10; 325 MG/1; MG/1
1 TABLET ORAL DAILY
Qty: 30 TABLET | Refills: 0 | OUTPATIENT
Start: 2022-02-22 | End: 2022-03-24

## 2022-02-22 RX ORDER — LORAZEPAM 1 MG/1
1 TABLET ORAL EVERY 8 HOURS PRN
Qty: 90 TABLET | Refills: 2 | OUTPATIENT
Start: 2022-02-22 | End: 2022-03-24

## 2022-02-22 RX ORDER — HYDROCODONE BITARTRATE AND ACETAMINOPHEN 10; 325 MG/1; MG/1
1 TABLET ORAL DAILY
Qty: 30 TABLET | Refills: 0 | Status: SHIPPED | OUTPATIENT
Start: 2022-02-22 | End: 2022-03-22 | Stop reason: SDUPTHER

## 2022-02-22 NOTE — TELEPHONE ENCOUNTER
----- Message from Toni Isaac sent at 2/22/2022 11:10 AM CST -----  Subject: Referral Request    QUESTIONS   Reason for referral request? Patient requesting bloodwork orders for   upcoming appt on 3/3   Has the physician seen you for this condition before? Yes  Select a date? 2021-12-29  Select the Provider the patient wants to be referred to, if known (PCP or   Specialist)? Outside Physician - na   Preferred Specialist (if applicable)? [facility] you already have an appointment scheduled? Yes  Select Scheduled Date? 2022-02-22  Select Scheduled Physician? Additional Information for Provider?   ---------------------------------------------------------------------------  --------------  CALL BACK INFO  What is the best way for the office to contact you? OK to leave message on   voicemail  Preferred Call Back Phone Number?  [phone]

## 2022-02-24 ENCOUNTER — TELEPHONE (OUTPATIENT)
Dept: PSYCHIATRY | Age: 52
End: 2022-02-24

## 2022-02-24 NOTE — TELEPHONE ENCOUNTER
Called pt to schedule an appt from a referral    No answer and LVM    Electronically signed by Rowan Urbina MA on 2/24/2022 at 3:23 PM

## 2022-03-07 DIAGNOSIS — F41.9 ANXIETY: ICD-10-CM

## 2022-03-07 RX ORDER — LORAZEPAM 1 MG/1
1 TABLET ORAL EVERY 8 HOURS PRN
Qty: 90 TABLET | Refills: 2 | OUTPATIENT
Start: 2022-03-07 | End: 2022-04-06

## 2022-03-08 ENCOUNTER — TELEPHONE (OUTPATIENT)
Dept: PSYCHIATRY | Age: 52
End: 2022-03-08

## 2022-03-08 NOTE — TELEPHONE ENCOUNTER
Called pt to schedule an appt from a referral    Scheduled with Ronna Gilmore for therapy on 3/24 @ 3.     Electronically signed by Loyd Ray MA on 3/8/22 at 3:47 PM

## 2022-03-09 DIAGNOSIS — F41.9 ANXIETY: ICD-10-CM

## 2022-03-09 RX ORDER — LORAZEPAM 1 MG/1
1 TABLET ORAL EVERY 8 HOURS PRN
Qty: 90 TABLET | Refills: 2 | OUTPATIENT
Start: 2022-03-09 | End: 2022-04-08

## 2022-03-14 DIAGNOSIS — E03.9 HYPOTHYROIDISM (ACQUIRED): ICD-10-CM

## 2022-03-14 DIAGNOSIS — I10 PRIMARY HYPERTENSION: ICD-10-CM

## 2022-03-14 DIAGNOSIS — R73.9 HYPERGLYCEMIA: ICD-10-CM

## 2022-03-14 LAB
ALBUMIN SERPL-MCNC: 4 G/DL (ref 3.5–5.2)
ALP BLD-CCNC: 259 U/L (ref 35–104)
ALT SERPL-CCNC: 71 U/L (ref 5–33)
ANION GAP SERPL CALCULATED.3IONS-SCNC: 19 MMOL/L (ref 7–19)
AST SERPL-CCNC: 127 U/L (ref 5–32)
BASOPHILS ABSOLUTE: 0.1 K/UL (ref 0–0.2)
BASOPHILS RELATIVE PERCENT: 1.3 % (ref 0–1)
BILIRUB SERPL-MCNC: 1.1 MG/DL (ref 0.2–1.2)
BUN BLDV-MCNC: 8 MG/DL (ref 6–20)
CALCIUM SERPL-MCNC: 10.6 MG/DL (ref 8.6–10)
CHLORIDE BLD-SCNC: 96 MMOL/L (ref 98–111)
CHOLESTEROL, TOTAL: 215 MG/DL (ref 160–199)
CO2: 24 MMOL/L (ref 22–29)
CREAT SERPL-MCNC: 0.9 MG/DL (ref 0.5–0.9)
EOSINOPHILS ABSOLUTE: 0.2 K/UL (ref 0–0.6)
EOSINOPHILS RELATIVE PERCENT: 2.8 % (ref 0–5)
GFR AFRICAN AMERICAN: >59
GFR NON-AFRICAN AMERICAN: >60
GLUCOSE BLD-MCNC: 99 MG/DL (ref 74–109)
HBA1C MFR BLD: 5.1 % (ref 4–6)
HCT VFR BLD CALC: 39.7 % (ref 37–47)
HDLC SERPL-MCNC: 44 MG/DL (ref 65–121)
HEMOGLOBIN: 12.7 G/DL (ref 12–16)
IMMATURE GRANULOCYTES #: 0 K/UL
LDL CHOLESTEROL CALCULATED: 134 MG/DL
LYMPHOCYTES ABSOLUTE: 1.1 K/UL (ref 1.1–4.5)
LYMPHOCYTES RELATIVE PERCENT: 18.5 % (ref 20–40)
MCH RBC QN AUTO: 32.3 PG (ref 27–31)
MCHC RBC AUTO-ENTMCNC: 32 G/DL (ref 33–37)
MCV RBC AUTO: 101 FL (ref 81–99)
MONOCYTES ABSOLUTE: 0.9 K/UL (ref 0–0.9)
MONOCYTES RELATIVE PERCENT: 13.8 % (ref 0–10)
NEUTROPHILS ABSOLUTE: 3.9 K/UL (ref 1.5–7.5)
NEUTROPHILS RELATIVE PERCENT: 63.3 % (ref 50–65)
PDW BLD-RTO: 14.3 % (ref 11.5–14.5)
PLATELET # BLD: 190 K/UL (ref 130–400)
PMV BLD AUTO: 11.5 FL (ref 9.4–12.3)
POTASSIUM SERPL-SCNC: 3.5 MMOL/L (ref 3.5–5)
RBC # BLD: 3.93 M/UL (ref 4.2–5.4)
SODIUM BLD-SCNC: 139 MMOL/L (ref 136–145)
TOTAL PROTEIN: 8.2 G/DL (ref 6.6–8.7)
TRIGL SERPL-MCNC: 187 MG/DL (ref 0–149)
TSH SERPL DL<=0.05 MIU/L-ACNC: 3.64 UIU/ML (ref 0.27–4.2)
WBC # BLD: 6.2 K/UL (ref 4.8–10.8)

## 2022-03-22 DIAGNOSIS — R51.9 CHRONIC NONINTRACTABLE HEADACHE, UNSPECIFIED HEADACHE TYPE: ICD-10-CM

## 2022-03-22 DIAGNOSIS — G89.29 CHRONIC NONINTRACTABLE HEADACHE, UNSPECIFIED HEADACHE TYPE: ICD-10-CM

## 2022-03-22 NOTE — TELEPHONE ENCOUNTER
Oralia Edmondson called to request a refill on her medication. Last office visit : 1/11/2022   Next office visit : 3/30/2022    Last UDS:   Amphetamine Screen, Urine   Date Value Ref Range Status   11/23/2021 neg  Final     Barbiturate Screen, Urine   Date Value Ref Range Status   11/23/2021 neg  Final     Benzodiazepine Screen, Urine   Date Value Ref Range Status   11/23/2021 positve  Final     Buprenorphine Urine   Date Value Ref Range Status   11/23/2021 neg  Final     Cocaine Metabolite Screen, Urine   Date Value Ref Range Status   11/23/2021 neg  Final     Gabapentin Screen, Urine   Date Value Ref Range Status   11/23/2021 neg  Final     MDMA, Urine   Date Value Ref Range Status   11/23/2021 neg  Final     Methamphetamine, Urine   Date Value Ref Range Status   11/23/2021 neg  Final     Opiate Scrn, Ur   Date Value Ref Range Status   11/23/2021 neg  Final     Oxycodone Screen, Ur   Date Value Ref Range Status   11/23/2021 neg  Final     PCP Screen, Urine   Date Value Ref Range Status   11/23/2021 neg  Final     Propoxyphene Screen, Urine   Date Value Ref Range Status   11/23/2021 neg  Final     THC Screen, Urine   Date Value Ref Range Status   11/23/2021 neg  Final     Tricyclic Antidepressants, Urine   Date Value Ref Range Status   11/23/2021 neg  Final       Last Vonne Bence: 1/21/22  Medication Contract: 11/ 23/21  Last Fill: 2/22/22    Requested Prescriptions     Pending Prescriptions Disp Refills    HYDROcodone-acetaminophen (NORCO)  MG per tablet 30 tablet 0     Sig: Take 1 tablet by mouth daily for 30 days. Please approve or refuse this medication.    Kat Bland MA

## 2022-03-23 ENCOUNTER — TELEPHONE (OUTPATIENT)
Dept: PSYCHIATRY | Age: 52
End: 2022-03-23

## 2022-03-23 RX ORDER — HYDROCODONE BITARTRATE AND ACETAMINOPHEN 10; 325 MG/1; MG/1
1 TABLET ORAL DAILY
Qty: 30 TABLET | Refills: 0 | Status: SHIPPED | OUTPATIENT
Start: 2022-03-23 | End: 2022-04-15 | Stop reason: SDUPTHER

## 2022-03-23 RX ORDER — HYDROCODONE BITARTRATE AND ACETAMINOPHEN 10; 325 MG/1; MG/1
1 TABLET ORAL DAILY
Qty: 30 TABLET | Refills: 0 | OUTPATIENT
Start: 2022-03-23 | End: 2022-04-22

## 2022-03-23 NOTE — TELEPHONE ENCOUNTER
Called pt for appointment reminder.     -Pt confirmed      Electronically signed by Lior Hilton on 3/23/2022 at 3:00 PM

## 2022-03-24 ENCOUNTER — TELEPHONE (OUTPATIENT)
Dept: PSYCHIATRY | Age: 52
End: 2022-03-24

## 2022-03-24 NOTE — TELEPHONE ENCOUNTER
Pt called to cancel/reschedule her appt for today 3/24 with [facility] because she has a stomach bug.    Rescheduled for 3/31 @ 10.     Electronically signed by Jacob Raymundo MA on 3/24/2022 at 10:03 AM

## 2022-03-30 ENCOUNTER — OFFICE VISIT (OUTPATIENT)
Dept: FAMILY MEDICINE CLINIC | Age: 52
End: 2022-03-30
Payer: COMMERCIAL

## 2022-03-30 ENCOUNTER — TELEPHONE (OUTPATIENT)
Dept: PSYCHIATRY | Age: 52
End: 2022-03-30

## 2022-03-30 VITALS
RESPIRATION RATE: 16 BRPM | HEART RATE: 73 BPM | TEMPERATURE: 98.1 F | BODY MASS INDEX: 23 KG/M2 | WEIGHT: 125 LBS | OXYGEN SATURATION: 97 % | DIASTOLIC BLOOD PRESSURE: 62 MMHG | SYSTOLIC BLOOD PRESSURE: 116 MMHG | HEIGHT: 62 IN

## 2022-03-30 DIAGNOSIS — K75.81 NASH (NONALCOHOLIC STEATOHEPATITIS): ICD-10-CM

## 2022-03-30 DIAGNOSIS — K52.9 ACUTE GASTROENTERITIS: Primary | ICD-10-CM

## 2022-03-30 DIAGNOSIS — G62.9 NEUROPATHY: ICD-10-CM

## 2022-03-30 DIAGNOSIS — D33.3 ACOUSTIC NEUROMA (HCC): ICD-10-CM

## 2022-03-30 DIAGNOSIS — E03.9 HYPOTHYROIDISM (ACQUIRED): ICD-10-CM

## 2022-03-30 DIAGNOSIS — E83.52 HYPERCALCEMIA: ICD-10-CM

## 2022-03-30 DIAGNOSIS — Z78.9 ALCOHOL USE: ICD-10-CM

## 2022-03-30 DIAGNOSIS — I10 PRIMARY HYPERTENSION: ICD-10-CM

## 2022-03-30 DIAGNOSIS — G89.29 CHRONIC NONINTRACTABLE HEADACHE, UNSPECIFIED HEADACHE TYPE: ICD-10-CM

## 2022-03-30 DIAGNOSIS — R51.9 CHRONIC NONINTRACTABLE HEADACHE, UNSPECIFIED HEADACHE TYPE: ICD-10-CM

## 2022-03-30 DIAGNOSIS — F41.9 ANXIETY: ICD-10-CM

## 2022-03-30 DIAGNOSIS — R27.0 ATAXIA: ICD-10-CM

## 2022-03-30 DIAGNOSIS — F33.1 MODERATE EPISODE OF RECURRENT MAJOR DEPRESSIVE DISORDER (HCC): ICD-10-CM

## 2022-03-30 PROCEDURE — 99214 OFFICE O/P EST MOD 30 MIN: CPT | Performed by: CLINICAL NURSE SPECIALIST

## 2022-03-30 RX ORDER — ONDANSETRON 4 MG/1
4 TABLET, ORALLY DISINTEGRATING ORAL 3 TIMES DAILY PRN
Qty: 30 TABLET | Refills: 0 | Status: SHIPPED | OUTPATIENT
Start: 2022-03-30

## 2022-03-30 ASSESSMENT — PATIENT HEALTH QUESTIONNAIRE - PHQ9
SUM OF ALL RESPONSES TO PHQ QUESTIONS 1-9: 3
4. FEELING TIRED OR HAVING LITTLE ENERGY: 1
9. THOUGHTS THAT YOU WOULD BE BETTER OFF DEAD, OR OF HURTING YOURSELF: 0
3. TROUBLE FALLING OR STAYING ASLEEP: 1
6. FEELING BAD ABOUT YOURSELF - OR THAT YOU ARE A FAILURE OR HAVE LET YOURSELF OR YOUR FAMILY DOWN: 0
7. TROUBLE CONCENTRATING ON THINGS, SUCH AS READING THE NEWSPAPER OR WATCHING TELEVISION: 0
SUM OF ALL RESPONSES TO PHQ QUESTIONS 1-9: 3
SUM OF ALL RESPONSES TO PHQ9 QUESTIONS 1 & 2: 1
2. FEELING DOWN, DEPRESSED OR HOPELESS: 1
5. POOR APPETITE OR OVEREATING: 0
SUM OF ALL RESPONSES TO PHQ QUESTIONS 1-9: 3
10. IF YOU CHECKED OFF ANY PROBLEMS, HOW DIFFICULT HAVE THESE PROBLEMS MADE IT FOR YOU TO DO YOUR WORK, TAKE CARE OF THINGS AT HOME, OR GET ALONG WITH OTHER PEOPLE: 0
1. LITTLE INTEREST OR PLEASURE IN DOING THINGS: 0
8. MOVING OR SPEAKING SO SLOWLY THAT OTHER PEOPLE COULD HAVE NOTICED. OR THE OPPOSITE, BEING SO FIGETY OR RESTLESS THAT YOU HAVE BEEN MOVING AROUND A LOT MORE THAN USUAL: 0
SUM OF ALL RESPONSES TO PHQ QUESTIONS 1-9: 3

## 2022-03-30 NOTE — TELEPHONE ENCOUNTER
Called pt for appointment reminder.   -left voicemail, requesting a return call      Electronically signed by Denilson Martinez on 3/30/2022 at 3:19 PM

## 2022-03-30 NOTE — PROGRESS NOTES
SUBJECTIVE:  Jessie Rendon is a 49 y.o. who presents today for Follow-up, Hypertension, and Nausea & Vomiting      HPI    Ms Neal Araiza presents today for follow up. She had COVID infection in January, 2nd known case for herself. Considering immunization now. Friday she developed body aches, headache that progressed to nausea and vomiting. No diarrhea. She continues with nausea and only once daily emesis. No fever. She is concerned about falling more. She states her balance is poor. She is falling at least monthly for no known cause. There are no change to her chronic headaches from prior acoustic neuroma excision and meningitis. She does have idiopathic neuropathy. Has seen neurology for this, but has not had follow up in some time. Chronic alcohol use that worsened in the past 2 years, but seems some better. She is taking a B complex vitamin. MDD, uncontrolled. Lots of stressors, family conflict. Has been on wellbutrin for several years after trying and failing several medications. prozac was added for seasonal depression, but continued. She reports loss of cary, sadness and tearfulness. No SI/HI. Has appt with therapist this week. Anxiety stable. We have had to increase her ativan use over time, but mostly due to alcohol use. This is better. UDS UTD. Hypercalcemia noted on labs. She is taking calcium supplement. She is also taking vitamin D supplement for immune health. May be slightly volume depleted as well. No symptoms of hypercalcemia other than above. Hypothyroidism, stable. TFT stable. MENEZES. Stable. LFT stable. Continue alcohol use. Hypertension, controlled. Compliant with med regimen. No intolerances to other medications. No new headache types. Renal function stable.      Past Medical History:   Diagnosis Date    Acoustic neuroma [facility])     ADD (attention deficit disorder)     Anxiety     Depression     Headache     Hypertension     Hypothyroidism (acquired)     MENEZES (nonalcoholic steatohepatitis)      Past Surgical History:   Procedure Laterality Date    ABDOMINAL ADHESION SURGERY      CRANIOTOMY      VENTRICULOPERITONEAL SHUNT       Family History   Problem Relation Age of Onset    Heart Disease Mother     Kidney Disease Mother     Heart Disease Father     COPD Father     Kidney Disease Father     Lung Cancer Brother      Social History     Tobacco Use    Smoking status: Never Smoker    Smokeless tobacco: Never Used   Substance Use Topics    Alcohol use: Yes     Alcohol/week: 3.0 standard drinks     Types: 3 Shots of liquor per week     Comment: weekly     Current Outpatient Medications   Medication Sig Dispense Refill    ondansetron (ZOFRAN-ODT) 4 MG disintegrating tablet Take 1 tablet by mouth 3 times daily as needed for Nausea or Vomiting 30 tablet 0    HYDROcodone-acetaminophen (NORCO)  MG per tablet Take 1 tablet by mouth daily for 30 days. 30 tablet 0    SYNTHROID 200 MCG tablet TAKE 1 TABLET DAILY 90 tablet 3    SYNTHROID 50 MCG tablet TAKE 1 TABLET DAILY 90 tablet 3    LORazepam (ATIVAN) 1 MG tablet Take 1 tablet by mouth every 8 hours as needed for Anxiety for up to 30 days.  90 tablet 2    FLUoxetine (PROZAC) 40 MG capsule TAKE 1 CAPSULE BY MOUTH EVERY DAY 90 capsule 1    pantoprazole (PROTONIX) 40 MG tablet TAKE 1 TABLET DAILY (NEED FOLLOW UP APPOINTMENT) 90 tablet 3    carvedilol (COREG) 12.5 MG tablet Take 1 tablet by mouth 2 times daily 180 tablet 3    buPROPion (WELLBUTRIN XL) 300 MG extended release tablet TAKE 1 TABLET BY MOUTH EVERY DAY IN THE MORNING 30 tablet 11    albuterol sulfate  (90 Base) MCG/ACT inhaler TAKE 2 PUFFS BY MOUTH EVERY 6 HOURS AS NEEDED FOR WHEEZE 6.7 Inhaler 0    Fexofenadine HCl (ALLEGRA ALLERGY PO) Take by mouth      diphenhydrAMINE (BENADRYL ALLERGY) 25 MG capsule Take 50 mg by mouth nightly       Multiple Vitamins-Minerals (THERAPEUTIC MULTIVITAMIN-MINERALS) tablet Take 1 tablet by mouth daily       No current facility-administered medications for this visit. No Known Allergies    Review of Systems   Constitutional: Positive for activity change, appetite change and fatigue. Negative for chills, diaphoresis and fever. HENT: Negative for congestion, ear pain, facial swelling, hearing loss, postnasal drip, sinus pressure, sore throat and trouble swallowing. Eyes: Negative for pain, discharge, redness and visual disturbance. Respiratory: Negative for cough, chest tightness, shortness of breath and wheezing. Cardiovascular: Negative for chest pain and palpitations. Gastrointestinal: Negative for abdominal pain, constipation and diarrhea. Genitourinary: Negative for difficulty urinating, dysuria, flank pain, frequency, hematuria and urgency. Musculoskeletal: Positive for gait problem. Negative for arthralgias, back pain, joint swelling and neck pain. Skin: Negative for color change and rash. Neurological: Positive for numbness and headaches. Negative for dizziness, syncope, weakness and light-headedness. Hematological: Negative for adenopathy. Does not bruise/bleed easily. Psychiatric/Behavioral: Positive for dysphoric mood. Negative for confusion and suicidal ideas. The patient is not nervous/anxious. OBJECTIVE:  /62   Pulse 73   Temp 98.1 °F (36.7 °C) (Temporal)   Resp 16   Ht 5' 2\" (1.575 m)   Wt 125 lb (56.7 kg)   LMP 07/25/2016 (Approximate)   SpO2 97%   BMI 22.86 kg/m²    Physical Exam  Vitals reviewed. Constitutional:       General: She is not in acute distress. Appearance: She is well-developed. She is not ill-appearing or toxic-appearing. HENT:      Head:      Comments: Chronic right sided facial paralysis  Eyes:      General:         Right eye: No discharge. Left eye: No discharge. Conjunctiva/sclera: Conjunctivae normal.      Pupils: Pupils are equal, round, and reactive to light.    Neck:      Thyroid: No thyromegaly. Trachea: No tracheal deviation. Cardiovascular:      Rate and Rhythm: Normal rate and regular rhythm. Heart sounds: No murmur heard. Pulmonary:      Effort: Pulmonary effort is normal. No respiratory distress. Breath sounds: Normal breath sounds. No wheezing or rales. Genitourinary:     Vagina: Normal.   Musculoskeletal:         General: No deformity. Normal range of motion. Cervical back: Neck supple. Lymphadenopathy:      Cervical: No cervical adenopathy. Skin:     General: Skin is warm and dry. Findings: No erythema or rash. Neurological:      General: No focal deficit present. Mental Status: She is alert and oriented to person, place, and time. Psychiatric:         Mood and Affect: Mood normal.         Behavior: Behavior normal.         Thought Content:  Thought content normal.         Judgment: Judgment normal.       Lab Results   Component Value Date    WBC 6.2 03/14/2022    HGB 12.7 03/14/2022    HCT 39.7 03/14/2022    .0 (H) 03/14/2022     03/14/2022     Lab Results   Component Value Date     03/14/2022    K 3.5 03/14/2022    CL 96 (L) 03/14/2022    CO2 24 03/14/2022    BUN 8 03/14/2022    CREATININE 0.9 03/14/2022    GLUCOSE 99 03/14/2022    CALCIUM 10.6 (H) 03/14/2022    PROT 8.2 03/14/2022    LABALBU 4.0 03/14/2022    BILITOT 1.1 03/14/2022    ALKPHOS 259 (H) 03/14/2022     (H) 03/14/2022    ALT 71 (H) 03/14/2022    LABGLOM >60 03/14/2022    GFRAA >59 03/14/2022    AGRATIO 0.9 03/25/2020    GLOB 4.3 (H) 03/25/2020       Lab Results   Component Value Date    TSH 3.640 03/14/2022     Lab Results   Component Value Date    CHOL 215 (H) 03/14/2022    CHOL 235 (H) 10/12/2020    CHOL 278 (H) 04/15/2019     Lab Results   Component Value Date    TRIG 187 (H) 03/14/2022    TRIG 174 (H) 10/12/2020    TRIG 327 (H) 04/15/2019     Lab Results   Component Value Date    HDL 44 (L) 03/14/2022    HDL 58 (L) 10/12/2020    HDL 76 04/15/2019 Lab Results   Component Value Date    LDLCALC 134 03/14/2022    [address] 142 10/12/2020    LDLCALC 137 04/15/2019     No results found for: LABVLDL, VLDL  No results found for: CHOLHDLRATIO  Lab Results   Component Value Date    LABA1C 5.1 03/14/2022     No results found for: EAG    ASSESSMENT/PLAN:  1. Acute gastroenteritis  Improving. HCANO  rx zofran prn.   - ondansetron (ZOFRAN-ODT) 4 MG disintegrating tablet; Take 1 tablet by mouth 3 times daily as needed for Nausea or Vomiting  Dispense: 30 tablet; Refill: 0    2. Hypercalcemia  Mild. May be volume related. Encouraged adequate water intake  Reduce extra calcium and D supplements. Will trend    3. Neuropathy  Worsening. Suspect somewhat alcohol related. Continue B complex vitamin  May be contributing to ataxia and falling  Follow up with neuro    4. Moderate episode of recurrent major depressive disorder ([facility] [address]  Uncontrolled  Same meds for now  appt with therapy- then regroup on her meds. 5. Anxiety  Stable. Same. 6. Chronic nonintractable headache, unspecified headache type  - MRI BRAIN W WO CONTRAST; Future    7. Hypothyroidism (acquired)  Stable. same    8. MENEZES (nonalcoholic steatohepatitis)  Stable. Avoid alcohol. Trend labs. 9. Primary hypertension  Controlled. Continue same    10. Alcohol use  Improved. Appraised efforts. 11. Ataxia  worsening  - MRI BRAIN W WO CONTRAST; Future    12. Acoustic neuroma [facility])  - MRI BRAIN W WO CONTRAST; Future          Return in about 3 months (around 6/30/2022).

## 2022-03-31 ENCOUNTER — OFFICE VISIT (OUTPATIENT)
Dept: PSYCHIATRY | Age: 52
End: 2022-03-31
Payer: COMMERCIAL

## 2022-03-31 DIAGNOSIS — F39 MOOD DISORDER (HCC): ICD-10-CM

## 2022-03-31 DIAGNOSIS — F41.1 GENERALIZED ANXIETY DISORDER WITH PANIC ATTACKS: Primary | ICD-10-CM

## 2022-03-31 DIAGNOSIS — F41.0 GENERALIZED ANXIETY DISORDER WITH PANIC ATTACKS: Primary | ICD-10-CM

## 2022-03-31 PROCEDURE — 90791 PSYCH DIAGNOSTIC EVALUATION: CPT

## 2022-03-31 ASSESSMENT — PATIENT HEALTH QUESTIONNAIRE - PHQ9
SUM OF ALL RESPONSES TO PHQ QUESTIONS 1-9: 21
5. POOR APPETITE OR OVEREATING: 3
8. MOVING OR SPEAKING SO SLOWLY THAT OTHER PEOPLE COULD HAVE NOTICED. OR THE OPPOSITE, BEING SO FIGETY OR RESTLESS THAT YOU HAVE BEEN MOVING AROUND A LOT MORE THAN USUAL: 0
4. FEELING TIRED OR HAVING LITTLE ENERGY: 3
SUM OF ALL RESPONSES TO PHQ9 QUESTIONS 1 & 2: 6
3. TROUBLE FALLING OR STAYING ASLEEP: 3
6. FEELING BAD ABOUT YOURSELF - OR THAT YOU ARE A FAILURE OR HAVE LET YOURSELF OR YOUR FAMILY DOWN: 3
DEPRESSION UNABLE TO ASSESS: FUNCTIONAL CAPACITY MOTIVATION LIMITS ACCURACY
SUM OF ALL RESPONSES TO PHQ QUESTIONS 1-9: 22
7. TROUBLE CONCENTRATING ON THINGS, SUCH AS READING THE NEWSPAPER OR WATCHING TELEVISION: 3
SUM OF ALL RESPONSES TO PHQ QUESTIONS 1-9: 22
9. THOUGHTS THAT YOU WOULD BE BETTER OFF DEAD, OR OF HURTING YOURSELF: 1
2. FEELING DOWN, DEPRESSED OR HOPELESS: 3
SUM OF ALL RESPONSES TO PHQ QUESTIONS 1-9: 22
10. IF YOU CHECKED OFF ANY PROBLEMS, HOW DIFFICULT HAVE THESE PROBLEMS MADE IT FOR YOU TO DO YOUR WORK, TAKE CARE OF THINGS AT HOME, OR GET ALONG WITH OTHER PEOPLE: 3
1. LITTLE INTEREST OR PLEASURE IN DOING THINGS: 3

## 2022-03-31 ASSESSMENT — ENCOUNTER SYMPTOMS
SINUS PRESSURE: 0
CONSTIPATION: 0
SORE THROAT: 0
BACK PAIN: 0
ABDOMINAL PAIN: 0
COLOR CHANGE: 0
EYE PAIN: 0
EYE REDNESS: 0
COUGH: 0
WHEEZING: 0
EYE DISCHARGE: 0
DIARRHEA: 0
CHEST TIGHTNESS: 0
TROUBLE SWALLOWING: 0
SHORTNESS OF BREATH: 0
FACIAL SWELLING: 0

## 2022-03-31 ASSESSMENT — COLUMBIA-SUICIDE SEVERITY RATING SCALE - C-SSRS
2. HAVE YOU ACTUALLY HAD ANY THOUGHTS OF KILLING YOURSELF?: NO
1. WITHIN THE PAST MONTH, HAVE YOU WISHED YOU WERE DEAD OR WISHED YOU COULD GO TO SLEEP AND NOT WAKE UP?: NO
6. HAVE YOU EVER DONE ANYTHING, STARTED TO DO ANYTHING, OR PREPARED TO DO ANYTHING TO END YOUR LIFE?: NO

## 2022-03-31 NOTE — PATIENT INSTRUCTIONS
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
## 2022-04-15 DIAGNOSIS — F41.9 ANXIETY: ICD-10-CM

## 2022-04-15 DIAGNOSIS — R51.9 CHRONIC NONINTRACTABLE HEADACHE, UNSPECIFIED HEADACHE TYPE: ICD-10-CM

## 2022-04-15 DIAGNOSIS — G89.29 CHRONIC NONINTRACTABLE HEADACHE, UNSPECIFIED HEADACHE TYPE: ICD-10-CM

## 2022-04-15 RX ORDER — LORAZEPAM 1 MG/1
1 TABLET ORAL EVERY 8 HOURS PRN
Qty: 90 TABLET | Refills: 2 | Status: SHIPPED | OUTPATIENT
Start: 2022-04-15 | End: 2022-06-23 | Stop reason: SDUPTHER

## 2022-04-15 RX ORDER — HYDROCODONE BITARTRATE AND ACETAMINOPHEN 10; 325 MG/1; MG/1
1 TABLET ORAL DAILY
Qty: 30 TABLET | Refills: 0 | Status: SHIPPED | OUTPATIENT
Start: 2022-04-22 | End: 2022-05-23 | Stop reason: SDUPTHER

## 2022-04-15 NOTE — TELEPHONE ENCOUNTER
Dolores Shannon called to request a refill on her medication. Last office visit : 3/30/2022   Next office visit : 7/1/2022     Last UDS:   Amphetamine Screen, Urine   Date Value Ref Range Status   11/23/2021 neg  Final     Barbiturate Screen, Urine   Date Value Ref Range Status   11/23/2021 neg  Final     Benzodiazepine Screen, Urine   Date Value Ref Range Status   11/23/2021 positve  Final     Buprenorphine Urine   Date Value Ref Range Status   11/23/2021 neg  Final     Cocaine Metabolite Screen, Urine   Date Value Ref Range Status   11/23/2021 neg  Final     Gabapentin Screen, Urine   Date Value Ref Range Status   11/23/2021 neg  Final     MDMA, Urine   Date Value Ref Range Status   11/23/2021 neg  Final     Methamphetamine, Urine   Date Value Ref Range Status   11/23/2021 neg  Final     Opiate Scrn, Ur   Date Value Ref Range Status   11/23/2021 neg  Final     Oxycodone Screen, Ur   Date Value Ref Range Status   11/23/2021 neg  Final     PCP Screen, Urine   Date Value Ref Range Status   11/23/2021 neg  Final     Propoxyphene Screen, Urine   Date Value Ref Range Status   11/23/2021 neg  Final     THC Screen, Urine   Date Value Ref Range Status   11/23/2021 neg  Final     Tricyclic Antidepressants, Urine   Date Value Ref Range Status   11/23/2021 neg  Final       Last [place]: 4/15/2022  Medication Contract: 3/31/2022   Last Fill: 3/13/2022; 3/23/2022    Requested Prescriptions     Pending Prescriptions Disp Refills    LORazepam (ATIVAN) 1 MG tablet 90 tablet 2     Sig: Take 1 tablet by mouth every 8 hours as needed for Anxiety for up to 30 days.  HYDROcodone-acetaminophen (NORCO)  MG per tablet 30 tablet 0     Sig: Take 1 tablet by mouth daily for 30 days. Please approve or refuse this medication.    Viviana Lambert MA

## 2022-04-21 ENCOUNTER — TELEPHONE (OUTPATIENT)
Dept: FAMILY MEDICINE CLINIC | Age: 52
End: 2022-04-21

## 2022-04-21 DIAGNOSIS — F33.1 MODERATE EPISODE OF RECURRENT MAJOR DEPRESSIVE DISORDER (HCC): Primary | ICD-10-CM

## 2022-04-21 NOTE — TELEPHONE ENCOUNTER
Patient called stating that she thinks she needs a change in her depression/anxiety medication. She states the bupropion does okay, but she thinks she needs something different, especially at night time. Please advise if approved or if she needs an appointment to discuss.

## 2022-04-21 NOTE — TELEPHONE ENCOUNTER
Has she ever taken abilify? I would recommend we add that to the wellbutrin vs stopping the medication and changing.

## 2022-04-22 RX ORDER — ARIPIPRAZOLE 5 MG/1
5 TABLET ORAL DAILY
Qty: 90 TABLET | Refills: 3 | Status: SHIPPED | OUTPATIENT
Start: 2022-04-22 | End: 2022-04-26 | Stop reason: SDUPTHER

## 2022-04-26 ENCOUNTER — TELEPHONE (OUTPATIENT)
Dept: FAMILY MEDICINE CLINIC | Age: 52
End: 2022-04-26

## 2022-04-26 DIAGNOSIS — F33.1 MODERATE EPISODE OF RECURRENT MAJOR DEPRESSIVE DISORDER (HCC): ICD-10-CM

## 2022-04-26 RX ORDER — ARIPIPRAZOLE 5 MG/1
5 TABLET ORAL DAILY
Qty: 90 TABLET | Refills: 3 | Status: SHIPPED | OUTPATIENT
Start: 2022-04-26

## 2022-04-26 NOTE — TELEPHONE ENCOUNTER
Irismichael Inman called to request a refill on her medication.       Last office visit : 3/30/2022   Next office visit : 7/1/2022     Requested Prescriptions     Signed Prescriptions Disp Refills    ARIPiprazole (ABILIFY) 5 MG tablet 90 tablet 3     Sig: Take 1 tablet by mouth daily     Authorizing Provider: Miryam Vega     Ordering User: Chidi Lui MA

## 2022-04-26 NOTE — TELEPHONE ENCOUNTER
----- Message from Alexeyevaristo Vahid sent at 4/26/2022  5:05 PM CDT -----  Subject: Message to Provider    QUESTIONS  Information for Provider? pt is wanting Abilify called in for her to   [address] [facility], [address] [facility]   [phone]  ---------------------------------------------------------------------------  --------------  Sarmad EVANS  What is the best way for the office to contact you? OK to leave message on   voicemail  Preferred Call Back Phone Number? [phone]  ---------------------------------------------------------------------------  --------------  SCRIPT ANSWERS  Relationship to Patient?  Self

## 2022-04-27 ENCOUNTER — TELEPHONE (OUTPATIENT)
Dept: PSYCHIATRY | Age: 52
End: 2022-04-27

## 2022-04-27 NOTE — TELEPHONE ENCOUNTER
Called pt for appointment reminder.     -left voicemail, requesting a return call      Electronically signed by Chai Davila MA on 4/27/2022 at 10:58 AM

## 2022-04-28 ENCOUNTER — OFFICE VISIT (OUTPATIENT)
Dept: PSYCHIATRY | Age: 52
End: 2022-04-28
Payer: COMMERCIAL

## 2022-04-28 DIAGNOSIS — F41.1 GENERALIZED ANXIETY DISORDER WITH PANIC ATTACKS: Primary | ICD-10-CM

## 2022-04-28 DIAGNOSIS — F39 MOOD DISORDER (HCC): ICD-10-CM

## 2022-04-28 DIAGNOSIS — F41.0 GENERALIZED ANXIETY DISORDER WITH PANIC ATTACKS: Primary | ICD-10-CM

## 2022-04-28 PROCEDURE — 90834 PSYTX W PT 45 MINUTES: CPT

## 2022-04-28 ASSESSMENT — ANXIETY QUESTIONNAIRES
2. NOT BEING ABLE TO STOP OR CONTROL WORRYING: 3
5. BEING SO RESTLESS THAT IT IS HARD TO SIT STILL: 0
GAD7 TOTAL SCORE: 11
4. TROUBLE RELAXING: 0
1. FEELING NERVOUS, ANXIOUS, OR ON EDGE: 2
IF YOU CHECKED OFF ANY PROBLEMS ON THIS QUESTIONNAIRE, HOW DIFFICULT HAVE THESE PROBLEMS MADE IT FOR YOU TO DO YOUR WORK, TAKE CARE OF THINGS AT HOME, OR GET ALONG WITH OTHER PEOPLE: VERY DIFFICULT
6. BECOMING EASILY ANNOYED OR IRRITABLE: 0
3. WORRYING TOO MUCH ABOUT DIFFERENT THINGS: 3
7. FEELING AFRAID AS IF SOMETHING AWFUL MIGHT HAPPEN: 3

## 2022-04-28 ASSESSMENT — PATIENT HEALTH QUESTIONNAIRE - PHQ9
SUM OF ALL RESPONSES TO PHQ QUESTIONS 1-9: 19
4. FEELING TIRED OR HAVING LITTLE ENERGY: 3
7. TROUBLE CONCENTRATING ON THINGS, SUCH AS READING THE NEWSPAPER OR WATCHING TELEVISION: 3
8. MOVING OR SPEAKING SO SLOWLY THAT OTHER PEOPLE COULD HAVE NOTICED. OR THE OPPOSITE, BEING SO FIGETY OR RESTLESS THAT YOU HAVE BEEN MOVING AROUND A LOT MORE THAN USUAL: 0
2. FEELING DOWN, DEPRESSED OR HOPELESS: 3
9. THOUGHTS THAT YOU WOULD BE BETTER OFF DEAD, OR OF HURTING YOURSELF: 0
SUM OF ALL RESPONSES TO PHQ9 QUESTIONS 1 & 2: 6
5. POOR APPETITE OR OVEREATING: 3
6. FEELING BAD ABOUT YOURSELF - OR THAT YOU ARE A FAILURE OR HAVE LET YOURSELF OR YOUR FAMILY DOWN: 3
10. IF YOU CHECKED OFF ANY PROBLEMS, HOW DIFFICULT HAVE THESE PROBLEMS MADE IT FOR YOU TO DO YOUR WORK, TAKE CARE OF THINGS AT HOME, OR GET ALONG WITH OTHER PEOPLE: 3
SUM OF ALL RESPONSES TO PHQ QUESTIONS 1-9: 19
3. TROUBLE FALLING OR STAYING ASLEEP: 1
1. LITTLE INTEREST OR PLEASURE IN DOING THINGS: 3

## 2022-04-28 NOTE — PROGRESS NOTES
Therapy Progress Note  Eugenie Ellis M.S., [facility]  4/28/2022  10:08 AM CDT   10:50 AM    Patient location  : Rekha URRUTIA location : [address] [place] [address]      Time spent with Patient: 42 minutes  This is patient's second  Therapy appointment. Reason for Consult:  depression, anxiety and stress  Referring Provider: No referring provider defined for this encounter. Lora Joseph ,a 49 y.o. female, for follow up visit. Pt provided informed consent for the behavioral health program. Discussed with patient model of service to include the limits of confidentiality (i.e. abuse reporting, suicide intervention, etc.) and short-term intervention focused approach. Discussed no show and late cancellation policy. Pt indicated understanding. S:  Pt reports feeling ok today but tired. Pt reports she normally just watches tv or sleeps to distract her mind. Pt reports getting overwhelmed easily. Pt reports she feels like her brain is scattered. Pt reports drinking a pint of alcohol everyday. Pt reports she starts drinking when her mind will not shut off. Pt reports she and her  went to a [employer] and she surprisingly had a good time. Pt reports normally, her  stays in his room and she stays in hers. Pt reports her son is having ongoing way issues right now and has been very disrespectful to her. Pt calm, cooperative, denies SI, HI,AHV at time time. Today, therapist completed al initial assessments, see below. Also therapist and pt discussed handout of relaxation method. Pt provided handout for if/when she needed to use these relaxation techniques for depression or anxiety. Pt voiced understanding and agreement. Pt reports she has been working on the breathing techniques from last session and it seems to have helped some. Therapist and pt also discussed the importance of mindfulness and staying in the here and now.  Therapist provided reflective listening/validation, Male   Other Topics Concern    Not on file   Social History Narrative    Not on file     Social Determinants of Health     Financial Resource Strain:     Difficulty of Paying Living Expenses: Not on file   Food Insecurity:     Worried About [address] in the Last Year: Not on file    [place] of Food in the Last Year: Not on file   Transportation Needs:     Lack of Transportation (Medical): Not on file    Lack of Transportation (Non-Medical): Not on file   Physical Activity:     Days of Exercise per Week: Not on file    Minutes of Exercise per Session: Not on file   Stress:     Feeling of Stress : Not on file   Social Connections:     Frequency of Communication with Friends and Family: Not on file    Frequency of Social Gatherings with Friends and Family: Not on file    Attends Zoroastrian Services: Not on file    Active Member of [address] [place] [address] or Organizations: Not on file    Attends Club or Organization Meetings: Not on file    Marital Status: Not on file   Intimate Partner Violence:     Fear of Current or Ex-Partner: Not on file    Emotionally Abused: Not on file    Physically Abused: Not on file    Sexually Abused: Not on file   Housing Stability:     Unable to Pay for Housing in the Last Year: Not on file    Number of [place] in the Last Year: Not on file    Unstable Housing in the Last Year: Not on file       Medications:   Current Outpatient Medications   Medication Sig Dispense Refill    ARIPiprazole (ABILIFY) 5 MG tablet Take 1 tablet by mouth daily 90 tablet 3    LORazepam (ATIVAN) 1 MG tablet Take 1 tablet by mouth every 8 hours as needed for Anxiety for up to 30 days. 90 tablet 2    HYDROcodone-acetaminophen (NORCO)  MG per tablet Take 1 tablet by mouth daily for 30 days.  30 tablet 0    ondansetron (ZOFRAN-ODT) 4 MG disintegrating tablet Take 1 tablet by mouth 3 times daily as needed for Nausea or Vomiting 30 tablet 0    SYNTHROID 200 MCG tablet TAKE 1 TABLET DAILY 90 tablet 3    SYNTHROID 50 MCG tablet TAKE 1 TABLET DAILY 90 tablet 3    FLUoxetine (PROZAC) 40 MG capsule TAKE 1 CAPSULE BY MOUTH EVERY DAY 90 capsule 1    pantoprazole (PROTONIX) 40 MG tablet TAKE 1 TABLET DAILY (NEED FOLLOW UP APPOINTMENT) 90 tablet 3    carvedilol (COREG) 12.5 MG tablet Take 1 tablet by mouth 2 times daily 180 tablet 3    buPROPion (WELLBUTRIN XL) 300 MG extended release tablet TAKE 1 TABLET BY MOUTH EVERY DAY IN THE MORNING 30 tablet 11    albuterol sulfate  (90 Base) MCG/ACT inhaler TAKE 2 PUFFS BY MOUTH EVERY 6 HOURS AS NEEDED FOR WHEEZE 6.7 Inhaler 0    Fexofenadine HCl (ALLEGRA ALLERGY PO) Take by mouth      diphenhydrAMINE (BENADRYL ALLERGY) 25 MG capsule Take 50 mg by mouth nightly       Multiple Vitamins-Minerals (THERAPEUTIC MULTIVITAMIN-MINERALS) tablet Take 1 tablet by mouth daily       No current facility-administered medications for this visit. Social History:   Social History     Socioeconomic History    Marital status:      Spouse name: Not on file    Number of children: Not on file    Years of education: Not on file    Highest education level: Not on file   Occupational History    Not on file   Tobacco Use    Smoking status: Never Smoker    Smokeless tobacco: Never Used   Vaping Use    Vaping Use: Never used   Substance and Sexual Activity    Alcohol use: Yes     Alcohol/week: 3.0 standard drinks     Types: 3 Shots of liquor per week     Comment: weekly    Drug use: No    Sexual activity: Yes     Partners: Male   Other Topics Concern    Not on file   Social History Narrative    Not on file     Social Determinants of Health     Financial Resource Strain:     Difficulty of Paying Living Expenses: Not on file   Food Insecurity:     Worried About Running Out of Food in the Last Year: Not on file    [place] of Food in the Last Year: Not on file   Transportation Needs:     Lack of Transportation (Medical):  Not on file    Lack of Transportation (Non-Medical): Not on file   Physical Activity:     Days of Exercise per Week: Not on file    Minutes of Exercise per Session: Not on file   Stress:     Feeling of Stress : Not on file   Social Connections:     Frequency of Communication with Friends and Family: Not on file    Frequency of Social Gatherings with Friends and Family: Not on file    Attends Protestant Services: Not on file    Active Member of [address] [place] [address] [employer] or Organizations: Not on file    Attends Club or Organization Meetings: Not on file    Marital Status: Not on file   Intimate Partner Violence:     Fear of Current or Ex-Partner: Not on file    Emotionally Abused: Not on file    Physically Abused: Not on file    Sexually Abused: Not on file   Housing Stability:     Unable to Pay for Housing in the Last Year: Not on file    Number of [place] in the Last Year: Not on file    Unstable Housing in the Last Year: Not on file       TOBACCO:   reports that she has never smoked. She has never used smokeless tobacco.  ETOH:   reports current alcohol use of about 3.0 standard drinks of alcohol per week. Family History:   Family History   Problem Relation Age of Onset    Heart Disease Mother     Kidney Disease Mother     Heart Disease Father     COPD Father     Kidney Disease Father     Lung Cancer Brother        Diagnosis:    Mood disorder  Generalized anxiety disordr with panic attacks      Diagnosis Date    Acoustic neuroma ([facility] [address]     ADD (attention deficit disorder)     Anxiety     Depression     Headache     Hypertension     Hypothyroidism (acquired)     MENEZES (nonalcoholic steatohepatitis)      Problems with primary support group and Housing problems    Plan:  1. Continue medication management  2. CBT to target cognitive distortions  3. Discuss therapeutic goals    A. Able to show and/or verbalize a decrease in signs/symptoms of depression.               Germaine Self to show and/or verbalize a decrease in sign/symptoms of anxiety. Cheryl Flores to show and/or verbalize an increase in self-esteem and feelings of self-worth.               D. Develop and begin to utilize healthy coping skills/strategies to target happiness, self care, and grief    Pt interventions:  Trained in relaxation strategies, Trained in improving communication skills, Provided education, Discussed self-care (sleep, nutrition, rewarding activities, social support, exercise), Established rapport and Supportive techniques      Gertrudis Mackey MS, [facility]

## 2022-04-28 NOTE — PATIENT INSTRUCTIONS
Deep Breathing    © 2017 [employer] [facility]  Provided by [email or web address]    Deep Breathing: a relaxation technique performed by purposefully taking slow, deep breaths. When practiced regularly, deep breathing provides both immediate and long-term relief from stress and anxiety. How Deep Breathing Works    During periods of anxiety, the body triggers a set of symptoms called the stress response. Breathing becomes shallow and rapid, heart rate increases, and muscles become tense. In opposition to the stress response is the relaxation response. Breathing becomes deeper and slower, and the symptoms of anxiety fade away. Deep breathing triggers this response. Instructions    Sit back or lie down in a comfortable position. Close your eyes, if you would like to do so. When you're learning, try placing a hand on your stomach. If you breathe deeply enough, you should notice it rising and falling with each inhalation and exhalation. 1 Inhale. Breathe in slowly through your nose for 4 seconds. 2 Pause. Hold the air in your lungs for 4 seconds. 3 Exhale. Breathe out slowly through your mouth for 6 seconds. Tip: Pucker your lips, as if you are blowing through a straw, to slow your exhalation. 4 Repeat. Practice for at least 2 minutes, but preferably 5 to 10 minutes. Tips   If it isn't working, slow down! The most common mistake is breathing too fast. Time each    step in your head, counting slowly as you do so.  Counting out your breaths serves a second purpose. It takes your mind off the source of your    anxiety. Whenever you catch your mind wandering, simply return your focus to counting.  The times we use for each step are suggestions, and can be lengthened or decreased. Lengthen the time if it feels natural to do so, or decrease the time if you feel discomfort. b

## 2022-05-11 DIAGNOSIS — F41.9 ANXIETY: ICD-10-CM

## 2022-05-11 RX ORDER — LORAZEPAM 1 MG/1
1 TABLET ORAL EVERY 8 HOURS PRN
Qty: 90 TABLET | Refills: 2 | OUTPATIENT
Start: 2022-05-11 | End: 2022-06-10

## 2022-05-11 RX ORDER — CIPROFLOXACIN HYDROCHLORIDE 3.5 MG/ML
1 SOLUTION/ DROPS TOPICAL 4 TIMES DAILY
Qty: 10 ML | Refills: 0 | Status: SHIPPED | OUTPATIENT
Start: 2022-05-11 | End: 2022-05-21

## 2022-05-12 ENCOUNTER — TELEPHONE (OUTPATIENT)
Dept: PSYCHIATRY | Age: 52
End: 2022-05-12

## 2022-05-12 NOTE — TELEPHONE ENCOUNTER
Called and confirmed appt with pt  for [email or web address]    Electronically signed by Ifeoma Duval on 5/12/2022 at 2:12 PM

## 2022-05-13 ENCOUNTER — TELEPHONE (OUTPATIENT)
Dept: PSYCHIATRY | Age: 52
End: 2022-05-13

## 2022-05-13 NOTE — TELEPHONE ENCOUNTER
Pt called to reschedule the appt that she missed today with Rafita Velarde because he son didn't get to her house in time to take over her responsibilities in time. Rescheduled for 6/16 @ 9.       Electronically signed by Nathalie Cash MA on 5/13/2022 at 11:49 AM

## 2022-05-18 DIAGNOSIS — F41.9 ANXIETY: ICD-10-CM

## 2022-05-18 DIAGNOSIS — F32.9 REACTIVE DEPRESSION: ICD-10-CM

## 2022-05-18 RX ORDER — BUPROPION HYDROCHLORIDE 300 MG/1
TABLET ORAL
Qty: 30 TABLET | Refills: 11 | Status: SHIPPED | OUTPATIENT
Start: 2022-05-18

## 2022-05-19 ENCOUNTER — TELEPHONE (OUTPATIENT)
Dept: FAMILY MEDICINE CLINIC | Age: 52
End: 2022-05-19

## 2022-05-23 DIAGNOSIS — R51.9 CHRONIC NONINTRACTABLE HEADACHE, UNSPECIFIED HEADACHE TYPE: ICD-10-CM

## 2022-05-23 DIAGNOSIS — G89.29 CHRONIC NONINTRACTABLE HEADACHE, UNSPECIFIED HEADACHE TYPE: ICD-10-CM

## 2022-05-23 NOTE — TELEPHONE ENCOUNTER
Markie Hansen called to request a refill on her medication. Last office visit : 3/30/2022   Next office visit : 7/1/2022     Last UDS:   Amphetamine Screen, Urine   Date Value Ref Range Status   11/23/2021 neg  Final     Barbiturate Screen, Urine   Date Value Ref Range Status   11/23/2021 neg  Final     Benzodiazepine Screen, Urine   Date Value Ref Range Status   11/23/2021 positve  Final     Buprenorphine Urine   Date Value Ref Range Status   11/23/2021 neg  Final     Cocaine Metabolite Screen, Urine   Date Value Ref Range Status   11/23/2021 neg  Final     Gabapentin Screen, Urine   Date Value Ref Range Status   11/23/2021 neg  Final     MDMA, Urine   Date Value Ref Range Status   11/23/2021 neg  Final     Methamphetamine, Urine   Date Value Ref Range Status   11/23/2021 neg  Final     Opiate Scrn, Ur   Date Value Ref Range Status   11/23/2021 neg  Final     Oxycodone Screen, Ur   Date Value Ref Range Status   11/23/2021 neg  Final     PCP Screen, Urine   Date Value Ref Range Status   11/23/2021 neg  Final     Propoxyphene Screen, Urine   Date Value Ref Range Status   11/23/2021 neg  Final     THC Screen, Urine   Date Value Ref Range Status   11/23/2021 neg  Final     Tricyclic Antidepressants, Urine   Date Value Ref Range Status   11/23/2021 neg  Final       Last Leodis Mealy: 4/15/22  Medication Contract: 11/23/21   Last Fill: 4/15/22    Requested Prescriptions     Pending Prescriptions Disp Refills    HYDROcodone-acetaminophen (NORCO)  MG per tablet 30 tablet 0     Sig: Take 1 tablet by mouth daily for 30 days. Please approve or refuse this medication.    Micha Mills MA

## 2022-05-24 RX ORDER — HYDROCODONE BITARTRATE AND ACETAMINOPHEN 10; 325 MG/1; MG/1
1 TABLET ORAL DAILY
Qty: 30 TABLET | Refills: 0 | OUTPATIENT
Start: 2022-05-24 | End: 2022-06-23

## 2022-05-24 RX ORDER — HYDROCODONE BITARTRATE AND ACETAMINOPHEN 10; 325 MG/1; MG/1
1 TABLET ORAL DAILY
Qty: 30 TABLET | Refills: 0 | Status: SHIPPED | OUTPATIENT
Start: 2022-05-24 | End: 2022-06-23 | Stop reason: SDUPTHER

## 2022-06-23 DIAGNOSIS — R51.9 CHRONIC NONINTRACTABLE HEADACHE, UNSPECIFIED HEADACHE TYPE: ICD-10-CM

## 2022-06-23 DIAGNOSIS — G89.29 CHRONIC NONINTRACTABLE HEADACHE, UNSPECIFIED HEADACHE TYPE: ICD-10-CM

## 2022-06-23 DIAGNOSIS — F41.9 ANXIETY: ICD-10-CM

## 2022-06-24 DIAGNOSIS — F41.9 ANXIETY: ICD-10-CM

## 2022-06-24 DIAGNOSIS — G89.29 CHRONIC NONINTRACTABLE HEADACHE, UNSPECIFIED HEADACHE TYPE: ICD-10-CM

## 2022-06-24 DIAGNOSIS — R51.9 CHRONIC NONINTRACTABLE HEADACHE, UNSPECIFIED HEADACHE TYPE: ICD-10-CM

## 2022-06-24 RX ORDER — HYDROCODONE BITARTRATE AND ACETAMINOPHEN 10; 325 MG/1; MG/1
1 TABLET ORAL DAILY
Qty: 30 TABLET | Refills: 0 | Status: SHIPPED | OUTPATIENT
Start: 2022-06-24 | End: 2022-07-26 | Stop reason: SDUPTHER

## 2022-06-24 RX ORDER — HYDROCODONE BITARTRATE AND ACETAMINOPHEN 10; 325 MG/1; MG/1
1 TABLET ORAL DAILY
Qty: 30 TABLET | Refills: 0 | Status: SHIPPED | OUTPATIENT
Start: 2022-06-24 | End: 2022-06-24 | Stop reason: SDUPTHER

## 2022-06-24 RX ORDER — LORAZEPAM 1 MG/1
1 TABLET ORAL EVERY 8 HOURS PRN
Qty: 90 TABLET | Refills: 2 | Status: SHIPPED | OUTPATIENT
Start: 2022-06-24 | End: 2022-06-24 | Stop reason: SDUPTHER

## 2022-06-24 RX ORDER — LORAZEPAM 1 MG/1
1 TABLET ORAL EVERY 8 HOURS PRN
Qty: 90 TABLET | Refills: 2 | Status: SHIPPED | OUTPATIENT
Start: 2022-06-24 | End: 2022-07-24

## 2022-07-07 ENCOUNTER — TELEMEDICINE (OUTPATIENT)
Dept: FAMILY MEDICINE CLINIC | Age: 52
End: 2022-07-07
Payer: COMMERCIAL

## 2022-07-07 ENCOUNTER — TELEPHONE (OUTPATIENT)
Dept: PSYCHIATRY | Age: 52
End: 2022-07-07

## 2022-07-07 DIAGNOSIS — B96.89 ACUTE BACTERIAL SINUSITIS: Primary | ICD-10-CM

## 2022-07-07 DIAGNOSIS — J01.90 ACUTE BACTERIAL SINUSITIS: Primary | ICD-10-CM

## 2022-07-07 PROCEDURE — 99213 OFFICE O/P EST LOW 20 MIN: CPT | Performed by: NURSE PRACTITIONER

## 2022-07-07 RX ORDER — PREDNISONE 10 MG/1
10 TABLET ORAL DAILY
Qty: 7 TABLET | Refills: 0 | Status: SHIPPED | OUTPATIENT
Start: 2022-07-07 | End: 2022-07-14

## 2022-07-07 RX ORDER — AMOXICILLIN AND CLAVULANATE POTASSIUM 875; 125 MG/1; MG/1
1 TABLET, FILM COATED ORAL 2 TIMES DAILY
Qty: 20 TABLET | Refills: 0 | Status: SHIPPED | OUTPATIENT
Start: 2022-07-07 | End: 2022-07-17

## 2022-07-07 RX ORDER — FLUTICASONE PROPIONATE 50 MCG
2 SPRAY, SUSPENSION (ML) NASAL DAILY
Qty: 16 G | Refills: 3 | Status: SHIPPED | OUTPATIENT
Start: 2022-07-07

## 2022-07-07 SDOH — ECONOMIC STABILITY: FOOD INSECURITY: WITHIN THE PAST 12 MONTHS, YOU WORRIED THAT YOUR FOOD WOULD RUN OUT BEFORE YOU GOT MONEY TO BUY MORE.: NEVER TRUE

## 2022-07-07 SDOH — ECONOMIC STABILITY: FOOD INSECURITY: WITHIN THE PAST 12 MONTHS, THE FOOD YOU BOUGHT JUST DIDN'T LAST AND YOU DIDN'T HAVE MONEY TO GET MORE.: NEVER TRUE

## 2022-07-07 ASSESSMENT — ENCOUNTER SYMPTOMS
SORE THROAT: 1
GASTROINTESTINAL NEGATIVE: 1
RESPIRATORY NEGATIVE: 1
EYES NEGATIVE: 1
SINUS PRESSURE: 1

## 2022-07-07 ASSESSMENT — SOCIAL DETERMINANTS OF HEALTH (SDOH): HOW HARD IS IT FOR YOU TO PAY FOR THE VERY BASICS LIKE FOOD, HOUSING, MEDICAL CARE, AND HEATING?: NOT HARD AT ALL

## 2022-07-07 NOTE — TELEPHONE ENCOUNTER
Called pt for appointment reminder.     -left voicemail, requesting a return call      Electronically signed by Vitor Escudero MA on 7/7/2022 at 11:09 AM

## 2022-07-07 NOTE — PROGRESS NOTES
[address] [address]     Phone:  [phone]  Fax:  [phone]      2022    TELEHEALTH EVALUATION -- Audio/Visual (During [facility] public health emergency)    HPI:    Chief Complaint   Patient presents with    Pharyngitis     Sick x2 weeks    Congestion    Otalgia    Fatigue     stopped Abilify         Rajan Rae (:  1970) has requested an audio/video evaluation for the following concern(s): This is a VV for patient having complaints of sinus congestion, sore throat and ear pain. She reports that symptoms have been ongoing for greater than 2 weeks. She denies high fever. She denies any chills. She reports Abilify giving her brain fog so she stopped this medication. Review of Systems   Constitutional: Positive for fatigue. HENT: Positive for congestion, sinus pressure and sore throat. Eyes: Negative. Respiratory: Negative. Cardiovascular: Negative. Gastrointestinal: Negative. Endocrine: Negative. Genitourinary: Negative. Musculoskeletal: Negative. Skin: Negative. Neurological: Positive for headaches. Hematological: Negative. Psychiatric/Behavioral: Negative. Prior to Visit Medications    Medication Sig Taking? Authorizing Provider   amoxicillin-clavulanate (AUGMENTIN) 875-125 MG per tablet Take 1 tablet by mouth 2 times daily for 10 days Yes JULI Christensen   predniSONE (DELTASONE) 10 MG tablet Take 1 tablet by mouth daily for 7 days Yes JULI Christensen   fluticasone (FLONASE) 50 MCG/ACT nasal spray 2 sprays by Nasal route daily Yes JULI Christensen   HYDROcodone-acetaminophen (NORCO)  MG per tablet Take 1 tablet by mouth daily for 30 days. Yes JULI Tohmas   LORazepam (ATIVAN) 1 MG tablet Take 1 tablet by mouth every 8 hours as needed for Anxiety for up to 30 days.  Yes JULI Thomas   buPROPion (WELLBUTRIN XL) 300 MG extended release tablet TAKE 1 TABLET BY MOUTH EVERY VENTRICULOPERITONEAL SHUNT     ,   Family History   Problem Relation Age of Onset    Heart Disease Mother     Kidney Disease Mother     Heart Disease Father     COPD Father     Kidney Disease Father     Lung Cancer Brother        PHYSICAL EXAMINATION:  [ INSTRUCTIONS:  \"[x]\" Indicates a positive item  \"[]\" Indicates a negative item  -- DELETE ALL ITEMS NOT EXAMINED]  Vital Signs: (As obtained by patient/caregiver at home)        Constitutional: [x] Appears well-developed and well-nourished [x] No apparent distress      [] Abnormal   Mental status  [x] Alert and awake  [x] Oriented to person/place/time [x]Able to follow commands        Eyes:  EOM    [x]  Normal  [] Abnormal-  Sclera  [x]  Normal  [] Abnormal -         Discharge []  None visible  [] Abnormal -    HENT:   [x] Normocephalic, atraumatic. [] Abnormal   [x] Mouth/Throat: Mucous membranes are moist.     External Ears [x] Normal  [] Abnormal-    Neck: [x] No visualized mass     Pulmonary/Chest: [x] Respiratory effort normal.  [x] No visualized signs of difficulty breathing or respiratory distress        [] Abnormal      Musculoskeletal:   [x] Normal gait with no signs of ataxia         [x] Normal range of motion of neck        [] Abnormal       Neurological:        [x] No Facial Asymmetry (Cranial nerve 7 motor function) (limited exam to video visit)          [] No gaze palsy        [] Abnormal         Skin:        [x] No significant exanthematous lesions or discoloration noted on facial skin         [] Abnormal            Psychiatric:       [x] Normal Affect [] Abnormal        [] No Hallucinations    Other pertinent observable physical exam findings:-    Due to this being a TeleHealth encounter, evaluation of the following organ systems is limited: Vitals/Constitutional/EENT/Resp/CV/GI//MS/Neuro/Skin/Heme-Lymph-Imm. ASSESSMENT/PLAN:  1. Acute bacterial sinusitis    - amoxicillin-clavulanate (AUGMENTIN) 875-125 MG per tablet;  Take 1 tablet by mouth 2 times daily for 10 days  Dispense: 20 tablet; Refill: 0  - predniSONE (DELTASONE) 10 MG tablet; Take 1 tablet by mouth daily for 7 days  Dispense: 7 tablet; Refill: 0  - fluticasone (FLONASE) 50 MCG/ACT nasal spray; 2 sprays by Nasal route daily  Dispense: 16 g; Refill: 3      Return if symptoms worsen or fail to improve. An  electronic signature was used to authenticate this note. --JULI Naranjo on 7/7/2022 at 10:47 AM        Pursuant to the emergency declaration under the [facility], [facility] waiver authority and the [employer] Act, this Virtual  Visit was conducted, with patient's consent, to reduce the patient's risk of exposure to COVID-19 and provide continuity of care for an established patient. Services were provided through a video synchronous discussion virtually to substitute for in-person clinic visit.

## 2022-07-08 ENCOUNTER — TELEPHONE (OUTPATIENT)
Dept: PSYCHIATRY | Age: 52
End: 2022-07-08

## 2022-07-08 NOTE — TELEPHONE ENCOUNTER
Called pt was a no show.  Called to check on them and    -Pt reported they no longer needed services    Pt got her dismissal letter and that's why she didn't come to her appt today 7/8 and pt doesn't want to reschedule since the lat day that we can provide care for her until 7/21 and pt stated that she didn't want to reschedule    Electronically signed by Rita Li MA on 7/8/2022 at 11:26 AM

## 2022-07-16 ENCOUNTER — APPOINTMENT (OUTPATIENT)
Dept: CT IMAGING | Facility: HOSPITAL | Age: 52
End: 2022-07-16

## 2022-07-16 ENCOUNTER — APPOINTMENT (OUTPATIENT)
Dept: GENERAL RADIOLOGY | Facility: HOSPITAL | Age: 52
End: 2022-07-16

## 2022-07-16 ENCOUNTER — HOSPITAL ENCOUNTER (OUTPATIENT)
Facility: HOSPITAL | Age: 52
Setting detail: OBSERVATION
Discharge: HOME | End: 2022-07-19
Attending: SPECIALIST | Admitting: SPECIALIST

## 2022-07-16 DIAGNOSIS — S36.892A CONTUSION OF MESENTERY, INITIAL ENCOUNTER: Primary | ICD-10-CM

## 2022-07-16 DIAGNOSIS — S22.43XA CLOSED FRACTURE OF MULTIPLE RIBS OF BOTH SIDES, INITIAL ENCOUNTER: ICD-10-CM

## 2022-07-16 DIAGNOSIS — S20.01XA TRAUMATIC HEMATOMA OF FEMALE BREAST, RIGHT, INITIAL ENCOUNTER: ICD-10-CM
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
## 2022-07-16 LAB
ALBUMIN SERPL-MCNC: 4 G/DL (ref 3.5–5.2)
ALBUMIN/GLOB SERPL: 0.9 G/DL
ALP SERPL-CCNC: 246 U/L (ref 39–117)
ALT SERPL W P-5'-P-CCNC: 28 U/L (ref 1–33)
ANION GAP SERPL CALCULATED.3IONS-SCNC: 11 MMOL/L (ref 5–15)
APTT PPP: 33.7 SECONDS (ref 24.1–35)
AST SERPL-CCNC: 62 U/L (ref 1–32)
BACTERIA UR QL AUTO: ABNORMAL /HPF
BASOPHILS # BLD AUTO: 0.11 10*3/MM3 (ref 0–0.2)
BASOPHILS NFR BLD AUTO: 0.7 % (ref 0–1.5)
BILIRUB SERPL-MCNC: 1.8 MG/DL (ref 0–1.2)
BILIRUB UR QL STRIP: NEGATIVE
BUN SERPL-MCNC: 11 MG/DL (ref 6–20)
BUN/CREAT SERPL: 18 (ref 7–25)
CALCIUM SPEC-SCNC: 9.2 MG/DL (ref 8.6–10.5)
CHLORIDE SERPL-SCNC: 98 MMOL/L (ref 98–107)
CLARITY UR: CLEAR
CO2 SERPL-SCNC: 25 MMOL/L (ref 22–29)
COLOR UR: YELLOW
CREAT SERPL-MCNC: 0.61 MG/DL (ref 0.57–1)
DEPRECATED RDW RBC AUTO: 54.6 FL (ref 37–54)
EGFRCR SERPLBLD CKD-EPI 2021: 108.4 ML/MIN/1.73
EOSINOPHIL # BLD AUTO: 0.14 10*3/MM3 (ref 0–0.4)
EOSINOPHIL NFR BLD AUTO: 0.9 % (ref 0.3–6.2)
ERYTHROCYTE [DISTWIDTH] IN BLOOD BY AUTOMATED COUNT: 14.6 % (ref 12.3–15.4)
GLOBULIN UR ELPH-MCNC: 4.3 GM/DL
GLUCOSE SERPL-MCNC: 153 MG/DL (ref 65–99)
GLUCOSE UR STRIP-MCNC: NEGATIVE MG/DL
HCT VFR BLD AUTO: 34.9 % (ref 34–46.6)
HGB BLD-MCNC: 11.4 G/DL (ref 12–15.9)
HGB UR QL STRIP.AUTO: NEGATIVE
HOLD SPECIMEN: NORMAL
HOLD SPECIMEN: NORMAL
HYALINE CASTS UR QL AUTO: ABNORMAL /LPF
IMM GRANULOCYTES # BLD AUTO: 0.15 10*3/MM3 (ref 0–0.05)
IMM GRANULOCYTES NFR BLD AUTO: 1 % (ref 0–0.5)
INR PPP: 1.26 (ref 0.91–1.09)
KETONES UR QL STRIP: NEGATIVE
LEUKOCYTE ESTERASE UR QL STRIP.AUTO: ABNORMAL
LIPASE SERPL-CCNC: 59 U/L (ref 13–60)
LYMPHOCYTES # BLD AUTO: 1.85 10*3/MM3 (ref 0.7–3.1)
LYMPHOCYTES NFR BLD AUTO: 12 % (ref 19.6–45.3)
MCH RBC QN AUTO: 33.3 PG (ref 26.6–33)
MCHC RBC AUTO-ENTMCNC: 32.7 G/DL (ref 31.5–35.7)
MCV RBC AUTO: 102 FL (ref 79–97)
MONOCYTES # BLD AUTO: 1.28 10*3/MM3 (ref 0.1–0.9)
MONOCYTES NFR BLD AUTO: 8.3 % (ref 5–12)
NEUTROPHILS NFR BLD AUTO: 11.89 10*3/MM3 (ref 1.7–7)
NEUTROPHILS NFR BLD AUTO: 77.1 % (ref 42.7–76)
NITRITE UR QL STRIP: NEGATIVE
NRBC BLD AUTO-RTO: 0 /100 WBC (ref 0–0.2)
PH UR STRIP.AUTO: 6.5 [PH] (ref 5–8)
PLATELET # BLD AUTO: 204 10*3/MM3 (ref 140–450)
PMV BLD AUTO: 10.7 FL (ref 6–12)
POTASSIUM SERPL-SCNC: 4 MMOL/L (ref 3.5–5.2)
PROT SERPL-MCNC: 8.3 G/DL (ref 6–8.5)
PROT UR QL STRIP: NEGATIVE
PROTHROMBIN TIME: 15.3 SECONDS (ref 11.9–14.6)
RBC # BLD AUTO: 3.42 10*6/MM3 (ref 3.77–5.28)
RBC # UR STRIP: ABNORMAL /HPF
REF LAB TEST METHOD: ABNORMAL
SARS-COV-2 RNA PNL SPEC NAA+PROBE: NOT DETECTED
SODIUM SERPL-SCNC: 134 MMOL/L (ref 136–145)
SP GR UR STRIP: >=1.03 (ref 1–1.03)
SQUAMOUS #/AREA URNS HPF: ABNORMAL /HPF
TROPONIN T SERPL-MCNC: <0.01 NG/ML (ref 0–0.03)
UROBILINOGEN UR QL STRIP: ABNORMAL
WBC # UR STRIP: ABNORMAL /HPF
WBC NRBC COR # BLD: 15.42 10*3/MM3 (ref 3.4–10.8)
WHOLE BLOOD HOLD COAG: NORMAL
WHOLE BLOOD HOLD SPECIMEN: NORMAL

## 2022-07-16 PROCEDURE — 0 HYDROMORPHONE 1 MG/ML SOLUTION: Performed by: NURSE PRACTITIONER

## 2022-07-16 PROCEDURE — 71260 CT THORAX DX C+: CPT

## 2022-07-16 PROCEDURE — 96375 TX/PRO/DX INJ NEW DRUG ADDON: CPT

## 2022-07-16 PROCEDURE — 93010 ELECTROCARDIOGRAM REPORT: CPT | Performed by: INTERNAL MEDICINE

## 2022-07-16 PROCEDURE — C9803 HOPD COVID-19 SPEC COLLECT: HCPCS

## 2022-07-16 PROCEDURE — 85025 COMPLETE CBC W/AUTO DIFF WBC: CPT | Performed by: NURSE PRACTITIONER

## 2022-07-16 PROCEDURE — G0378 HOSPITAL OBSERVATION PER HR: HCPCS

## 2022-07-16 PROCEDURE — 87635 SARS-COV-2 COVID-19 AMP PRB: CPT | Performed by: NURSE PRACTITIONER

## 2022-07-16 PROCEDURE — 96361 HYDRATE IV INFUSION ADD-ON: CPT

## 2022-07-16 PROCEDURE — 81001 URINALYSIS AUTO W/SCOPE: CPT | Performed by: NURSE PRACTITIONER

## 2022-07-16 PROCEDURE — 36415 COLL VENOUS BLD VENIPUNCTURE: CPT

## 2022-07-16 PROCEDURE — 72110 X-RAY EXAM L-2 SPINE 4/>VWS: CPT

## 2022-07-16 PROCEDURE — 80053 COMPREHEN METABOLIC PANEL: CPT | Performed by: NURSE PRACTITIONER

## 2022-07-16 PROCEDURE — 70450 CT HEAD/BRAIN W/O DYE: CPT

## 2022-07-16 PROCEDURE — 25010000002 IOPAMIDOL 61 % SOLUTION: Performed by: NURSE PRACTITIONER

## 2022-07-16 PROCEDURE — 25010000002 ONDANSETRON PER 1 MG: Performed by: NURSE PRACTITIONER

## 2022-07-16 PROCEDURE — 83690 ASSAY OF LIPASE: CPT | Performed by: NURSE PRACTITIONER

## 2022-07-16 PROCEDURE — 85730 THROMBOPLASTIN TIME PARTIAL: CPT | Performed by: NURSE PRACTITIONER

## 2022-07-16 PROCEDURE — 73030 X-RAY EXAM OF SHOULDER: CPT

## 2022-07-16 PROCEDURE — 72125 CT NECK SPINE W/O DYE: CPT

## 2022-07-16 PROCEDURE — 25010000002 MORPHINE PER 10 MG: Performed by: SPECIALIST

## 2022-07-16 PROCEDURE — 99284 EMERGENCY DEPT VISIT MOD MDM: CPT

## 2022-07-16 PROCEDURE — 84484 ASSAY OF TROPONIN QUANT: CPT | Performed by: NURSE PRACTITIONER

## 2022-07-16 PROCEDURE — 96376 TX/PRO/DX INJ SAME DRUG ADON: CPT

## 2022-07-16 PROCEDURE — 93005 ELECTROCARDIOGRAM TRACING: CPT | Performed by: SPECIALIST

## 2022-07-16 PROCEDURE — 96374 THER/PROPH/DIAG INJ IV PUSH: CPT

## 2022-07-16 PROCEDURE — 74177 CT ABD & PELVIS W/CONTRAST: CPT

## 2022-07-16 PROCEDURE — 72072 X-RAY EXAM THORAC SPINE 3VWS: CPT

## 2022-07-16 PROCEDURE — 93005 ELECTROCARDIOGRAM TRACING: CPT

## 2022-07-16 PROCEDURE — 85610 PROTHROMBIN TIME: CPT | Performed by: NURSE PRACTITIONER

## 2022-07-16 PROCEDURE — 71045 X-RAY EXAM CHEST 1 VIEW: CPT

## 2022-07-16 RX ORDER — ONDANSETRON 2 MG/ML
4 INJECTION INTRAMUSCULAR; INTRAVENOUS EVERY 4 HOURS PRN
Status: DISCONTINUED | OUTPATIENT
Start: 2022-07-16 | End: 2022-07-19 | Stop reason: HOSPADM

## 2022-07-16 RX ORDER — ONDANSETRON 2 MG/ML
4 INJECTION INTRAMUSCULAR; INTRAVENOUS ONCE
Status: COMPLETED | OUTPATIENT
Start: 2022-07-16 | End: 2022-07-16

## 2022-07-16 RX ORDER — DEXTROSE, SODIUM CHLORIDE, SODIUM LACTATE, POTASSIUM CHLORIDE, AND CALCIUM CHLORIDE 5; .6; .31; .03; .02 G/100ML; G/100ML; G/100ML; G/100ML; G/100ML
75 INJECTION, SOLUTION INTRAVENOUS CONTINUOUS
Status: DISCONTINUED | OUTPATIENT
Start: 2022-07-16 | End: 2022-07-19 | Stop reason: HOSPADM

## 2022-07-16 RX ORDER — FAMOTIDINE 10 MG/ML
20 INJECTION, SOLUTION INTRAVENOUS EVERY 12 HOURS SCHEDULED
Status: DISCONTINUED | OUTPATIENT
Start: 2022-07-16 | End: 2022-07-19

## 2022-07-16 RX ORDER — ALBUTEROL SULFATE 2.5 MG/3ML
2.5 SOLUTION RESPIRATORY (INHALATION) EVERY 6 HOURS PRN
Status: DISCONTINUED | OUTPATIENT
Start: 2022-07-16 | End: 2022-07-17

## 2022-07-16 RX ORDER — SODIUM CHLORIDE 0.9 % (FLUSH) 0.9 %
10 SYRINGE (ML) INJECTION AS NEEDED
Status: DISCONTINUED | OUTPATIENT
Start: 2022-07-16 | End: 2022-07-19 | Stop reason: HOSPADM

## 2022-07-16 RX ORDER — MORPHINE SULFATE 2 MG/ML
2 INJECTION, SOLUTION INTRAMUSCULAR; INTRAVENOUS
Status: DISCONTINUED | OUTPATIENT
Start: 2022-07-16 | End: 2022-07-19 | Stop reason: HOSPADM

## 2022-07-16 RX ADMIN — IOPAMIDOL 100 ML: 612 INJECTION, SOLUTION INTRAVENOUS at 13:06

## 2022-07-16 RX ADMIN — SODIUM CHLORIDE, SODIUM LACTATE, POTASSIUM CHLORIDE, CALCIUM CHLORIDE AND DEXTROSE MONOHYDRATE 75 ML/HR: 5; 600; 310; 30; 20 INJECTION, SOLUTION INTRAVENOUS at 17:58

## 2022-07-16 RX ADMIN — HYDROMORPHONE HYDROCHLORIDE 0.5 MG: 1 INJECTION, SOLUTION INTRAMUSCULAR; INTRAVENOUS; SUBCUTANEOUS at 12:32

## 2022-07-16 RX ADMIN — FAMOTIDINE 20 MG: 10 INJECTION INTRAVENOUS at 20:09

## 2022-07-16 RX ADMIN — MORPHINE SULFATE 4 MG: 4 INJECTION, SOLUTION INTRAMUSCULAR; INTRAVENOUS at 17:59

## 2022-07-16 RX ADMIN — MORPHINE SULFATE 4 MG: 4 INJECTION, SOLUTION INTRAMUSCULAR; INTRAVENOUS at 21:25

## 2022-07-16 RX ADMIN — ONDANSETRON 4 MG: 2 INJECTION INTRAMUSCULAR; INTRAVENOUS at 12:30
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
## 2022-07-17 ENCOUNTER — APPOINTMENT (OUTPATIENT)
Dept: GENERAL RADIOLOGY | Facility: HOSPITAL | Age: 52
End: 2022-07-17

## 2022-07-17 LAB
ALBUMIN SERPL-MCNC: 3.9 G/DL (ref 3.5–5.2)
ALBUMIN/GLOB SERPL: 0.9 G/DL
ALP SERPL-CCNC: 236 U/L (ref 39–117)
ALT SERPL W P-5'-P-CCNC: 30 U/L (ref 1–33)
AMYLASE SERPL-CCNC: 109 U/L (ref 28–100)
ANION GAP SERPL CALCULATED.3IONS-SCNC: 10 MMOL/L (ref 5–15)
AST SERPL-CCNC: 75 U/L (ref 1–32)
BASOPHILS # BLD AUTO: 0.08 10*3/MM3 (ref 0–0.2)
BASOPHILS # BLD AUTO: 0.14 10*3/MM3 (ref 0–0.2)
BASOPHILS NFR BLD AUTO: 0.7 % (ref 0–1.5)
BASOPHILS NFR BLD AUTO: 1.1 % (ref 0–1.5)
BILIRUB SERPL-MCNC: 2.3 MG/DL (ref 0–1.2)
BUN SERPL-MCNC: 9 MG/DL (ref 6–20)
BUN/CREAT SERPL: 16.7 (ref 7–25)
CALCIUM SPEC-SCNC: 8.8 MG/DL (ref 8.6–10.5)
CHLORIDE SERPL-SCNC: 100 MMOL/L (ref 98–107)
CO2 SERPL-SCNC: 26 MMOL/L (ref 22–29)
CREAT SERPL-MCNC: 0.54 MG/DL (ref 0.57–1)
DEPRECATED RDW RBC AUTO: 55.3 FL (ref 37–54)
DEPRECATED RDW RBC AUTO: 55.8 FL (ref 37–54)
EGFRCR SERPLBLD CKD-EPI 2021: 111.6 ML/MIN/1.73
EOSINOPHIL # BLD AUTO: 0.25 10*3/MM3 (ref 0–0.4)
EOSINOPHIL # BLD AUTO: 0.35 10*3/MM3 (ref 0–0.4)
EOSINOPHIL NFR BLD AUTO: 2.2 % (ref 0.3–6.2)
EOSINOPHIL NFR BLD AUTO: 2.7 % (ref 0.3–6.2)
ERYTHROCYTE [DISTWIDTH] IN BLOOD BY AUTOMATED COUNT: 14.6 % (ref 12.3–15.4)
ERYTHROCYTE [DISTWIDTH] IN BLOOD BY AUTOMATED COUNT: 14.6 % (ref 12.3–15.4)
GLOBULIN UR ELPH-MCNC: 4.4 GM/DL
GLUCOSE SERPL-MCNC: 139 MG/DL (ref 65–99)
HCT VFR BLD AUTO: 31.9 % (ref 34–46.6)
HCT VFR BLD AUTO: 34.2 % (ref 34–46.6)
HGB BLD-MCNC: 10.4 G/DL (ref 12–15.9)
HGB BLD-MCNC: 10.8 G/DL (ref 12–15.9)
IMM GRANULOCYTES # BLD AUTO: 0.05 10*3/MM3 (ref 0–0.05)
IMM GRANULOCYTES # BLD AUTO: 0.09 10*3/MM3 (ref 0–0.05)
IMM GRANULOCYTES NFR BLD AUTO: 0.4 % (ref 0–0.5)
IMM GRANULOCYTES NFR BLD AUTO: 0.7 % (ref 0–0.5)
LIPASE SERPL-CCNC: 70 U/L (ref 13–60)
LYMPHOCYTES # BLD AUTO: 1.47 10*3/MM3 (ref 0.7–3.1)
LYMPHOCYTES # BLD AUTO: 1.73 10*3/MM3 (ref 0.7–3.1)
LYMPHOCYTES NFR BLD AUTO: 13.2 % (ref 19.6–45.3)
LYMPHOCYTES NFR BLD AUTO: 13.4 % (ref 19.6–45.3)
MCH RBC QN AUTO: 33 PG (ref 26.6–33)
MCH RBC QN AUTO: 34 PG (ref 26.6–33)
MCHC RBC AUTO-ENTMCNC: 31.6 G/DL (ref 31.5–35.7)
MCHC RBC AUTO-ENTMCNC: 32.6 G/DL (ref 31.5–35.7)
MCV RBC AUTO: 104.2 FL (ref 79–97)
MCV RBC AUTO: 104.6 FL (ref 79–97)
MONOCYTES # BLD AUTO: 1.23 10*3/MM3 (ref 0.1–0.9)
MONOCYTES # BLD AUTO: 1.45 10*3/MM3 (ref 0.1–0.9)
MONOCYTES NFR BLD AUTO: 11.1 % (ref 5–12)
MONOCYTES NFR BLD AUTO: 11.2 % (ref 5–12)
NEUTROPHILS NFR BLD AUTO: 70.9 % (ref 42.7–76)
NEUTROPHILS NFR BLD AUTO: 72.4 % (ref 42.7–76)
NEUTROPHILS NFR BLD AUTO: 8.05 10*3/MM3 (ref 1.7–7)
NEUTROPHILS NFR BLD AUTO: 9.13 10*3/MM3 (ref 1.7–7)
NRBC BLD AUTO-RTO: 0 /100 WBC (ref 0–0.2)
NRBC BLD AUTO-RTO: 0 /100 WBC (ref 0–0.2)
PLATELET # BLD AUTO: 153 10*3/MM3 (ref 140–450)
PLATELET # BLD AUTO: 187 10*3/MM3 (ref 140–450)
PMV BLD AUTO: 10.5 FL (ref 6–12)
PMV BLD AUTO: 10.7 FL (ref 6–12)
POTASSIUM SERPL-SCNC: 4.1 MMOL/L (ref 3.5–5.2)
PROT SERPL-MCNC: 8.3 G/DL (ref 6–8.5)
RBC # BLD AUTO: 3.06 10*6/MM3 (ref 3.77–5.28)
RBC # BLD AUTO: 3.27 10*6/MM3 (ref 3.77–5.28)
SODIUM SERPL-SCNC: 136 MMOL/L (ref 136–145)
WBC NRBC COR # BLD: 11.13 10*3/MM3 (ref 3.4–10.8)
WBC NRBC COR # BLD: 12.89 10*3/MM3 (ref 3.4–10.8)

## 2022-07-17 PROCEDURE — 94664 DEMO&/EVAL PT USE INHALER: CPT

## 2022-07-17 PROCEDURE — 96361 HYDRATE IV INFUSION ADD-ON: CPT

## 2022-07-17 PROCEDURE — 85025 COMPLETE CBC W/AUTO DIFF WBC: CPT | Performed by: SPECIALIST

## 2022-07-17 PROCEDURE — 94799 UNLISTED PULMONARY SVC/PX: CPT

## 2022-07-17 PROCEDURE — 82150 ASSAY OF AMYLASE: CPT | Performed by: SPECIALIST

## 2022-07-17 PROCEDURE — 80053 COMPREHEN METABOLIC PANEL: CPT | Performed by: SPECIALIST

## 2022-07-17 PROCEDURE — 94761 N-INVAS EAR/PLS OXIMETRY MLT: CPT

## 2022-07-17 PROCEDURE — 99220 PR INITIAL OBSERVATION CARE/DAY 70 MINUTES: CPT | Performed by: SPECIALIST

## 2022-07-17 PROCEDURE — G0378 HOSPITAL OBSERVATION PER HR: HCPCS

## 2022-07-17 PROCEDURE — 25010000002 MORPHINE PER 10 MG: Performed by: SPECIALIST

## 2022-07-17 PROCEDURE — 63710000001 DIPHENHYDRAMINE PER 50 MG: Performed by: SPECIALIST

## 2022-07-17 PROCEDURE — 96376 TX/PRO/DX INJ SAME DRUG ADON: CPT

## 2022-07-17 PROCEDURE — 83690 ASSAY OF LIPASE: CPT | Performed by: SPECIALIST

## 2022-07-17 PROCEDURE — 71045 X-RAY EXAM CHEST 1 VIEW: CPT

## 2022-07-17 PROCEDURE — 94640 AIRWAY INHALATION TREATMENT: CPT

## 2022-07-17 RX ORDER — BUPROPION HYDROCHLORIDE 150 MG/1
300 TABLET ORAL DAILY
Status: DISCONTINUED | OUTPATIENT
Start: 2022-07-18 | End: 2022-07-19 | Stop reason: HOSPADM

## 2022-07-17 RX ORDER — ALBUTEROL SULFATE 2.5 MG/3ML
2.5 SOLUTION RESPIRATORY (INHALATION)
Status: DISCONTINUED | OUTPATIENT
Start: 2022-07-17 | End: 2022-07-19 | Stop reason: HOSPADM

## 2022-07-17 RX ORDER — DIPHENHYDRAMINE HCL 25 MG
25 CAPSULE ORAL NIGHTLY PRN
Status: DISCONTINUED | OUTPATIENT
Start: 2022-07-17 | End: 2022-07-19 | Stop reason: HOSPADM

## 2022-07-17 RX ORDER — LOSARTAN POTASSIUM 50 MG/1
50 TABLET ORAL
Status: DISCONTINUED | OUTPATIENT
Start: 2022-07-18 | End: 2022-07-19 | Stop reason: HOSPADM

## 2022-07-17 RX ORDER — LEVOTHYROXINE SODIUM 0.1 MG/1
100 TABLET ORAL
Status: DISCONTINUED | OUTPATIENT
Start: 2022-07-18 | End: 2022-07-19 | Stop reason: HOSPADM

## 2022-07-17 RX ADMIN — MORPHINE SULFATE 4 MG: 4 INJECTION, SOLUTION INTRAMUSCULAR; INTRAVENOUS at 08:38

## 2022-07-17 RX ADMIN — MORPHINE SULFATE 4 MG: 4 INJECTION, SOLUTION INTRAMUSCULAR; INTRAVENOUS at 14:56

## 2022-07-17 RX ADMIN — SODIUM CHLORIDE, SODIUM LACTATE, POTASSIUM CHLORIDE, CALCIUM CHLORIDE AND DEXTROSE MONOHYDRATE 75 ML/HR: 5; 600; 310; 30; 20 INJECTION, SOLUTION INTRAVENOUS at 20:22

## 2022-07-17 RX ADMIN — MORPHINE SULFATE 4 MG: 4 INJECTION, SOLUTION INTRAMUSCULAR; INTRAVENOUS at 00:46

## 2022-07-17 RX ADMIN — DIPHENHYDRAMINE HYDROCHLORIDE 25 MG: 25 CAPSULE ORAL at 22:15

## 2022-07-17 RX ADMIN — ALBUTEROL SULFATE 2.5 MG: 2.5 SOLUTION RESPIRATORY (INHALATION) at 20:04

## 2022-07-17 RX ADMIN — MORPHINE SULFATE 2 MG: 2 INJECTION, SOLUTION INTRAMUSCULAR; INTRAVENOUS at 20:48

## 2022-07-17 RX ADMIN — ALBUTEROL SULFATE 2.5 MG: 2.5 SOLUTION RESPIRATORY (INHALATION) at 12:40

## 2022-07-17 RX ADMIN — MORPHINE SULFATE 4 MG: 4 INJECTION, SOLUTION INTRAMUSCULAR; INTRAVENOUS at 12:04

## 2022-07-17 RX ADMIN — SODIUM CHLORIDE, SODIUM LACTATE, POTASSIUM CHLORIDE, CALCIUM CHLORIDE AND DEXTROSE MONOHYDRATE 75 ML/HR: 5; 600; 310; 30; 20 INJECTION, SOLUTION INTRAVENOUS at 06:48

## 2022-07-17 RX ADMIN — MORPHINE SULFATE 4 MG: 4 INJECTION, SOLUTION INTRAMUSCULAR; INTRAVENOUS at 18:11

## 2022-07-17 RX ADMIN — FAMOTIDINE 20 MG: 10 INJECTION INTRAVENOUS at 20:04

## 2022-07-17 RX ADMIN — MORPHINE SULFATE 4 MG: 4 INJECTION, SOLUTION INTRAMUSCULAR; INTRAVENOUS at 05:01

## 2022-07-17 RX ADMIN — FAMOTIDINE 20 MG: 10 INJECTION INTRAVENOUS at 08:38
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
## 2022-07-17 NOTE — PLAN OF CARE
Goal Outcome Evaluation:  Plan of Care Reviewed With: patient        Progress: no change  Outcome Evaluation: Patient c/o of pain see mar. Bed rest. Voiding. O2 at 2L sat 92-95 %

## 2022-07-18 LAB
ALBUMIN SERPL-MCNC: 3.7 G/DL (ref 3.5–5.2)
ALBUMIN/GLOB SERPL: 0.9 G/DL
ALP SERPL-CCNC: 205 U/L (ref 39–117)
ALT SERPL W P-5'-P-CCNC: 24 U/L (ref 1–33)
AMYLASE SERPL-CCNC: 100 U/L (ref 28–100)
ANION GAP SERPL CALCULATED.3IONS-SCNC: 7 MMOL/L (ref 5–15)
AST SERPL-CCNC: 79 U/L (ref 1–32)
BILIRUB SERPL-MCNC: 2.5 MG/DL (ref 0–1.2)
BUN SERPL-MCNC: 7 MG/DL (ref 6–20)
BUN/CREAT SERPL: 13.5 (ref 7–25)
CALCIUM SPEC-SCNC: 9 MG/DL (ref 8.6–10.5)
CHLORIDE SERPL-SCNC: 99 MMOL/L (ref 98–107)
CO2 SERPL-SCNC: 29 MMOL/L (ref 22–29)
CREAT SERPL-MCNC: 0.52 MG/DL (ref 0.57–1)
EGFRCR SERPLBLD CKD-EPI 2021: 112.6 ML/MIN/1.73
GLOBULIN UR ELPH-MCNC: 4.1 GM/DL
GLUCOSE SERPL-MCNC: 131 MG/DL (ref 65–99)
LIPASE SERPL-CCNC: 63 U/L (ref 13–60)
POTASSIUM SERPL-SCNC: 3.9 MMOL/L (ref 3.5–5.2)
PROT SERPL-MCNC: 7.8 G/DL (ref 6–8.5)
SODIUM SERPL-SCNC: 135 MMOL/L (ref 136–145)

## 2022-07-18 PROCEDURE — 96361 HYDRATE IV INFUSION ADD-ON: CPT

## 2022-07-18 PROCEDURE — 94761 N-INVAS EAR/PLS OXIMETRY MLT: CPT

## 2022-07-18 PROCEDURE — 96376 TX/PRO/DX INJ SAME DRUG ADON: CPT

## 2022-07-18 PROCEDURE — G0378 HOSPITAL OBSERVATION PER HR: HCPCS

## 2022-07-18 PROCEDURE — 63710000001 DIPHENHYDRAMINE PER 50 MG: Performed by: SPECIALIST

## 2022-07-18 PROCEDURE — 99225 PR SBSQ OBSERVATION CARE/DAY 25 MINUTES: CPT | Performed by: SPECIALIST

## 2022-07-18 PROCEDURE — 94799 UNLISTED PULMONARY SVC/PX: CPT

## 2022-07-18 PROCEDURE — 25010000002 MORPHINE PER 10 MG: Performed by: SPECIALIST

## 2022-07-18 RX ADMIN — MORPHINE SULFATE 4 MG: 4 INJECTION, SOLUTION INTRAMUSCULAR; INTRAVENOUS at 08:38

## 2022-07-18 RX ADMIN — MORPHINE SULFATE 4 MG: 4 INJECTION, SOLUTION INTRAMUSCULAR; INTRAVENOUS at 04:06

## 2022-07-18 RX ADMIN — FAMOTIDINE 20 MG: 10 INJECTION INTRAVENOUS at 20:51

## 2022-07-18 RX ADMIN — FAMOTIDINE 20 MG: 10 INJECTION INTRAVENOUS at 08:38

## 2022-07-18 RX ADMIN — ALBUTEROL SULFATE 2.5 MG: 2.5 SOLUTION RESPIRATORY (INHALATION) at 07:04

## 2022-07-18 RX ADMIN — ALBUTEROL SULFATE 2.5 MG: 2.5 SOLUTION RESPIRATORY (INHALATION) at 00:43

## 2022-07-18 RX ADMIN — MORPHINE SULFATE 4 MG: 4 INJECTION, SOLUTION INTRAMUSCULAR; INTRAVENOUS at 20:57

## 2022-07-18 RX ADMIN — SODIUM CHLORIDE, SODIUM LACTATE, POTASSIUM CHLORIDE, CALCIUM CHLORIDE AND DEXTROSE MONOHYDRATE 75 ML/HR: 5; 600; 310; 30; 20 INJECTION, SOLUTION INTRAVENOUS at 22:54

## 2022-07-18 RX ADMIN — LEVOTHYROXINE SODIUM 100 MCG: 100 TABLET ORAL at 06:22

## 2022-07-18 RX ADMIN — MORPHINE SULFATE 4 MG: 4 INJECTION, SOLUTION INTRAMUSCULAR; INTRAVENOUS at 00:07

## 2022-07-18 RX ADMIN — ALBUTEROL SULFATE 2.5 MG: 2.5 SOLUTION RESPIRATORY (INHALATION) at 21:14

## 2022-07-18 RX ADMIN — ALBUTEROL SULFATE 2.5 MG: 2.5 SOLUTION RESPIRATORY (INHALATION) at 13:57

## 2022-07-18 RX ADMIN — BUPROPION HYDROCHLORIDE 300 MG: 150 TABLET, FILM COATED, EXTENDED RELEASE ORAL at 08:38

## 2022-07-18 RX ADMIN — SODIUM CHLORIDE, SODIUM LACTATE, POTASSIUM CHLORIDE, CALCIUM CHLORIDE AND DEXTROSE MONOHYDRATE 75 ML/HR: 5; 600; 310; 30; 20 INJECTION, SOLUTION INTRAVENOUS at 08:38

## 2022-07-18 RX ADMIN — MORPHINE SULFATE 4 MG: 4 INJECTION, SOLUTION INTRAMUSCULAR; INTRAVENOUS at 11:29

## 2022-07-18 RX ADMIN — MORPHINE SULFATE 4 MG: 4 INJECTION, SOLUTION INTRAMUSCULAR; INTRAVENOUS at 17:51

## 2022-07-18 RX ADMIN — DIPHENHYDRAMINE HYDROCHLORIDE 25 MG: 25 CAPSULE ORAL at 23:55

## 2022-07-18 RX ADMIN — LOSARTAN POTASSIUM 50 MG: 50 TABLET, FILM COATED ORAL at 08:38
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
## 2022-07-18 NOTE — PROGRESS NOTES
Nutrition Services    Patient Name:  Denise Fields  YOB: 1970  MRN: [id number]  Admit Date:  7/16/2022    Pt has been NPO x 3 days. If unable to initiate/tolerate a po diet within the next 1-2 days, she may benefit from alternate means of nutrition support. If nutrition support is desired, please consult dietitian.    Electronically signed by:  Lauren Cox RDN, RAN  07/18/22 10:57 CDT

## 2022-07-18 NOTE — PLAN OF CARE
Goal Outcome Evaluation:      Pt c/o generalized pain this shift; prn meds given per orders. Pt is on O2 at 2L and tolerating well. Pt is up ad onesimo and voiding. Fluids infusing per orders. Pt is NPO with ice chips. Cozaar and synthroid restarted and Wellbutrin and Benadryl added. VSS; safety maintained.

## 2022-07-19 VITALS
OXYGEN SATURATION: 95 % | SYSTOLIC BLOOD PRESSURE: 127 MMHG | WEIGHT: 135 LBS | TEMPERATURE: 98.5 F | BODY MASS INDEX: 24.84 KG/M2 | DIASTOLIC BLOOD PRESSURE: 74 MMHG | HEIGHT: 62 IN | RESPIRATION RATE: 16 BRPM | HEART RATE: 97 BPM

## 2022-07-19 LAB
ALBUMIN SERPL-MCNC: 3.8 G/DL (ref 3.5–5.2)
ALBUMIN/GLOB SERPL: 1 G/DL
ALP SERPL-CCNC: 199 U/L (ref 39–117)
ALT SERPL W P-5'-P-CCNC: 25 U/L (ref 1–33)
ANION GAP SERPL CALCULATED.3IONS-SCNC: 9 MMOL/L (ref 5–15)
AST SERPL-CCNC: 73 U/L (ref 1–32)
BASOPHILS # BLD AUTO: 0.06 10*3/MM3 (ref 0–0.2)
BASOPHILS NFR BLD AUTO: 0.6 % (ref 0–1.5)
BILIRUB SERPL-MCNC: 2.6 MG/DL (ref 0–1.2)
BUN SERPL-MCNC: 4 MG/DL (ref 6–20)
BUN/CREAT SERPL: 8.2 (ref 7–25)
CALCIUM SPEC-SCNC: 9.3 MG/DL (ref 8.6–10.5)
CHLORIDE SERPL-SCNC: 98 MMOL/L (ref 98–107)
CO2 SERPL-SCNC: 29 MMOL/L (ref 22–29)
CREAT SERPL-MCNC: 0.49 MG/DL (ref 0.57–1)
DEPRECATED RDW RBC AUTO: 54.6 FL (ref 37–54)
EGFRCR SERPLBLD CKD-EPI 2021: 114.3 ML/MIN/1.73
EOSINOPHIL # BLD AUTO: 0.25 10*3/MM3 (ref 0–0.4)
EOSINOPHIL NFR BLD AUTO: 2.6 % (ref 0.3–6.2)
ERYTHROCYTE [DISTWIDTH] IN BLOOD BY AUTOMATED COUNT: 14.4 % (ref 12.3–15.4)
GLOBULIN UR ELPH-MCNC: 3.9 GM/DL
GLUCOSE SERPL-MCNC: 124 MG/DL (ref 65–99)
HCT VFR BLD AUTO: 31 % (ref 34–46.6)
HGB BLD-MCNC: 10.1 G/DL (ref 12–15.9)
IMM GRANULOCYTES # BLD AUTO: 0.06 10*3/MM3 (ref 0–0.05)
IMM GRANULOCYTES NFR BLD AUTO: 0.6 % (ref 0–0.5)
LYMPHOCYTES # BLD AUTO: 1.5 10*3/MM3 (ref 0.7–3.1)
LYMPHOCYTES NFR BLD AUTO: 15.3 % (ref 19.6–45.3)
MCH RBC QN AUTO: 33.9 PG (ref 26.6–33)
MCHC RBC AUTO-ENTMCNC: 32.6 G/DL (ref 31.5–35.7)
MCV RBC AUTO: 104 FL (ref 79–97)
MONOCYTES # BLD AUTO: 1.29 10*3/MM3 (ref 0.1–0.9)
MONOCYTES NFR BLD AUTO: 13.2 % (ref 5–12)
NEUTROPHILS NFR BLD AUTO: 6.63 10*3/MM3 (ref 1.7–7)
NEUTROPHILS NFR BLD AUTO: 67.7 % (ref 42.7–76)
NRBC BLD AUTO-RTO: 0 /100 WBC (ref 0–0.2)
PLATELET # BLD AUTO: 147 10*3/MM3 (ref 140–450)
PMV BLD AUTO: 10.7 FL (ref 6–12)
POTASSIUM SERPL-SCNC: 3.9 MMOL/L (ref 3.5–5.2)
PROT SERPL-MCNC: 7.7 G/DL (ref 6–8.5)
QT INTERVAL: 400 MS
QTC INTERVAL: 489 MS
RBC # BLD AUTO: 2.98 10*6/MM3 (ref 3.77–5.28)
SODIUM SERPL-SCNC: 136 MMOL/L (ref 136–145)
WBC NRBC COR # BLD: 9.79 10*3/MM3 (ref 3.4–10.8)

## 2022-07-19 PROCEDURE — 25010000002 MORPHINE SULFATE (PF) 2 MG/ML SOLUTION: Performed by: SPECIALIST

## 2022-07-19 PROCEDURE — 80053 COMPREHEN METABOLIC PANEL: CPT | Performed by: SPECIALIST

## 2022-07-19 PROCEDURE — 94799 UNLISTED PULMONARY SVC/PX: CPT

## 2022-07-19 PROCEDURE — 99217 PR OBSERVATION CARE DISCHARGE MANAGEMENT: CPT | Performed by: SPECIALIST

## 2022-07-19 PROCEDURE — 94761 N-INVAS EAR/PLS OXIMETRY MLT: CPT

## 2022-07-19 PROCEDURE — 96376 TX/PRO/DX INJ SAME DRUG ADON: CPT

## 2022-07-19 PROCEDURE — G0378 HOSPITAL OBSERVATION PER HR: HCPCS

## 2022-07-19 PROCEDURE — 25010000002 MORPHINE PER 10 MG: Performed by: SPECIALIST

## 2022-07-19 PROCEDURE — 85025 COMPLETE CBC W/AUTO DIFF WBC: CPT | Performed by: SPECIALIST

## 2022-07-19 RX ORDER — HYDROCODONE BITARTRATE AND ACETAMINOPHEN 7.5; 325 MG/1; MG/1
1 TABLET ORAL EVERY 6 HOURS PRN
Qty: 30 TABLET | Refills: 0 | Status: SHIPPED | OUTPATIENT
Start: 2022-07-19 | End: 2022-08-04 | Stop reason: SDUPTHER

## 2022-07-19 RX ORDER — FAMOTIDINE 20 MG/1
20 TABLET, FILM COATED ORAL
Status: DISCONTINUED | OUTPATIENT
Start: 2022-07-19 | End: 2022-07-19 | Stop reason: HOSPADM

## 2022-07-19 RX ADMIN — FAMOTIDINE 20 MG: 10 INJECTION INTRAVENOUS at 09:04

## 2022-07-19 RX ADMIN — BUPROPION HYDROCHLORIDE 300 MG: 150 TABLET, FILM COATED, EXTENDED RELEASE ORAL at 10:12

## 2022-07-19 RX ADMIN — MORPHINE SULFATE 2 MG: 2 INJECTION, SOLUTION INTRAMUSCULAR; INTRAVENOUS at 10:16

## 2022-07-19 RX ADMIN — ALBUTEROL SULFATE 2.5 MG: 2.5 SOLUTION RESPIRATORY (INHALATION) at 06:57

## 2022-07-19 RX ADMIN — LEVOTHYROXINE SODIUM 100 MCG: 100 TABLET ORAL at 05:49

## 2022-07-19 RX ADMIN — ALBUTEROL SULFATE 2.5 MG: 2.5 SOLUTION RESPIRATORY (INHALATION) at 00:41

## 2022-07-19 RX ADMIN — MORPHINE SULFATE 4 MG: 4 INJECTION, SOLUTION INTRAMUSCULAR; INTRAVENOUS at 06:14

## 2022-07-19 RX ADMIN — LOSARTAN POTASSIUM 50 MG: 50 TABLET, FILM COATED ORAL at 09:04

## 2022-07-19 NOTE — PROGRESS NOTES
Pharmacy Dosing Service  Automatic IV to PO Conversion  Famotidine    Assessment/Action/Plan:  Patient meets criteria listed below. famotidine 20 mg IV every 12 hours has been changed to famotidine 20 mg PO twice daily before meals.       Subjective:  Denise Fields is a 52 y.o. female who meets the following criteria for IV to PO therapy conversion     Policy Criteria:  Tolerating oral fluids or 40ml/hour of enteral nutrition and oral route not otherwise compromised  Receiving other oral medications on a scheduled basis    Additional Factors Considered:  Anti-emetic usage  Patient disposition per documentation  Disease states or conditions contraindicating oral conversion    Objective:  Diet Order   Procedures    Diet Regular       Shashi Gonzales, PharmD  07/19/2212:13 CDT

## 2022-07-19 NOTE — PLAN OF CARE
Goal Outcome Evaluation:  Plan of Care Reviewed With: patient        Progress: no change  Outcome Evaluation: PRN pain meds as ordered with good results. VSS. Benadryl given to aide with sleep. Mild SOA noted with exertion, O2 sats remain in mid to upper 90's.
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
## 2022-07-26 DIAGNOSIS — G89.29 CHRONIC NONINTRACTABLE HEADACHE, UNSPECIFIED HEADACHE TYPE: ICD-10-CM

## 2022-07-26 DIAGNOSIS — F41.9 ANXIETY: ICD-10-CM

## 2022-07-26 DIAGNOSIS — R51.9 CHRONIC NONINTRACTABLE HEADACHE, UNSPECIFIED HEADACHE TYPE: ICD-10-CM

## 2022-07-26 RX ORDER — LORAZEPAM 1 MG/1
1 TABLET ORAL EVERY 8 HOURS PRN
Qty: 90 TABLET | Refills: 2 | OUTPATIENT
Start: 2022-07-26 | End: 2022-08-25

## 2022-07-26 RX ORDER — HYDROCODONE BITARTRATE AND ACETAMINOPHEN 10; 325 MG/1; MG/1
1 TABLET ORAL DAILY
Qty: 30 TABLET | Refills: 0 | Status: SHIPPED | OUTPATIENT
Start: 2022-07-26 | End: 2022-08-25

## 2022-07-26 NOTE — TELEPHONE ENCOUNTER
Creasie Spoon called to request a refill on her medication. Last office visit : 7/7/2022   Next office visit : 8/3/2022     Last UDS:   Amphetamine Screen, Urine   Date Value Ref Range Status   11/23/2021 neg  Final     Barbiturate Screen, Urine   Date Value Ref Range Status   11/23/2021 neg  Final     Benzodiazepine Screen, Urine   Date Value Ref Range Status   11/23/2021 positve  Final     Buprenorphine Urine   Date Value Ref Range Status   11/23/2021 neg  Final     Cocaine Metabolite Screen, Urine   Date Value Ref Range Status   11/23/2021 neg  Final     Gabapentin Screen, Urine   Date Value Ref Range Status   11/23/2021 neg  Final     MDMA, Urine   Date Value Ref Range Status   11/23/2021 neg  Final     Methamphetamine, Urine   Date Value Ref Range Status   11/23/2021 neg  Final     Opiate Scrn, Ur   Date Value Ref Range Status   11/23/2021 neg  Final     Oxycodone Screen, Ur   Date Value Ref Range Status   11/23/2021 neg  Final     PCP Screen, Urine   Date Value Ref Range Status   11/23/2021 neg  Final     Propoxyphene Screen, Urine   Date Value Ref Range Status   11/23/2021 neg  Final     THC Screen, Urine   Date Value Ref Range Status   11/23/2021 neg  Final     Tricyclic Antidepressants, Urine   Date Value Ref Range Status   11/23/2021 neg  Final       Last Titus Li: 7/26/2022  Medication Contract: 3/31/2022   Last Fill: 6/24/2022    Requested Prescriptions     Pending Prescriptions Disp Refills    HYDROcodone-acetaminophen (NORCO)  MG per tablet 30 tablet 0     Sig: Take 1 tablet by mouth in the morning for 30 days. Refused Prescriptions Disp Refills    LORazepam (ATIVAN) 1 MG tablet 90 tablet 2     Sig: Take 1 tablet by mouth every 8 hours as needed for Anxiety for up to 30 days. Please approve or refuse this medication.    Luba Morin MA

## 2022-08-02 ENCOUNTER — OFFICE VISIT (OUTPATIENT)
Dept: SURGERY | Facility: CLINIC | Age: 52
End: 2022-08-02

## 2022-08-02 VITALS
BODY MASS INDEX: 24.73 KG/M2 | HEIGHT: 62 IN | SYSTOLIC BLOOD PRESSURE: 132 MMHG | WEIGHT: 134.4 LBS | OXYGEN SATURATION: 98 % | TEMPERATURE: 98.2 F | RESPIRATION RATE: 18 BRPM | HEART RATE: 82 BPM | DIASTOLIC BLOOD PRESSURE: 70 MMHG

## 2022-08-02 DIAGNOSIS — S20.01XA TRAUMATIC HEMATOMA OF RIGHT FEMALE BREAST: ICD-10-CM

## 2022-08-02 DIAGNOSIS — K74.60 CIRRHOSIS OF LIVER WITHOUT ASCITES, UNSPECIFIED HEPATIC CIRRHOSIS TYPE: ICD-10-CM

## 2022-08-02 DIAGNOSIS — S22.43XA CLOSED FRACTURE OF MULTIPLE RIBS OF BOTH SIDES, INITIAL ENCOUNTER: ICD-10-CM

## 2022-08-02 DIAGNOSIS — S36.892A CONTUSION OF MESENTERY, INITIAL ENCOUNTER: Primary | ICD-10-CM

## 2022-08-02 PROCEDURE — 99213 OFFICE O/P EST LOW 20 MIN: CPT | Performed by: SPECIALIST
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
## 2022-08-04 RX ORDER — HYDROCODONE BITARTRATE AND ACETAMINOPHEN 7.5; 325 MG/1; MG/1
1 TABLET ORAL EVERY 6 HOURS PRN
Qty: 30 TABLET | Refills: 0 | Status: SHIPPED | OUTPATIENT
Start: 2022-08-04 | End: 2022-09-01

## 2022-08-07 DIAGNOSIS — F32.9 REACTIVE DEPRESSION: ICD-10-CM

## 2022-08-08 RX ORDER — FLUOXETINE HYDROCHLORIDE 40 MG/1
CAPSULE ORAL
Qty: 30 CAPSULE | Refills: 5 | OUTPATIENT
Start: 2022-08-08

## 2022-08-25 DIAGNOSIS — F32.9 REACTIVE DEPRESSION: ICD-10-CM

## 2022-08-25 NOTE — TELEPHONE ENCOUNTER
Juanito Robles called to request a refill on her medication.       Last office visit : 7/7/2022   Next office visit : Visit date not found     Requested Prescriptions     Pending Prescriptions Disp Refills    FLUoxetine (PROZAC) 40 MG capsule [Pharmacy Med Name: FLUOXETINE HCL 40 MG CAPSULE] 30 capsule 5     Sig: TAKE 1 CAPSULE BY MOUTH EVERY DAY            Balbir Snyder MA

## 2022-08-26 RX ORDER — FLUOXETINE HYDROCHLORIDE 40 MG/1
CAPSULE ORAL
Qty: 30 CAPSULE | Refills: 5 | OUTPATIENT
Start: 2022-08-26

## 2022-09-01 ENCOUNTER — OFFICE VISIT (OUTPATIENT)
Dept: SURGERY | Facility: CLINIC | Age: 52
End: 2022-09-01

## 2022-09-01 VITALS
HEIGHT: 62 IN | BODY MASS INDEX: 24.66 KG/M2 | WEIGHT: 134 LBS | SYSTOLIC BLOOD PRESSURE: 136 MMHG | TEMPERATURE: 97.7 F | HEART RATE: 78 BPM | OXYGEN SATURATION: 95 % | DIASTOLIC BLOOD PRESSURE: 76 MMHG

## 2022-09-01 DIAGNOSIS — S20.01XA TRAUMATIC HEMATOMA OF RIGHT FEMALE BREAST: Primary | ICD-10-CM

## 2022-09-01 DIAGNOSIS — S36.892A CONTUSION OF MESENTERY, INITIAL ENCOUNTER: ICD-10-CM

## 2022-09-01 PROCEDURE — 99213 OFFICE O/P EST LOW 20 MIN: CPT | Performed by: SPECIALIST

## 2022-09-01 RX ORDER — ONDANSETRON 4 MG/1
TABLET, ORALLY DISINTEGRATING ORAL
COMMUNITY
Start: 2022-08-25

## 2022-09-01 RX ORDER — FEXOFENADINE HYDROCHLORIDE 60 MG/1
TABLET, FILM COATED ORAL
COMMUNITY

## 2022-09-01 RX ORDER — BUPROPION HYDROCHLORIDE 300 MG/1
300 TABLET ORAL EVERY MORNING
COMMUNITY
Start: 2022-07-26

## 2022-09-01 RX ORDER — MULTIPLE VITAMINS W/ MINERALS TAB 9MG-400MCG
1 TAB ORAL DAILY
COMMUNITY

## 2022-09-01 RX ORDER — FLUOXETINE HYDROCHLORIDE 40 MG/1
1 CAPSULE ORAL DAILY
COMMUNITY
Start: 2021-10-03

## 2022-09-01 RX ORDER — BUPROPION HYDROCHLORIDE 150 MG/1
TABLET ORAL
COMMUNITY
Start: 2022-08-19

## 2022-09-01 RX ORDER — CARVEDILOL 12.5 MG/1
1 TABLET ORAL 2 TIMES DAILY
COMMUNITY
Start: 2021-07-29

## 2022-09-01 NOTE — PROGRESS NOTES
Patient: Denise Fields    YOB: 1970    Date: 09/01/2022    Primary Care Provider: Gaby Cedeño APRN    Chief Complaint   Patient presents with   • Follow-up       Subjective .     History of present illness:   She is continue to improve.  She is getting around okay still has some mild soreness from time to time.  No cough no hemoptysis no shortness of breath.  No abdominal pain or change in her bowels.    The following portions of the patient's history were reviewed and updated as appropriate: allergies, current medications, past family history, past medical history, past social history, past surgical history and problem list.      History:  Past Medical History:   Diagnosis Date   • Acoustic neuroma (HCC)    • ADD (attention deficit disorder)    • Colon polyp    • Hypertension           Past Surgical History:   Procedure Laterality Date   • COLONOSCOPY  02/18/2016   • CRANIOTOMY     • UPPER GASTROINTESTINAL ENDOSCOPY  02/18/2016       Family History   Problem Relation Age of Onset   • Breast cancer Neg Hx        Social History     Tobacco Use   • Smoking status: Never Smoker   • Smokeless tobacco: Never Used   Substance Use Topics   • Alcohol use: Yes   • Drug use: Never       Allergies:  No Known Allergies    Medications:     Current Outpatient Medications:   •  buPROPion XL (WELLBUTRIN XL) 150 MG 24 hr tablet, TAKE 1 TABLET BY MOUTH DAILY IN THE MORNING, Disp: , Rfl:   •  buPROPion XL (WELLBUTRIN XL) 300 MG 24 hr tablet, Take 300 mg by mouth Every Morning., Disp: , Rfl:   •  carvedilol (COREG) 12.5 MG tablet, Take 1 tablet by mouth 2 (Two) Times a Day., Disp: , Rfl:   •  cetirizine (zyrTEC) 10 MG tablet, Take 10 mg by mouth daily, Disp: , Rfl:   •  fexofenadine (ALLEGRA) 60 MG tablet, Take  by mouth., Disp: , Rfl:   •  FLUoxetine (PROzac) 40 MG capsule, Take 1 capsule by mouth Daily., Disp: , Rfl:   •  levothyroxine (SYNTHROID, LEVOTHROID) 100 MCG tablet, Take 100 mcg by mouth Daily, Disp: ,  "Rfl:   •  multivitamin with minerals tablet tablet, Take 1 tablet by mouth Daily., Disp: , Rfl:   •  ondansetron ODT (ZOFRAN-ODT) 4 MG disintegrating tablet, DISSOLVE 1 TABLET BY MOUTH 3 TIMES DAILY AS NEEDED FOR NAUSEA OR VOMITING, Disp: , Rfl:   •  pantoprazole (PROTONIX) 40 MG EC tablet, Take 40 mg by mouth daily, Disp: , Rfl:     Objective     Vital Signs:   Vitals:    09/01/22 0940   BP: 136/76   Pulse: 78   Temp: 97.7 °F (36.5 °C)   SpO2: 95%   Weight: 60.8 kg (134 lb)   Height: 157.5 cm (62\")       Physical Exam:     General Appearance:    Alert, cooperative, in no acute distress   Head:    Normocephalic, without obvious abnormality, atraumatic   Eyes:            Lids and lashes normal, conjunctivae and sclerae normal, no  icterus, no pallor, corneas clear,   Ears:    Ears appear intact with no abnormalities noted   Breast:    The right breast hematoma has decreased in size but still present in the medial right breast.  There is some evolving ecchymosis overlying this area as well as in the nipple but the remaining hematoma or ecchymosis to the skin of the breast has resolved   Lungs:     Clear to auscultation,respirations regular, even and              Unlabored    Heart:    Regular rhythm and normal rate, no murmur, no gallop.   Chest Wall:    No abnormalities observed   Abdomen    Rectal:    Soft positive for hepatomegaly but no tenderness    Deferred   Extremities:   Moves all extremities well, no edema, no cyanosis, no          redness   Pulses:   Pulses palpable and equal bilaterally   Skin:   No bleeding, bruising or rash   Lymph nodes:   No palpable adenopathy   Neurologic:   Cranial nerves 2 - 12 grossly intact.         Results Review:   I reviewed the patient's new clinical results.        Assessment / Plan:    Diagnoses and all orders for this visit:    1. Traumatic hematoma of right female breast (Primary)    2. Contusion of mesentery, initial encounter        BMI is within normal parameters. No " other follow-up for BMI required.    #1 breast hematoma-this should continue to resolve although it still fairly sizable and will take probably several more months for complete resolution.  I do not think getting a mammogram at this stage would be wise but I have stressed to her the need for ongoing mammographic surveillance.  She has no family history or personal history of breast issues and her mammograms reportedly have been normal I think it is reasonable to wait a month or 2 before getting it but again I have stressed to her the need for ongoing mammograms    2.  Mesenteric hematoma.  She is having no symptoms from this and it has  most likely has fully resolved    Follow-up with me on a as needed basis          Electronically signed by Latrice Saul MD  09/01/22  09:55 CDT

## 2023-06-09 ENCOUNTER — TRANSCRIBE ORDERS (OUTPATIENT)
Dept: ADMINISTRATIVE | Facility: HOSPITAL | Age: 53
End: 2023-06-09
Payer: COMMERCIAL

## 2023-06-09 DIAGNOSIS — D33.3 ACOUSTIC NEUROMA: ICD-10-CM

## 2023-06-09 DIAGNOSIS — R74.8 ELEVATED LIVER ENZYMES: ICD-10-CM

## 2023-06-09 DIAGNOSIS — E03.9 ACQUIRED HYPOTHYROIDISM: Primary | ICD-10-CM

## 2023-08-02 ENCOUNTER — OFFICE VISIT (OUTPATIENT)
Dept: NEUROLOGY | Age: 53
End: 2023-08-02
Payer: COMMERCIAL

## 2023-08-02 VITALS
SYSTOLIC BLOOD PRESSURE: 128 MMHG | HEART RATE: 77 BPM | BODY MASS INDEX: 23.19 KG/M2 | WEIGHT: 126 LBS | DIASTOLIC BLOOD PRESSURE: 80 MMHG | HEIGHT: 62 IN

## 2023-08-02 DIAGNOSIS — R26.89 IMBALANCE: ICD-10-CM

## 2023-08-02 DIAGNOSIS — G62.9 NEUROPATHY: Primary | ICD-10-CM

## 2023-08-02 DIAGNOSIS — G51.0 FACIAL PALSY: ICD-10-CM

## 2023-08-02 DIAGNOSIS — D36.10 SCHWANNOMA: ICD-10-CM

## 2023-08-02 DIAGNOSIS — H91.91 HEARING LOSS OF RIGHT EAR, UNSPECIFIED HEARING LOSS TYPE: ICD-10-CM

## 2023-08-02 PROCEDURE — 3074F SYST BP LT 130 MM HG: CPT | Performed by: PSYCHIATRY & NEUROLOGY

## 2023-08-02 PROCEDURE — 99204 OFFICE O/P NEW MOD 45 MIN: CPT | Performed by: PSYCHIATRY & NEUROLOGY

## 2023-08-02 PROCEDURE — 3079F DIAST BP 80-89 MM HG: CPT | Performed by: PSYCHIATRY & NEUROLOGY

## 2023-08-02 RX ORDER — LEVOTHYROXINE SODIUM 0.2 MG/1
200 TABLET ORAL DAILY
COMMUNITY

## 2023-08-02 RX ORDER — LIOTHYRONINE SODIUM 5 UG/1
5 TABLET ORAL DAILY
COMMUNITY

## 2023-08-02 NOTE — PROGRESS NOTES
Chief Complaint   Patient presents with    New Patient     Referred back by Ronnell for benign neoplasm of cranial nerve        Pebbles Grady is a 47y.o. year old female was seen recently in 2019 for evaluation of numbness and tingling in the feet. This had been present for about 1 year prior to that. Initially it started in the right foot but then moved to the left foot. It was primally in the feet. There was no back pain, weakness or involvement of the arms. She denied diplopia, dysarthria, or dysphagia. Her mother also had neuropathy. The patient worked as a  but denied any history of chemical or toxins or chronic alcohol use. Activity might make it worse and rest might improve it. At times it was painful. Her EMG with nerve conduction study at that time was normal without evidence of a generalized neuropathy. Her extensive blood work was significant for an abnormal serum protein electrophoresis and she was referred to hematology. She did not initiate any medications for neuropathy at that time and has been lost to follow-up. Overall her neuropathic symptoms have been stable. In 2016 underwent a right schwannoma resection. This is quite traumatic for her. 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 She is some chronic issues with balance. She recently underwent MRI of the brain which revealed some postoperative changes with no recurrence of tumor.       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

## 2023-08-02 NOTE — PROGRESS NOTES
Review of Systems    Constitutional - yes fever or chills. No diaphoresis yes significant fatigue. HENT -  No tinnitus or significant hearing loss. Eyes - no sudden vision change or eye pain  Respiratory - no significant shortness of breath or cough  Cardiovascular - no chest pain No palpitations or significant leg swelling  Gastrointestinal - no abdominal swelling or pain. Genitourinary - No difficulty urinating, dysuria  Musculoskeletal - no back pain or myalgia. Skin - no color change or rash  Neurologic - No seizures. No lateralizing weakness. Hematologic - yes easy bruising or excessive bleeding. Psychiatric - yes severe anxiety or nervousness. All other review of systems are negative.

## 2023-09-10 NOTE — TELEPHONE ENCOUNTER
Called reminder appointment, asked also if patient has been sick or around anyone that has been sick, if so , please call and change the appointment, if not we will see them on the appointment date. Left message on patient voice mail. 5

## 2023-10-04 ENCOUNTER — APPOINTMENT (OUTPATIENT)
Dept: CT IMAGING | Age: 53
DRG: 101 | End: 2023-10-04
Payer: COMMERCIAL

## 2023-10-04 ENCOUNTER — APPOINTMENT (OUTPATIENT)
Dept: GENERAL RADIOLOGY | Age: 53
DRG: 101 | End: 2023-10-04
Payer: COMMERCIAL

## 2023-10-04 ENCOUNTER — HOSPITAL ENCOUNTER (INPATIENT)
Age: 53
LOS: 1 days | Discharge: HOME | DRG: 101 | End: 2023-10-06
Attending: PEDIATRICS
Payer: COMMERCIAL

## 2023-10-04 DIAGNOSIS — R56.9 SEIZURES (HCC): Primary | ICD-10-CM

## 2023-10-04 DIAGNOSIS — E87.20 METABOLIC ACIDOSIS: ICD-10-CM

## 2023-10-04 DIAGNOSIS — G93.41 METABOLIC ENCEPHALOPATHY: ICD-10-CM

## 2023-10-04 LAB
ALBUMIN SERPL-MCNC: 4.2 G/DL (ref 3.5–5.2)
ALP SERPL-CCNC: 208 U/L (ref 35–104)
ALT SERPL-CCNC: 43 U/L (ref 5–33)
AMMONIA PLAS-SCNC: 143 UMOL/L (ref 11–51)
AMPHET UR QL SCN: POSITIVE
ANION GAP SERPL CALCULATED.3IONS-SCNC: 24 MMOL/L (ref 7–19)
AST SERPL-CCNC: 104 U/L (ref 5–32)
BACTERIA #/AREA URNS HPF: ABNORMAL /HPF
BARBITURATES UR QL SCN: NEGATIVE
BASOPHILS # BLD: 0.1 K/UL (ref 0–0.2)
BASOPHILS NFR BLD: 0.8 % (ref 0–1)
BENZODIAZ UR QL SCN: POSITIVE
BILIRUB SERPL-MCNC: 2.8 MG/DL (ref 0.2–1.2)
BILIRUB UR QL STRIP: ABNORMAL
BUN SERPL-MCNC: 20 MG/DL (ref 6–20)
BUPRENORPHINE URINE: NEGATIVE
CALCIUM SERPL-MCNC: 9.8 MG/DL (ref 8.6–10)
CANNABINOIDS UR QL SCN: POSITIVE
CHLORIDE SERPL-SCNC: 97 MMOL/L (ref 98–111)
CLARITY UR: ABNORMAL
CO2 SERPL-SCNC: 15 MMOL/L (ref 22–29)
COCAINE UR QL SCN: NEGATIVE
COLOR UR: ABNORMAL
CREAT SERPL-MCNC: 1.2 MG/DL (ref 0.5–0.9)
DRUG SCREEN COMMENT UR-IMP: ABNORMAL
EOSINOPHIL # BLD: 0.2 K/UL (ref 0–0.6)
EOSINOPHIL NFR BLD: 2 % (ref 0–5)
ERYTHROCYTE [DISTWIDTH] IN BLOOD BY AUTOMATED COUNT: 14.8 % (ref 11.5–14.5)
ETHANOLAMINE SERPL-MCNC: <10 MG/DL (ref 0–0.08)
FENTANYL SCREEN, URINE: NEGATIVE
GLUCOSE SERPL-MCNC: 115 MG/DL (ref 74–109)
GLUCOSE UR STRIP.AUTO-MCNC: NEGATIVE MG/DL
HCT VFR BLD AUTO: 37.8 % (ref 37–47)
HGB BLD-MCNC: 12.7 G/DL (ref 12–16)
HGB UR STRIP.AUTO-MCNC: NEGATIVE MG/L
HYALINE CASTS #/AREA URNS LPF: ABNORMAL /LPF (ref 0–5)
IMM GRANULOCYTES # BLD: 0 K/UL
KETONES UR STRIP.AUTO-MCNC: ABNORMAL MG/DL
LEUKOCYTE ESTERASE UR QL STRIP.AUTO: ABNORMAL
LYMPHOCYTES # BLD: 2.1 K/UL (ref 1.1–4.5)
LYMPHOCYTES NFR BLD: 21.1 % (ref 20–40)
MAGNESIUM SERPL-MCNC: 1.8 MG/DL (ref 1.6–2.6)
MCH RBC QN AUTO: 34.2 PG (ref 27–31)
MCHC RBC AUTO-ENTMCNC: 33.6 G/DL (ref 33–37)
MCV RBC AUTO: 101.9 FL (ref 81–99)
METHADONE UR QL SCN: NEGATIVE
METHAMPHETAMINE, URINE: NEGATIVE
MONOCYTES # BLD: 1.3 K/UL (ref 0–0.9)
MONOCYTES NFR BLD: 13.6 % (ref 0–10)
NEUTROPHILS # BLD: 6.1 K/UL (ref 1.5–7.5)
NEUTS SEG NFR BLD: 62.2 % (ref 50–65)
NITRITE UR QL STRIP.AUTO: NEGATIVE
OPIATES UR QL SCN: POSITIVE
OXYCODONE UR QL SCN: NEGATIVE
PCP UR QL SCN: NEGATIVE
PH UR STRIP.AUTO: 6 [PH] (ref 5–8)
PLATELET # BLD AUTO: 159 K/UL (ref 130–400)
PMV BLD AUTO: 10.5 FL (ref 9.4–12.3)
POTASSIUM SERPL-SCNC: 4.3 MMOL/L (ref 3.5–5)
PROLACTIN SERPL-MCNC: 116.8 NG/ML (ref 4.79–23.3)
PROT SERPL-MCNC: 9.2 G/DL (ref 6.6–8.7)
PROT UR STRIP.AUTO-MCNC: 30 MG/DL
RBC # BLD AUTO: 3.71 M/UL (ref 4.2–5.4)
RBC #/AREA URNS HPF: ABNORMAL /HPF (ref 0–2)
SARS-COV-2 RDRP RESP QL NAA+PROBE: NOT DETECTED
SODIUM SERPL-SCNC: 136 MMOL/L (ref 136–145)
SP GR UR STRIP.AUTO: 1.03 (ref 1–1.03)
SQUAMOUS #/AREA URNS HPF: ABNORMAL /HPF
TRICYCLIC, URINE: NEGATIVE
UROBILINOGEN UR STRIP.AUTO-MCNC: 2 E.U./DL
WBC # BLD AUTO: 9.9 K/UL (ref 4.8–10.8)
WBC #/AREA URNS HPF: ABNORMAL /HPF (ref 0–5)

## 2023-10-04 PROCEDURE — 70250 X-RAY EXAM OF SKULL: CPT

## 2023-10-04 PROCEDURE — 80053 COMPREHEN METABOLIC PANEL: CPT

## 2023-10-04 PROCEDURE — 36415 COLL VENOUS BLD VENIPUNCTURE: CPT

## 2023-10-04 PROCEDURE — 83036 HEMOGLOBIN GLYCOSYLATED A1C: CPT

## 2023-10-04 PROCEDURE — 83735 ASSAY OF MAGNESIUM: CPT

## 2023-10-04 PROCEDURE — 96375 TX/PRO/DX INJ NEW DRUG ADDON: CPT

## 2023-10-04 PROCEDURE — 82077 ASSAY SPEC XCP UR&BREATH IA: CPT

## 2023-10-04 PROCEDURE — 84146 ASSAY OF PROLACTIN: CPT

## 2023-10-04 PROCEDURE — 87635 SARS-COV-2 COVID-19 AMP PRB: CPT

## 2023-10-04 PROCEDURE — 96374 THER/PROPH/DIAG INJ IV PUSH: CPT

## 2023-10-04 PROCEDURE — 70450 CT HEAD/BRAIN W/O DYE: CPT

## 2023-10-04 PROCEDURE — 85025 COMPLETE CBC W/AUTO DIFF WBC: CPT

## 2023-10-04 PROCEDURE — 72125 CT NECK SPINE W/O DYE: CPT

## 2023-10-04 PROCEDURE — 99285 EMERGENCY DEPT VISIT HI MDM: CPT

## 2023-10-04 PROCEDURE — 6360000002 HC RX W HCPCS

## 2023-10-04 RX ORDER — LORAZEPAM 2 MG/ML
1 INJECTION INTRAMUSCULAR ONCE
Status: DISCONTINUED | OUTPATIENT
Start: 2023-10-04 | End: 2023-10-04

## 2023-10-04 RX ORDER — LORAZEPAM 2 MG/ML
INJECTION INTRAMUSCULAR
Status: COMPLETED
Start: 2023-10-04 | End: 2023-10-04

## 2023-10-04 RX ORDER — LEVETIRACETAM 500 MG/5ML
INJECTION, SOLUTION, CONCENTRATE INTRAVENOUS
Status: COMPLETED
Start: 2023-10-04 | End: 2023-10-04

## 2023-10-04 RX ORDER — LORAZEPAM 2 MG/ML
2 INJECTION INTRAMUSCULAR ONCE
Status: COMPLETED | OUTPATIENT
Start: 2023-10-04 | End: 2023-10-04

## 2023-10-04 RX ORDER — LEVETIRACETAM 500 MG/5ML
1000 INJECTION, SOLUTION, CONCENTRATE INTRAVENOUS ONCE
Status: COMPLETED | OUTPATIENT
Start: 2023-10-04 | End: 2023-10-04

## 2023-10-04 RX ADMIN — LEVETIRACETAM 1000 MG: 500 INJECTION, SOLUTION, CONCENTRATE INTRAVENOUS at 22:50

## 2023-10-04 RX ADMIN — LEVETIRACETAM 1000 MG: 100 INJECTION INTRAVENOUS at 22:50

## 2023-10-04 RX ADMIN — LORAZEPAM 2 MG: 2 INJECTION INTRAMUSCULAR; INTRAVENOUS at 22:48

## 2023-10-04 RX ADMIN — LORAZEPAM 2 MG: 2 INJECTION INTRAMUSCULAR at 22:48

## 2023-10-04 ASSESSMENT — PAIN - FUNCTIONAL ASSESSMENT: PAIN_FUNCTIONAL_ASSESSMENT: NONE - DENIES PAIN

## 2023-10-05 PROBLEM — R56.9 NEW ONSET SEIZURE (HCC): Status: ACTIVE | Noted: 2023-10-05

## 2023-10-05 LAB
25(OH)D3 SERPL-MCNC: 85.3 NG/ML
ALBUMIN SERPL-MCNC: 3.8 G/DL (ref 3.5–5.2)
ALP SERPL-CCNC: 177 U/L (ref 35–104)
ALT SERPL-CCNC: 35 U/L (ref 5–33)
AMMONIA PLAS-SCNC: 62 UMOL/L (ref 11–51)
ANION GAP SERPL CALCULATED.3IONS-SCNC: 14 MMOL/L (ref 7–19)
AST SERPL-CCNC: 81 U/L (ref 5–32)
BILIRUB SERPL-MCNC: 2.5 MG/DL (ref 0.2–1.2)
BUN SERPL-MCNC: 18 MG/DL (ref 6–20)
CALCIUM SERPL-MCNC: 8.8 MG/DL (ref 8.6–10)
CHLORIDE SERPL-SCNC: 100 MMOL/L (ref 98–111)
CHOLEST SERPL-MCNC: 163 MG/DL (ref 160–199)
CO2 SERPL-SCNC: 22 MMOL/L (ref 22–29)
CREAT SERPL-MCNC: 0.8 MG/DL (ref 0.5–0.9)
ERYTHROCYTE [DISTWIDTH] IN BLOOD BY AUTOMATED COUNT: 15 % (ref 11.5–14.5)
GLUCOSE SERPL-MCNC: 135 MG/DL (ref 74–109)
HBA1C MFR BLD: 4.7 % (ref 4–6)
HCT VFR BLD AUTO: 34.8 % (ref 37–47)
HDLC SERPL-MCNC: 48 MG/DL (ref 65–121)
HGB BLD-MCNC: 11.2 G/DL (ref 12–16)
LACTATE BLDV-SCNC: 0.8 MMOL/L (ref 0.5–1.9)
LDLC SERPL CALC-MCNC: 101 MG/DL
LIPASE SERPL-CCNC: 136 U/L (ref 13–60)
MAGNESIUM SERPL-MCNC: 1.9 MG/DL (ref 1.6–2.6)
MCH RBC QN AUTO: 34.9 PG (ref 27–31)
MCHC RBC AUTO-ENTMCNC: 32.2 G/DL (ref 33–37)
MCV RBC AUTO: 108.4 FL (ref 81–99)
PLATELET # BLD AUTO: 121 K/UL (ref 130–400)
PMV BLD AUTO: 11.2 FL (ref 9.4–12.3)
POTASSIUM SERPL-SCNC: 3.3 MMOL/L (ref 3.5–5)
PROT SERPL-MCNC: 8.1 G/DL (ref 6.6–8.7)
RBC # BLD AUTO: 3.21 M/UL (ref 4.2–5.4)
SODIUM SERPL-SCNC: 136 MMOL/L (ref 136–145)
TRIGL SERPL-MCNC: 72 MG/DL (ref 0–149)
TSH SERPL DL<=0.005 MIU/L-ACNC: 1.03 UIU/ML (ref 0.27–4.2)
WBC # BLD AUTO: 6.9 K/UL (ref 4.8–10.8)

## 2023-10-05 PROCEDURE — 2580000003 HC RX 258: Performed by: HOSPITALIST

## 2023-10-05 PROCEDURE — 82306 VITAMIN D 25 HYDROXY: CPT

## 2023-10-05 PROCEDURE — 2100000000 HC CCU R&B

## 2023-10-05 PROCEDURE — 80307 DRUG TEST PRSMV CHEM ANLYZR: CPT

## 2023-10-05 PROCEDURE — 80061 LIPID PANEL: CPT

## 2023-10-05 PROCEDURE — 83605 ASSAY OF LACTIC ACID: CPT

## 2023-10-05 PROCEDURE — 95816 EEG AWAKE AND DROWSY: CPT | Performed by: PSYCHIATRY & NEUROLOGY

## 2023-10-05 PROCEDURE — 87086 URINE CULTURE/COLONY COUNT: CPT

## 2023-10-05 PROCEDURE — 95816 EEG AWAKE AND DROWSY: CPT

## 2023-10-05 PROCEDURE — 80053 COMPREHEN METABOLIC PANEL: CPT

## 2023-10-05 PROCEDURE — 99223 1ST HOSP IP/OBS HIGH 75: CPT | Performed by: PSYCHIATRY & NEUROLOGY

## 2023-10-05 PROCEDURE — 83735 ASSAY OF MAGNESIUM: CPT

## 2023-10-05 PROCEDURE — 6370000000 HC RX 637 (ALT 250 FOR IP): Performed by: HOSPITALIST

## 2023-10-05 PROCEDURE — 2500000003 HC RX 250 WO HCPCS: Performed by: HOSPITALIST

## 2023-10-05 PROCEDURE — 83690 ASSAY OF LIPASE: CPT

## 2023-10-05 PROCEDURE — 94760 N-INVAS EAR/PLS OXIMETRY 1: CPT

## 2023-10-05 PROCEDURE — 82140 ASSAY OF AMMONIA: CPT

## 2023-10-05 PROCEDURE — 36415 COLL VENOUS BLD VENIPUNCTURE: CPT

## 2023-10-05 PROCEDURE — 6370000000 HC RX 637 (ALT 250 FOR IP): Performed by: PSYCHIATRY & NEUROLOGY

## 2023-10-05 PROCEDURE — 81001 URINALYSIS AUTO W/SCOPE: CPT

## 2023-10-05 PROCEDURE — 2700000000 HC OXYGEN THERAPY PER DAY

## 2023-10-05 PROCEDURE — 85027 COMPLETE CBC AUTOMATED: CPT

## 2023-10-05 PROCEDURE — 84443 ASSAY THYROID STIM HORMONE: CPT

## 2023-10-05 RX ORDER — LEVOTHYROXINE SODIUM 0.1 MG/1
200 TABLET ORAL DAILY
Status: DISCONTINUED | OUTPATIENT
Start: 2023-10-05 | End: 2023-10-06 | Stop reason: HOSPADM

## 2023-10-05 RX ORDER — SODIUM CHLORIDE 0.9 % (FLUSH) 0.9 %
5-40 SYRINGE (ML) INJECTION EVERY 12 HOURS SCHEDULED
Status: DISCONTINUED | OUTPATIENT
Start: 2023-10-05 | End: 2023-10-06 | Stop reason: HOSPADM

## 2023-10-05 RX ORDER — GAUZE BANDAGE 2" X 2"
100 BANDAGE TOPICAL DAILY
Status: DISCONTINUED | OUTPATIENT
Start: 2023-10-05 | End: 2023-10-06 | Stop reason: HOSPADM

## 2023-10-05 RX ORDER — LORAZEPAM 2 MG/1
2 TABLET ORAL
Status: DISCONTINUED | OUTPATIENT
Start: 2023-10-05 | End: 2023-10-06 | Stop reason: HOSPADM

## 2023-10-05 RX ORDER — LORAZEPAM 2 MG/ML
1 INJECTION INTRAMUSCULAR
Status: DISCONTINUED | OUTPATIENT
Start: 2023-10-05 | End: 2023-10-06 | Stop reason: HOSPADM

## 2023-10-05 RX ORDER — SODIUM CHLORIDE 9 MG/ML
INJECTION, SOLUTION INTRAVENOUS PRN
Status: DISCONTINUED | OUTPATIENT
Start: 2023-10-05 | End: 2023-10-06 | Stop reason: HOSPADM

## 2023-10-05 RX ORDER — LORAZEPAM 2 MG/ML
4 INJECTION INTRAMUSCULAR
Status: DISCONTINUED | OUTPATIENT
Start: 2023-10-05 | End: 2023-10-05

## 2023-10-05 RX ORDER — SODIUM CHLORIDE 9 MG/ML
INJECTION, SOLUTION INTRAVENOUS CONTINUOUS
Status: DISCONTINUED | OUTPATIENT
Start: 2023-10-05 | End: 2023-10-05

## 2023-10-05 RX ORDER — FLUOXETINE HYDROCHLORIDE 20 MG/1
40 CAPSULE ORAL DAILY
Status: DISCONTINUED | OUTPATIENT
Start: 2023-10-05 | End: 2023-10-06 | Stop reason: HOSPADM

## 2023-10-05 RX ORDER — FOLIC ACID 1 MG/1
1 TABLET ORAL DAILY
Status: DISCONTINUED | OUTPATIENT
Start: 2023-10-05 | End: 2023-10-06 | Stop reason: HOSPADM

## 2023-10-05 RX ORDER — LORAZEPAM 2 MG/ML
2 INJECTION INTRAMUSCULAR
Status: DISCONTINUED | OUTPATIENT
Start: 2023-10-05 | End: 2023-10-06 | Stop reason: HOSPADM

## 2023-10-05 RX ORDER — ACETAMINOPHEN 325 MG/1
650 TABLET ORAL EVERY 6 HOURS PRN
Status: DISCONTINUED | OUTPATIENT
Start: 2023-10-05 | End: 2023-10-05

## 2023-10-05 RX ORDER — ENOXAPARIN SODIUM 100 MG/ML
40 INJECTION SUBCUTANEOUS DAILY
Status: DISCONTINUED | OUTPATIENT
Start: 2023-10-06 | End: 2023-10-06 | Stop reason: HOSPADM

## 2023-10-05 RX ORDER — ENOXAPARIN SODIUM 100 MG/ML
40 INJECTION SUBCUTANEOUS DAILY
Status: DISCONTINUED | OUTPATIENT
Start: 2023-10-05 | End: 2023-10-05

## 2023-10-05 RX ORDER — ONDANSETRON 4 MG/1
4 TABLET, ORALLY DISINTEGRATING ORAL EVERY 8 HOURS PRN
Status: DISCONTINUED | OUTPATIENT
Start: 2023-10-05 | End: 2023-10-06 | Stop reason: HOSPADM

## 2023-10-05 RX ORDER — SODIUM CHLORIDE 0.9 % (FLUSH) 0.9 %
5-40 SYRINGE (ML) INJECTION PRN
Status: DISCONTINUED | OUTPATIENT
Start: 2023-10-05 | End: 2023-10-06 | Stop reason: HOSPADM

## 2023-10-05 RX ORDER — SODIUM BICARBONATE 650 MG/1
650 TABLET ORAL 4 TIMES DAILY
Status: DISCONTINUED | OUTPATIENT
Start: 2023-10-05 | End: 2023-10-06 | Stop reason: HOSPADM

## 2023-10-05 RX ORDER — LACTULOSE 10 G/15ML
30 SOLUTION ORAL EVERY 6 HOURS SCHEDULED
Status: DISCONTINUED | OUTPATIENT
Start: 2023-10-05 | End: 2023-10-05

## 2023-10-05 RX ORDER — LACTULOSE 10 G/15ML
30 SOLUTION ORAL EVERY 6 HOURS SCHEDULED
Status: DISCONTINUED | OUTPATIENT
Start: 2023-10-05 | End: 2023-10-06

## 2023-10-05 RX ORDER — ACETAMINOPHEN 650 MG/1
650 SUPPOSITORY RECTAL EVERY 6 HOURS PRN
Status: DISCONTINUED | OUTPATIENT
Start: 2023-10-05 | End: 2023-10-05

## 2023-10-05 RX ORDER — POLYETHYLENE GLYCOL 3350 17 G/17G
17 POWDER, FOR SOLUTION ORAL DAILY PRN
Status: DISCONTINUED | OUTPATIENT
Start: 2023-10-05 | End: 2023-10-06 | Stop reason: HOSPADM

## 2023-10-05 RX ORDER — BUPROPION HYDROCHLORIDE 150 MG/1
300 TABLET ORAL EVERY MORNING
Status: DISCONTINUED | OUTPATIENT
Start: 2023-10-05 | End: 2023-10-06 | Stop reason: HOSPADM

## 2023-10-05 RX ORDER — LORAZEPAM 1 MG/1
1 TABLET ORAL
Status: DISCONTINUED | OUTPATIENT
Start: 2023-10-05 | End: 2023-10-06 | Stop reason: HOSPADM

## 2023-10-05 RX ORDER — POTASSIUM CHLORIDE 7.45 MG/ML
10 INJECTION INTRAVENOUS PRN
Status: DISCONTINUED | OUTPATIENT
Start: 2023-10-05 | End: 2023-10-06 | Stop reason: HOSPADM

## 2023-10-05 RX ORDER — ONDANSETRON 2 MG/ML
4 INJECTION INTRAMUSCULAR; INTRAVENOUS EVERY 6 HOURS PRN
Status: DISCONTINUED | OUTPATIENT
Start: 2023-10-05 | End: 2023-10-06 | Stop reason: HOSPADM

## 2023-10-05 RX ORDER — PANTOPRAZOLE SODIUM 40 MG/1
40 TABLET, DELAYED RELEASE ORAL
Status: DISCONTINUED | OUTPATIENT
Start: 2023-10-05 | End: 2023-10-06 | Stop reason: HOSPADM

## 2023-10-05 RX ORDER — LAMOTRIGINE 25 MG/1
25 TABLET ORAL DAILY
Status: DISCONTINUED | OUTPATIENT
Start: 2023-10-05 | End: 2023-10-06 | Stop reason: HOSPADM

## 2023-10-05 RX ORDER — ENOXAPARIN SODIUM 100 MG/ML
40 INJECTION SUBCUTANEOUS DAILY
Status: DISCONTINUED | OUTPATIENT
Start: 2023-10-05 | End: 2023-10-05 | Stop reason: SDUPTHER

## 2023-10-05 RX ORDER — MAGNESIUM SULFATE IN WATER 40 MG/ML
2000 INJECTION, SOLUTION INTRAVENOUS PRN
Status: DISCONTINUED | OUTPATIENT
Start: 2023-10-05 | End: 2023-10-06 | Stop reason: HOSPADM

## 2023-10-05 RX ORDER — BISACODYL 5 MG/1
5 TABLET, DELAYED RELEASE ORAL DAILY PRN
Status: DISCONTINUED | OUTPATIENT
Start: 2023-10-05 | End: 2023-10-06 | Stop reason: HOSPADM

## 2023-10-05 RX ORDER — LEVETIRACETAM 500 MG/5ML
1000 INJECTION, SOLUTION, CONCENTRATE INTRAVENOUS EVERY 12 HOURS
Status: DISCONTINUED | OUTPATIENT
Start: 2023-10-06 | End: 2023-10-06 | Stop reason: HOSPADM

## 2023-10-05 RX ORDER — DIPHENHYDRAMINE HCL 25 MG
50 TABLET ORAL NIGHTLY PRN
Status: DISCONTINUED | OUTPATIENT
Start: 2023-10-05 | End: 2023-10-06 | Stop reason: HOSPADM

## 2023-10-05 RX ORDER — CARVEDILOL 12.5 MG/1
12.5 TABLET ORAL 2 TIMES DAILY
Status: DISCONTINUED | OUTPATIENT
Start: 2023-10-05 | End: 2023-10-06 | Stop reason: HOSPADM

## 2023-10-05 RX ORDER — POTASSIUM CHLORIDE 20 MEQ/1
40 TABLET, EXTENDED RELEASE ORAL PRN
Status: DISCONTINUED | OUTPATIENT
Start: 2023-10-05 | End: 2023-10-06 | Stop reason: HOSPADM

## 2023-10-05 RX ORDER — IBUPROFEN 400 MG/1
600 TABLET ORAL EVERY 8 HOURS PRN
Status: DISCONTINUED | OUTPATIENT
Start: 2023-10-05 | End: 2023-10-06 | Stop reason: HOSPADM

## 2023-10-05 RX ORDER — LIOTHYRONINE SODIUM 5 UG/1
5 TABLET ORAL DAILY
Status: DISCONTINUED | OUTPATIENT
Start: 2023-10-05 | End: 2023-10-06 | Stop reason: HOSPADM

## 2023-10-05 RX ORDER — LORAZEPAM 2 MG/ML
3 INJECTION INTRAMUSCULAR
Status: DISCONTINUED | OUTPATIENT
Start: 2023-10-05 | End: 2023-10-06 | Stop reason: HOSPADM

## 2023-10-05 RX ORDER — LORAZEPAM 2 MG/1
4 TABLET ORAL
Status: DISCONTINUED | OUTPATIENT
Start: 2023-10-05 | End: 2023-10-05

## 2023-10-05 RX ADMIN — LACTULOSE 30 G: 20 SOLUTION ORAL at 18:21

## 2023-10-05 RX ADMIN — IBUPROFEN 600 MG: 400 TABLET ORAL at 22:36

## 2023-10-05 RX ADMIN — SODIUM BICARBONATE 650 MG: 650 TABLET, ORALLY DISINTEGRATING ORAL at 13:14

## 2023-10-05 RX ADMIN — SODIUM BICARBONATE 650 MG: 650 TABLET, ORALLY DISINTEGRATING ORAL at 16:26

## 2023-10-05 RX ADMIN — SODIUM BICARBONATE 650 MG: 650 TABLET, ORALLY DISINTEGRATING ORAL at 07:46

## 2023-10-05 RX ADMIN — CARVEDILOL 12.5 MG: 12.5 TABLET, FILM COATED ORAL at 20:05

## 2023-10-05 RX ADMIN — LORAZEPAM 1 MG: 1 TABLET ORAL at 22:03

## 2023-10-05 RX ADMIN — SODIUM CHLORIDE: 9 INJECTION, SOLUTION INTRAVENOUS at 02:01

## 2023-10-05 RX ADMIN — LACTULOSE 30 G: 20 SOLUTION ORAL at 12:18

## 2023-10-05 RX ADMIN — SODIUM BICARBONATE: 84 INJECTION, SOLUTION INTRAVENOUS at 21:49

## 2023-10-05 RX ADMIN — Medication 100 MG: at 10:20

## 2023-10-05 RX ADMIN — PANTOPRAZOLE SODIUM 40 MG: 40 TABLET, DELAYED RELEASE ORAL at 16:26

## 2023-10-05 RX ADMIN — FLUOXETINE 40 MG: 20 CAPSULE ORAL at 10:20

## 2023-10-05 RX ADMIN — FOLIC ACID 1 MG: 1 TABLET ORAL at 10:20

## 2023-10-05 RX ADMIN — LIOTHYRONINE SODIUM 5 MCG: 5 TABLET ORAL at 10:20

## 2023-10-05 RX ADMIN — PANTOPRAZOLE SODIUM 40 MG: 40 TABLET, DELAYED RELEASE ORAL at 06:17

## 2023-10-05 RX ADMIN — SODIUM BICARBONATE 650 MG: 650 TABLET, ORALLY DISINTEGRATING ORAL at 20:05

## 2023-10-05 RX ADMIN — LEVOTHYROXINE SODIUM 200 MCG: 100 TABLET ORAL at 06:17

## 2023-10-05 RX ADMIN — SODIUM CHLORIDE, PRESERVATIVE FREE 10 ML: 5 INJECTION INTRAVENOUS at 20:13

## 2023-10-05 RX ADMIN — LACTULOSE 30 G: 20 SOLUTION ORAL at 04:54

## 2023-10-05 RX ADMIN — LACTULOSE 30 G: 20 SOLUTION ORAL at 22:03

## 2023-10-05 RX ADMIN — IBUPROFEN 600 MG: 400 TABLET ORAL at 16:28

## 2023-10-05 RX ADMIN — IBUPROFEN 600 MG: 400 TABLET ORAL at 03:01

## 2023-10-05 RX ADMIN — CARVEDILOL 12.5 MG: 12.5 TABLET, FILM COATED ORAL at 10:20

## 2023-10-05 RX ADMIN — SODIUM CHLORIDE, PRESERVATIVE FREE 10 ML: 5 INJECTION INTRAVENOUS at 10:22

## 2023-10-05 RX ADMIN — LAMOTRIGINE 25 MG: 25 TABLET ORAL at 10:22

## 2023-10-05 RX ADMIN — SODIUM BICARBONATE: 84 INJECTION, SOLUTION INTRAVENOUS at 06:12

## 2023-10-05 RX ADMIN — SODIUM BICARBONATE: 84 INJECTION, SOLUTION INTRAVENOUS at 20:31

## 2023-10-05 ASSESSMENT — PAIN SCALES - GENERAL
PAINLEVEL_OUTOF10: 4
PAINLEVEL_OUTOF10: 6

## 2023-10-05 ASSESSMENT — PAIN DESCRIPTION - DESCRIPTORS: DESCRIPTORS: ACHING

## 2023-10-05 ASSESSMENT — PAIN DESCRIPTION - ORIENTATION
ORIENTATION: RIGHT;LEFT
ORIENTATION: RIGHT;LEFT

## 2023-10-05 ASSESSMENT — PAIN DESCRIPTION - LOCATION
LOCATION: LEG
LOCATION: LEG

## 2023-10-05 NOTE — CONSULTS
[facility] Neurology Consult  ? ? Patient: Corean Cockayne  MR#: [id number]  Account Number: [de-identified]   Room: 02702-   YOB: 1970  Date of Progress Note: 10/5/2023  Time of Note 7:39 AM  Attending Physician: Angeles Mcclelland MD  Consulting Physician: Pollo Aguiar M.D.  ?  ? CHIEF COMPLAINT: Seizure  ? HISTORY OF PRESENT ILLNESS:   This 47 y.o. female who presents with seizures. She reportedly had 1 at home with eyes deviation and a fall and striking her head. She was brought to the ED where she had a witnessed grand mal seizure and became cyanotic and postictal.  She has a lot of ongoing stressors as well as chronic alcoholic liver disease with elevated ammonia for which she is on lactulose at home. Urine drug screen was positive for amphetamine, benzodiazepine, cannabinoids and opiates. She has a remote history of acoustic neuroma resection on the right and also has a  shunt. The latter was said to be functioning appropriately while in the ED. Case discussed with ED physician. She did have a CT scan of the head to the ED. Used to see a psychiatrist but no longer does. Is on Wellbutrin 300 mg daily. Also takes Prozac  REVIEW OF SYSTEMS:  Constitutional - No fever or chills. No diaphoresis or significant fatigue. HENT - No tinnitus or significant hearing loss. Eyes - no sudden vision change or eye pain  Respiratory - no significant shortness of breath or cough  Cardiovascular - no chest pain No palpitations or significant leg swelling  Gastrointestinal - no abdominal swelling or pain. Genitourinary - No difficulty urinating, dysuria  Musculoskeletal - no back pain or myalgia. Skin - no color change or rash  Neurologic - No headaches no lateralizing weakness. Hematologic - no easy bruising or excessive bleeding. Psychiatric - no severe anxiety or nervousness. All other review of systems are negative. ?   Past Medical History:      Diagnosis Date    Acoustic neuroma ([address])     ADD tested  Coordination  Finger to nose-unremarkable  ? ?  CBC:   Recent Labs     10/04/23  2215 10/05/23  0334   WBC 9.9 6.9   HGB 12.7 11.2*    121*     BMP:   Recent Labs     10/04/23  2215 10/05/23  0649    136   K 4.3 3.3*   CL 97* 100   CO2 15* 22   BUN 20 18   CREATININE 1.2* 0.8   GLUCOSE 115* 135*     Hepatic:   Recent Labs     10/04/23  2215 10/05/23  0649   * 81*   ALT 43* 35*   BILITOT 2.8* 2.5*   ALKPHOS 208* 177*     Lipids:   Recent Labs     10/05/23  0649   CHOL 163   HDL 48*     INR: No results for input(s): \"INR\" in the last 72 hours. ?  ?  Assessment and Plan   1. New onset seizure x2. She denies any signs or symptoms suggestive of complex partial seizures. She does have a history of a craniotomy and alcoholic liver disease. Says her last drink was a few days ago. Denies a history of DTs or withdrawal.  Urine drug screen positive for amphetamine. This could be causative as well. Will check EEG today. She is currently on Keppra 1000 mg twice a day since admission. That may aggravate her agitation and behavioral issues at home but we will keep her on that for now and add low-dose Lamictal.  I would like to ultimately increase her Lamictal as an outpatient and get her on a stable dose of around 100 mg twice a day if it will not affect her liver enzymes and then reduce her Keppra. Lamictal would be a better mood stabilizer for the long run for her behavioral issues. ?  ?       Allyson Logan MD  Board Certified in Neurology

## 2023-10-05 NOTE — PROGRESS NOTES
Comprehensive Nutrition Assessment    Type and Reason for Visit:  Initial, Positive Nutrition Screen    Nutrition Recommendations/Plan:   Follow for advancing diet     Malnutrition Assessment:  Malnutrition Status:  No malnutrition (10/05/23 0815)    Context:  Acute Illness     Findings of the 6 clinical characteristics of malnutrition:  Energy Intake:  Mild decrease in energy intake (Comment)  Weight Loss:  No significant weight loss     Body Fat Loss:  No significant body fat loss     Muscle Mass Loss:  No significant muscle mass loss    Fluid Accumulation:  No significant fluid accumulation Extremities   Strength:  Not Performed    Nutrition Assessment:    +NS for decreased weight and po intake. Weight is stable from last montha and actually approx 6# greater than in August 2023. Pt remains NPO at this time    Nutrition Related Findings:    Glucose 115-135. K+ 3.3 Wound Type: None       Current Nutrition Intake & Therapies:    Average Meal Intake: NPO  Average Supplements Intake: NPO  Diet NPO Exceptions are: Sips of Water with Meds    Anthropometric Measures:  Height: 5' 2\" (157.5 cm)  Ideal Body Weight (IBW): 110 lbs (50 kg)    Admission Body Weight: 127 lb 4.8 oz (57.7 kg)  Current Body Weight: 127 lb 4.8 oz (57.7 kg), 115.7 % IBW. Weight Source: Bed Scale  Current BMI (kg/m2): 23.3  Usual Body Weight: 127 lb 7 oz (57.8 kg) (9/20/2023.      121.7# 8/22/2023)  % Weight Change (Calculated): -0.1  BMI Categories: Normal Weight (BMI 18.5-24. 9)    Estimated Daily Nutrient Needs:  Energy Requirements Based On: Kcal/kg  Weight Used for Energy Requirements: Current  Energy (kcal/day): 0930-1289 kcals (20-25 kcals/kg)  Weight Used for Protein Requirements: Current  Protein (g/day): 87g  Method Used for Fluid Requirements: 1 ml/kcal  Fluid (ml/day): 6398-9576 ml    Nutrition Diagnosis:   Inadequate oral intake related to acute injury/trauma as evidenced by NPO or clear liquid status due to medical condition    Nutrition Interventions:   Food and/or Nutrient Delivery: Continue NPO  Nutrition Education/Counseling: No recommendation at this time  Coordination of Nutrition Care: Continue to monitor while inpatient       Goals:     Goals: Meet at least 75% of estimated needs       Nutrition Monitoring and Evaluation:   Behavioral-Environmental Outcomes: None Identified  Food/Nutrient Intake Outcomes: Diet Advancement/Tolerance  Physical Signs/Symptoms Outcomes: Biochemical Data, Fluid Status or Edema, Weight, Skin    Discharge Planning:     Too soon to determine     Yobani Juan MS, RD, LD  Contact: [phone]

## 2023-10-05 NOTE — PLAN OF CARE
Patient admitted this a.m. Writer assumes care of patient this AM.  Patient seen and examined. Doing well. No further seizure episodes reported this AM.  Patient laying comfortably in bed in no apparent acute distress   Continue current work-up and management, including EEG and electrolyte replacement. Neurology consulted admission.

## 2023-10-05 NOTE — PROGRESS NOTES
Per patient request, this nurse notified Dr. Charis Mendoza of patient's admission to CCU last night.     Electronically signed by Rodriguez Wiley RN on 10/5/2023 at 6:15 AM.

## 2023-10-05 NOTE — ED NOTES
Called Dr. Hicks Meals answering service and he is to call us back.       Annika Douglass  10/04/23 7442

## 2023-10-05 NOTE — PROCEDURES
[facility] [employer] [address], [address] [place] [address]                          ELECTROENCEPHALOGRAM REPORT    PATIENT NAME: Savita Su                       :        1970  MED REC NO:   [id number]                              ROOM:       [facility]  ACCOUNT NO:   [de-identified]                           ADMIT DATE: 10/04/2023  PROVIDER:     Trevor Romo MD    DATE OF EEG:  10/04/2023    INDICATION FOR TEST:  New-onset seizures. DESCRIPTION:  The waking background consists of a rhythmic and symmetric  well-formed and well-regulated posterior dominant 8 to 10 Hz activity. Eye movement is noted frequently. No epileptiform discharges nor any  focal or lateralizing slowing was noted. IMPRESSION:  Normal awake and drowsy EEG.         Trevor Romo MD    D: 10/05/2023 11:48:34      T: 10/05/2023 12:03:13     [id number]  Job#: [id number]     Doc#: [id number]    CC:

## 2023-10-06 VITALS
SYSTOLIC BLOOD PRESSURE: 117 MMHG | HEART RATE: 76 BPM | RESPIRATION RATE: 16 BRPM | DIASTOLIC BLOOD PRESSURE: 78 MMHG | OXYGEN SATURATION: 93 % | HEIGHT: 62 IN | WEIGHT: 127.3 LBS | TEMPERATURE: 98.7 F | BODY MASS INDEX: 23.42 KG/M2

## 2023-10-06 LAB
ALBUMIN SERPL-MCNC: 3 G/DL (ref 3.5–5.2)
ALP SERPL-CCNC: 151 U/L (ref 35–104)
ALT SERPL-CCNC: 31 U/L (ref 5–33)
AMMONIA PLAS-SCNC: 41 UMOL/L (ref 11–51)
ANION GAP SERPL CALCULATED.3IONS-SCNC: 10 MMOL/L (ref 7–19)
ANION GAP SERPL CALCULATED.3IONS-SCNC: 11 MMOL/L (ref 7–19)
AST SERPL-CCNC: 70 U/L (ref 5–32)
BACTERIA UR CULT: NORMAL
BILIRUB SERPL-MCNC: 1.6 MG/DL (ref 0.2–1.2)
BUN SERPL-MCNC: 6 MG/DL (ref 6–20)
BUN SERPL-MCNC: 9 MG/DL (ref 6–20)
CALCIUM SERPL-MCNC: 8.1 MG/DL (ref 8.6–10)
CALCIUM SERPL-MCNC: 8.2 MG/DL (ref 8.6–10)
CHLORIDE SERPL-SCNC: 100 MMOL/L (ref 98–111)
CHLORIDE SERPL-SCNC: 98 MMOL/L (ref 98–111)
CO2 SERPL-SCNC: 29 MMOL/L (ref 22–29)
CO2 SERPL-SCNC: 32 MMOL/L (ref 22–29)
CREAT SERPL-MCNC: 0.6 MG/DL (ref 0.5–0.9)
CREAT SERPL-MCNC: 0.7 MG/DL (ref 0.5–0.9)
ERYTHROCYTE [DISTWIDTH] IN BLOOD BY AUTOMATED COUNT: 14.6 % (ref 11.5–14.5)
GLUCOSE SERPL-MCNC: 120 MG/DL (ref 74–109)
GLUCOSE SERPL-MCNC: 91 MG/DL (ref 74–109)
HCT VFR BLD AUTO: 28.7 % (ref 37–47)
HGB BLD-MCNC: 9.8 G/DL (ref 12–16)
MAGNESIUM SERPL-MCNC: 1.5 MG/DL (ref 1.6–2.6)
MAGNESIUM SERPL-MCNC: 2.5 MG/DL (ref 1.6–2.6)
MCH RBC QN AUTO: 34.6 PG (ref 27–31)
MCHC RBC AUTO-ENTMCNC: 34.1 G/DL (ref 33–37)
MCV RBC AUTO: 101.4 FL (ref 81–99)
PLATELET # BLD AUTO: 85 K/UL (ref 130–400)
PMV BLD AUTO: 10.3 FL (ref 9.4–12.3)
POTASSIUM SERPL-SCNC: 2.9 MMOL/L (ref 3.5–5)
POTASSIUM SERPL-SCNC: 3.7 MMOL/L (ref 3.5–5)
PROT SERPL-MCNC: 6.8 G/DL (ref 6.6–8.7)
RBC # BLD AUTO: 2.83 M/UL (ref 4.2–5.4)
SODIUM SERPL-SCNC: 137 MMOL/L (ref 136–145)
SODIUM SERPL-SCNC: 143 MMOL/L (ref 136–145)
WBC # BLD AUTO: 5.3 K/UL (ref 4.8–10.8)

## 2023-10-06 PROCEDURE — 83735 ASSAY OF MAGNESIUM: CPT

## 2023-10-06 PROCEDURE — 6370000000 HC RX 637 (ALT 250 FOR IP): Performed by: HOSPITALIST

## 2023-10-06 PROCEDURE — 99232 SBSQ HOSP IP/OBS MODERATE 35: CPT | Performed by: PSYCHIATRY & NEUROLOGY

## 2023-10-06 PROCEDURE — 6370000000 HC RX 637 (ALT 250 FOR IP): Performed by: PSYCHIATRY & NEUROLOGY

## 2023-10-06 PROCEDURE — 6360000002 HC RX W HCPCS: Performed by: INTERNAL MEDICINE

## 2023-10-06 PROCEDURE — 6360000002 HC RX W HCPCS: Performed by: HOSPITALIST

## 2023-10-06 PROCEDURE — 82140 ASSAY OF AMMONIA: CPT

## 2023-10-06 PROCEDURE — 80053 COMPREHEN METABOLIC PANEL: CPT

## 2023-10-06 PROCEDURE — 85027 COMPLETE CBC AUTOMATED: CPT

## 2023-10-06 PROCEDURE — 36415 COLL VENOUS BLD VENIPUNCTURE: CPT

## 2023-10-06 RX ORDER — LAMOTRIGINE 25 MG/1
25 TABLET ORAL DAILY
Qty: 30 TABLET | Refills: 0 | Status: SHIPPED | OUTPATIENT
Start: 2023-10-07

## 2023-10-06 RX ORDER — FOLIC ACID 1 MG/1
1 TABLET ORAL DAILY
Qty: 30 TABLET | Refills: 0 | Status: SHIPPED | OUTPATIENT
Start: 2023-10-07

## 2023-10-06 RX ORDER — LEVETIRACETAM 1000 MG/1
1000 TABLET ORAL 2 TIMES DAILY
Qty: 60 TABLET | Refills: 0 | Status: SHIPPED | OUTPATIENT
Start: 2023-10-06

## 2023-10-06 RX ORDER — THIAMINE MONONITRATE (VIT B1) 100 MG
100 TABLET ORAL DAILY
Qty: 30 TABLET | Refills: 0 | Status: SHIPPED | OUTPATIENT
Start: 2023-10-07

## 2023-10-06 RX ORDER — LACTULOSE 10 G/15ML
30 SOLUTION ORAL DAILY
Status: DISCONTINUED | OUTPATIENT
Start: 2023-10-07 | End: 2023-10-06 | Stop reason: HOSPADM

## 2023-10-06 RX ORDER — POTASSIUM CHLORIDE 20 MEQ/1
40 TABLET, EXTENDED RELEASE ORAL DAILY
Qty: 14 TABLET | Refills: 0 | Status: SHIPPED | OUTPATIENT
Start: 2023-10-06

## 2023-10-06 RX ADMIN — CARVEDILOL 12.5 MG: 12.5 TABLET, FILM COATED ORAL at 07:58

## 2023-10-06 RX ADMIN — LACTULOSE 30 G: 20 SOLUTION ORAL at 06:17

## 2023-10-06 RX ADMIN — FOLIC ACID 1 MG: 1 TABLET ORAL at 07:58

## 2023-10-06 RX ADMIN — FLUOXETINE 40 MG: 20 CAPSULE ORAL at 07:58

## 2023-10-06 RX ADMIN — PANTOPRAZOLE SODIUM 40 MG: 40 TABLET, DELAYED RELEASE ORAL at 06:17

## 2023-10-06 RX ADMIN — POTASSIUM CHLORIDE 10 MEQ: 10 INJECTION, SOLUTION INTRAVENOUS at 03:53

## 2023-10-06 RX ADMIN — MAGNESIUM SULFATE HEPTAHYDRATE 2000 MG: 2 INJECTION, SOLUTION INTRAVENOUS at 12:00

## 2023-10-06 RX ADMIN — POTASSIUM CHLORIDE 10 MEQ: 10 INJECTION, SOLUTION INTRAVENOUS at 09:11

## 2023-10-06 RX ADMIN — SODIUM BICARBONATE 650 MG: 650 TABLET, ORALLY DISINTEGRATING ORAL at 07:58

## 2023-10-06 RX ADMIN — ENOXAPARIN SODIUM 40 MG: 100 INJECTION SUBCUTANEOUS at 08:01

## 2023-10-06 RX ADMIN — LIOTHYRONINE SODIUM 5 MCG: 5 TABLET ORAL at 07:58

## 2023-10-06 RX ADMIN — SODIUM BICARBONATE 650 MG: 650 TABLET, ORALLY DISINTEGRATING ORAL at 14:22

## 2023-10-06 RX ADMIN — LAMOTRIGINE 25 MG: 25 TABLET ORAL at 07:58

## 2023-10-06 RX ADMIN — Medication 100 MG: at 07:58

## 2023-10-06 RX ADMIN — POTASSIUM CHLORIDE 10 MEQ: 10 INJECTION, SOLUTION INTRAVENOUS at 07:53

## 2023-10-06 RX ADMIN — POTASSIUM CHLORIDE 10 MEQ: 10 INJECTION, SOLUTION INTRAVENOUS at 06:22

## 2023-10-06 RX ADMIN — POTASSIUM CHLORIDE 10 MEQ: 10 INJECTION, SOLUTION INTRAVENOUS at 05:04

## 2023-10-06 RX ADMIN — POTASSIUM CHLORIDE 10 MEQ: 10 INJECTION, SOLUTION INTRAVENOUS at 11:21

## 2023-10-06 RX ADMIN — LEVETIRACETAM 1000 MG: 100 INJECTION INTRAVENOUS at 07:37

## 2023-10-06 RX ADMIN — LEVOTHYROXINE SODIUM 200 MCG: 100 TABLET ORAL at 06:17

## 2023-10-06 NOTE — DISCHARGE SUMMARY
[facility] Hospitalists    Discharge Summary      Cameron Pan  :  1970  MRN:  [id number]    Admit date:  10/4/2023  Discharge date:    10/6/2023    Discharging Physician:  Dr. Byron Kan Directive: Full Code    Consults: neurology    Primary Care Physician:  Elvin Nassar MD    Discharge Diagnoses:  Principal Problem:    New onset seizure [facility])  Resolved Problems:    * No resolved hospital problems. *      Portions of this note have been copied forward, however, changed to reflect the most current clinical status of this patient. Hospital Course: This patient is a 69-year-old female with PMH depression, anxiety, hypothyroidism, hypertension, MENEZES with elevated ammonia on lactulose at home, ADD, anemia, facial palsy,  shunt, who presented to [facility] ED on 10/4 for evaluation of new onset seizure. Per H&P, patient was noted to be twitching, with eyes deviated to the right, and subsequently fell and hit her head. Per reports, she stopped breathing and became blue. She was postictal.  Patient admits to drinking 1 pint of vodka daily. She decided several days prior to this that she was going to stop drinking. ER documentation unavailable but it appears patient had a witnessed tonic-clonic seizure. In ER, bicarb 15, creatinine 1.2, LFTs mildly elevated, total bilirubin 2.8, ammonia elevated at 143, UDS positive for amphetamine, benzos, cannabinoids, and opiates, CBC unremarkable, urinalysis without evidence of infection, x-ray shows  shunt without interruption or kinking, CT of the head showed no intracranial abnormality. She was given Keppra and admitted to hospitalist with neurology consulted. She received lactulose 3 times daily due to elevated ammonia with improvement to 41. She required only one dose of Ativan during admission per [facility] protocol. Neurology recommended adding low-dose Lamictal 25 mg daily.  Plan is to increase Lamictal dose while decreasing Keppra on an outpatient basis as mouth daily     vitamin B-1 100 MG tablet  Commonly known as: THIAMINE  Take 1 tablet by mouth daily  Start taking on: October 7, 2023            CHANGE how you take these medications      FLUoxetine 40 MG capsule  Commonly known as: PROzac  TAKE 1 CAPSULE BY MOUTH EVERY DAY  What changed:   how much to take  additional instructions            CONTINUE taking these medications      carvedilol 12.5 MG tablet  Commonly known as: COREG  Take 1 tablet by mouth 2 times daily     diphenhydrAMINE 25 MG capsule  Commonly known as: BENADRYL     ibuprofen 600 MG tablet  Commonly known as: ADVIL;MOTRIN  Take 1 tablet by mouth every 8 hours as needed for Pain Take with food. LACTULOSE PO     levothyroxine 200 MCG tablet  Commonly known as: SYNTHROID     liothyronine 5 MCG tablet  Commonly known as: CYTOMEL     LORazepam 1 MG tablet  Commonly known as: Ativan  Take 1 tablet by mouth every 8 hours as needed for Anxiety for up to 30 days. pantoprazole 40 MG tablet  Commonly known as: PROTONIX  TAKE 1 TABLET DAILY (NEED FOLLOW UP APPOINTMENT)     therapeutic multivitamin-minerals tablet            STOP taking these medications      albuterol sulfate  (90 Base) MCG/ACT inhaler  Commonly known as: PROVENTIL;VENTOLIN;PROAIR     buPROPion 300 MG extended release tablet  Commonly known as: WELLBUTRIN XL               Where to Get Your Medications        These medications were sent to [facility]/pharmacy #[id number], [address] [place] [address] RD., [address] [place] [address]      Phone: [phone]   folic acid 1 MG tablet  lamoTRIgine 25 MG tablet  levETIRAcetam 1000 MG tablet  potassium chloride 20 MEQ extended release tablet  vitamin B-1 100 MG tablet          Discharge Instructions: Follow up with Alexandria Nichols MD in 7 days. Follow-up with neurology within 2 weeks. Take medications as directed. Resume activity as tolerated.     Followup Appointments Scheduled at Time of

## 2023-10-06 NOTE — PROGRESS NOTES
Nutrition Assessment     Type and Reason for Visit: Reassess    Nutrition Recommendations/Plan:   Continue current POC,  monitor pointake     Malnutrition Assessment:  Malnutrition Status: No malnutrition    Nutrition Assessment:  Diet has been advanced to Regular. Pt has not received breakfast yet. Staes po intake has been just fair even PTA. New wt NA. Pt does not care for ONS at this time    Estimated Daily Nutrient Needs:  Energy (kcal):  5269-3880 kcals (20-25 kcals/kg) Weight Used for Energy Requirements: Current     Protein (g):  87g Weight Used for Protein Requirements: Current        Fluid (ml/day):  8734-7609 ml Method Used for Fluid Requirements: 1 ml/kcal    Nutrition Related Findings:   Glucose 120. K+ 2.9 Wound Type: None    Current Nutrition Therapies:    ADULT DIET;  Regular    Anthropometric Measures:  Height: 5' 2\" (157.5 cm)  Current Body Wt: 127 lb 4.8 oz (57.7 kg)   BMI: 23.3    Nutrition Diagnosis:   Inadequate oral intake related to acute injury/trauma, early satiety as evidenced by poor intake prior to admission    Nutrition Interventions:   Food and/or Nutrient Delivery: Continue Current Diet  Nutrition Education/Counseling: No recommendation at this time  Coordination of Nutrition Care: Continue to monitor while inpatient  Plan of Care discussed with: pt    Goals:  Previous Goal Met: Progressing toward Goal(s)  Goals: Meet at least 75% of estimated needs       Nutrition Monitoring and Evaluation:   Behavioral-Environmental Outcomes: None Identified  Food/Nutrient Intake Outcomes: Food and Nutrient Intake  Physical Signs/Symptoms Outcomes: Biochemical Data, Fluid Status or Edema, Weight, Skin    Discharge Planning:    Continue current diet     Lulú Frazier MS, RD, LD  Contact: [phone]

## 2023-10-06 NOTE — CARE COORDINATION
Spoke with pt about addiction facilities and DC plans. Provided pt with resources, pt did sound interested in following up, but seemed to be more directed towards out pt classes.     Electronically signed by Truman Parker MBA, BSW on 10/6/2023 at 4:13 PM

## 2023-10-09 ENCOUNTER — TELEPHONE (OUTPATIENT)
Dept: NEUROLOGY | Age: 53
End: 2023-10-09

## 2023-10-09 NOTE — TELEPHONE ENCOUNTER
Patient requesting HFU 2 weeks. [address] [facility] not able to accommodate.  Please call to discus [phone]      Thank you

## 2023-10-11 NOTE — PROGRESS NOTES
Physician Progress Note      PATIENT:               Lula Perdue  CSN #:                  [id number]  :                       1970  ADMIT DATE:       10/4/2023 10:12 PM  [facility] DATE:        10/6/2023 5:11 PM  RESPONDING  PROVIDER #: Zev Chavez MD MPH          QUERY TEXT:    Pt admitted with new onset seizures. Noted documentation of \"elevated   ammonia\" in H/P dated 10/5. If possible, please document in progress notes and   discharge summary:    The medical record reflects the following:  Risk Factors: etoh, alcoholic liver disease  Clinical Indicators: (10/5) ammonia 143, (10/6) ammonia 41; pt noted to be   post ictal after seizure but otherwise AAOx3; H/P documented \"She has chronic   alcoholic liver disease and elevated ammonia for which she is on lactulose at   home\" with assessment of \"elevated ammonia\"  Treatment: Lactulose, serial ammonia levels    Thank you,  Nayeli Mcgowan, [facility]  [phone]  Options provided:  -- Elevated ammonia not clinically significant  -- Elevated ammonia is clinically significant for, Please specify condition. -- Other - I will add my own diagnosis  -- Disagree - Not applicable / Not valid  -- Disagree - Clinically unable to determine / Unknown  -- Refer to Clinical Documentation Reviewer    PROVIDER RESPONSE TEXT:    Elevated ammonia is not clinically significant. Query created by: Lesly Hollis on 10/6/2023 10:44 AM      QUERY TEXT:    Pt admitted with new onset seizure. Pt noted to have a crea of 1.2 (on 10/4)   which dropped to 0.7 (10/6). If possible, please document in the progress   notes and discharge summary if you are evaluating and/or treating any of the   following:     The medical record reflects the following:  Risk Factors:  etoh, htn, UDS + for amphetamines/opiates/benzo's  Clinical Indicators:  presented with new onset seizures; (10/4) crea 1.2,   (10/6) crea 0.7  Treatment: NS @155 ml/hr, bicarb IV, serial crea levels    Thank you,  Blair ENGLISH,

## 2023-10-19 ASSESSMENT — ENCOUNTER SYMPTOMS
NAUSEA: 0
SHORTNESS OF BREATH: 0
ABDOMINAL PAIN: 0
COUGH: 0
COLOR CHANGE: 0
VOMITING: 0
BACK PAIN: 0
RHINORRHEA: 0

## 2023-10-20 NOTE — ED PROVIDER NOTES
[address] [facility]  eMERGENCY dEPARTMENT eNCOUnter      Pt Name: Esther Ortiz  MRN: [id number]  [facility] 1970  Date of evaluation: 10/4/2023  Provider: Libby Welch MD    CHIEF COMPLAINT       Chief Complaint   Patient presents with    Fall     Pt had fall at home reported by     Seizures     Reported seizure by  after fall. Pt reports that she drinks daily. Pt has not had a drink in 24 hours. HISTORY OF PRESENT ILLNESS   (Location/Symptom, Timing/Onset,Context/Setting, Quality, Duration, Modifying Factors, Severity)  Note limiting factors. Esther Ortiz is a 47 y.o. female who presents to the emergency department following a seizure. Patient has a history of acoustic neuroma and the patient but no history of seizures. .   states that patient fell at home and hit her chin. Patient had a seizure after her fall.  states that patient's eyes were deviated and she was \"twitching. \"   states that it lasted approximately 5 minutes. 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 Patient denies recent fever or illness.  states that patient usually drinks alcohol every day but that she has not drank today. Patient is mildly postictal following seizure. Patient was oriented x3 but was sleepy. PCP is JULI Bustamante. Patient states that she is scheduled to see Dr. Val Lopez later this month. HPI    NursingNotes were reviewed. REVIEW OF SYSTEMS    (2-9 systems for level 4, 10 or more for level 5)     Review of Systems   Constitutional:  Negative for chills and fever. HENT:  Negative for congestion and rhinorrhea. Respiratory:  Negative for cough and shortness of breath. Cardiovascular:  Negative for chest pain and palpitations. Gastrointestinal:  Negative for abdominal pain, nausea and vomiting.    Genitourinary:  Negative for

## 2023-12-01 ENCOUNTER — OFFICE VISIT (OUTPATIENT)
Dept: NEUROLOGY | Age: 53
End: 2023-12-01
Payer: COMMERCIAL

## 2023-12-01 VITALS
BODY MASS INDEX: 24.84 KG/M2 | WEIGHT: 135 LBS | DIASTOLIC BLOOD PRESSURE: 78 MMHG | SYSTOLIC BLOOD PRESSURE: 144 MMHG | HEIGHT: 62 IN | HEART RATE: 87 BPM

## 2023-12-01 DIAGNOSIS — G40.909 SEIZURE DISORDER (HCC): Primary | ICD-10-CM

## 2023-12-01 DIAGNOSIS — D36.10 SCHWANNOMA: ICD-10-CM

## 2023-12-01 DIAGNOSIS — Z86.59 HISTORY OF ATTENTION DEFICIT DISORDER: ICD-10-CM

## 2023-12-01 DIAGNOSIS — R29.810 FACIAL WEAKNESS: ICD-10-CM

## 2023-12-01 PROCEDURE — 99214 OFFICE O/P EST MOD 30 MIN: CPT | Performed by: PSYCHIATRY & NEUROLOGY

## 2023-12-01 PROCEDURE — 3077F SYST BP >= 140 MM HG: CPT | Performed by: PSYCHIATRY & NEUROLOGY

## 2023-12-01 PROCEDURE — 3078F DIAST BP <80 MM HG: CPT | Performed by: PSYCHIATRY & NEUROLOGY

## 2023-12-01 RX ORDER — DEXTROAMPHETAMINE SACCHARATE, AMPHETAMINE ASPARTATE, DEXTROAMPHETAMINE SULFATE AND AMPHETAMINE SULFATE 5; 5; 5; 5 MG/1; MG/1; MG/1; MG/1
TABLET ORAL
COMMUNITY

## 2023-12-01 RX ORDER — ONDANSETRON 4 MG/1
TABLET, FILM COATED ORAL
COMMUNITY
Start: 2023-11-21

## 2023-12-01 RX ORDER — PANTOPRAZOLE SODIUM 20 MG/1
20 TABLET, DELAYED RELEASE ORAL DAILY
COMMUNITY
Start: 2023-09-18

## 2023-12-01 RX ORDER — DESVENLAFAXINE SUCCINATE 50 MG/1
50 TABLET, EXTENDED RELEASE ORAL EVERY MORNING
COMMUNITY

## 2024-02-10 NOTE — TELEPHONE ENCOUNTER
Pt called and wanted to see if she could get back on taking Vyvanse 70mg. She said she took this because she was having a hard time concentrating, and is currently having a hard time with this. Please advise. Calm

## 2024-03-12 ENCOUNTER — NURSE TRIAGE (OUTPATIENT)
Dept: CALL CENTER | Facility: HOSPITAL | Age: 54
End: 2024-03-12
Payer: COMMERCIAL

## 2024-03-12 NOTE — TELEPHONE ENCOUNTER
"Reason for Disposition   Fever present > 3 days (72 hours)    Additional Information   Negative: SEVERE difficulty breathing (e.g., struggling for each breath, speaks in single words, stridor)   Negative: Sounds like a life-threatening emergency to the triager   Negative: [1] Diagnosed strep throat AND [2] taking antibiotic AND [3] symptoms continue   Negative: Throat culture results, call about   Negative: Productive cough is main symptom   Negative: Non-productive cough is main symptom   Negative: Hoarseness is main symptom   Negative: Runny nose is main symptom   Negative: Uvula swelling is main symptom   Negative: [1] Drooling or spitting out saliva (because can't swallow) AND [2] normal breathing   Negative: Unable to open mouth completely   Negative: [1] Difficulty breathing AND [2] not severe   Negative: Fever > 104 F (40 C)   Negative: [1] Refuses to drink anything AND [2] for > 12 hours   Negative: [1] Drinking very little AND [2] dehydration suspected (e.g., no urine > 12 hours, very dry mouth, very lightheaded)   Negative: Patient sounds very sick or weak to the triager   Negative: SEVERE (e.g., excruciating) throat pain   Negative: [1] Pus on tonsils (back of throat) AND [2]  fever AND [3] swollen neck lymph nodes (\"glands\")   Negative: [1] Rash AND [2] widespread (especially chest and abdomen)   Negative: Earache also present    Answer Assessment - Initial Assessment Questions  1. ONSET: \"When did the throat start hurting?\" (Hours or days ago)       A week ago  2. SEVERITY: \"How bad is the sore throat?\" (Scale 1-10; mild, moderate or severe)    - MILD (1-3):  Doesn't interfere with eating or normal activities.    - MODERATE (4-7): Interferes with eating some solids and normal activities.    - SEVERE (8-10):  Excruciating pain, interferes with most normal activities.    - SEVERE WITH DYSPHAGIA (10): Can't swallow liquids, drooling.      mod  3. STREP EXPOSURE: \"Has there been any exposure to strep within " "the past week?\" If Yes, ask: \"What type of contact occurred?\"       None known  4.  VIRAL SYMPTOMS: \"Are there any symptoms of a cold, such as a runny nose, cough, hoarse voice or red eyes?\"       no  5. FEVER: \"Do you have a fever?\" If Yes, ask: \"What is your temperature, how was it measured, and when did it start?\"      no  6. PUS ON THE TONSILS: \"Is there pus on the tonsils in the back of your throat?\"      no  7. OTHER SYMPTOMS: \"Do you have any other symptoms?\" (e.g., difficulty breathing, headache, rash)      Right ear pain  8. PREGNANCY: \"Is there any chance you are pregnant?\" \"When was your last menstrual period?\"      no    Protocols used: Sore Throat-ADULT-AH    "

## 2024-03-12 NOTE — TELEPHONE ENCOUNTER
Caller has had a sore throat and right ear pain for a week.  She sounds hoarse.  Denies fever.

## 2024-05-23 ENCOUNTER — TRANSCRIBE ORDERS (OUTPATIENT)
Dept: ADMINISTRATIVE | Facility: HOSPITAL | Age: 54
End: 2024-05-23
Payer: COMMERCIAL

## 2024-05-23 DIAGNOSIS — Z12.31 ENCOUNTER FOR SCREENING MAMMOGRAM FOR MALIGNANT NEOPLASM OF BREAST: Primary | ICD-10-CM

## 2024-06-19 ENCOUNTER — TELEPHONE (OUTPATIENT)
Dept: OBSTETRICS AND GYNECOLOGY | Age: 54
End: 2024-06-19

## 2024-06-19 NOTE — TELEPHONE ENCOUNTER
Provider: HARRY GARCIA    Caller: EKTA GAMBINO    Phone Number: [phone]    Reason for Call: SAME DAY CANCELLATION FOR NEW GYN @ 1pm- PT HAS MIGRAINE - R/S FOR 07/01/24 @ 2:15pm

## 2024-07-02 ENCOUNTER — OFFICE VISIT (OUTPATIENT)
Dept: OBSTETRICS AND GYNECOLOGY | Age: 54
End: 2024-07-02
Payer: COMMERCIAL

## 2024-07-02 VITALS
WEIGHT: 145 LBS | BODY MASS INDEX: 26.68 KG/M2 | HEIGHT: 62 IN | SYSTOLIC BLOOD PRESSURE: 110 MMHG | DIASTOLIC BLOOD PRESSURE: 66 MMHG

## 2024-07-02 DIAGNOSIS — N89.8 VAGINAL DRYNESS: ICD-10-CM

## 2024-07-02 DIAGNOSIS — Z01.419 WELL WOMAN EXAM WITH ROUTINE GYNECOLOGICAL EXAM: Primary | ICD-10-CM

## 2024-07-02 DIAGNOSIS — Z78.0 MENOPAUSE: ICD-10-CM

## 2024-07-02 DIAGNOSIS — Z12.31 ENCOUNTER FOR SCREENING MAMMOGRAM FOR BREAST CANCER: ICD-10-CM

## 2024-07-02 PROCEDURE — 87624 HPV HI-RISK TYP POOLED RSLT: CPT | Performed by: NURSE PRACTITIONER

## 2024-07-02 PROCEDURE — G0123 SCREEN CERV/VAG THIN LAYER: HCPCS | Performed by: NURSE PRACTITIONER

## 2024-07-02 RX ORDER — DEXTROAMPHETAMINE SACCHARATE, AMPHETAMINE ASPARTATE, DEXTROAMPHETAMINE SULFATE AND AMPHETAMINE SULFATE 7.5; 7.5; 7.5; 7.5 MG/1; MG/1; MG/1; MG/1
TABLET ORAL
COMMUNITY
Start: 2024-06-13

## 2024-07-02 RX ORDER — LIOTHYRONINE SODIUM 5 UG/1
TABLET ORAL
COMMUNITY
Start: 2024-07-01

## 2024-07-02 RX ORDER — LEVOTHYROXINE SODIUM 175 UG/1
1 TABLET ORAL DAILY
COMMUNITY
Start: 2024-06-18

## 2024-07-02 RX ORDER — SPIRONOLACTONE 25 MG/1
TABLET ORAL
COMMUNITY
Start: 2024-06-06

## 2024-07-02 NOTE — PROGRESS NOTES
"Chief Complaint   Patient presents with    Gynecologic Exam     New patient here for annual, last pap 6/2019, mammogram 2018, patient reports not having periods in 12 years.           Subjective     Denise Fields is a 54 y.o. female    History of Present Illness  Pt comes in today for annual wellness exam. Denies history of abnormal pap. Went through menopause early due to history of brain surgery and shunt, etc per pt.     /66 (BP Location: Left arm, Patient Position: Sitting, Cuff Size: Adult)   Ht 157.5 cm (62\")   Wt 65.8 kg (145 lb)   LMP  (LMP Unknown)   BMI 26.52 kg/m²     Outpatient Encounter Medications as of 7/2/2024   Medication Sig Dispense Refill    amphetamine-dextroamphetamine (ADDERALL) 30 MG tablet TAKE 1 TABLET BY MOUTH TWICE A DAY AS DIRECTED FOR 30 DAYS (FOR ADHD)      carvedilol (COREG) 12.5 MG tablet Take 1 tablet by mouth 2 (Two) Times a Day.      cetirizine (zyrTEC) 10 MG tablet Take 10 mg by mouth daily      desvenlafaxine (PRISTIQ) 50 MG 24 hr tablet Take 1 tablet by mouth Every Morning.      HYDROcodone-acetaminophen (NORCO)  MG per tablet PLEASE SEE ATTACHED FOR DETAILED DIRECTIONS      levothyroxine (SYNTHROID, LEVOTHROID) 175 MCG tablet Take 1 tablet by mouth Daily.      liothyronine (CYTOMEL) 5 MCG tablet       LORazepam (ATIVAN) 1 MG tablet Take 1 tablet by mouth Every 8 (Eight) Hours As Needed.      multivitamin with minerals tablet tablet Take 1 tablet by mouth Daily.      ondansetron ODT (ZOFRAN-ODT) 4 MG disintegrating tablet DISSOLVE 1 TABLET BY MOUTH 3 TIMES DAILY AS NEEDED FOR NAUSEA OR VOMITING      pantoprazole (PROTONIX) 40 MG EC tablet Take 40 mg by mouth daily      spironolactone (ALDACTONE) 25 MG tablet TAKE 1 TABLET EVERY DAY BY ORAL ROUTE IN THE MORNING FOR 90 DAYS, FOR DIURETIC.      [DISCONTINUED] amphetamine-dextroamphetamine (ADDERALL) 15 MG tablet TAKE 1 TABLET BY MOUTH TWICE A DAY AS DIRECTED      [DISCONTINUED] buPROPion XL (WELLBUTRIN XL) 150 MG " 24 hr tablet TAKE 1 TABLET BY MOUTH DAILY IN THE MORNING      [DISCONTINUED] levothyroxine (SYNTHROID, LEVOTHROID) 100 MCG tablet Take 100 mcg by mouth Daily       No facility-administered encounter medications on file as of 7/2/2024.       Past Medical History  Past Medical History:   Diagnosis Date    Acoustic neuroma     ADD (attention deficit disorder)     Colon polyp     Hypertension         Surgical History  Past Surgical History:   Procedure Laterality Date    COLONOSCOPY  02/18/2016    COLONOSCOPY      CRANIOTOMY      UPPER GASTROINTESTINAL ENDOSCOPY  02/18/2016     SHUNT INSERTION         Family History  Family History   Problem Relation Age of Onset    Breast cancer Neg Hx     Ovarian cancer Neg Hx     Uterine cancer Neg Hx     Colon cancer Neg Hx     Melanoma Neg Hx        The following portions of the patient's history were reviewed and updated as appropriate: allergies, current medications, past family history, past medical history, past social history, past surgical history, and problem list.    Review of Systems   Constitutional:  Negative for activity change and unexpected weight loss.   Cardiovascular:  Negative for chest pain.   Gastrointestinal:  Negative for blood in stool, constipation and diarrhea.   Endocrine: Negative for cold intolerance and heat intolerance.   Genitourinary:  Negative for dyspareunia, pelvic pain and vaginal discharge.   Musculoskeletal:  Negative for arthralgias, back pain, neck pain and neck stiffness.   Skin:  Negative for rash.   Neurological:  Negative for dizziness and headache.   Psychiatric/Behavioral:  Negative for sleep disturbance. The patient is not nervous/anxious.        Objective   Physical Exam  Vitals and nursing note reviewed. Exam conducted with a chaperone present.   Constitutional:       General: She is not in acute distress.     Appearance: She is well-developed. She is not diaphoretic.   HENT:      Head: Normocephalic.      Right Ear: External ear  normal.      Left Ear: External ear normal.      Nose: Nose normal.   Eyes:      General: No scleral icterus.        Right eye: No discharge.         Left eye: No discharge.      Conjunctiva/sclera: Conjunctivae normal.      Pupils: Pupils are equal, round, and reactive to light.   Neck:      Thyroid: No thyromegaly.      Vascular: No carotid bruit.      Trachea: No tracheal deviation.   Cardiovascular:      Rate and Rhythm: Normal rate and regular rhythm.      Heart sounds: Normal heart sounds. No murmur heard.  Pulmonary:      Effort: Pulmonary effort is normal. No respiratory distress.      Breath sounds: Normal breath sounds. No wheezing.   Chest:   Breasts:     Breasts are symmetrical.      Right: Normal. No swelling, bleeding, inverted nipple, mass, nipple discharge, skin change or tenderness.      Left: Normal. No swelling, bleeding, inverted nipple, mass, nipple discharge, skin change or tenderness.   Abdominal:      General: There is no distension.      Palpations: Abdomen is soft. There is no mass.      Tenderness: There is no abdominal tenderness. There is no right CVA tenderness, left CVA tenderness or guarding.      Hernia: No hernia is present. There is no hernia in the left inguinal area or right inguinal area.   Genitourinary:     General: Normal vulva.      Exam position: Lithotomy position.      Labia:         Right: No rash, tenderness, lesion or injury.         Left: No rash, tenderness, lesion or injury.       Vagina: Normal. No signs of injury and foreign body. No vaginal discharge, erythema, tenderness or bleeding.      Cervix: Normal.      Uterus: Normal. Not enlarged, not fixed and not tender.       Adnexa: Right adnexa normal and left adnexa normal.        Right: No mass, tenderness or fullness.          Left: No mass, tenderness or fullness.        Rectum: Normal. No mass.      Comments:   BSU normal  Urethral meatus  Normal  Perineum  Normal    Vaginal dryness on exam.   Musculoskeletal:          General: No tenderness. Normal range of motion.      Cervical back: Normal range of motion and neck supple.   Lymphadenopathy:      Head:      Right side of head: No submental, submandibular, tonsillar, preauricular, posterior auricular or occipital adenopathy.      Left side of head: No submental, submandibular, tonsillar, preauricular, posterior auricular or occipital adenopathy.      Cervical: No cervical adenopathy.      Right cervical: No superficial, deep or posterior cervical adenopathy.     Left cervical: No superficial, deep or posterior cervical adenopathy.      Upper Body:      Right upper body: No supraclavicular, axillary or pectoral adenopathy.      Left upper body: No supraclavicular, axillary or pectoral adenopathy.      Lower Body: No right inguinal adenopathy. No left inguinal adenopathy.   Skin:     General: Skin is warm and dry.      Findings: No bruising, erythema or rash.   Neurological:      Mental Status: She is alert and oriented to person, place, and time.      Coordination: Coordination normal.   Psychiatric:         Mood and Affect: Mood normal.         Behavior: Behavior normal.         Thought Content: Thought content normal.         Judgment: Judgment normal.           Assessment & Plan   Diagnoses and all orders for this visit:    1. Well woman exam with routine gynecological exam (Primary)  -     Liquid-based Pap Smear, Screening    2. Encounter for screening mammogram for breast cancer  -     Mammo Screening Digital Tomosynthesis Bilateral With CAD; Future    3. Menopause  -     DEXA Bone Density Axial; Future    4. Vaginal dryness         Normal GYN exam. Encouraged SBE, pt is aware how to do self breast exam and the importance of same. Discussed weight management and importance of maintaining a healthy weight. Discussed Vitamin D intake and the importance of adequate vitamin D for both bone health and a healthy immune system.  Discussed daily exercise and the importance of  same, in regards to a healthy heart as well as helping to maintain her weight and improving her mental health.  Colonoscopy is up to date. Mammogram will be scheduled. Bone density will be scheduled. Pap smear is done per ASCCP guidelines. HPV is done. Lab work up is up to date through PCP.    Discussed using coconut oil for vaginal dryness. Pt not currently sexually active.     BMI is >= 25 and <30. (Overweight) The following options were offered after discussion;: weight loss educational material (shared in after visit summary)      HARRY Sagastume  7/2/2024    Return in about 1 year (around 7/2/2025) for Annual physical.

## 2024-07-03 LAB
GEN CATEG CVX/VAG CYTO-IMP: NORMAL
HPV I/H RISK 4 DNA CVX QL PROBE+SIG AMP: NOT DETECTED
LAB AP CASE REPORT: NORMAL
LAB AP GYN ADDITIONAL INFORMATION: NORMAL
Lab: NORMAL
PATH INTERP SPEC-IMP: NORMAL
STAT OF ADQ CVX/VAG CYTO-IMP: NORMAL

## 2024-08-22 ENCOUNTER — TRANSCRIBE ORDERS (OUTPATIENT)
Dept: ADMINISTRATIVE | Facility: HOSPITAL | Age: 54
End: 2024-08-22
Payer: COMMERCIAL

## 2024-08-22 DIAGNOSIS — R44.1 VISUAL HALLUCINATIONS: Primary | ICD-10-CM

## 2024-09-13 ENCOUNTER — APPOINTMENT (OUTPATIENT)
Dept: CT IMAGING | Age: 54
DRG: 897 | End: 2024-09-13
Payer: COMMERCIAL

## 2024-09-13 ENCOUNTER — APPOINTMENT (OUTPATIENT)
Dept: GENERAL RADIOLOGY | Age: 54
DRG: 897 | End: 2024-09-13
Payer: COMMERCIAL

## 2024-09-13 ENCOUNTER — HOSPITAL ENCOUNTER (INPATIENT)
Age: 54
LOS: 1 days | Discharge: HOME | DRG: 897 | End: 2024-09-15
Attending: PEDIATRICS | Admitting: STUDENT IN AN ORGANIZED HEALTH CARE EDUCATION/TRAINING PROGRAM
Payer: COMMERCIAL

## 2024-09-13 DIAGNOSIS — R41.0 DELIRIUM: Primary | ICD-10-CM

## 2024-09-13 DIAGNOSIS — J18.9 PNEUMONIA OF BOTH LOWER LOBES DUE TO INFECTIOUS ORGANISM: ICD-10-CM

## 2024-09-13 DIAGNOSIS — K76.82 HEPATIC ENCEPHALOPATHY (HCC): ICD-10-CM

## 2024-09-13 DIAGNOSIS — A41.9 SEPSIS, DUE TO UNSPECIFIED ORGANISM, UNSPECIFIED WHETHER ACUTE ORGAN DYSFUNCTION PRESENT (HCC): ICD-10-CM

## 2024-09-13 LAB
ALBUMIN SERPL-MCNC: 3.7 G/DL (ref 3.5–5.2)
ALP SERPL-CCNC: 150 U/L (ref 35–104)
ALT SERPL-CCNC: 27 U/L (ref 5–33)
AMMONIA PLAS-SCNC: 109 UMOL/L (ref 11–51)
ANION GAP SERPL CALCULATED.3IONS-SCNC: 12 MMOL/L (ref 7–19)
AST SERPL-CCNC: 48 U/L (ref 5–32)
B PARAP IS1001 DNA NPH QL NAA+NON-PROBE: NOT DETECTED
B PERT.PT PRMT NPH QL NAA+NON-PROBE: NOT DETECTED
BACTERIA URNS QL MICRO: ABNORMAL /HPF
BASE EXCESS ARTERIAL: -1.4 MMOL/L (ref -2–2)
BASOPHILS # BLD: 0.1 K/UL (ref 0–0.2)
BASOPHILS NFR BLD: 1.2 % (ref 0–1)
BILIRUB SERPL-MCNC: 1.7 MG/DL (ref 0.2–1.2)
BILIRUB UR QL STRIP: NEGATIVE
BUN SERPL-MCNC: 12 MG/DL (ref 6–20)
C PNEUM DNA NPH QL NAA+NON-PROBE: NOT DETECTED
CALCIUM SERPL-MCNC: 9.2 MG/DL (ref 8.6–10)
CARBOXYHEMOGLOBIN ARTERIAL: 1.9 % (ref 0–5)
CHLORIDE SERPL-SCNC: 106 MMOL/L (ref 98–111)
CLARITY UR: CLEAR
CO2 SERPL-SCNC: 24 MMOL/L (ref 22–29)
COLOR UR: YELLOW
CREAT SERPL-MCNC: 0.9 MG/DL (ref 0.5–0.9)
CRYSTALS URNS MICRO: ABNORMAL /HPF
EOSINOPHIL # BLD: 0.3 K/UL (ref 0–0.6)
EOSINOPHIL NFR BLD: 4.3 % (ref 0–5)
EPI CELLS #/AREA URNS AUTO: 3 /HPF (ref 0–5)
ERYTHROCYTE [DISTWIDTH] IN BLOOD BY AUTOMATED COUNT: 14.1 % (ref 11.5–14.5)
ETHANOLAMINE SERPL-MCNC: <10 MG/DL (ref 0–0.08)
FIO2: 21 %
FLUAV RNA NPH QL NAA+NON-PROBE: NOT DETECTED
FLUBV RNA NPH QL NAA+NON-PROBE: NOT DETECTED
GLUCOSE SERPL-MCNC: 104 MG/DL (ref 70–99)
GLUCOSE UR STRIP.AUTO-MCNC: NEGATIVE MG/DL
HADV DNA NPH QL NAA+NON-PROBE: NOT DETECTED
HCO3 ARTERIAL: 22.8 MMOL/L (ref 22–26)
HCOV 229E RNA NPH QL NAA+NON-PROBE: NOT DETECTED
HCOV HKU1 RNA NPH QL NAA+NON-PROBE: NOT DETECTED
HCOV NL63 RNA NPH QL NAA+NON-PROBE: NOT DETECTED
HCOV OC43 RNA NPH QL NAA+NON-PROBE: NOT DETECTED
HCT VFR BLD AUTO: 34.5 % (ref 37–47)
HEMOGLOBIN, ART, EXTENDED: 12.2 G/DL (ref 12–16)
HGB BLD-MCNC: 11.6 G/DL (ref 12–16)
HGB UR STRIP.AUTO-MCNC: NEGATIVE MG/L
HMPV RNA NPH QL NAA+NON-PROBE: NOT DETECTED
HPIV1 RNA NPH QL NAA+NON-PROBE: NOT DETECTED
HPIV2 RNA NPH QL NAA+NON-PROBE: NOT DETECTED
HPIV3 RNA NPH QL NAA+NON-PROBE: NOT DETECTED
HPIV4 RNA NPH QL NAA+NON-PROBE: NOT DETECTED
HYALINE CASTS #/AREA URNS AUTO: 3 /HPF (ref 0–8)
IMM GRANULOCYTES # BLD: 0 K/UL
KETONES UR STRIP.AUTO-MCNC: NEGATIVE MG/DL
LACTATE BLDV-SCNC: 1.8 MMOL/L (ref 0.5–1.9)
LACTATE BLDV-SCNC: 2.5 MMOL/L (ref 0.5–1.9)
LEUKOCYTE ESTERASE UR QL STRIP.AUTO: ABNORMAL
LYMPHOCYTES # BLD: 2 K/UL (ref 1.1–4.5)
LYMPHOCYTES NFR BLD: 32.6 % (ref 20–40)
M PNEUMO DNA NPH QL NAA+NON-PROBE: NOT DETECTED
MCH RBC QN AUTO: 33 PG (ref 27–31)
MCHC RBC AUTO-ENTMCNC: 33.6 G/DL (ref 33–37)
MCV RBC AUTO: 98 FL (ref 81–99)
METHEMOGLOBIN ARTERIAL: 1.2 %
MONOCYTES # BLD: 0.7 K/UL (ref 0–0.9)
MONOCYTES NFR BLD: 11.7 % (ref 0–10)
NEUTROPHILS # BLD: 3.1 K/UL (ref 1.5–7.5)
NEUTS SEG NFR BLD: 50.2 % (ref 50–65)
NITRITE UR QL STRIP.AUTO: POSITIVE
O2 CONTENT ARTERIAL: 16.2 ML/DL
O2 SAT, ARTERIAL: 94 %
O2 THERAPY: ABNORMAL
PCO2 ARTERIAL: 36 MMHG (ref 35–45)
PH ARTERIAL: 7.41 (ref 7.35–7.45)
PH UR STRIP.AUTO: 6.5 [PH] (ref 5–8)
PLATELET # BLD AUTO: 111 K/UL (ref 130–400)
PMV BLD AUTO: 10.4 FL (ref 9.4–12.3)
PO2 ARTERIAL: 74 MMHG (ref 80–100)
POTASSIUM BLD-SCNC: 3.7 MMOL/L
POTASSIUM SERPL-SCNC: 3.5 MMOL/L (ref 3.5–5)
PROT SERPL-MCNC: 8.5 G/DL (ref 6.4–8.3)
PROT UR STRIP.AUTO-MCNC: NEGATIVE MG/DL
RBC # BLD AUTO: 3.52 M/UL (ref 4.2–5.4)
RBC #/AREA URNS AUTO: 0 /HPF (ref 0–4)
RSV RNA NPH QL NAA+NON-PROBE: NOT DETECTED
RV+EV RNA NPH QL NAA+NON-PROBE: NOT DETECTED
SARS-COV-2 RNA NPH QL NAA+NON-PROBE: NOT DETECTED
SODIUM SERPL-SCNC: 142 MMOL/L (ref 136–145)
SP GR UR STRIP.AUTO: 1.01 (ref 1–1.03)
TROPONIN, HIGH SENSITIVITY: <6 NG/L (ref 0–14)
UROBILINOGEN UR STRIP.AUTO-MCNC: 1 E.U./DL
WBC # BLD AUTO: 6.1 K/UL (ref 4.8–10.8)
WBC #/AREA URNS AUTO: 9 /HPF (ref 0–5)

## 2024-09-13 PROCEDURE — 85025 COMPLETE CBC W/AUTO DIFF WBC: CPT

## 2024-09-13 PROCEDURE — HZ2ZZZZ DETOXIFICATION SERVICES FOR SUBSTANCE ABUSE TREATMENT: ICD-10-PCS | Performed by: STUDENT IN AN ORGANIZED HEALTH CARE EDUCATION/TRAINING PROGRAM

## 2024-09-13 PROCEDURE — 96360 HYDRATION IV INFUSION INIT: CPT

## 2024-09-13 PROCEDURE — 70450 CT HEAD/BRAIN W/O DYE: CPT

## 2024-09-13 PROCEDURE — 82803 BLOOD GASES ANY COMBINATION: CPT

## 2024-09-13 PROCEDURE — 0202U NFCT DS 22 TRGT SARS-COV-2: CPT

## 2024-09-13 PROCEDURE — 82077 ASSAY SPEC XCP UR&BREATH IA: CPT

## 2024-09-13 PROCEDURE — 93005 ELECTROCARDIOGRAM TRACING: CPT | Performed by: PEDIATRICS

## 2024-09-13 PROCEDURE — 2580000003 HC RX 258: Performed by: PEDIATRICS

## 2024-09-13 PROCEDURE — 6370000000 HC RX 637 (ALT 250 FOR IP): Performed by: PEDIATRICS

## 2024-09-13 PROCEDURE — 36415 COLL VENOUS BLD VENIPUNCTURE: CPT

## 2024-09-13 PROCEDURE — 80053 COMPREHEN METABOLIC PANEL: CPT

## 2024-09-13 PROCEDURE — 87040 BLOOD CULTURE FOR BACTERIA: CPT

## 2024-09-13 PROCEDURE — 96365 THER/PROPH/DIAG IV INF INIT: CPT

## 2024-09-13 PROCEDURE — 36600 WITHDRAWAL OF ARTERIAL BLOOD: CPT

## 2024-09-13 PROCEDURE — 96367 TX/PROPH/DG ADDL SEQ IV INF: CPT

## 2024-09-13 PROCEDURE — 84484 ASSAY OF TROPONIN QUANT: CPT

## 2024-09-13 PROCEDURE — 6360000002 HC RX W HCPCS: Performed by: PEDIATRICS

## 2024-09-13 PROCEDURE — 83605 ASSAY OF LACTIC ACID: CPT

## 2024-09-13 PROCEDURE — 71045 X-RAY EXAM CHEST 1 VIEW: CPT

## 2024-09-13 PROCEDURE — 99285 EMERGENCY DEPT VISIT HI MDM: CPT

## 2024-09-13 PROCEDURE — 82140 ASSAY OF AMMONIA: CPT

## 2024-09-13 RX ORDER — LACTULOSE 10 G/15ML
30 SOLUTION ORAL ONCE
Status: COMPLETED | OUTPATIENT
Start: 2024-09-13 | End: 2024-09-13

## 2024-09-13 RX ORDER — 0.9 % SODIUM CHLORIDE 0.9 %
30 INTRAVENOUS SOLUTION INTRAVENOUS ONCE
Status: COMPLETED | OUTPATIENT
Start: 2024-09-13 | End: 2024-09-13

## 2024-09-13 RX ADMIN — VANCOMYCIN HYDROCHLORIDE 1500 MG: 10 INJECTION, POWDER, LYOPHILIZED, FOR SOLUTION INTRAVENOUS at 23:24

## 2024-09-13 RX ADMIN — CEFEPIME 2000 MG: 2 INJECTION, POWDER, FOR SOLUTION INTRAVENOUS at 22:14

## 2024-09-13 RX ADMIN — SODIUM CHLORIDE 1905 ML: 9 INJECTION, SOLUTION INTRAVENOUS at 21:59

## 2024-09-13 RX ADMIN — LACTULOSE 30 G: 20 SOLUTION ORAL at 22:10

## 2024-09-14 PROBLEM — J18.9 PNEUMONIA DUE TO ORGANISM: Status: ACTIVE | Noted: 2024-09-14

## 2024-09-14 LAB
AMMONIA PLAS-SCNC: 94 UMOL/L (ref 11–51)
AMPHET UR QL SCN: POSITIVE
ANION GAP SERPL CALCULATED.3IONS-SCNC: 12 MMOL/L (ref 7–19)
BARBITURATES UR QL SCN: NEGATIVE
BENZODIAZ UR QL SCN: POSITIVE
BUN SERPL-MCNC: 9 MG/DL (ref 6–20)
BUPRENORPHINE URINE: NEGATIVE
CALCIUM SERPL-MCNC: 8.4 MG/DL (ref 8.6–10)
CANNABINOIDS UR QL SCN: NEGATIVE
CHLORIDE SERPL-SCNC: 111 MMOL/L (ref 98–111)
CO2 SERPL-SCNC: 18 MMOL/L (ref 22–29)
COCAINE UR QL SCN: NEGATIVE
CREAT SERPL-MCNC: 0.6 MG/DL (ref 0.5–0.9)
DRUG SCREEN COMMENT UR-IMP: ABNORMAL
FENTANYL SCREEN, URINE: NEGATIVE
GLUCOSE SERPL-MCNC: 113 MG/DL (ref 70–99)
MAGNESIUM SERPL-MCNC: 1.6 MG/DL (ref 1.6–2.6)
METHADONE UR QL SCN: NEGATIVE
METHAMPHETAMINE, URINE: NEGATIVE
OPIATES UR QL SCN: POSITIVE
OXYCODONE UR QL SCN: NEGATIVE
PCP UR QL SCN: NEGATIVE
POTASSIUM SERPL-SCNC: 3.4 MMOL/L (ref 3.5–5)
SODIUM SERPL-SCNC: 141 MMOL/L (ref 136–145)
T4 FREE SERPL-MCNC: 1.17 NG/DL (ref 0.93–1.7)
TRICYCLIC ANTIDEPRESSANTS, UR: NEGATIVE
TSH SERPL DL<=0.005 MIU/L-ACNC: 3.05 UIU/ML (ref 0.27–4.2)

## 2024-09-14 PROCEDURE — 6370000000 HC RX 637 (ALT 250 FOR IP): Performed by: STUDENT IN AN ORGANIZED HEALTH CARE EDUCATION/TRAINING PROGRAM

## 2024-09-14 PROCEDURE — 36415 COLL VENOUS BLD VENIPUNCTURE: CPT

## 2024-09-14 PROCEDURE — 81001 URINALYSIS AUTO W/SCOPE: CPT

## 2024-09-14 PROCEDURE — 84439 ASSAY OF FREE THYROXINE: CPT

## 2024-09-14 PROCEDURE — 96366 THER/PROPH/DIAG IV INF ADDON: CPT

## 2024-09-14 PROCEDURE — 80048 BASIC METABOLIC PNL TOTAL CA: CPT

## 2024-09-14 PROCEDURE — 94760 N-INVAS EAR/PLS OXIMETRY 1: CPT

## 2024-09-14 PROCEDURE — G0480 DRUG TEST DEF 1-7 CLASSES: HCPCS

## 2024-09-14 PROCEDURE — 2580000003 HC RX 258: Performed by: STUDENT IN AN ORGANIZED HEALTH CARE EDUCATION/TRAINING PROGRAM

## 2024-09-14 PROCEDURE — 6360000002 HC RX W HCPCS: Performed by: STUDENT IN AN ORGANIZED HEALTH CARE EDUCATION/TRAINING PROGRAM

## 2024-09-14 PROCEDURE — 80307 DRUG TEST PRSMV CHEM ANLYZR: CPT

## 2024-09-14 PROCEDURE — 6370000000 HC RX 637 (ALT 250 FOR IP): Performed by: PSYCHIATRY & NEUROLOGY

## 2024-09-14 PROCEDURE — 84443 ASSAY THYROID STIM HORMONE: CPT

## 2024-09-14 PROCEDURE — 83735 ASSAY OF MAGNESIUM: CPT

## 2024-09-14 PROCEDURE — 82140 ASSAY OF AMMONIA: CPT

## 2024-09-14 PROCEDURE — 1200000000 HC SEMI PRIVATE

## 2024-09-14 PROCEDURE — 99254 IP/OBS CNSLTJ NEW/EST MOD 60: CPT | Performed by: PSYCHIATRY & NEUROLOGY

## 2024-09-14 RX ORDER — ONDANSETRON 2 MG/ML
4 INJECTION INTRAMUSCULAR; INTRAVENOUS EVERY 6 HOURS PRN
Status: DISCONTINUED | OUTPATIENT
Start: 2024-09-14 | End: 2024-09-15 | Stop reason: HOSPADM

## 2024-09-14 RX ORDER — MECOBALAMIN 5000 MCG
5 TABLET,DISINTEGRATING ORAL NIGHTLY
Status: DISCONTINUED | OUTPATIENT
Start: 2024-09-14 | End: 2024-09-15 | Stop reason: HOSPADM

## 2024-09-14 RX ORDER — SODIUM CHLORIDE 9 MG/ML
INJECTION, SOLUTION INTRAVENOUS PRN
Status: DISCONTINUED | OUTPATIENT
Start: 2024-09-14 | End: 2024-09-15 | Stop reason: HOSPADM

## 2024-09-14 RX ORDER — LORAZEPAM 2 MG/ML
2 INJECTION INTRAMUSCULAR
Status: DISCONTINUED | OUTPATIENT
Start: 2024-09-14 | End: 2024-09-15 | Stop reason: HOSPADM

## 2024-09-14 RX ORDER — SODIUM CHLORIDE 9 MG/ML
INJECTION, SOLUTION INTRAVENOUS PRN
Status: DISCONTINUED | OUTPATIENT
Start: 2024-09-14 | End: 2024-09-15 | Stop reason: SDUPTHER

## 2024-09-14 RX ORDER — SODIUM CHLORIDE 0.9 % (FLUSH) 0.9 %
5-40 SYRINGE (ML) INJECTION PRN
Status: DISCONTINUED | OUTPATIENT
Start: 2024-09-14 | End: 2024-09-15 | Stop reason: SDUPTHER

## 2024-09-14 RX ORDER — LORAZEPAM 2 MG/1
2 TABLET ORAL
Status: DISCONTINUED | OUTPATIENT
Start: 2024-09-14 | End: 2024-09-15 | Stop reason: HOSPADM

## 2024-09-14 RX ORDER — LEVETIRACETAM 500 MG/1
1000 TABLET ORAL 2 TIMES DAILY
Status: DISCONTINUED | OUTPATIENT
Start: 2024-09-14 | End: 2024-09-15 | Stop reason: HOSPADM

## 2024-09-14 RX ORDER — POTASSIUM CHLORIDE 1500 MG/1
40 TABLET, EXTENDED RELEASE ORAL ONCE
Status: COMPLETED | OUTPATIENT
Start: 2024-09-14 | End: 2024-09-14

## 2024-09-14 RX ORDER — LORAZEPAM 2 MG/1
4 TABLET ORAL
Status: DISCONTINUED | OUTPATIENT
Start: 2024-09-14 | End: 2024-09-15 | Stop reason: HOSPADM

## 2024-09-14 RX ORDER — LORAZEPAM 1 MG/1
1 TABLET ORAL
Status: DISCONTINUED | OUTPATIENT
Start: 2024-09-14 | End: 2024-09-15 | Stop reason: HOSPADM

## 2024-09-14 RX ORDER — PANTOPRAZOLE SODIUM 20 MG/1
20 TABLET, DELAYED RELEASE ORAL DAILY
Status: DISCONTINUED | OUTPATIENT
Start: 2024-09-14 | End: 2024-09-15 | Stop reason: HOSPADM

## 2024-09-14 RX ORDER — ONDANSETRON 4 MG/1
4 TABLET, ORALLY DISINTEGRATING ORAL EVERY 8 HOURS PRN
Status: DISCONTINUED | OUTPATIENT
Start: 2024-09-14 | End: 2024-09-15 | Stop reason: HOSPADM

## 2024-09-14 RX ORDER — FOLIC ACID 1 MG/1
1 TABLET ORAL DAILY
Status: DISCONTINUED | OUTPATIENT
Start: 2024-09-14 | End: 2024-09-15 | Stop reason: HOSPADM

## 2024-09-14 RX ORDER — ALBUTEROL SULFATE 0.83 MG/ML
2.5 SOLUTION RESPIRATORY (INHALATION)
Status: DISCONTINUED | OUTPATIENT
Start: 2024-09-14 | End: 2024-09-15 | Stop reason: HOSPADM

## 2024-09-14 RX ORDER — ENOXAPARIN SODIUM 100 MG/ML
40 INJECTION SUBCUTANEOUS DAILY
Status: DISCONTINUED | OUTPATIENT
Start: 2024-09-14 | End: 2024-09-15 | Stop reason: HOSPADM

## 2024-09-14 RX ORDER — LORAZEPAM 2 MG/ML
4 INJECTION INTRAMUSCULAR
Status: DISCONTINUED | OUTPATIENT
Start: 2024-09-14 | End: 2024-09-15 | Stop reason: HOSPADM

## 2024-09-14 RX ORDER — LORAZEPAM 2 MG/ML
3 INJECTION INTRAMUSCULAR
Status: DISCONTINUED | OUTPATIENT
Start: 2024-09-14 | End: 2024-09-15 | Stop reason: HOSPADM

## 2024-09-14 RX ORDER — POLYETHYLENE GLYCOL 3350 17 G/17G
17 POWDER, FOR SOLUTION ORAL DAILY PRN
Status: DISCONTINUED | OUTPATIENT
Start: 2024-09-14 | End: 2024-09-15 | Stop reason: HOSPADM

## 2024-09-14 RX ORDER — GAUZE BANDAGE 2" X 2"
100 BANDAGE TOPICAL DAILY
Status: DISCONTINUED | OUTPATIENT
Start: 2024-09-14 | End: 2024-09-14

## 2024-09-14 RX ORDER — LORAZEPAM 2 MG/ML
1 INJECTION INTRAMUSCULAR
Status: DISCONTINUED | OUTPATIENT
Start: 2024-09-14 | End: 2024-09-15 | Stop reason: HOSPADM

## 2024-09-14 RX ORDER — BENZONATATE 100 MG/1
100 CAPSULE ORAL 3 TIMES DAILY PRN
Status: DISCONTINUED | OUTPATIENT
Start: 2024-09-14 | End: 2024-09-15 | Stop reason: HOSPADM

## 2024-09-14 RX ORDER — SODIUM CHLORIDE 0.9 % (FLUSH) 0.9 %
5-40 SYRINGE (ML) INJECTION EVERY 12 HOURS SCHEDULED
Status: DISCONTINUED | OUTPATIENT
Start: 2024-09-14 | End: 2024-09-15 | Stop reason: HOSPADM

## 2024-09-14 RX ORDER — SODIUM CHLORIDE 0.9 % (FLUSH) 0.9 %
5-40 SYRINGE (ML) INJECTION EVERY 12 HOURS SCHEDULED
Status: DISCONTINUED | OUTPATIENT
Start: 2024-09-14 | End: 2024-09-15 | Stop reason: SDUPTHER

## 2024-09-14 RX ORDER — LEVOTHYROXINE SODIUM 100 UG/1
200 TABLET ORAL DAILY
Status: DISCONTINUED | OUTPATIENT
Start: 2024-09-14 | End: 2024-09-15 | Stop reason: HOSPADM

## 2024-09-14 RX ORDER — LACTULOSE 10 G/15ML
10 SOLUTION ORAL 3 TIMES DAILY
Status: DISCONTINUED | OUTPATIENT
Start: 2024-09-14 | End: 2024-09-15 | Stop reason: HOSPADM

## 2024-09-14 RX ORDER — ACETAMINOPHEN 325 MG/1
650 TABLET ORAL EVERY 6 HOURS PRN
Status: DISCONTINUED | OUTPATIENT
Start: 2024-09-14 | End: 2024-09-15 | Stop reason: HOSPADM

## 2024-09-14 RX ORDER — CARVEDILOL 12.5 MG/1
12.5 TABLET ORAL 2 TIMES DAILY
Status: DISCONTINUED | OUTPATIENT
Start: 2024-09-14 | End: 2024-09-15 | Stop reason: HOSPADM

## 2024-09-14 RX ORDER — LORAZEPAM 0.5 MG/1
0.5 TABLET ORAL EVERY 8 HOURS PRN
Status: ON HOLD | COMMUNITY
End: 2024-09-15 | Stop reason: HOSPADM

## 2024-09-14 RX ORDER — GAUZE BANDAGE 2" X 2"
100 BANDAGE TOPICAL DAILY
Status: DISCONTINUED | OUTPATIENT
Start: 2024-09-14 | End: 2024-09-15 | Stop reason: HOSPADM

## 2024-09-14 RX ORDER — MAGNESIUM SULFATE IN WATER 40 MG/ML
2000 INJECTION, SOLUTION INTRAVENOUS ONCE
Status: COMPLETED | OUTPATIENT
Start: 2024-09-14 | End: 2024-09-14

## 2024-09-14 RX ORDER — SODIUM CHLORIDE 0.9 % (FLUSH) 0.9 %
5-40 SYRINGE (ML) INJECTION PRN
Status: DISCONTINUED | OUTPATIENT
Start: 2024-09-14 | End: 2024-09-15 | Stop reason: HOSPADM

## 2024-09-14 RX ADMIN — FOLIC ACID 1 MG: 1 TABLET ORAL at 09:06

## 2024-09-14 RX ADMIN — LEVETIRACETAM 1000 MG: 500 TABLET, FILM COATED ORAL at 09:06

## 2024-09-14 RX ADMIN — LACTULOSE 10 G: 10 SOLUTION ORAL at 09:07

## 2024-09-14 RX ADMIN — FLUOXETINE HYDROCHLORIDE 40 MG: 20 CAPSULE ORAL at 09:06

## 2024-09-14 RX ADMIN — ENOXAPARIN SODIUM 40 MG: 100 INJECTION SUBCUTANEOUS at 09:14

## 2024-09-14 RX ADMIN — CEFEPIME 2000 MG: 2 INJECTION, POWDER, FOR SOLUTION INTRAVENOUS at 06:00

## 2024-09-14 RX ADMIN — LEVETIRACETAM 1000 MG: 500 TABLET, FILM COATED ORAL at 21:01

## 2024-09-14 RX ADMIN — Medication 10 ML: at 21:02

## 2024-09-14 RX ADMIN — Medication 5 MG: at 21:01

## 2024-09-14 RX ADMIN — PANTOPRAZOLE SODIUM 20 MG: 20 TABLET, DELAYED RELEASE ORAL at 09:16

## 2024-09-14 RX ADMIN — SODIUM CHLORIDE, PRESERVATIVE FREE 10 ML: 5 INJECTION INTRAVENOUS at 21:02

## 2024-09-14 RX ADMIN — Medication 100 MG: at 09:06

## 2024-09-14 RX ADMIN — POTASSIUM CHLORIDE 40 MEQ: 1500 TABLET, EXTENDED RELEASE ORAL at 09:06

## 2024-09-14 RX ADMIN — LACTULOSE 10 G: 10 SOLUTION ORAL at 21:02

## 2024-09-14 RX ADMIN — CARVEDILOL 12.5 MG: 12.5 TABLET, FILM COATED ORAL at 09:07

## 2024-09-14 RX ADMIN — CARVEDILOL 12.5 MG: 12.5 TABLET, FILM COATED ORAL at 21:02

## 2024-09-14 RX ADMIN — LACTULOSE 10 G: 10 SOLUTION ORAL at 15:23

## 2024-09-14 RX ADMIN — MAGNESIUM SULFATE HEPTAHYDRATE 2000 MG: 40 INJECTION, SOLUTION INTRAVENOUS at 09:12

## 2024-09-15 ENCOUNTER — APPOINTMENT (OUTPATIENT)
Dept: ULTRASOUND IMAGING | Age: 54
DRG: 897 | End: 2024-09-15
Payer: COMMERCIAL

## 2024-09-15 VITALS
HEART RATE: 84 BPM | WEIGHT: 158.1 LBS | OXYGEN SATURATION: 95 % | BODY MASS INDEX: 29.09 KG/M2 | RESPIRATION RATE: 18 BRPM | SYSTOLIC BLOOD PRESSURE: 108 MMHG | TEMPERATURE: 97 F | DIASTOLIC BLOOD PRESSURE: 64 MMHG | HEIGHT: 62 IN

## 2024-09-15 LAB
ALBUMIN SERPL-MCNC: 3.4 G/DL (ref 3.5–5.2)
ALP SERPL-CCNC: 128 U/L (ref 35–104)
ALT SERPL-CCNC: 20 U/L (ref 5–33)
AMMONIA PLAS-SCNC: 50 UMOL/L (ref 11–51)
ANION GAP SERPL CALCULATED.3IONS-SCNC: 13 MMOL/L (ref 7–19)
AST SERPL-CCNC: 32 U/L (ref 5–32)
BASOPHILS # BLD: 0.1 K/UL (ref 0–0.2)
BASOPHILS NFR BLD: 1.3 % (ref 0–1)
BILIRUB SERPL-MCNC: 1.5 MG/DL (ref 0.2–1.2)
BUN SERPL-MCNC: 10 MG/DL (ref 6–20)
CALCIUM SERPL-MCNC: 9 MG/DL (ref 8.6–10)
CHLORIDE SERPL-SCNC: 111 MMOL/L (ref 98–111)
CO2 SERPL-SCNC: 17 MMOL/L (ref 22–29)
CREAT SERPL-MCNC: 0.7 MG/DL (ref 0.5–0.9)
EKG P AXIS: 45 DEGREES
EKG P-R INTERVAL: 170 MS
EKG Q-T INTERVAL: 400 MS
EKG QRS DURATION: 80 MS
EKG QTC CALCULATION (BAZETT): 429 MS
EKG T AXIS: 40 DEGREES
EOSINOPHIL # BLD: 0.2 K/UL (ref 0–0.6)
EOSINOPHIL NFR BLD: 2.9 % (ref 0–5)
ERYTHROCYTE [DISTWIDTH] IN BLOOD BY AUTOMATED COUNT: 14.1 % (ref 11.5–14.5)
GLUCOSE SERPL-MCNC: 112 MG/DL (ref 70–99)
HCT VFR BLD AUTO: 35.4 % (ref 37–47)
HGB BLD-MCNC: 11.6 G/DL (ref 12–16)
IMM GRANULOCYTES # BLD: 0 K/UL
INR PPP: 1.5 (ref 0.88–1.18)
LYMPHOCYTES # BLD: 1.9 K/UL (ref 1.1–4.5)
LYMPHOCYTES NFR BLD: 30.5 % (ref 20–40)
MAGNESIUM SERPL-MCNC: 1.7 MG/DL (ref 1.6–2.6)
MCH RBC QN AUTO: 32.9 PG (ref 27–31)
MCHC RBC AUTO-ENTMCNC: 32.8 G/DL (ref 33–37)
MCV RBC AUTO: 100.3 FL (ref 81–99)
MONOCYTES # BLD: 0.7 K/UL (ref 0–0.9)
MONOCYTES NFR BLD: 11.8 % (ref 0–10)
NEUTROPHILS # BLD: 3.3 K/UL (ref 1.5–7.5)
NEUTS SEG NFR BLD: 53.2 % (ref 50–65)
PLATELET # BLD AUTO: 94 K/UL (ref 130–400)
PMV BLD AUTO: 10.3 FL (ref 9.4–12.3)
POTASSIUM SERPL-SCNC: 3.2 MMOL/L (ref 3.5–5)
PROT SERPL-MCNC: 7.6 G/DL (ref 6.4–8.3)
PROTHROMBIN TIME: 17.7 SEC (ref 12–14.6)
RBC # BLD AUTO: 3.53 M/UL (ref 4.2–5.4)
SODIUM SERPL-SCNC: 141 MMOL/L (ref 136–145)
WBC # BLD AUTO: 6.3 K/UL (ref 4.8–10.8)

## 2024-09-15 PROCEDURE — 76700 US EXAM ABDOM COMPLETE: CPT

## 2024-09-15 PROCEDURE — 2580000003 HC RX 258: Performed by: STUDENT IN AN ORGANIZED HEALTH CARE EDUCATION/TRAINING PROGRAM

## 2024-09-15 PROCEDURE — 80053 COMPREHEN METABOLIC PANEL: CPT

## 2024-09-15 PROCEDURE — 85025 COMPLETE CBC W/AUTO DIFF WBC: CPT

## 2024-09-15 PROCEDURE — 6370000000 HC RX 637 (ALT 250 FOR IP): Performed by: STUDENT IN AN ORGANIZED HEALTH CARE EDUCATION/TRAINING PROGRAM

## 2024-09-15 PROCEDURE — 6360000002 HC RX W HCPCS: Performed by: STUDENT IN AN ORGANIZED HEALTH CARE EDUCATION/TRAINING PROGRAM

## 2024-09-15 PROCEDURE — 83735 ASSAY OF MAGNESIUM: CPT

## 2024-09-15 PROCEDURE — 82140 ASSAY OF AMMONIA: CPT

## 2024-09-15 PROCEDURE — 93010 ELECTROCARDIOGRAM REPORT: CPT | Performed by: INTERNAL MEDICINE

## 2024-09-15 PROCEDURE — 94760 N-INVAS EAR/PLS OXIMETRY 1: CPT

## 2024-09-15 PROCEDURE — 36415 COLL VENOUS BLD VENIPUNCTURE: CPT

## 2024-09-15 PROCEDURE — 85610 PROTHROMBIN TIME: CPT

## 2024-09-15 RX ORDER — POTASSIUM CHLORIDE 1500 MG/1
40 TABLET, EXTENDED RELEASE ORAL ONCE
Status: COMPLETED | OUTPATIENT
Start: 2024-09-15 | End: 2024-09-15

## 2024-09-15 RX ORDER — LACTULOSE 10 G/15ML
10 SOLUTION ORAL 3 TIMES DAILY
Qty: 946 ML | Refills: 2 | Status: SHIPPED | OUTPATIENT
Start: 2024-09-15

## 2024-09-15 RX ORDER — MAGNESIUM SULFATE IN WATER 40 MG/ML
2000 INJECTION, SOLUTION INTRAVENOUS ONCE
Status: COMPLETED | OUTPATIENT
Start: 2024-09-15 | End: 2024-09-15

## 2024-09-15 RX ADMIN — LACTULOSE 10 G: 10 SOLUTION ORAL at 09:20

## 2024-09-15 RX ADMIN — ENOXAPARIN SODIUM 40 MG: 100 INJECTION SUBCUTANEOUS at 09:20

## 2024-09-15 RX ADMIN — SODIUM CHLORIDE, PRESERVATIVE FREE 10 ML: 5 INJECTION INTRAVENOUS at 09:20

## 2024-09-15 RX ADMIN — LEVETIRACETAM 1000 MG: 500 TABLET, FILM COATED ORAL at 09:20

## 2024-09-15 RX ADMIN — PANTOPRAZOLE SODIUM 20 MG: 20 TABLET, DELAYED RELEASE ORAL at 09:19

## 2024-09-15 RX ADMIN — MAGNESIUM SULFATE HEPTAHYDRATE 2000 MG: 40 INJECTION, SOLUTION INTRAVENOUS at 10:32

## 2024-09-15 RX ADMIN — CARVEDILOL 12.5 MG: 12.5 TABLET, FILM COATED ORAL at 09:20

## 2024-09-15 RX ADMIN — FOLIC ACID 1 MG: 1 TABLET ORAL at 09:20

## 2024-09-15 RX ADMIN — FLUOXETINE HYDROCHLORIDE 40 MG: 20 CAPSULE ORAL at 09:20

## 2024-09-15 RX ADMIN — POTASSIUM CHLORIDE 40 MEQ: 1500 TABLET, EXTENDED RELEASE ORAL at 10:30

## 2024-09-15 RX ADMIN — Medication 100 MG: at 09:20

## 2024-09-19 ENCOUNTER — HOSPITAL ENCOUNTER (OUTPATIENT)
Dept: MRI IMAGING | Facility: HOSPITAL | Age: 54
Discharge: HOME | End: 2024-09-19
Payer: COMMERCIAL

## 2024-09-19 ENCOUNTER — HOSPITAL ENCOUNTER (OUTPATIENT)
Dept: MAMMOGRAPHY | Facility: HOSPITAL | Age: 54
Discharge: HOME | End: 2024-09-19
Payer: COMMERCIAL

## 2024-09-19 ENCOUNTER — HOSPITAL ENCOUNTER (OUTPATIENT)
Dept: BONE DENSITY | Facility: HOSPITAL | Age: 54
Discharge: HOME | End: 2024-09-19
Payer: COMMERCIAL

## 2024-09-19 DIAGNOSIS — R44.1 VISUAL HALLUCINATIONS: ICD-10-CM

## 2024-09-19 DIAGNOSIS — Z12.31 ENCOUNTER FOR SCREENING MAMMOGRAM FOR BREAST CANCER: ICD-10-CM

## 2024-09-19 DIAGNOSIS — Z78.0 MENOPAUSE: ICD-10-CM

## 2024-09-19 LAB
BACTERIA BLD CULT ORG #2: NORMAL
BACTERIA BLD CULT: NORMAL
NCCN CRITERIA FLAG: NORMAL
TYRER CUZICK SCORE: 5.8

## 2024-09-19 PROCEDURE — 77067 SCR MAMMO BI INCL CAD: CPT

## 2024-09-19 PROCEDURE — 77080 DXA BONE DENSITY AXIAL: CPT

## 2024-09-19 PROCEDURE — 70551 MRI BRAIN STEM W/O DYE: CPT

## 2024-09-19 PROCEDURE — 77063 BREAST TOMOSYNTHESIS BI: CPT

## 2024-10-22 ENCOUNTER — TELEPHONE (OUTPATIENT)
Dept: NEUROLOGY | Age: 54
End: 2024-10-22

## 2024-10-22 NOTE — TELEPHONE ENCOUNTER
Patient called needing a card that says that she is ok to drive after she has had her seizures. Patient needs this to get her license reinstated. Patient requesting call back. Patient last appointment on 12/01/2023.

## 2024-10-24 NOTE — TELEPHONE ENCOUNTER
I'm assuming she means October of 2023. In order for the form to be legal, the patient has to be seen within 90 days in order for us to fill it out. Also, per [place] state law a patient must be seizure free for 90 days before they can drive again.

## 2024-10-24 NOTE — TELEPHONE ENCOUNTER
Patient called back and said her last seizure was last year around 10/23 but she couldn't remember. She is still taking the medication for seizures.

## 2024-10-31 ENCOUNTER — OFFICE VISIT (OUTPATIENT)
Dept: NEUROLOGY | Age: 54
End: 2024-10-31
Payer: COMMERCIAL

## 2024-10-31 VITALS
DIASTOLIC BLOOD PRESSURE: 88 MMHG | OXYGEN SATURATION: 98 % | RESPIRATION RATE: 16 BRPM | BODY MASS INDEX: 26.68 KG/M2 | WEIGHT: 145 LBS | HEIGHT: 62 IN | HEART RATE: 85 BPM | SYSTOLIC BLOOD PRESSURE: 134 MMHG

## 2024-10-31 DIAGNOSIS — R29.810 FACIAL WEAKNESS: ICD-10-CM

## 2024-10-31 DIAGNOSIS — G40.909 SEIZURE DISORDER (HCC): Primary | ICD-10-CM

## 2024-10-31 DIAGNOSIS — Z86.59 HISTORY OF ATTENTION DEFICIT DISORDER: ICD-10-CM

## 2024-10-31 PROCEDURE — 99213 OFFICE O/P EST LOW 20 MIN: CPT | Performed by: PSYCHIATRY & NEUROLOGY

## 2024-10-31 PROCEDURE — 3075F SYST BP GE 130 - 139MM HG: CPT | Performed by: PSYCHIATRY & NEUROLOGY

## 2024-10-31 PROCEDURE — 3079F DIAST BP 80-89 MM HG: CPT | Performed by: PSYCHIATRY & NEUROLOGY

## 2024-10-31 NOTE — PROGRESS NOTES
REVIEW OF SYSTEMS    Constitutional: []Fever []Sweat []Chills [] Recent Injury [x] Denies all unless marked  HEENT:[]Headache  [] Head Injury/Hearing Loss  [] Sore Throat  [] Ear Ache/Dizziness  [x] Denies all unless marked  Spine:  [] Neck pain  [] Back pain  [] Sciaticia  [x] Denies all unless marked  Cardiovascular:[]Heart Disease []Chest Pain [] Palpitations  [x] Denies all unless marked  Pulmonary: []Shortness of Breath []Cough   [x] Denies all unless marke  Gastrointestinal: []Nausea  []Vomiting  []Abdominal Pain  []Constipation  []Diarrhea  []Dark Bloody Stools  [x] Denies all unless marked  Psychiatric/Behavioral:[] Depression [] Anxiety [x] Denies all unless marked  Genitourinary:   [] Frequency  [] Urgency  [] Incontinence [] Pain with Urination  [x] Denies all unless marked  Extremities: []Pain  []Swelling  [x] Denies all unless marked  Musculoskeletal: [] Muscle Pain  [x] Joint Pain  [] Arthritis [x] Muscle Cramps [] Muscle Twitches  [x] Denies all unless marked  Sleep: [] Insomnia [] Snoring [] Restless Legs [] Sleep Apnea  [] Daytime Sleepiness  [x] Denies all unless marked  Skin:[] Rash [] Skin Discoloration [x] Denies all unless marked   Neurological: []Visual Disturbance/Memory Loss [] Loss of Balance [] Slurred Speech/Weakness [] Seizures  [] Vertigo/Dizziness [x] Denies all unless marked     
seizure (HCC) 10/05/2023     Resolved Ambulatory Problems     Diagnosis Date Noted    Hyponatremia 08/31/2018    Hypokalemia 08/31/2018    Supraclavicular fossa fullness 08/31/2018    Cushing syndrome (HCC) 09/14/2018    Numbness 09/26/2019    Pain in both feet 09/26/2019     No Additional Past Medical History       Past Surgical History:   Procedure Laterality Date    ABDOMINAL ADHESION SURGERY      CRANIOTOMY      VENTRICULOPERITONEAL SHUNT         Family History   Problem Relation Age of Onset    Heart Disease Mother     Kidney Disease Mother     Heart Disease Father     COPD Father     Kidney Disease Father     Lung Cancer Brother        No Known Allergies    Social History     Socioeconomic History    Marital status:      Spouse name: Not on file    Number of children: Not on file    Years of education: Not on file    Highest education level: Not on file   Occupational History    Not on file   Tobacco Use    Smoking status: Never    Smokeless tobacco: Never   Vaping Use    Vaping status: Never Used   Substance and Sexual Activity    Alcohol use: Not Currently     Comment: 4x weekly, half pint each time    Drug use: No    Sexual activity: Yes     Partners: Male   Other Topics Concern    Not on file   Social History Narrative    Not on file     Social Determinants of Health     Financial Resource Strain: Low Risk  (7/7/2022)    Overall Financial Resource Strain (CARDIA)     Difficulty of Paying Living Expenses: Not hard at all   Food Insecurity: No Food Insecurity (9/14/2024)    Hunger Vital Sign     Worried About Running Out of Food in the Last Year: Never true     Ran Out of Food in the Last Year: Never true   Transportation Needs: No Transportation Needs (9/14/2024)    PRAPARE - Transportation     Lack of Transportation (Medical): No     Lack of Transportation (Non-Medical): No   Physical Activity: Not on file   Stress: Not on file   Social Connections: Moderately Integrated (11/1/2024)    Social

## 2024-11-04 NOTE — TELEPHONE ENCOUNTER
Called patient and advised her that  have not received fax yet   she gave me the number to call [phone] to get faxed     I have called multiple times and always get stuck on hold.      11/14/24 spoke with someone at [employer] and they will fax paperwork to kay Stout so we can fill out and get sent back

## 2024-11-15 ENCOUNTER — TELEPHONE (OUTPATIENT)
Dept: NEUROLOGY | Age: 54
End: 2024-11-15

## 2024-11-15 NOTE — TELEPHONE ENCOUNTER
Paperwork filled out and put on Dr Rodríguez's desk to sign    Called patient and left a message that paperwork needed her signature and to come by and sign and I can get it faxed

## 2024-11-20 ENCOUNTER — TELEPHONE (OUTPATIENT)
Dept: NEUROLOGY | Age: 54
End: 2024-11-20

## 2025-01-09 ENCOUNTER — HOSPITAL ENCOUNTER (EMERGENCY)
Age: 55
Discharge: HOME | End: 2025-01-09
Payer: COMMERCIAL

## 2025-01-09 VITALS
HEART RATE: 78 BPM | DIASTOLIC BLOOD PRESSURE: 77 MMHG | TEMPERATURE: 98.4 F | OXYGEN SATURATION: 100 % | SYSTOLIC BLOOD PRESSURE: 147 MMHG | WEIGHT: 145 LBS | BODY MASS INDEX: 26.52 KG/M2 | RESPIRATION RATE: 18 BRPM

## 2025-01-09 DIAGNOSIS — Z87.898 HISTORY OF ALCOHOL USE DISORDER: ICD-10-CM

## 2025-01-09 DIAGNOSIS — N39.0 URINARY TRACT INFECTION IN FEMALE: Primary | ICD-10-CM

## 2025-01-09 DIAGNOSIS — F22 PARANOIA (HCC): ICD-10-CM

## 2025-01-09 LAB
ALBUMIN SERPL-MCNC: 4.4 G/DL (ref 3.5–5.2)
ALP SERPL-CCNC: 136 U/L (ref 35–104)
ALT SERPL-CCNC: 28 U/L (ref 5–33)
AMMONIA PLAS-SCNC: 59 UMOL/L (ref 11–51)
AMPHET UR QL SCN: POSITIVE
ANION GAP SERPL CALCULATED.3IONS-SCNC: 14 MMOL/L (ref 7–19)
AST SERPL-CCNC: 49 U/L (ref 5–32)
BACTERIA URNS QL MICRO: ABNORMAL /HPF
BARBITURATES UR QL SCN: NEGATIVE
BASOPHILS # BLD: 0.1 K/UL (ref 0–0.2)
BASOPHILS NFR BLD: 1.1 % (ref 0–1)
BENZODIAZ UR QL SCN: NEGATIVE
BILIRUB SERPL-MCNC: 1.8 MG/DL (ref 0.2–1.2)
BILIRUB UR QL STRIP: NEGATIVE
BUN SERPL-MCNC: 17 MG/DL (ref 6–20)
BUPRENORPHINE URINE: NEGATIVE
CALCIUM SERPL-MCNC: 9.7 MG/DL (ref 8.6–10)
CANNABINOIDS UR QL SCN: NEGATIVE
CHLORIDE SERPL-SCNC: 103 MMOL/L (ref 98–111)
CLARITY UR: CLEAR
CO2 SERPL-SCNC: 24 MMOL/L (ref 22–29)
COCAINE UR QL SCN: NEGATIVE
COLOR UR: YELLOW
CREAT SERPL-MCNC: 0.8 MG/DL (ref 0.5–0.9)
CRYSTALS URNS MICRO: ABNORMAL /HPF
DRUG SCREEN COMMENT UR-IMP: ABNORMAL
EOSINOPHIL # BLD: 0.2 K/UL (ref 0–0.6)
EOSINOPHIL NFR BLD: 2.9 % (ref 0–5)
EPI CELLS #/AREA URNS AUTO: 2 /HPF (ref 0–5)
ERYTHROCYTE [DISTWIDTH] IN BLOOD BY AUTOMATED COUNT: 14.6 % (ref 11.5–14.5)
ETHANOLAMINE SERPL-MCNC: <10 MG/DL (ref 0–0.08)
FENTANYL SCREEN, URINE: NEGATIVE
GLUCOSE SERPL-MCNC: 99 MG/DL (ref 70–99)
GLUCOSE UR STRIP.AUTO-MCNC: NEGATIVE MG/DL
HCT VFR BLD AUTO: 36.5 % (ref 37–47)
HGB BLD-MCNC: 12.1 G/DL (ref 12–16)
HGB UR STRIP.AUTO-MCNC: NEGATIVE MG/L
HYALINE CASTS #/AREA URNS AUTO: 1 /HPF (ref 0–8)
IMM GRANULOCYTES # BLD: 0 K/UL
KETONES UR STRIP.AUTO-MCNC: NEGATIVE MG/DL
LEUKOCYTE ESTERASE UR QL STRIP.AUTO: ABNORMAL
LYMPHOCYTES # BLD: 1.8 K/UL (ref 1.1–4.5)
LYMPHOCYTES NFR BLD: 28.3 % (ref 20–40)
MCH RBC QN AUTO: 31.5 PG (ref 27–31)
MCHC RBC AUTO-ENTMCNC: 33.2 G/DL (ref 33–37)
MCV RBC AUTO: 95.1 FL (ref 81–99)
METHADONE UR QL SCN: NEGATIVE
METHAMPHETAMINE, URINE: POSITIVE
MONOCYTES # BLD: 0.7 K/UL (ref 0–0.9)
MONOCYTES NFR BLD: 11.2 % (ref 0–10)
NEUTROPHILS # BLD: 3.5 K/UL (ref 1.5–7.5)
NEUTS SEG NFR BLD: 56.2 % (ref 50–65)
NITRITE UR QL STRIP.AUTO: POSITIVE
OPIATES UR QL SCN: POSITIVE
OXYCODONE UR QL SCN: POSITIVE
PCP UR QL SCN: NEGATIVE
PH UR STRIP.AUTO: 7 [PH] (ref 5–8)
PLATELET # BLD AUTO: 134 K/UL (ref 130–400)
PMV BLD AUTO: 10.5 FL (ref 9.4–12.3)
POTASSIUM SERPL-SCNC: 4 MMOL/L (ref 3.5–5)
PROT SERPL-MCNC: 9 G/DL (ref 6.4–8.3)
PROT UR STRIP.AUTO-MCNC: NEGATIVE MG/DL
RBC # BLD AUTO: 3.84 M/UL (ref 4.2–5.4)
RBC #/AREA URNS AUTO: 1 /HPF (ref 0–4)
SARS-COV-2 RDRP RESP QL NAA+PROBE: NOT DETECTED
SODIUM SERPL-SCNC: 141 MMOL/L (ref 136–145)
SP GR UR STRIP.AUTO: 1.02 (ref 1–1.03)
TRICYCLIC ANTIDEPRESSANTS, UR: NEGATIVE
UROBILINOGEN UR STRIP.AUTO-MCNC: 2 E.U./DL
WBC # BLD AUTO: 6.3 K/UL (ref 4.8–10.8)
WBC #/AREA URNS AUTO: 6 /HPF (ref 0–5)

## 2025-01-09 PROCEDURE — 87635 SARS-COV-2 COVID-19 AMP PRB: CPT

## 2025-01-09 PROCEDURE — 82077 ASSAY SPEC XCP UR&BREATH IA: CPT

## 2025-01-09 PROCEDURE — 81001 URINALYSIS AUTO W/SCOPE: CPT

## 2025-01-09 PROCEDURE — 80307 DRUG TEST PRSMV CHEM ANLYZR: CPT

## 2025-01-09 PROCEDURE — 82140 ASSAY OF AMMONIA: CPT

## 2025-01-09 PROCEDURE — 85025 COMPLETE CBC W/AUTO DIFF WBC: CPT

## 2025-01-09 PROCEDURE — 80053 COMPREHEN METABOLIC PANEL: CPT

## 2025-01-09 PROCEDURE — 36415 COLL VENOUS BLD VENIPUNCTURE: CPT

## 2025-01-09 PROCEDURE — G0480 DRUG TEST DEF 1-7 CLASSES: HCPCS

## 2025-01-09 PROCEDURE — 99283 EMERGENCY DEPT VISIT LOW MDM: CPT

## 2025-01-09 RX ORDER — LIDOCAINE HYDROCHLORIDE 20 MG/ML
JELLY TOPICAL PRN
Status: DISCONTINUED | OUTPATIENT
Start: 2025-01-09 | End: 2025-01-09

## 2025-01-09 RX ORDER — CEPHALEXIN 500 MG/1
500 CAPSULE ORAL 2 TIMES DAILY
Qty: 14 CAPSULE | Refills: 0 | Status: SHIPPED | OUTPATIENT
Start: 2025-01-09 | End: 2025-01-16

## 2025-01-09 ASSESSMENT — ENCOUNTER SYMPTOMS
RESPIRATORY NEGATIVE: 1
GASTROINTESTINAL NEGATIVE: 1

## 2025-01-09 NOTE — ED PROVIDER NOTES
[facility] EMERGENCY DEPARTMENT  EMERGENCY DEPARTMENT ENCOUNTER      Pt Name: Michelle Foster  MRN: [id number]  Birthdate 1970  Date of evaluation: 1/9/2025  Provider: JULI Enamorado NP    CHIEF COMPLAINT       Chief Complaint   Patient presents with    Mental Health Problem     Hallucinations         HISTORY OF PRESENT ILLNESS   (Location/Symptom, Timing/Onset,Context/Setting, Quality, Duration, Modifying Factors, Severity)  Note limiting factors.   Michelle Foster is a 54 y.o. female who presents to the emergency department with report that someone has been following her and stalking her.  She has a relative that she believes has hacked into her computer to get her information.  She is accompanied by a friend and her .  Her  informs that she has been paranoid like this for a while.  Patient has a history of alcohol abuse, anxiety, ADD, hypertension, hypothyroidism and MENEZES.  She denies any current alcohol use.  She denies any visual or auditory hallucinations.        The history is provided by the patient, the spouse and a friend.       NursingNotes were reviewed.    REVIEW OF SYSTEMS    (2-9 systems for level 4, 10 or more for level 5)     Review of Systems   Constitutional:         As per HPI   HENT: Negative.     Respiratory: Negative.     Cardiovascular: Negative.    Gastrointestinal: Negative.    Genitourinary: Negative.    Neurological: Negative.    Psychiatric/Behavioral:  Positive for sleep disturbance. Negative for hallucinations, self-injury and suicidal ideas.    All other systems reviewed and are negative.      A complete review of systems was performed and is negative except as noted above in the HPI.       PAST MEDICAL HISTORY     Past Medical History:   Diagnosis Date    Acoustic neuroma (HCC)     ADD (attention deficit disorder)     Anxiety     Depression     Headache     Hypertension     Hypothyroidism (acquired)     MENEZES (nonalcoholic steatohepatitis)          SURGICAL

## 2025-01-09 NOTE — DISCHARGE INSTRUCTIONS
Take all of the antibiotic.  Be sure you are drinking plenty of water.  Follow-up with counselor according to resources that were provided.  Return to ER for any new, worsening, or change in condition.

## 2025-01-24 ENCOUNTER — TRANSCRIBE ORDERS (OUTPATIENT)
Dept: ADMINISTRATIVE | Facility: HOSPITAL | Age: 55
End: 2025-01-24
Payer: COMMERCIAL

## 2025-01-24 DIAGNOSIS — Z12.31 ENCOUNTER FOR SCREENING MAMMOGRAM FOR MALIGNANT NEOPLASM OF BREAST: Primary | ICD-10-CM

## 2025-01-29 ENCOUNTER — HOSPITAL ENCOUNTER (INPATIENT)
Age: 55
LOS: 3 days | Discharge: HOME | End: 2025-02-01
Attending: PSYCHIATRY & NEUROLOGY | Admitting: PSYCHIATRY & NEUROLOGY
Payer: COMMERCIAL

## 2025-01-29 DIAGNOSIS — N39.0 URINARY TRACT INFECTION IN FEMALE: Primary | ICD-10-CM

## 2025-01-29 DIAGNOSIS — R44.3 HALLUCINATION: ICD-10-CM

## 2025-01-29 LAB
ALBUMIN SERPL-MCNC: 4.2 G/DL (ref 3.5–5.2)
ALP SERPL-CCNC: 162 U/L (ref 35–104)
ALT SERPL-CCNC: 27 U/L (ref 5–33)
AMMONIA PLAS-SCNC: 52 UMOL/L (ref 11–51)
AMPHET UR QL SCN: POSITIVE
ANION GAP SERPL CALCULATED.3IONS-SCNC: 11 MMOL/L (ref 8–16)
AST SERPL-CCNC: 44 U/L (ref 5–32)
BACTERIA #/AREA URNS HPF: NORMAL /HPF
BARBITURATES UR QL SCN: NEGATIVE
BASOPHILS # BLD: 0.1 K/UL (ref 0–0.2)
BASOPHILS NFR BLD: 1 % (ref 0–1)
BENZODIAZ UR QL SCN: NEGATIVE
BILIRUB SERPL-MCNC: 1.7 MG/DL (ref 0.2–1.2)
BILIRUB UR QL STRIP: NEGATIVE
BUN SERPL-MCNC: 12 MG/DL (ref 6–20)
BUPRENORPHINE URINE: NEGATIVE
CALCIUM SERPL-MCNC: 9.7 MG/DL (ref 8.6–10)
CANNABINOIDS UR QL SCN: NEGATIVE
CHLORIDE SERPL-SCNC: 104 MMOL/L (ref 98–107)
CLARITY UR: ABNORMAL
CO2 SERPL-SCNC: 24 MMOL/L (ref 22–29)
COCAINE UR QL SCN: NEGATIVE
COLOR UR: ABNORMAL
CREAT SERPL-MCNC: 0.6 MG/DL (ref 0.5–0.9)
DRUG SCREEN COMMENT UR-IMP: ABNORMAL
EOSINOPHIL # BLD: 0.3 K/UL (ref 0–0.6)
EOSINOPHIL NFR BLD: 3.7 % (ref 0–5)
ERYTHROCYTE [DISTWIDTH] IN BLOOD BY AUTOMATED COUNT: 13.8 % (ref 11.5–14.5)
ETHANOLAMINE SERPL-MCNC: <10 MG/DL (ref 0–10)
FENTANYL SCREEN, URINE: NEGATIVE
GLUCOSE SERPL-MCNC: 122 MG/DL (ref 70–99)
GLUCOSE UR STRIP.AUTO-MCNC: NEGATIVE MG/DL
HCT VFR BLD AUTO: 35.8 % (ref 37–47)
HGB BLD-MCNC: 12.5 G/DL (ref 12–16)
HGB UR STRIP.AUTO-MCNC: NEGATIVE MG/L
IMM GRANULOCYTES # BLD: 0 K/UL
KETONES UR STRIP.AUTO-MCNC: NEGATIVE MG/DL
LEUKOCYTE ESTERASE UR QL STRIP.AUTO: ABNORMAL
LYMPHOCYTES # BLD: 2.1 K/UL (ref 1.1–4.5)
LYMPHOCYTES NFR BLD: 25.5 % (ref 20–40)
MCH RBC QN AUTO: 31.3 PG (ref 27–31)
MCHC RBC AUTO-ENTMCNC: 34.9 G/DL (ref 33–37)
MCV RBC AUTO: 89.7 FL (ref 81–99)
METHADONE UR QL SCN: NEGATIVE
METHAMPHETAMINE, URINE: NEGATIVE
MONOCYTES # BLD: 0.8 K/UL (ref 0–0.9)
MONOCYTES NFR BLD: 9.8 % (ref 0–10)
NEUTROPHILS # BLD: 4.8 K/UL (ref 1.5–7.5)
NEUTS SEG NFR BLD: 59.6 % (ref 50–65)
NITRITE UR QL STRIP.AUTO: POSITIVE
OPIATES UR QL SCN: NEGATIVE
OXYCODONE UR QL SCN: NEGATIVE
PCP UR QL SCN: NEGATIVE
PH UR STRIP.AUTO: 7 [PH] (ref 5–8)
PLATELET # BLD AUTO: 145 K/UL (ref 130–400)
PMV BLD AUTO: 10.6 FL (ref 9.4–12.3)
POTASSIUM SERPL-SCNC: 3.8 MMOL/L (ref 3.5–5.1)
PROT SERPL-MCNC: 8.7 G/DL (ref 6.4–8.3)
PROT UR STRIP.AUTO-MCNC: NEGATIVE MG/DL
RBC # BLD AUTO: 3.99 M/UL (ref 4.2–5.4)
RBC #/AREA URNS HPF: NORMAL /HPF (ref 0–2)
SARS-COV-2 RDRP RESP QL NAA+PROBE: NOT DETECTED
SODIUM SERPL-SCNC: 139 MMOL/L (ref 136–145)
SP GR UR STRIP.AUTO: 1.02 (ref 1–1.03)
SQUAMOUS #/AREA URNS HPF: NORMAL /HPF
TRICYCLIC ANTIDEPRESSANTS, UR: NEGATIVE
UROBILINOGEN UR STRIP.AUTO-MCNC: 4 E.U./DL
WBC # BLD AUTO: 8 K/UL (ref 4.8–10.8)
WBC #/AREA URNS HPF: NORMAL /HPF (ref 0–5)

## 2025-01-29 PROCEDURE — 80307 DRUG TEST PRSMV CHEM ANLYZR: CPT

## 2025-01-29 PROCEDURE — 99285 EMERGENCY DEPT VISIT HI MDM: CPT

## 2025-01-29 PROCEDURE — 6360000002 HC RX W HCPCS: Performed by: NURSE PRACTITIONER

## 2025-01-29 PROCEDURE — 82140 ASSAY OF AMMONIA: CPT

## 2025-01-29 PROCEDURE — 81001 URINALYSIS AUTO W/SCOPE: CPT

## 2025-01-29 PROCEDURE — 6370000000 HC RX 637 (ALT 250 FOR IP): Performed by: PSYCHIATRY & NEUROLOGY

## 2025-01-29 PROCEDURE — 1240000000 HC EMOTIONAL WELLNESS R&B

## 2025-01-29 PROCEDURE — 82077 ASSAY SPEC XCP UR&BREATH IA: CPT

## 2025-01-29 PROCEDURE — 87186 SC STD MICRODIL/AGAR DIL: CPT

## 2025-01-29 PROCEDURE — 87077 CULTURE AEROBIC IDENTIFY: CPT

## 2025-01-29 PROCEDURE — 87635 SARS-COV-2 COVID-19 AMP PRB: CPT

## 2025-01-29 PROCEDURE — 36415 COLL VENOUS BLD VENIPUNCTURE: CPT

## 2025-01-29 PROCEDURE — 85025 COMPLETE CBC W/AUTO DIFF WBC: CPT

## 2025-01-29 PROCEDURE — 6370000000 HC RX 637 (ALT 250 FOR IP): Performed by: NURSE PRACTITIONER

## 2025-01-29 PROCEDURE — 96372 THER/PROPH/DIAG INJ SC/IM: CPT

## 2025-01-29 PROCEDURE — 99222 1ST HOSP IP/OBS MODERATE 55: CPT | Performed by: PSYCHIATRY & NEUROLOGY

## 2025-01-29 PROCEDURE — 80053 COMPREHEN METABOLIC PANEL: CPT

## 2025-01-29 PROCEDURE — 87086 URINE CULTURE/COLONY COUNT: CPT

## 2025-01-29 PROCEDURE — G0480 DRUG TEST DEF 1-7 CLASSES: HCPCS

## 2025-01-29 RX ORDER — LACTULOSE 10 G/15ML
10 SOLUTION ORAL DAILY
Status: DISCONTINUED | OUTPATIENT
Start: 2025-01-29 | End: 2025-01-31

## 2025-01-29 RX ORDER — LAMOTRIGINE 25 MG/1
25 TABLET ORAL 2 TIMES DAILY
COMMUNITY

## 2025-01-29 RX ORDER — CEPHALEXIN 500 MG/1
500 CAPSULE ORAL EVERY 12 HOURS SCHEDULED
Status: DISCONTINUED | OUTPATIENT
Start: 2025-01-29 | End: 2025-01-31

## 2025-01-29 RX ORDER — HYDROXYZINE HYDROCHLORIDE 25 MG/1
25 TABLET, FILM COATED ORAL 3 TIMES DAILY PRN
Status: DISCONTINUED | OUTPATIENT
Start: 2025-01-29 | End: 2025-02-01 | Stop reason: HOSPADM

## 2025-01-29 RX ORDER — LORAZEPAM 2 MG/1
2 TABLET ORAL EVERY 4 HOURS PRN
Status: DISCONTINUED | OUTPATIENT
Start: 2025-01-29 | End: 2025-01-31

## 2025-01-29 RX ORDER — PANTOPRAZOLE SODIUM 40 MG/1
40 TABLET, DELAYED RELEASE ORAL DAILY
Status: DISCONTINUED | OUTPATIENT
Start: 2025-01-30 | End: 2025-02-01 | Stop reason: HOSPADM

## 2025-01-29 RX ORDER — PALIPERIDONE 6 MG/1
6 TABLET, EXTENDED RELEASE ORAL DAILY
Status: DISCONTINUED | OUTPATIENT
Start: 2025-01-29 | End: 2025-02-01 | Stop reason: HOSPADM

## 2025-01-29 RX ORDER — ROPINIROLE 0.5 MG/1
0.5 TABLET, FILM COATED ORAL NIGHTLY
COMMUNITY

## 2025-01-29 RX ORDER — CARVEDILOL 12.5 MG/1
12.5 TABLET ORAL 2 TIMES DAILY
Status: DISCONTINUED | OUTPATIENT
Start: 2025-01-29 | End: 2025-01-29

## 2025-01-29 RX ORDER — NALTREXONE HYDROCHLORIDE 50 MG/1
50 TABLET, FILM COATED ORAL DAILY
COMMUNITY

## 2025-01-29 RX ORDER — LACTULOSE 10 G/15ML
10 SOLUTION ORAL 3 TIMES DAILY
Status: DISCONTINUED | OUTPATIENT
Start: 2025-01-29 | End: 2025-01-29

## 2025-01-29 RX ORDER — M-VIT,TX,IRON,MINS/CALC/FOLIC 27MG-0.4MG
1 TABLET ORAL DAILY
Status: DISCONTINUED | OUTPATIENT
Start: 2025-01-30 | End: 2025-02-01 | Stop reason: HOSPADM

## 2025-01-29 RX ORDER — ACETAMINOPHEN 325 MG/1
650 TABLET ORAL EVERY 4 HOURS PRN
Status: DISCONTINUED | OUTPATIENT
Start: 2025-01-29 | End: 2025-02-01 | Stop reason: HOSPADM

## 2025-01-29 RX ORDER — LAMOTRIGINE 25 MG/1
25 TABLET ORAL 2 TIMES DAILY
Status: DISCONTINUED | OUTPATIENT
Start: 2025-01-29 | End: 2025-02-01 | Stop reason: HOSPADM

## 2025-01-29 RX ORDER — MECOBALAMIN 5000 MCG
5 TABLET,DISINTEGRATING ORAL NIGHTLY
Status: DISCONTINUED | OUTPATIENT
Start: 2025-01-29 | End: 2025-02-01 | Stop reason: HOSPADM

## 2025-01-29 RX ORDER — POLYETHYLENE GLYCOL 3350 17 G/17G
17 POWDER, FOR SOLUTION ORAL DAILY PRN
Status: DISCONTINUED | OUTPATIENT
Start: 2025-01-29 | End: 2025-02-01 | Stop reason: HOSPADM

## 2025-01-29 RX ORDER — CARVEDILOL 12.5 MG/1
12.5 TABLET ORAL 2 TIMES DAILY
Status: DISCONTINUED | OUTPATIENT
Start: 2025-01-30 | End: 2025-02-01 | Stop reason: HOSPADM

## 2025-01-29 RX ORDER — LORAZEPAM 2 MG/1
1 TABLET ORAL EVERY 4 HOURS PRN
Status: DISCONTINUED | OUTPATIENT
Start: 2025-01-29 | End: 2025-01-31

## 2025-01-29 RX ORDER — LEVETIRACETAM 500 MG/1
1000 TABLET ORAL 2 TIMES DAILY
Status: DISCONTINUED | OUTPATIENT
Start: 2025-01-29 | End: 2025-02-01 | Stop reason: HOSPADM

## 2025-01-29 RX ORDER — TRAZODONE HYDROCHLORIDE 50 MG/1
50 TABLET, FILM COATED ORAL NIGHTLY
Status: DISCONTINUED | OUTPATIENT
Start: 2025-01-29 | End: 2025-02-01 | Stop reason: HOSPADM

## 2025-01-29 RX ADMIN — Medication 5 MG: at 21:14

## 2025-01-29 RX ADMIN — LAMOTRIGINE 25 MG: 25 TABLET ORAL at 21:15

## 2025-01-29 RX ADMIN — LEVETIRACETAM 1000 MG: 500 TABLET, FILM COATED ORAL at 21:15

## 2025-01-29 RX ADMIN — HYDROXYZINE HYDROCHLORIDE 25 MG: 25 TABLET, FILM COATED ORAL at 21:15

## 2025-01-29 RX ADMIN — PALIPERIDONE 6 MG: 6 TABLET, EXTENDED RELEASE ORAL at 17:10

## 2025-01-29 RX ADMIN — LACTULOSE 10 G: 20 SOLUTION ORAL at 16:37

## 2025-01-29 RX ADMIN — TRAZODONE HYDROCHLORIDE 50 MG: 50 TABLET ORAL at 21:15

## 2025-01-29 RX ADMIN — CEPHALEXIN 500 MG: 500 CAPSULE ORAL at 21:15

## 2025-01-29 RX ADMIN — LIDOCAINE HYDROCHLORIDE 1000 MG: 10 INJECTION, SOLUTION EPIDURAL; INFILTRATION; INTRACAUDAL; PERINEURAL at 14:13

## 2025-01-29 ASSESSMENT — PATIENT HEALTH QUESTIONNAIRE - PHQ9
SUM OF ALL RESPONSES TO PHQ9 QUESTIONS 1 & 2: 2
2. FEELING DOWN, DEPRESSED OR HOPELESS: SEVERAL DAYS
1. LITTLE INTEREST OR PLEASURE IN DOING THINGS: SEVERAL DAYS
SUM OF ALL RESPONSES TO PHQ QUESTIONS 1-9: 2

## 2025-01-29 ASSESSMENT — SLEEP AND FATIGUE QUESTIONNAIRES
DO YOU HAVE DIFFICULTY SLEEPING: YES
DO YOU USE A SLEEP AID: YES
AVERAGE NUMBER OF SLEEP HOURS: 4
SLEEP PATTERN: RESTLESSNESS;DISTURBED/INTERRUPTED SLEEP

## 2025-01-29 ASSESSMENT — LIFESTYLE VARIABLES
HOW MANY STANDARD DRINKS CONTAINING ALCOHOL DO YOU HAVE ON A TYPICAL DAY: PATIENT DOES NOT DRINK
HOW OFTEN DO YOU HAVE A DRINK CONTAINING ALCOHOL: NEVER

## 2025-01-29 ASSESSMENT — ENCOUNTER SYMPTOMS: RESPIRATORY NEGATIVE: 1

## 2025-01-29 NOTE — ED PROVIDER NOTES
[facility] ADULT [facility]  EMERGENCY DEPARTMENT ENCOUNTER      Pt Name: Michelle Foster  MRN: [id number]  Birthdate 1970  Date of evaluation: 1/29/2025  Provider: JULI Enamorado NP    CHIEF COMPLAINT       Chief Complaint   Patient presents with    Psychiatric Evaluation     Auditory hallucinations, denies SI, thoughts of harm toward sister         HISTORY OF PRESENT ILLNESS   (Location/Symptom, Timing/Onset,Context/Setting, Quality, Duration, Modifying Factors, Severity)  Note limiting factors.       Michelle Foster is a 54 y.o. female who presents to the emergency department with report of auditory hallucinations.  She denies SI but claims that she wants to harm her sister.  Patient has had no recent illness and no complaints of pain today.  Medical history includes acoustic neuroma, ADD, anxiety, depression, headache, hypertension, hypothyroidism and MENEZES.  She is accompanied today by her .    The history is provided by the patient.       NursingNotes were reviewed.    REVIEW OF SYSTEMS    (2-9 systems for level 4, 10 or more for level 5)     Review of Systems   Constitutional:         As per HPI   HENT: Negative.     Respiratory: Negative.     Cardiovascular: Negative.    Genitourinary: Negative.    Psychiatric/Behavioral:  Positive for hallucinations. Negative for self-injury. The patient is nervous/anxious.    All other systems reviewed and are negative.      A complete review of systems was performed and is negative except as noted above in the HPI.       PAST MEDICAL HISTORY     Past Medical History:   Diagnosis Date    Acoustic neuroma (HCC)     ADD (attention deficit disorder)     Anxiety     Depression     Headache     Hypertension     Hypothyroidism (acquired)     MENEZES (nonalcoholic steatohepatitis)          SURGICAL HISTORY       Past Surgical History:   Procedure Laterality Date    ABDOMINAL ADHESION SURGERY      CRANIOTOMY      VENTRICULOPERITONEAL SHUNT           CURRENT MEDICATIONS      LORazepam (ATIVAN) tablet 2 mg (has no administration in time range)   paliperidone (INVEGA) extended release tablet 6 mg (6 mg Oral Given 1/29/25 1710)   cefTRIAXone (ROCEPHIN) 1,000 mg in lidocaine 1 % 2.86 mL IM Injection (1,000 mg IntraMUSCular Given 1/29/25 1413)       EMERGENCY DEPARTMENT COURSE and DIFFERENTIALDIAGNOSIS/MDM:   Vitals:    Vitals:    01/29/25 1145 01/29/25 1533   BP: 135/74 118/72   Pulse: 83 83   Resp: 18 20   Temp: 98.2 °F (36.8 °C) 98.4 °F (36.9 °C)   TempSrc: Oral    SpO2: 98%    Weight: 65.8 kg (145 lb)        MDM  Number of Diagnoses or Management Options  Hallucination  Urinary tract infection in female  Diagnosis management comments: 54-year-old female presented to the emergency department with complaint of audio hallucinations and thoughts of harming her sister.  Patient denies SI.  Vital signs are stable today, CBC and CMP are unremarkable, urinalysis positive for UTI.  Rocephin 1 g administered while in the emergency department and will prescribe Keflex for this patient.  Urine drug screen positive for amphetamine; patient currently takes medication for ADHD.  Psychiatric evaluation completed and patient will be admitted to the hospital for further monitoring and evaluation.       Amount and/or Complexity of Data Reviewed  Clinical lab tests: ordered and reviewed        ED Course as of 01/29/25 1813 Wed Jan 29, 2025   1400 Discussed patient's status with Dr. Lars Arriaga who will accept patient for admission. [TG]      ED Course User Index  [TG] Maicol Dillard, APRN - NP       CONSULTS:  IP CONSULT TO FAMILY MEDICINE    PROCEDURES:  Unless otherwise notedbelow, none     Procedures      FINAL IMPRESSION     1. Urinary tract infection in female    2. Hallucination          DISPOSITION/PLAN   DISPOSITION Decision To Admit 01/29/2025 02:21:18 PM               No notes of EC Admission Criteria type on file.    PATIENT REFERRED TO:  No follow-up provider specified.    DISCHARGE

## 2025-01-29 NOTE — H&P
Behavioral Services  Medicare Certification Upon Admission    I certify that this patient's inpatient psychiatric hospital admission is medically necessary for:    [x] (1) Treatment which could reasonably be expected to improve this patient's condition,       [x] (2) Or for diagnostic study;     AND     [x](2) The inpatient psychiatric services are provided while the individual is under the care of a physician and are included in the individualized plan of care.    Estimated length of stay/service :3 days-5 days    Plan for post-hospital care :TBD    Electronically signed by JULI Coon on 1/29/2025 at 3:06 PM

## 2025-01-29 NOTE — PROGRESS NOTES
Nursing Admission Note    Reason for Admission: Pt is a 54 yr old female who presents to the ER with Auditory hallucinations and thoughts of harming her sister, her best fiend and sisters boyfriend. Pt denies SI/VH. She reports she hears her sister and sisters boyfriend taunting her. Pt appears tearful and paranoid. She reports difficulty sleeping and decreased appetite. Pt is a recovering alcoholic and gave in last night and had a drink. Pt was sober for 6 mths. Pt denies any past suicide attempts and denes any past hospitalizations. Pt is not currently seeing a psychiatrist or therapist. Pt reports feeling depressed and anxious. Pt lives with her  and is unemployed at this time.    Additional Notes:      Patient Active Problem List   Diagnosis    Depression    Anxiety    Headache    Hyperglycemia    Hypothyroidism (acquired)    Hypertension    MENEZES (nonalcoholic steatohepatitis)    ADD (attention deficit disorder)    Acoustic neuroma (HCC)    Anemia    Neuropathy    Elevated LFTs    Female pattern hair loss    Facial palsy    Dizziness    Abnormal SPEP    Syncope and collapse    Alcohol use    Hypercalcemia    Schwannoma    Hearing loss of right ear    Imbalance    New onset seizure (HCC)    Hallucinations       C-SSRS:  C-SSRS Completed: yes.    Risk Assessment Completed: yes.    Risk of Suicide per C-SSRS: Risk of Suicide: No Risk  Provider Notified of the C-SSRS Screening and   Risk Assessment Findings: yes.    Order for Constant Observer Continued: no.    If no, discontinued due to the following reasons:  Patient is on 15 minute safety checks NA.  Safety Features on the unit: NA.    No previous safety concerns while on unit: NA.  Patient reporting no thoughts of self-harm while on unit: NA.      Suicidal Ideation: no.    If yes, is there a plan?(Describe)   Homicidal Ideation:  yes.   If yes, describe: Reports wants to harm her sister, sisters boyfriend and best friend  Auditory/Visual

## 2025-01-29 NOTE — H&P
[facility]    Psychiatric Initial Evaluation    Date of Evaluation:  1/30/2025  Session Type:  41602 Psychiatric Diagnostic Interview Exam   Name:  Michelle Foster  Age:  54 y.o.  Sex:  female  Ethnicity:   Primary Care Physician:  Chandu Reynaga MD   Patient Care Team:  Patient Care Team:  Chandu Reynaga MD as PCP - General (Family Medicine)  Chandu Reynaga MD as PCP - Empaneled Provider  Chief Complaint: \" I am hearing things that nobody else hears.\"    History of Present Illness    Historian: patient  Complaint Type: sleep disturbance, tobacco use, and psychosis  Course of Symptoms: ongoing  Symptoms Onset: gradual  Onset Approximately: gradual  Precipitating Factors: chronic alcohol use   Severity: moderate  Risk Factors: chronic alcohol use disorder        The patient is a 54 y.o. female with previous psychiatric history of depression, anxiety, ADHD and alcohol use disorder in early remission, complicated by history of multiple medical conditions, including acoustic neuroma, complicated by meningitis, history of seizures, hypothyroidism, hypertension, non-alcoholic steatohepatitis, who presented to the emergency department complaining for auditory hallucinations.  Patient's UDS in ER was positive for prescribed amphetamines.     Patient is well known to psychiatry due to previous consult, when patient was admitted to medical services secondary to auditory and visual hallucinations. She reports she had not used alcohol in the past 6 months until yesterday. She admits to drinking half a pint of Vodka last night.       Patient reported that during the last a few weeks she heard voices of her sister and her sisters boyfriend. She reports that she hears their voices telling her that they are going to \"drive her crazy until she kills herself.\" She has also been hearing her sisters voice crying stating \"no, no don't hurt me.\"  Patient stated that she felt stressed, because she

## 2025-01-29 NOTE — ED NOTES
ED TO INPATIENT SBAR HANDOFF    Patient Name: Michelle Foster   : 1970  54 y.o.   Family/Caregiver Present: Yes  Code Status Order: Prior    C-SSRS: Risk of Suicide: No Risk  Sitter Yes  Restraints:         Situation  Chief Complaint:   Chief Complaint   Patient presents with    Psychiatric Evaluation     Auditory hallucinations, denies SI, thoughts of harm toward sister     Patient Diagnosis: No admission diagnoses are documented for this encounter.     Brief Description of Patient's Condition: Pt presented to ED for  eval. Pt has been having AH and thoughts of harming her sister. Pt calm and cooperative in ED. UA shows Uti, given dose of IM rocephin and to start keflex during admission. A&o. Voluntary. VSS.   Mental Status: oriented and alert  Arrived from: home    Imaging:   No orders to display     COVID-19 Results:   Internal Administration   First Dose      Second Dose           Last COVID Lab SARS-CoV-2, PCR (no units)   Date Value   2024 Not Detected     SARS-CoV-2, NAAT (no units)   Date Value   2025 Not Detected           Abnormal labs:   Abnormal Labs Reviewed   CBC WITH AUTO DIFFERENTIAL - Abnormal; Notable for the following components:       Result Value    RBC 3.99 (*)     Hematocrit 35.8 (*)     MCH 31.3 (*)     All other components within normal limits   COMPREHENSIVE METABOLIC PANEL - Abnormal; Notable for the following components:    Glucose 122 (*)     Total Protein 8.7 (*)     Total Bilirubin 1.7 (*)     Alkaline Phosphatase 162 (*)     AST 44 (*)     All other components within normal limits   URINALYSIS WITH REFLEX TO CULTURE - Abnormal; Notable for the following components:    Color, UA DARK YELLOW (*)     Clarity, UA CLOUDY (*)     Urobilinogen, Urine 4.0 (*)     Nitrite, Urine POSITIVE (*)     Leukocyte Esterase, Urine MODERATE (*)     All other components within normal limits   DRUG SCRN, BUPRENORPHINE - Abnormal; Notable for the following components:    Amphetamine  Screen, Ur POSITIVE (*)     All other components within normal limits     Background  Allergies: No Known Allergies  Current Medications:   Medications Administered         cefTRIAXone (ROCEPHIN) 1,000 mg in lidocaine 1 % 2.86 mL IM Injection Admin Date  01/29/2025 Action  Given Dose  1,000 mg Route  IntraMUSCular Documented By  Juan A Barragan RN            History:   Past Medical History:   Diagnosis Date    Acoustic neuroma (HCC)     ADD (attention deficit disorder)     Anxiety     Depression     Headache     Hypertension     Hypothyroidism (acquired)     MENEZES (nonalcoholic steatohepatitis)        Assessment  Vitals: Level of Consciousness: Alert (0)   Vitals:    01/29/25 1145   BP: 135/74   Pulse: 83   Resp: 18   Temp: 98.2 °F (36.8 °C)   TempSrc: Oral   SpO2: 98%   Weight: 65.8 kg (145 lb)     Predictive Model Details   No score data available for Deterioration Index      NPO? No  O2 Flow Rate: O2 Device: None (Room air)    Cardiac Rhythm: sinus  NIH Score: NIH     Active LDA's:    Pertinent or High Risk Medications/Drips: no   If Yes, please provide details:    Blood Product Administration: no  If Yes, please provide details:    Sepsis Risk Score      Admitted with Sepsis? No    Recommendation  Incomplete orders: none  Patient Belongings: w security  Additional Comments: none  If any further questions, please call Sending RN at [phone]    Electronically signed by: Electronically signed by Juan A Barragan RN on 1/29/2025 at 2:19 PM

## 2025-01-29 NOTE — CONSULTS
[facility]  Psychiatry Consult    Reason for Consult/Chief Complaint: Concern   Hallucinations    Psychiatric consult has been requested by ER attending Maicol Dillard NP    The primary source(s) of information include(s):  patient    The patient is a 54 y.o. female with previous psychiatric history of depression, anxiety, ADHD and alcohol use disorder in early remission, complicated by history of multiple medical conditions, including acoustic neuroma, complicated by meningitis, history of seizures, hypothyroidism, hypertension, non-alcoholic steatohepatitis, who presented to the emergency department complaining for auditory hallucinations.  Patient's UDS in ER was positive for prescribed amphetamines.    Patient is well known to psychiatry due to previous consult, when patient was admitted to medical services secondary to auditory and visual hallucinations.    Patient has been seen in ER in room #17 with presence of the patient's .  Patient reported that during the last a few weeks she heard voices of her sister and her boyfriend, who made a bad remarks about the patient and were discussing how to kill the patient.  Patient stated that she felt stressed, because she had a bad relationships with her sister and she believes that her sister and her boyfriend can really kill her.  However, she denies auditory and visual hallucinations during the interview, stated that last time she experienced auditory hallucinations before admission to the hospital.    Also, patient reported that during the last a few months she was dealing with multiple scams on her computer, stated that people hacked her computer and tried to scam her about Social Security, as well as \"some people from home group\" and she feels scared that she can lose everything, what she got in her life.  Patient stated that during the last 2 months, she was experiencing poor quality of sleep and usually sleeps not more than 3  at present time. Patient did not appear to be responding to internal stimuli.   Executive Functions: Appear impaired.   Concentration: Decreased  Reasoning: Appears impaired based on interaction from interview   Orientation: To personal information, place and current sedation  Alert.   Language: Intact.   Fund of information: Intact.   Memory: recent and remote appear intact.   Impulsivity: Limited  Neurovegitative: Fair appetite, poor sleep  Insight: Impaired  Judgment: Impaired    Assessment    DSM 5 DIAGNOSIS:  Psychosis, unspecified  History of major depressive disorder  History of generalized anxiety disorder  History of ADHD  History of alcohol use disorder in early remission  Insomnia  Urinary tract infection    Recommendations  1.  Currently patient is not suicidal or homicidal.  Patient continues to endorse paranoid ideations.  2.  Patient can be transferred from ER to adult psychiatric unit when she is medically stable.  3.  Patient has urinary tract infection.  Discussed with ER attending and ask for assistance to provide patient with injection of antibiotics for UTI before patient's transferred from ER to psychiatric unit.    YUSUF DRAPER MD

## 2025-01-30 LAB
25(OH)D3 SERPL-MCNC: 107.8 NG/ML
CHOLEST SERPL-MCNC: 148 MG/DL (ref 0–199)
HBA1C MFR BLD: 5.3 % (ref 4–5.6)
HDLC SERPL-MCNC: 68 MG/DL (ref 40–60)
LDLC SERPL CALC-MCNC: 69 MG/DL
TRIGL SERPL-MCNC: 56 MG/DL (ref 0–149)
VIT B12 SERPL-MCNC: 1029 PG/ML (ref 232–1245)

## 2025-01-30 PROCEDURE — 80061 LIPID PANEL: CPT

## 2025-01-30 PROCEDURE — 6370000000 HC RX 637 (ALT 250 FOR IP): Performed by: NURSE PRACTITIONER

## 2025-01-30 PROCEDURE — 90792 PSYCH DIAG EVAL W/MED SRVCS: CPT | Performed by: NURSE PRACTITIONER

## 2025-01-30 PROCEDURE — 36415 COLL VENOUS BLD VENIPUNCTURE: CPT

## 2025-01-30 PROCEDURE — 6370000000 HC RX 637 (ALT 250 FOR IP): Performed by: PSYCHIATRY & NEUROLOGY

## 2025-01-30 PROCEDURE — 82607 VITAMIN B-12: CPT

## 2025-01-30 PROCEDURE — 83036 HEMOGLOBIN GLYCOSYLATED A1C: CPT

## 2025-01-30 PROCEDURE — 1240000000 HC EMOTIONAL WELLNESS R&B

## 2025-01-30 PROCEDURE — 82306 VITAMIN D 25 HYDROXY: CPT

## 2025-01-30 RX ADMIN — CARVEDILOL 12.5 MG: 12.5 TABLET, FILM COATED ORAL at 07:45

## 2025-01-30 RX ADMIN — TRAZODONE HYDROCHLORIDE 50 MG: 50 TABLET ORAL at 20:40

## 2025-01-30 RX ADMIN — LAMOTRIGINE 25 MG: 25 TABLET ORAL at 20:40

## 2025-01-30 RX ADMIN — LACTULOSE 10 G: 20 SOLUTION ORAL at 07:45

## 2025-01-30 RX ADMIN — CEPHALEXIN 500 MG: 500 CAPSULE ORAL at 20:40

## 2025-01-30 RX ADMIN — PANTOPRAZOLE SODIUM 40 MG: 40 TABLET, DELAYED RELEASE ORAL at 07:45

## 2025-01-30 RX ADMIN — LAMOTRIGINE 25 MG: 25 TABLET ORAL at 07:45

## 2025-01-30 RX ADMIN — Medication 5 MG: at 20:40

## 2025-01-30 RX ADMIN — LEVETIRACETAM 1000 MG: 500 TABLET, FILM COATED ORAL at 20:40

## 2025-01-30 RX ADMIN — Medication 1 TABLET: at 07:45

## 2025-01-30 RX ADMIN — CEPHALEXIN 500 MG: 500 CAPSULE ORAL at 07:48

## 2025-01-30 RX ADMIN — HYDROXYZINE HYDROCHLORIDE 25 MG: 25 TABLET, FILM COATED ORAL at 20:40

## 2025-01-30 RX ADMIN — PALIPERIDONE 6 MG: 6 TABLET, EXTENDED RELEASE ORAL at 07:45

## 2025-01-30 RX ADMIN — CARVEDILOL 12.5 MG: 12.5 TABLET, FILM COATED ORAL at 17:17

## 2025-01-30 RX ADMIN — LEVETIRACETAM 1000 MG: 500 TABLET, FILM COATED ORAL at 07:45

## 2025-01-30 NOTE — GROUP NOTE
Group Therapy Note    Date: 1/30/2025    Group Start Time: 1100  Group End Time: 1130  Group Topic: Psychoeducation    [facility]    Shannon Kinsey           Patient's Goal:  Triggers    Notes: SW discussed with pts about anxiety, what triggers their anxiety, what physical symptoms they experience when anxious, and how they currently cope with their anxiety. Pt did not share in group, but listened attentively.    Status After Intervention: Unchanged     Participation Level: Active Listener     Participation Quality: Attentive    Speech:  normal      Thought Process/Content: Logical      Affective Functioning: Congruent      Mood: euthymic      Level of consciousness:  Attentive      Response to Learning: Able to verbalize current knowledge/experience       Endings: None Reported    Modes of Intervention: Support, Socialization, and Exploration      Discipline Responsible: Psychoeducational Specialist      Signature:  Shannon Kinsey

## 2025-01-30 NOTE — PROGRESS NOTES
Group Note    Date: 01/30/25  Start Time: 8:00 AM   End Time:8:30 AM     Number of Participants: 12    Type of Group: Community/Goal     Patient's Goal:  \"Getting better and letting stuff go\"    Notes:      Status After Intervention:  Unchanged    Participation Level: Active Listener and Interactive    Participation Quality: Appropriate and Attentive    Speech:  normal    Thought Process/Content: Logical    Mood: anxious and depressed    Level of consciousness:  Alert    Response to Learning: Capable of insight    Modes of Intervention: Education and Support    Discipline Responsible: Registered Nurse     Signature:  Emma Anaya RN

## 2025-01-30 NOTE — BH NOTE
Group Note    Date: 01/29/25  Start Time: 8:00 PM   End Time:8:30 PM     Number of Participants: 8    Type of Group: Wrap-Up     Patient's Goal:  \"People are wonderful.\"    Notes:  Pt reports improved depression and on/off anxiety. Pt reports having less hallucinations than when she was admitted.     Status After Intervention:      Participation Level: Active Listener    Participation Quality: Appropriate    Speech:  normal    Thought Process/Content: Logical    Mood: Calm     Level of consciousness:  Alert    Response to Learning: Able to verbalize current knowledge/experience, Able to verbalize/acknowledge new learning, and Able to retain information    Modes of Intervention: Education and Support    Discipline Responsible: Behavioral Health Technician     Signature:  Roge Asher

## 2025-01-30 NOTE — PROGRESS NOTES
SW spoke with pt about a safe discharge plan for when she is ready to leave the hospital, and she confirms that she will be going home where she lives with her spouse in [place] and that he will pick her up at the time of discharge. Pt said she previously followed up at [facility], but would prefer to try somewhere different, so would like to try and follow up at [facility]. SW will contact [facility] in [place] and schedule pt's follow up appointments.

## 2025-01-30 NOTE — PROGRESS NOTES
[facility] Adult Unit Daily Assessment  Nursing Progress Note    Room: [facility]607-01   Name: Michelle Foster   Age: 54 y.o.   Gender: female   Dx: Hallucinations  Precautions: suicide risk, fall risk, and seizure precautions  Inpatient Status: voluntary     SLEEP:  Sleep Quality Good  Sleep Medications: Yes   PRN Sleep Meds: No     MEDICAL:  Other PRN Meds: No   Med Compliant: Yes  Accu-Chek: No  Oxygen/CPAP/BiPAP: No  CIWA/CINA: No   PAIN Assessment: none  Side Effects from medication: No    Metabolic Screening:  Lab Results   Component Value Date    LABA1C 5.3 01/30/2025     Lab Results   Component Value Date    CHOL 148 01/30/2025    CHOL 163 10/05/2023    CHOL 215 (H) 03/14/2022    CHOL 235 (H) 10/12/2020    CHOL 278 (H) 04/15/2019     Lab Results   Component Value Date    TRIG 56 01/30/2025    TRIG 72 10/05/2023    TRIG 187 (H) 03/14/2022    TRIG 174 (H) 10/12/2020    TRIG 327 (H) 04/15/2019     Lab Results   Component Value Date    HDL 68 (H) 01/30/2025    HDL 48 (L) 10/05/2023    HDL 44 (L) 03/14/2022    HDL 58 (L) 10/12/2020    HDL 76 04/15/2019     No components found for: \"LDLCAL\"  No components found for: \"LABVLDL\"  Body mass index is 26.52 kg/m².  BP Readings from Last 2 Encounters:   01/29/25 128/67   01/09/25 (!) 147/77       Medical Bed:   Is patient in a medical bed? no   If medical bed is in use, has nursing secured room while patient is awake and out of the room? NA  Has safety checks by nursing been completed on the bed/room this shift? yes    Protective Factors:  Patient identifies protective factors with nursing staff as follows:   Identifies reasons for living: Yes   Supportive Social Network or family: Yes    Belief that suicide is immoral/high spirituality: Yes   Responsibility to family or others/living with family: Yes   Fear of death or dying due to pain and suffering: No   Engaged in work or school: No  If Patient is unable to identify, reason why?     Depression: 0   Anxiety: 0   SI denies suicidal  ideation   Risk of Suicide: No Risk  HI Negative for homicidal ideation        AVH:no If Hallucinations are present, describe?     Appetite: good pt states she ate almost all of her breakfast  Percent Meals: 75%   Social: Yes   Speech: normal   Appearance: appropriately dressed and healthy looking    GROUP:  Group Participation: Yes  Participation Quality: Active Listener    Notes:   Pt up in day room. Pt is brightened this morning. Pt states she slept great and her appetite is good. Pt denies AVH, SI, and HI. She rates anxiety and depression a 0. Pt is attending group. Pt is med compliant. Pt is social.       Electronically signed by Diana Urena RN on 1/30/25 at 11:32 AM CST

## 2025-01-30 NOTE — PROGRESS NOTES
Treatment Team Note:    Therapist met with treatment team to discuss patients treatment, progress toward treatment goals and discharge plans.    Target Symptoms/Reason for admission: Per nursing admission assessment - Reason for Admission: Pt is a 54 yr old female who presents to the ER with Auditory hallucinations and thoughts of harming her sister. Pt's sister has an EPO on her and has court 2/4/25. Pt denies SI/VH. She reports she hears her sister and sisters boyfriend taunting her. Pt appears tearful and paranoid. She reports difficulty sleeping and decreased appetite. Pt is a recovering alcoholic and gave in last night and had a drink. Pt was sober for 6 mths. Pt denies any past suicide attempts and denes any past hospitalizations. Pt is not currently seeing a psychiatrist or therapist. Pt reports feeling depressed and anxious. Pt lives with her  and is unemployed at this time.    Diagnoses per psych provider: Hallucination [R44.3]  Urinary tract infection in female [N39.0]  Hallucinations [R44.3]    Patient's aftercare plan is: [facility]    Aftercare appointments made: No - SW will make discharge appointments    Pt lives with: spouse    Collateral obtained from: SW will meet with patient to gather information    Attending groups: New admission - SW will monitor group attendance    Behavior: calm and cooperative    Has patient been completing ADL's:  New admission - unknown at this time - SW will monitor    Sleeping:New admission - unknown at this time.    Taking medication: New admission - unknown at this time.    Misc:

## 2025-01-30 NOTE — PROGRESS NOTES
[facility] Adult Unit Daily Assessment  Nursing Progress Note    Room: [facility]/607-01   Name: Michelle Foster   Age: 54 y.o.   Gender: female   Dx: Hallucinations  Precautions: suicide risk, fall risk, and seizure precautions  Inpatient Status: voluntary     SLEEP:  Sleep Quality Good  Sleep Medications: Yes; melatonin 5mg, trazadone 50mg   PRN Sleep Meds: No     MEDICAL:  Other PRN Meds: Yes; atarax 25mg   Med Compliant: Yes  Accu-Chek: No  Oxygen/CPAP/BiPAP: No  CIWA/CINA: No   PAIN Assessment: denies  Side Effects from medication: none voiced    Metabolic Screening:  Lab Results   Component Value Date    LABA1C 4.7 10/04/2023     Lab Results   Component Value Date    CHOL 163 10/05/2023    CHOL 215 (H) 03/14/2022    CHOL 235 (H) 10/12/2020    CHOL 278 (H) 04/15/2019     Lab Results   Component Value Date    TRIG 72 10/05/2023    TRIG 187 (H) 03/14/2022    TRIG 174 (H) 10/12/2020    TRIG 327 (H) 04/15/2019     Lab Results   Component Value Date    HDL 48 (L) 10/05/2023    HDL 44 (L) 03/14/2022    HDL 58 (L) 10/12/2020    HDL 76 04/15/2019     No components found for: \"LDLCAL\"  No components found for: \"LABVLDL\"  Body mass index is 26.52 kg/m².  BP Readings from Last 2 Encounters:   01/29/25 128/67   01/09/25 (!) 147/77       Medical Bed:   Is patient in a medical bed? no   If medical bed is in use, has nursing secured room while patient is awake and out of the room? NA  Has safety checks by nursing been completed on the bed/room this shift? yes    Protective Factors:  Patient identifies protective factors with nursing staff as follows:   Identifies reasons for living: Yes   Supportive Social Network or family: Yes    Belief that suicide is immoral/high spirituality: Yes   Responsibility to family or others/living with family: Yes   Fear of death or dying due to pain and suffering: No   Engaged in work or school: No  If Patient is unable to identify, reason why? N/A    Depression: 2   Anxiety: 4   SI denies suicidal ideation

## 2025-01-30 NOTE — PROGRESS NOTES
Admission Note      Reason for admission/Target Symptom: Per nursing admission assessment - Reason for Admission: Pt is a 54 yr old female who presents to the ER with Auditory hallucinations and thoughts of harming her sister. Pt's sister has an EPO on her and has court 2/4/25. Pt denies SI/VH. She reports she hears her sister and sisters boyfriend taunting her. Pt appears tearful and paranoid. She reports difficulty sleeping and decreased appetite. Pt is a recovering alcoholic and gave in last night and had a drink. Pt was sober for 6 mths. Pt denies any past suicide attempts and denes any past hospitalizations. Pt is not currently seeing a psychiatrist or therapist. Pt reports feeling depressed and anxious. Pt lives with her  and is unemployed at this time.    Diagnoses: Unspecified depression  UDS: Amphetamine   BAL:  Neg    SW will meet with treatment team to discuss patient's treatment including care planning, discharge planning, and follow-up needs. Patient has been admitted to [facility].     Treatment team will identify the patient's discharge needs. Appointments will be made for medication management and outpatient therapy/counseling. Pt confirmed the need for ongoing treatment post inpatient stay. Pt was also provided a handout of contact information for drug and alcohol treatment centers and other community support service such as [facility], AA, and [facility].

## 2025-01-30 NOTE — PROGRESS NOTES
SW met with patient to complete psychosocial and lifetime CSSR-S on this date. Patients long and short-term goals discussed. Patient voiced understanding. Treatment plan sheet signed. Patient verbalized understanding of the treatment plan. Patient participated in goals and objectives of the treatment plan. Patient discussed safety plan with .    In the last 6 months has the patient been a danger to self: No  In the last 6 months has the patient been a danger to others: No  Legal Guardian/POA: No    Activity Assessment:  Skills:   Talents:   Interests:     Provided patient with [employer] handout entitled \"Quitting Smoking.\"  Reviewed handout with patient: addressing dangers of smoking, developing coping skills, and providing basic information about quitting.       Patient received all components practical counseling of tobacco practical counseling during the hospital stay.

## 2025-01-30 NOTE — PLAN OF CARE
Problem: Safety - Adult  Goal: Free from fall injury  Outcome: Progressing     Problem: Anxiety  Goal: Will report anxiety at manageable levels  Description: INTERVENTIONS:  1. Administer medication as ordered  2. Teach and rehearse alternative coping skills  3. Provide emotional support with 1:1 interaction with staff  Outcome: Progressing     Problem: Coping  Goal: Pt/Family able to verbalize concerns and demonstrate effective coping strategies  Description: INTERVENTIONS:  1. Assist patient/family to identify coping skills, available support systems and cultural and spiritual values  2. Provide emotional support, including active listening and acknowledgement of concerns of patient and caregivers  3. Reduce environmental stimuli, as able  4. Instruct patient/family in relaxation techniques, as appropriate  5. Assess for spiritual pain/suffering and initiate Spiritual Care, Psychosocial Clinical Specialist consults as needed  Outcome: Progressing     Problem: Decision Making  Goal: Pt/Family able to effectively weigh alternatives and participate in decision making related to treatment and care  Description: INTERVENTIONS:  1. Determine when there are differences between patient's view, family's view, and healthcare provider's view of condition  2. Facilitate patient and family articulation of goals for care  3. Help patient and family identify pros/cons of alternative solutions  4. Provide information as requested by patient/family  5. Respect patient/family right to receive or not to receive information  6. Serve as a liaison between patient and family and health care team  7. Initiate Consults from Ethics, Palliative Care or initiate Family Care Conference as is appropriate  Outcome: Progressing     Problem: Behavior  Goal: Pt/Family maintain appropriate behavior and adhere to behavioral management agreement, if implemented  Description: INTERVENTIONS:  1. Assess patient/family's coping skills and

## 2025-01-30 NOTE — PLAN OF CARE
Problem: Anxiety  Goal: Will report anxiety at manageable levels  Description: INTERVENTIONS:  1. Administer medication as ordered  2. Teach and rehearse alternative coping skills  3. Provide emotional support with 1:1 interaction with staff  1/30/2025 1141 by Diana Urena RN  Outcome: Progressing     Group Therapy Note     Date: 1/30/2025  Start Time: 1000  End Time:  1030  Number of Participants: 5     Type of Group: Psychotherapy        Status After Intervention:  Improved     Participation Level: Active Listener     Participation Quality: Appropriate, Attentive, and Sharing        Speech:  normal        Thought Process/Content: Logical        Affective Functioning: Congruent        Mood: euthymic        Level of consciousness:  Alert        Response to Learning: Able to verbalize current knowledge/experience        Endings: None Reported     Modes of Intervention: Education, Support, and Socialization        Discipline Responsible: /Counselor        Signature:  Ciara Caldera LPC

## 2025-01-30 NOTE — PROGRESS NOTES
Collateral obtained from: patient  Ho [phone]    Immediate Stressors & Time Episode Began: patient has been feeling that she has someone is watching her from outside if the house and is out to get her after getting into a argument and her sister. She also thinks that her sister and others have acess to her phone and that they are reading her text messages.She has not had any medication changes  Patient has been drinking for two years and she has been sober for 2 months and she admitted that she had 1 drink a few nights ago and she admitted this to her .    Diagnosis/Hx of compliance with meds: patient and she has been taking her medications and then her  been her  reports that has taking her to the er several. In the past few days    Tx Hx/Past hospitalizations:  caller reports no [facility] treatment history    Family hx of psychiatric issues: caller reports no family history of psychiatric issues    Substance Abuse: caller reports substance abuse history -- history includes patient has a history  of alcohol abuse    Pending Legal: caller reports no pending legal issues    Safety Issues (Weapons? Hx of attempts): The importance of locking weapons in a secured location or removing from the home was explained and recommended to collateral caller. No weapons are in the home    Support system/Medication Managed by: The importance of medication management and locking extra medication in a secured location was explained and recommended to collateral caller.     Discharge Disposition: home -lives with family    Additional Info:

## 2025-01-31 LAB
ALBUMIN SERPL-MCNC: 3.5 G/DL (ref 3.5–5.2)
ALP SERPL-CCNC: 131 U/L (ref 35–104)
ALT SERPL-CCNC: 21 U/L (ref 5–33)
AMMONIA PLAS-SCNC: 55 UMOL/L (ref 11–51)
ANION GAP SERPL CALCULATED.3IONS-SCNC: 11 MMOL/L (ref 8–16)
AST SERPL-CCNC: 34 U/L (ref 5–32)
BACTERIA UR CULT: ABNORMAL
BACTERIA UR CULT: ABNORMAL
BILIRUB SERPL-MCNC: 1.8 MG/DL (ref 0.2–1.2)
BUN SERPL-MCNC: 14 MG/DL (ref 6–20)
CALCIUM SERPL-MCNC: 9.1 MG/DL (ref 8.6–10)
CHLORIDE SERPL-SCNC: 105 MMOL/L (ref 98–107)
CO2 SERPL-SCNC: 23 MMOL/L (ref 22–29)
CREAT SERPL-MCNC: 0.7 MG/DL (ref 0.5–0.9)
GLUCOSE SERPL-MCNC: 110 MG/DL (ref 70–99)
INR PPP: 1.47 (ref 0.88–1.18)
ORGANISM: ABNORMAL
POTASSIUM SERPL-SCNC: 3.5 MMOL/L (ref 3.5–5.1)
PROT SERPL-MCNC: 7.5 G/DL (ref 6.4–8.3)
PROTHROMBIN TIME: 17.4 SEC (ref 12–14.6)
SODIUM SERPL-SCNC: 139 MMOL/L (ref 136–145)

## 2025-01-31 PROCEDURE — 99232 SBSQ HOSP IP/OBS MODERATE 35: CPT | Performed by: NURSE PRACTITIONER

## 2025-01-31 PROCEDURE — 36415 COLL VENOUS BLD VENIPUNCTURE: CPT

## 2025-01-31 PROCEDURE — 6370000000 HC RX 637 (ALT 250 FOR IP): Performed by: PSYCHIATRY & NEUROLOGY

## 2025-01-31 PROCEDURE — 1240000000 HC EMOTIONAL WELLNESS R&B

## 2025-01-31 PROCEDURE — 82140 ASSAY OF AMMONIA: CPT

## 2025-01-31 PROCEDURE — 80053 COMPREHEN METABOLIC PANEL: CPT

## 2025-01-31 PROCEDURE — 6370000000 HC RX 637 (ALT 250 FOR IP): Performed by: NURSE PRACTITIONER

## 2025-01-31 PROCEDURE — 6370000000 HC RX 637 (ALT 250 FOR IP): Performed by: FAMILY MEDICINE

## 2025-01-31 PROCEDURE — 85610 PROTHROMBIN TIME: CPT

## 2025-01-31 RX ORDER — LACTULOSE 10 G/15ML
10 SOLUTION ORAL 3 TIMES DAILY
Status: DISCONTINUED | OUTPATIENT
Start: 2025-01-31 | End: 2025-02-01 | Stop reason: HOSPADM

## 2025-01-31 RX ORDER — SULFAMETHOXAZOLE AND TRIMETHOPRIM 800; 160 MG/1; MG/1
1 TABLET ORAL EVERY 12 HOURS SCHEDULED
Status: DISCONTINUED | OUTPATIENT
Start: 2025-01-31 | End: 2025-02-01 | Stop reason: HOSPADM

## 2025-01-31 RX ADMIN — Medication 1 TABLET: at 07:37

## 2025-01-31 RX ADMIN — TRAZODONE HYDROCHLORIDE 50 MG: 50 TABLET ORAL at 21:35

## 2025-01-31 RX ADMIN — LACTULOSE 10 G: 20 SOLUTION ORAL at 21:35

## 2025-01-31 RX ADMIN — ACETAMINOPHEN 650 MG: 325 TABLET ORAL at 21:35

## 2025-01-31 RX ADMIN — Medication 5 MG: at 21:35

## 2025-01-31 RX ADMIN — CARVEDILOL 12.5 MG: 12.5 TABLET, FILM COATED ORAL at 17:20

## 2025-01-31 RX ADMIN — PANTOPRAZOLE SODIUM 40 MG: 40 TABLET, DELAYED RELEASE ORAL at 07:37

## 2025-01-31 RX ADMIN — LACTULOSE 10 G: 20 SOLUTION ORAL at 13:55

## 2025-01-31 RX ADMIN — HYDROXYZINE HYDROCHLORIDE 25 MG: 25 TABLET, FILM COATED ORAL at 21:36

## 2025-01-31 RX ADMIN — LEVETIRACETAM 1000 MG: 500 TABLET, FILM COATED ORAL at 21:35

## 2025-01-31 RX ADMIN — LAMOTRIGINE 25 MG: 25 TABLET ORAL at 07:37

## 2025-01-31 RX ADMIN — SULFAMETHOXAZOLE AND TRIMETHOPRIM 1 TABLET: 800; 160 TABLET ORAL at 21:40

## 2025-01-31 RX ADMIN — LEVETIRACETAM 1000 MG: 500 TABLET, FILM COATED ORAL at 07:37

## 2025-01-31 RX ADMIN — PALIPERIDONE 6 MG: 6 TABLET, EXTENDED RELEASE ORAL at 07:37

## 2025-01-31 RX ADMIN — SULFAMETHOXAZOLE AND TRIMETHOPRIM 1 TABLET: 800; 160 TABLET ORAL at 13:54

## 2025-01-31 RX ADMIN — CEPHALEXIN 500 MG: 500 CAPSULE ORAL at 07:37

## 2025-01-31 RX ADMIN — LAMOTRIGINE 25 MG: 25 TABLET ORAL at 21:36

## 2025-01-31 RX ADMIN — CARVEDILOL 12.5 MG: 12.5 TABLET, FILM COATED ORAL at 07:36

## 2025-01-31 RX ADMIN — LACTULOSE 10 G: 20 SOLUTION ORAL at 07:36

## 2025-01-31 ASSESSMENT — PAIN SCALES - GENERAL
PAINLEVEL_OUTOF10: 0
PAINLEVEL_OUTOF10: 3

## 2025-01-31 ASSESSMENT — PAIN - FUNCTIONAL ASSESSMENT: PAIN_FUNCTIONAL_ASSESSMENT: ACTIVITIES ARE NOT PREVENTED

## 2025-01-31 ASSESSMENT — PAIN DESCRIPTION - DESCRIPTORS: DESCRIPTORS: NAGGING

## 2025-01-31 ASSESSMENT — PAIN DESCRIPTION - LOCATION: LOCATION: GENERALIZED

## 2025-01-31 NOTE — PROGRESS NOTES
Treatment Team Note:    Therapist met with treatment team to discuss patients treatment, progress toward treatment goals and discharge plans.    Target Symptoms/Reason for admission: Per nursing admission assessment - Reason for Admission: Pt is a 54 yr old female who presents to the ER with Auditory hallucinations and thoughts of harming her sister. Pt's sister has an EPO on her and has court 2/4/25. Pt denies SI/VH. She reports she hears her sister and sisters boyfriend taunting her. Pt appears tearful and paranoid. She reports difficulty sleeping and decreased appetite. Pt is a recovering alcoholic and gave in last night and had a drink. Pt was sober for 6 mths. Pt denies any past suicide attempts and denes any past hospitalizations. Pt is not currently seeing a psychiatrist or therapist. Pt reports feeling depressed and anxious. Pt lives with her  and is unemployed at this time.    Diagnoses per psych provider: Hallucination [R44.3]  Urinary tract infection in female [N39.0]  Hallucinations [R44.3]    Patient's aftercare plan is: [facility]    Aftercare appointments made: Yes    Pt lives with: spouse    Collateral obtained from:  [facility] () [phone]  Collateral obtained on:01/30/2025    Attending groups: Yes-active listener    Behavior: calm and cooperative    Has patient been completing ADL's:  Yes    Sleeping: Patient slept good with medication    Taking medication: Yes    Misc: Per nightshift nursing staff \"Pt is alert and oriented, pt in room and out in milieu area with peers and staff, pt interm social. Pt denies hallucinations and paranoia. Pt states that she has been tired most of this day with sleeping during day. Pt states that she has eaten all meals and did groups this day and was sleeping during wrap up group this evening. Pt stated this evening that depression is 0, and none, and states her anxiety has \"good\". Pt

## 2025-01-31 NOTE — H&P
HISTORY and PHYSICAL      CHIEF COMPLAINT:  Psychosis    Reason for Admission:  Psychosis    History Obtained From:  patient, chart    HISTORY OF PRESENT ILLNESS:      The patient is a 54 y.o. female who is admitted to the [facility] unit with worsening mood issues. She has no c/o chest pain or SOA. She has had no abdominal pain or N/V. She has had no fevers. She is taking PO.     Past Medical History:        Diagnosis Date    Acoustic neuroma (HCC)     ADD (attention deficit disorder)     Anxiety     Depression     Headache     Hypertension     Hypothyroidism (acquired)     MENEZES (nonalcoholic steatohepatitis)      Past Surgical History:        Procedure Laterality Date    ABDOMINAL ADHESION SURGERY      CRANIOTOMY      VENTRICULOPERITONEAL SHUNT           Medications Prior to Admission:    Medications Prior to Admission: lamoTRIgine (LAMICTAL) 25 MG tablet, Take 1 tablet by mouth 2 times daily  rOPINIRole (REQUIP) 0.5 MG tablet, Take 1 tablet by mouth nightly  naltrexone (DEPADE) 50 MG tablet, Take 1 tablet by mouth daily  amphetamine-dextroamphetamine (ADDERALL) 20 MG tablet, 1.5 tablets 2 times daily.  pantoprazole (PROTONIX) 20 MG tablet, Take 2 tablets by mouth daily  ondansetron (ZOFRAN) 4 MG tablet, Take 2 tablets by mouth 3 times daily as needed for Nausea or Vomiting  levETIRAcetam (KEPPRA) 1000 MG tablet, Take 1 tablet by mouth 2 times daily  potassium chloride (KLOR-CON M) 20 MEQ extended release tablet, Take 2 tablets by mouth daily  ibuprofen (ADVIL;MOTRIN) 600 MG tablet, Take 1 tablet by mouth every 8 hours as needed for Pain Take with food.  carvedilol (COREG) 12.5 MG tablet, Take 1 tablet by mouth 2 times daily  diphenhydrAMINE (BENADRYL) 25 MG capsule, Take 2 capsules by mouth nightly  Multiple Vitamins-Minerals (THERAPEUTIC MULTIVITAMIN-MINERALS) tablet, Take 1 tablet by mouth daily  lactulose (CHRONULAC) 10 GM/15ML solution, Take 15 mLs by mouth

## 2025-01-31 NOTE — PROGRESS NOTES
[facility]      Psychiatric Progress Note    Name:  Michelle Foster  Date:  1/31/2025  Age:  54 y.o.  Sex:  female  Ethnicity:   Primary Care Physician:  Chandu Reynaga MD   Patient Care Team:  Patient Care Team:  Chandu Reynaga MD as PCP - General (Family Medicine)  Chandu Reynaga MD as PCP - Empaneled Provider  Chief Complaint: \" I am starting to feel maurilio.[email or web address]\"        Historian:patient  Complaint Type: anxiety, irritability, loss of interest in favorite activities, mood swings, sleep disturbance, and psychosis  Course of Symptoms: improved  Precipitating Factors: alcohol use disorder,     Subjective  Nursing notes were reviewed and patient had no behavioral issues during the night. As needed medications administered during the night include Hydroxyzine. Today she denies suicidal ideations, homicidal ideations and psychosis. She has been out of her room more today and has been in the milieu. She was been social with select peers. Patient reports sleep as,\" I am sleeping normally now.\" She reports she is upset that her sister has taken out an EPO against her and wants to have a relationship with her. Patient has been calm and cooperative with staff and peers. Patient has been compliant with medications. Patient has been attending groups. Patient reports appetite as,\" I am eating a lot better today.\" Patient reports no side effects from medications.      Objective    Vital Signs:  Last set of tests and vitals:  Vitals:    01/31/25 0747   BP: 105/70   Pulse: 81   Resp: 20   Temp: 98.1 °F (36.7 °C)   SpO2: 97%       Previous Psychiatric/Substance Use History      Medical History:  Past Medical History:   Diagnosis Date    Acoustic neuroma (HCC)     ADD (attention deficit disorder)     Anxiety     Depression     Headache     Hypertension     Hypothyroidism (acquired)     MENEZES (nonalcoholic steatohepatitis)         PIERCE History:   Social History     Substance and Sexual Activity  (BACTRIM DS;SEPTRA DS) 800-160 MG per tablet 1 tablet  1 tablet Oral 2 times per day Estrada Torres MD        acetaminophen (TYLENOL) tablet 650 mg  650 mg Oral Q4H PRN Lars Arriaga MD        polyethylene glycol (GLYCOLAX) packet 17 g  17 g Oral Daily PRN Lars Arriaga MD        traZODone (DESYREL) tablet 50 mg  50 mg Oral Nightly Lars Arriaga MD   50 mg at 01/30/25 2040    melatonin disintegrating tablet 5 mg  5 mg Oral Nightly Lars Arriaga MD   5 mg at 01/30/25 2040    hydrOXYzine HCl (ATARAX) tablet 25 mg  25 mg Oral TID PRN Lars Arriaga MD   25 mg at 01/30/25 2040    lamoTRIgine (LAMICTAL) tablet 25 mg  25 mg Oral BID Reimnijanett Katerin, APRN   25 mg at 01/31/25 0737    therapeutic multivitamin-minerals 1 tablet  1 tablet Oral Daily Reimnijanett, Katerin, APRN   1 tablet at 01/31/25 0737    pantoprazole (PROTONIX) tablet 40 mg  40 mg Oral Daily Reimnijanett, Katerin, APRN   40 mg at 01/31/25 0737    levETIRAcetam (KEPPRA) tablet 1,000 mg  1,000 mg Oral BID Reimnijanett, Katerin, APRN   1,000 mg at 01/31/25 0737    carvedilol (COREG) tablet 12.5 mg  12.5 mg Oral BID Reimnijanett, Katerin, APRN   12.5 mg at 01/31/25 0736    paliperidone (INVEGA) extended release tablet 6 mg  6 mg Oral Daily Reimnijanett, Katerin, APRN   6 mg at 01/31/25 0737       Psychotherapy:   SUPPORTIVE    DSM V Diagnoses:    Psychosis, unspecified  R/O Delirium   History of major depressive disorder  History of generalized anxiety disorder  History of ADHD  History of alcohol use disorder in early remission  Insomnia  Urinary tract infection          Plan:    Encourage group therapy  15 minute safety checks  Medical monitoring by Dr. Torres and associates  Continue current therapy and medications  Possible discharge over the weekend if patient is stable    Amount of time spent with patient:  35 minutes with greater than 50% of the time spent in counseling and collaboration of care.    LEONIE Coon-BC, FNP-C   Clinician

## 2025-01-31 NOTE — PLAN OF CARE
Problem: Safety - Adult  Goal: Free from fall injury  1/31/2025 1129 by Milady Carey RN  Outcome: Progressing  1/31/2025 1105 by Milady Carey RN  Outcome: Progressing  1/30/2025 2229 by Qiana Llanes RN  Outcome: Adequate for Discharge     Problem: Anxiety  Goal: Will report anxiety at manageable levels  Description: INTERVENTIONS:  1. Administer medication as ordered  2. Teach and rehearse alternative coping skills  3. Provide emotional support with 1:1 interaction with staff  1/31/2025 1129 by Milady Carey RN  Outcome: Progressing  1/31/2025 1105 by Milady Carey RN  Outcome: Progressing  1/30/2025 2229 by Qiana Llanes RN  Outcome: Progressing     Problem: Coping  Goal: Pt/Family able to verbalize concerns and demonstrate effective coping strategies  Description: INTERVENTIONS:  1. Assist patient/family to identify coping skills, available support systems and cultural and spiritual values  2. Provide emotional support, including active listening and acknowledgement of concerns of patient and caregivers  3. Reduce environmental stimuli, as able  4. Instruct patient/family in relaxation techniques, as appropriate  5. Assess for spiritual pain/suffering and initiate Spiritual Care, Psychosocial Clinical Specialist consults as needed  1/31/2025 1129 by Milady Carey RN  Outcome: Progressing  1/31/2025 1105 by Milady Carey RN  Outcome: Progressing  1/30/2025 2229 by Qiana Llanes RN  Outcome: Progressing     Problem: Decision Making  Goal: Pt/Family able to effectively weigh alternatives and participate in decision making related to treatment and care  Description: INTERVENTIONS:  1. Determine when there are differences between patient's view, family's view, and healthcare provider's view of condition  2. Facilitate patient and family articulation of goals for care  3. Help patient and family identify pros/cons of alternative solutions  4. Provide information as requested by  patient/family  5. Respect patient/family right to receive or not to receive information  6. Serve as a liaison between patient and family and health care team  7. Initiate Consults from Ethics, Palliative Care or initiate Family Care Conference as is appropriate  1/31/2025 1129 by Milady Carey RN  Outcome: Progressing  1/31/2025 1105 by Milady Carey RN  Outcome: Progressing  1/30/2025 2229 by Qiana Llanes RN  Outcome: Progressing     Problem: Behavior  Goal: Pt/Family maintain appropriate behavior and adhere to behavioral management agreement, if implemented  Description: INTERVENTIONS:  1. Assess patient/family's coping skills and  non-compliant behavior (including use of illegal substances)  2. Notify security of behavior or suspected illegal substances which indicate the need for search of the family and/or belongings  3. Encourage verbalization of thoughts and concerns in a socially appropriate manner  4. Utilize positive, consistent limit setting strategies supporting safety of patient, staff and others  5. Encourage participation in the decision making process about the behavioral management agreement  6. If a visitor's behavior poses a threat to safety call refer to organization policy.  7. Initiate consult with , Psychosocial CNS, Spiritual Care as appropriate  1/31/2025 1129 by Milady Carey RN  Outcome: Progressing  1/31/2025 1105 by Milady Carey RN  Outcome: Progressing  1/30/2025 2229 by Qiana Llanes RN  Outcome: Adequate for Discharge     Problem: Self Harm/Suicidality  Goal: Will have no self-injury during hospital stay  Description: INTERVENTIONS:  1.  Ensure constant observer at bedside with Q15M safety checks  2.  Maintain a safe environment  3.  Secure patient belongings  4.  Ensure family/visitors adhere to safety recommendations  5.  Ensure safety tray has been added to patient's diet order  6.  Every shift and PRN: Re-assess suicidal risk via Frequent

## 2025-01-31 NOTE — PLAN OF CARE
Problem: Coping  Goal: Pt/Family able to verbalize concerns and demonstrate effective coping strategies  Description: INTERVENTIONS:  1. Assist patient/family to identify coping skills, available support systems and cultural and spiritual values  2. Provide emotional support, including active listening and acknowledgement of concerns of patient and caregivers  3. Reduce environmental stimuli, as able  4. Instruct patient/family in relaxation techniques, as appropriate  5. Assess for spiritual pain/suffering and initiate Spiritual Care, Psychosocial Clinical Specialist consults as needed  1/31/2025 1148 by Jessica Sadler MSW  Outcome: Progressing                                                                   Group Note    Date: 01/31/25  Start Time: 10:00 AM   End Time:10:40 AM     Number of Participants: 9    Therapeutic Goal: Foster self expression in regards to emotions/feelings     Objective: Encouraged and provide opportunity to patient to discuss emotions and thoughts on personal experiences     Participation Quality: Appropriate, Attentive, Sharing, and Supportive    Speech:  normal    Thought Process/Content: Logical    Mood:  Calm    Level of consciousness:  Alert    Response to Learning: Able to verbalize current knowledge/experience, Able to verbalize/acknowledge new learning, Able to retain information, Able to change behavior, and Progressing to goal    Modes of Intervention: Education and Support    Discipline Responsible: /Counselor     Signature:  CAROLYN Mccall

## 2025-01-31 NOTE — GROUP NOTE
Group Therapy Note    Date: 1/31/2025    Group Start Time: 1100  Group End Time: 1140  Group Topic: Psychoeducation    [facility]    Shannon Kinsey           Patient's Goal: Happiness     Notes: SW discussed what happiness means and pt's shared what makes them happy including family, music, movies, and how although we cannot control what happens to us, we can always control how we react and what perspective we choose to have.    Status After Intervention:  Improved    Participation Level: Active Listener and Interactive    Participation Quality: Appropriate, Attentive, and Sharing      Speech:  normal      Thought Process/Content: Logical      Affective Functioning: Congruent      Mood: euthymic      Level of consciousness:  Alert, Oriented x4, and Attentive      Response to Learning: Able to verbalize current knowledge/experience and Able to verbalize/acknowledge new learning      Endings: None Reported    Modes of Intervention: Education, Support, and Socialization      Discipline Responsible: Psychoeducational Specialist      Signature:  Shannon Kinsey

## 2025-01-31 NOTE — PLAN OF CARE
Problem: Safety - Adult  Goal: Free from fall injury  1/31/2025 1105 by Milady Carey RN  Outcome: Progressing  1/30/2025 2229 by Qiana Llanes RN  Outcome: Adequate for Discharge     Problem: Anxiety  Goal: Will report anxiety at manageable levels  Description: INTERVENTIONS:  1. Administer medication as ordered  2. Teach and rehearse alternative coping skills  3. Provide emotional support with 1:1 interaction with staff  1/31/2025 1105 by Milady Carey RN  Outcome: Progressing  1/30/2025 2229 by Qiana Llanes RN  Outcome: Progressing     Problem: Coping  Goal: Pt/Family able to verbalize concerns and demonstrate effective coping strategies  Description: INTERVENTIONS:  1. Assist patient/family to identify coping skills, available support systems and cultural and spiritual values  2. Provide emotional support, including active listening and acknowledgement of concerns of patient and caregivers  3. Reduce environmental stimuli, as able  4. Instruct patient/family in relaxation techniques, as appropriate  5. Assess for spiritual pain/suffering and initiate Spiritual Care, Psychosocial Clinical Specialist consults as needed  1/31/2025 1105 by Milady Carey RN  Outcome: Progressing  1/30/2025 2229 by Qiana Llanes RN  Outcome: Progressing     Problem: Decision Making  Goal: Pt/Family able to effectively weigh alternatives and participate in decision making related to treatment and care  Description: INTERVENTIONS:  1. Determine when there are differences between patient's view, family's view, and healthcare provider's view of condition  2. Facilitate patient and family articulation of goals for care  3. Help patient and family identify pros/cons of alternative solutions  4. Provide information as requested by patient/family  5. Respect patient/family right to receive or not to receive information  6. Serve as a liaison between patient and family and health care team  7. Initiate Consults from

## 2025-01-31 NOTE — PLAN OF CARE
Problem: Anxiety  Goal: Will report anxiety at manageable levels  Description: INTERVENTIONS:  1. Administer medication as ordered  2. Teach and rehearse alternative coping skills  3. Provide emotional support with 1:1 interaction with staff  1/30/2025 2229 by Qiana Llanes RN  Outcome: Progressing  1/30/2025 1141 by Diana Urena RN  Outcome: Progressing     Problem: Coping  Goal: Pt/Family able to verbalize concerns and demonstrate effective coping strategies  Description: INTERVENTIONS:  1. Assist patient/family to identify coping skills, available support systems and cultural and spiritual values  2. Provide emotional support, including active listening and acknowledgement of concerns of patient and caregivers  3. Reduce environmental stimuli, as able  4. Instruct patient/family in relaxation techniques, as appropriate  5. Assess for spiritual pain/suffering and initiate Spiritual Care, Psychosocial Clinical Specialist consults as needed  1/30/2025 2229 by Qiana Llanes RN  Outcome: Progressing  1/30/2025 1141 by Diana Urena RN  Outcome: Progressing     Problem: Decision Making  Goal: Pt/Family able to effectively weigh alternatives and participate in decision making related to treatment and care  Description: INTERVENTIONS:  1. Determine when there are differences between patient's view, family's view, and healthcare provider's view of condition  2. Facilitate patient and family articulation of goals for care  3. Help patient and family identify pros/cons of alternative solutions  4. Provide information as requested by patient/family  5. Respect patient/family right to receive or not to receive information  6. Serve as a liaison between patient and family and health care team  7. Initiate Consults from Ethics, Palliative Care or initiate Family Care Conference as is appropriate  1/30/2025 2229 by Qiana Llanes RN  Outcome: Progressing  1/30/2025 1141 by Diana Urena RN  Outcome:

## 2025-01-31 NOTE — PROGRESS NOTES
[facility] Adult Unit Daily Assessment  Nursing Progress Note    Room: [facility]/607-01   Name: Michelle Foster   Age: 54 y.o.   Gender: female   Dx: Hallucinations  Precautions: suicide risk, fall risk, and seizure precautions  Inpatient Status: voluntary     SLEEP:  Sleep Quality Good  Sleep Medications: Yes and trazodone 50 mg and Melatonin 5 mg   PRN Sleep Meds: No     MEDICAL:  Other PRN Meds: Yes, atarax 25 mg   Med Compliant: Yes  Accu-Chek: No  Oxygen/CPAP/BiPAP: No  CIWA/CINA: Yes and score = 1   PAIN Assessment: none  Side Effects from medication: No    Metabolic Screening:  Lab Results   Component Value Date    LABA1C 5.3 01/30/2025     Lab Results   Component Value Date    CHOL 148 01/30/2025    CHOL 163 10/05/2023    CHOL 215 (H) 03/14/2022    CHOL 235 (H) 10/12/2020    CHOL 278 (H) 04/15/2019     Lab Results   Component Value Date    TRIG 56 01/30/2025    TRIG 72 10/05/2023    TRIG 187 (H) 03/14/2022    TRIG 174 (H) 10/12/2020    TRIG 327 (H) 04/15/2019     Lab Results   Component Value Date    HDL 68 (H) 01/30/2025    HDL 48 (L) 10/05/2023    HDL 44 (L) 03/14/2022    HDL 58 (L) 10/12/2020    HDL 76 04/15/2019     No components found for: \"LDLCAL\"  No components found for: \"LABVLDL\"  Body mass index is 26.52 kg/m².  BP Readings from Last 2 Encounters:   01/30/25 132/75   01/09/25 (!) 147/77       Medical Bed:   Is patient in a medical bed? no   If medical bed is in use, has nursing secured room while patient is awake and out of the room? NA  Has safety checks by nursing been completed on the bed/room this shift? yes    Protective Factors:  Patient identifies protective factors with nursing staff as follows:   Identifies reasons for living: Yes   Supportive Social Network or family: Yes    Belief that suicide is immoral/high spirituality: Yes   Responsibility to family or others/living with family: Yes   Fear of death or dying due to pain and suffering: No   Engaged in work or school: No  If Patient is unable to

## 2025-02-01 VITALS
TEMPERATURE: 98.1 F | RESPIRATION RATE: 14 BRPM | OXYGEN SATURATION: 100 % | HEART RATE: 71 BPM | WEIGHT: 145 LBS | BODY MASS INDEX: 26.52 KG/M2 | SYSTOLIC BLOOD PRESSURE: 114 MMHG | DIASTOLIC BLOOD PRESSURE: 59 MMHG

## 2025-02-01 PROBLEM — G51.0 FACIAL PALSY: Status: RESOLVED | Noted: 2019-09-26 | Resolved: 2025-02-01

## 2025-02-01 PROBLEM — R26.89 IMBALANCE: Status: RESOLVED | Noted: 2023-08-02 | Resolved: 2025-02-01

## 2025-02-01 PROBLEM — D64.9 ANEMIA: Status: RESOLVED | Noted: 2018-10-04 | Resolved: 2025-02-01

## 2025-02-01 PROBLEM — Z78.9 ALCOHOL USE: Status: RESOLVED | Noted: 2020-12-29 | Resolved: 2025-02-01

## 2025-02-01 PROBLEM — R44.3 HALLUCINATIONS: Status: RESOLVED | Noted: 2025-01-29 | Resolved: 2025-02-01

## 2025-02-01 PROBLEM — H91.91 HEARING LOSS OF RIGHT EAR: Status: RESOLVED | Noted: 2023-08-02 | Resolved: 2025-02-01

## 2025-02-01 PROBLEM — R56.9 NEW ONSET SEIZURE (HCC): Status: RESOLVED | Noted: 2023-10-05 | Resolved: 2025-02-01

## 2025-02-01 PROBLEM — E83.52 HYPERCALCEMIA: Status: RESOLVED | Noted: 2022-03-30 | Resolved: 2025-02-01

## 2025-02-01 PROBLEM — R73.9 HYPERGLYCEMIA: Status: RESOLVED | Noted: 2018-08-31 | Resolved: 2025-02-01

## 2025-02-01 PROBLEM — R79.89 ELEVATED LFTS: Status: RESOLVED | Noted: 2018-11-30 | Resolved: 2025-02-01

## 2025-02-01 PROBLEM — F10.90 ALCOHOL USE: Status: RESOLVED | Noted: 2020-12-29 | Resolved: 2025-02-01

## 2025-02-01 PROBLEM — R42 DIZZINESS: Status: RESOLVED | Noted: 2019-09-26 | Resolved: 2025-02-01

## 2025-02-01 PROCEDURE — 6370000000 HC RX 637 (ALT 250 FOR IP): Performed by: NURSE PRACTITIONER

## 2025-02-01 PROCEDURE — 99239 HOSP IP/OBS DSCHRG MGMT >30: CPT | Performed by: PSYCHIATRY & NEUROLOGY

## 2025-02-01 PROCEDURE — 6370000000 HC RX 637 (ALT 250 FOR IP): Performed by: FAMILY MEDICINE

## 2025-02-01 RX ORDER — TRAZODONE HYDROCHLORIDE 50 MG/1
50 TABLET, FILM COATED ORAL NIGHTLY
Qty: 30 TABLET | Refills: 1 | Status: SHIPPED | OUTPATIENT
Start: 2025-02-01 | End: 2025-03-03

## 2025-02-01 RX ORDER — SULFAMETHOXAZOLE AND TRIMETHOPRIM 800; 160 MG/1; MG/1
1 TABLET ORAL EVERY 12 HOURS SCHEDULED
Qty: 7 TABLET | Refills: 0 | Status: SHIPPED | OUTPATIENT
Start: 2025-02-01 | End: 2025-02-05

## 2025-02-01 RX ORDER — PALIPERIDONE 6 MG/1
6 TABLET, EXTENDED RELEASE ORAL DAILY
Qty: 30 TABLET | Refills: 1 | Status: SHIPPED | OUTPATIENT
Start: 2025-02-02 | End: 2025-03-04

## 2025-02-01 RX ADMIN — PALIPERIDONE 6 MG: 6 TABLET, EXTENDED RELEASE ORAL at 08:58

## 2025-02-01 RX ADMIN — LEVETIRACETAM 1000 MG: 500 TABLET, FILM COATED ORAL at 08:57

## 2025-02-01 RX ADMIN — PANTOPRAZOLE SODIUM 40 MG: 40 TABLET, DELAYED RELEASE ORAL at 08:57

## 2025-02-01 RX ADMIN — Medication 1 TABLET: at 08:57

## 2025-02-01 RX ADMIN — SULFAMETHOXAZOLE AND TRIMETHOPRIM 1 TABLET: 800; 160 TABLET ORAL at 08:57

## 2025-02-01 RX ADMIN — CARVEDILOL 12.5 MG: 12.5 TABLET, FILM COATED ORAL at 08:58

## 2025-02-01 RX ADMIN — LACTULOSE 10 G: 20 SOLUTION ORAL at 14:35

## 2025-02-01 RX ADMIN — LAMOTRIGINE 25 MG: 25 TABLET ORAL at 08:58

## 2025-02-01 RX ADMIN — LACTULOSE 10 G: 20 SOLUTION ORAL at 08:55

## 2025-02-01 NOTE — PROGRESS NOTES
Group Note    Date: 02/01/25  Start Time: 8:00 AM   End Time:8:30 AM     Number of Participants: 11    Type of Group: Community/Goal     Patient's Goal:      Notes:  No written goals    Status After Intervention:      Participation Level: Active Listener    Participation Quality: Appropriate    Speech:  normal    Thought Process/Content: Logical    Mood:  Calm    Level of consciousness:  Alert    Response to Learning: Able to verbalize current knowledge/experience    Modes of Intervention: Education and Support    Discipline Responsible: Behavioral Health Technician     Signature:  TERESA HURLEY

## 2025-02-01 NOTE — DISCHARGE INSTR - DIET

## 2025-02-01 NOTE — PROGRESS NOTES
[facility] Adult Unit Daily Assessment  Nursing Progress Note    Room: [address] [place] [address]   Name: Michelle Foster   Age: 54 y.o.   Gender: female   Dx: Hallucinations  Precautions: suicide risk, fall risk, and seizure precautions  Inpatient Status: voluntary     SLEEP:  Sleep Quality Good  Sleep Medications: Yes trazodone 50 mg Melatonin 5 mg   PRN Sleep Meds: No     MEDICAL:  Other PRN Meds: Yes atarax 25 mg Tylenol 650 mg   Med Compliant: Yes  Accu-Chek: No  Oxygen/CPAP/BiPAP: No  CIWA/CINA: No   PAIN Assessment: present - adequately treated  Side Effects from medication: No    Metabolic Screening:  Lab Results   Component Value Date    LABA1C 5.3 01/30/2025     Lab Results   Component Value Date    CHOL 148 01/30/2025    CHOL 163 10/05/2023    CHOL 215 (H) 03/14/2022    CHOL 235 (H) 10/12/2020    CHOL 278 (H) 04/15/2019     Lab Results   Component Value Date    TRIG 56 01/30/2025    TRIG 72 10/05/2023    TRIG 187 (H) 03/14/2022    TRIG 174 (H) 10/12/2020    TRIG 327 (H) 04/15/2019     Lab Results   Component Value Date    HDL 68 (H) 01/30/2025    HDL 48 (L) 10/05/2023    HDL 44 (L) 03/14/2022    HDL 58 (L) 10/12/2020    HDL 76 04/15/2019     No components found for: \"LDLCAL\"  No components found for: \"LABVLDL\"  Body mass index is 26.52 kg/m².  BP Readings from Last 2 Encounters:   01/31/25 115/69   01/09/25 (!) 147/77       Medical Bed:   Is patient in a medical bed? no   If medical bed is in use, has nursing secured room while patient is awake and out of the room? NA  Has safety checks by nursing been completed on the bed/room this shift? yes    Protective Factors:  Patient identifies protective factors with nursing staff as follows:   Identifies reasons for living: Yes   Supportive Social Network or family: Yes    Belief that suicide is immoral/high spirituality: Yes   Responsibility to family or others/living with family: Yes   Fear of death or dying due to pain and suffering: Yes   Engaged in work or school: No  If Patient is

## 2025-02-01 NOTE — PLAN OF CARE
Problem: Safety - Adult  Goal: Free from fall injury  Outcome: Adequate for Discharge     Problem: Anxiety  Goal: Will report anxiety at manageable levels  Description: INTERVENTIONS:  1. Administer medication as ordered  2. Teach and rehearse alternative coping skills  3. Provide emotional support with 1:1 interaction with staff  Outcome: Adequate for Discharge     Problem: Coping  Goal: Pt/Family able to verbalize concerns and demonstrate effective coping strategies  Description: INTERVENTIONS:  1. Assist patient/family to identify coping skills, available support systems and cultural and spiritual values  2. Provide emotional support, including active listening and acknowledgement of concerns of patient and caregivers  3. Reduce environmental stimuli, as able  4. Instruct patient/family in relaxation techniques, as appropriate  5. Assess for spiritual pain/suffering and initiate Spiritual Care, Psychosocial Clinical Specialist consults as needed  Outcome: Adequate for Discharge     Problem: Decision Making  Goal: Pt/Family able to effectively weigh alternatives and participate in decision making related to treatment and care  Description: INTERVENTIONS:  1. Determine when there are differences between patient's view, family's view, and healthcare provider's view of condition  2. Facilitate patient and family articulation of goals for care  3. Help patient and family identify pros/cons of alternative solutions  4. Provide information as requested by patient/family  5. Respect patient/family right to receive or not to receive information  6. Serve as a liaison between patient and family and health care team  7. Initiate Consults from Ethics, Palliative Care or initiate Family Care Conference as is appropriate  Outcome: Adequate for Discharge     Problem: Behavior  Goal: Pt/Family maintain appropriate behavior and adhere to behavioral management agreement, if implemented  Description: INTERVENTIONS:  1. Assess  patient/family's coping skills and  non-compliant behavior (including use of illegal substances)  2. Notify security of behavior or suspected illegal substances which indicate the need for search of the family and/or belongings  3. Encourage verbalization of thoughts and concerns in a socially appropriate manner  4. Utilize positive, consistent limit setting strategies supporting safety of patient, staff and others  5. Encourage participation in the decision making process about the behavioral management agreement  6. If a visitor's behavior poses a threat to safety call refer to organization policy.  7. Initiate consult with , Psychosocial CNS, Spiritual Care as appropriate  Outcome: Adequate for Discharge     Problem: Self Harm/Suicidality  Goal: Will have no self-injury during hospital stay  Description: INTERVENTIONS:  1.  Ensure constant observer at bedside with Q15M safety checks  2.  Maintain a safe environment  3.  Secure patient belongings  4.  Ensure family/visitors adhere to safety recommendations  5.  Ensure safety tray has been added to patient's diet order  6.  Every shift and PRN: Re-assess suicidal risk via Frequent Screener    Outcome: Adequate for Discharge     Problem: Discharge Planning  Goal: Discharge to home or other facility with appropriate resources  Outcome: Adequate for Discharge

## 2025-02-01 NOTE — PROGRESS NOTES
Progress Note  Michelle Foster  1/31/2025 9:04 PM  Subjective:   Admit Date:   1/29/2025      CC/ADMIT DX:       Interval History:   Reviewed overnight events and nursing notes. She has had no new medical issues.     I have reviewed all labs/diagnostics from the last 24hrs.       ROS:   I have done a 10 point ROS and all are negative, except what is mentioned in the HPI.    ADULT DIET; Regular; Safety Tray; Safety Tray (Disposables)    Medications:      lactulose  10 g Oral TID    sulfamethoxazole-trimethoprim  1 tablet Oral 2 times per day    traZODone  50 mg Oral Nightly    melatonin  5 mg Oral Nightly    lamoTRIgine  25 mg Oral BID    therapeutic multivitamin-minerals  1 tablet Oral Daily    pantoprazole  40 mg Oral Daily    levETIRAcetam  1,000 mg Oral BID    carvedilol  12.5 mg Oral BID    paliperidone  6 mg Oral Daily           Objective:   Vitals: /69   Pulse 83   Temp 98.5 °F (36.9 °C) (Tympanic)   Resp 16   Wt 65.8 kg (145 lb)   LMP 07/25/2016 (Approximate)   SpO2 98%   BMI 26.52 kg/m²  No intake or output data in the 24 hours ending 01/31/25 2104  General appearance: alert and cooperative with exam  Extremities: extremities normal, atraumatic, no cyanosis or edema  Neurologic:  No obvious focal neurologic deficits.    Assessment and Plan:   Principal Problem:    Hallucinations  Resolved Problems:    * No resolved hospital problems. *    MENEZES    UTI    ETOH Use    Plan:   Continue present medication(s)    Follow with Psych   Increase Lactulose    Change antibiotic based on urine culture      Discharge planning:   her home     Reviewed treatment plans with the patient and/or family.             Electronically signed by Estrada Torres MD on 1/31/2025 at 9:04 PM

## 2025-02-01 NOTE — RESEARCH
Collateral obtained from: Ho Foster (Spouse) [phone]    Immediate Stressors & Time Episode Began: Spouse reports \"she thought someone or her sister was outside the house and they were trying to make her look crazy.\"  states \"I don't have any concerns with her coming home. I have no problem with her coming home.\" Nolberto reports that he will provide patient with safe transportation home. States that their son lives in the home and will be gone for a little bit.     Safety Issues: The importance of locking weapons in a secured location or removing from home was explained and recommended to collateral caller.  reports no weapons in the home at this time.    Support system/Medication Managed by: The importance of medication management and locking extra medication in a secured location was explained and recommended to collateral caller.     Discharge Disposition: home -lives with family    Additional Info: SW encouraged  to assist patient with being medication compliant/ take medication as directed. SW reminder patients  that she will have follow up appointments upon discharge.  verbalized understanding and confirmed no further questions or concerns at this time.

## 2025-02-01 NOTE — DISCHARGE SUMMARY
Discharge Summary     Patient ID:  Michelle Foster  [id number]  54 y.o.  1970    Admit date: 1/29/2025  Discharge date: 2/1/2025    Admitting Physician: Lars Arriaga MD   Attending Physician: Lars Arriaga MD  Discharge Provider: Priscilla Hopkins MD     Admission Diagnoses:   Psychosis, unspecified  R/O Delirium   History of major depressive disorder  History of generalized anxiety disorder  History of ADHD  History of alcohol use disorder in early remission  Insomnia  Urinary tract infection    Discharge Diagnoses:   Mood disorder unspecified  Insomnia unspecified  Urinary tract infection  History of seizures  History of acoustic neuroma  MENEZES     Admission Condition: poor    Discharged Condition: stable    Indication for Admission: Psychosis    Chief Complaint: Auditory hallucinations     History of Present Illness (Per BASIM Parekh)  The patient is a 54 y.o. female with previous psychiatric history of depression, anxiety, ADHD and alcohol use disorder in early remission, complicated by history of multiple medical conditions, including acoustic neuroma, complicated by meningitis, history of seizures, hypothyroidism, hypertension, non-alcoholic steatohepatitis, who presented to the emergency department complaining for auditory hallucinations.  Patient's UDS in ER was positive for prescribed amphetamines.     Patient is well known to psychiatry due to previous consult, when patient was admitted to medical services secondary to auditory and visual hallucinations. She reports she had not used alcohol in the past 6 months until yesterday. She admits to drinking half a pint of Vodka last night.       Patient reported that during the last a few weeks she heard voices of her sister and her sisters boyfriend. She reports that she hears their voices telling her that they are going to \"drive her crazy until she kills herself.\" She has also been hearing her sisters voice crying stating \"no, no don't hurt me.\"  Patient stated      ASK your doctor about these medications      lactulose 10 GM/15ML solution  Commonly known as: CHRONULAC  Take 15 mLs by mouth 3 times daily To have 2-3 soft bowel movements daily               Where to Get Your Medications        These medications were sent to [facility]/pharmacy #[id number] - [place], [place] - [address] - P [phone] - F [phone] [address] [facility] [address]      Phone: [phone]   paliperidone 6 MG extended release tablet  sulfamethoxazole-trimethoprim 800-160 MG per tablet  traZODone 50 MG tablet            Activity: as tolerated  Diet: regular diet  Wound Care: none needed    Follow-up: TBA    Time worked: 33 min    Participation: good    Electronically signed by Priscilla Hopkins MD on 2/1/2025 at 12:55 PM

## 2025-02-01 NOTE — PROGRESS NOTES
Behavioral Health   Discharge Note  Bridge Appointment completed: Reviewed Discharge Instructions with patient.    Patient verbalizes understanding and agreement with the discharge plan using the teachback method.     Referral for Outpatient Tobacco Cessation Counseling, upon discharge (samuel X if applicable and completed):    ( )  Hospital staff assisted patient to call Quit Line or faxed referral                                   during hospitalization                  ( )  Recognizing danger situations (included triggers and roadblocks), if not completed on admission                    ( )  Coping skills (new ways to manage stress, exercise, relaxation techniques, changing routine, distraction), if not completed on admission                                                           ( )  Basic information about quitting (benefits of quitting, techniques in how to quit, available resources, if not completed on admission  ( ) [facility]   ( ) Patient refused referral  ( ) Patient refused counseling  ( ) Patient refused smoking cessation medication upon discharge  ( x) Patient non-tobacco use    Vaccinations (samuel X if applicable and completed):  ( ) Patient states already received influenza vaccine elsewhere  ( ) Patient received influenza vaccine during this hospitalization  ( x) Patient refused influenza vaccine at this time      Pt discharged with followings belongings:  Dental Appliances: None  Vision - Corrective Lenses: None  Hearing Aid: Bilateral hearing aids  Jewelry: Ring  Body Piercings Removed: N/A  Clothing: Footwear, Socks, Slippers, Pants  Other Valuables: At home   Valuables sent home with  patient.   Valuables retrieved from safe and returned to patient.    Patient left department with staff Departure Mode: With spouse via  private vehicle Mobility at Departure: Ambulatory, discharged to Discharged to: Private Residence.   Patient education on aftercare

## 2025-02-01 NOTE — PROGRESS NOTES
Progress Note  Michelle Foster  2/1/2025 1:55 PM  Subjective:   Admit Date:   1/29/2025      CC/ADMIT DX:       Interval History:   Reviewed overnight events and nursing notes. She denies any new physical complaints.     I have reviewed all labs/diagnostics from the last 24hrs.       ROS:   I have done a 10 point ROS and all are negative, except what is mentioned in the HPI.    ADULT DIET; Regular; Safety Tray; Safety Tray (Disposables)    Medications:      lactulose  10 g Oral TID    sulfamethoxazole-trimethoprim  1 tablet Oral 2 times per day    traZODone  50 mg Oral Nightly    melatonin  5 mg Oral Nightly    lamoTRIgine  25 mg Oral BID    therapeutic multivitamin-minerals  1 tablet Oral Daily    pantoprazole  40 mg Oral Daily    levETIRAcetam  1,000 mg Oral BID    carvedilol  12.5 mg Oral BID    paliperidone  6 mg Oral Daily           Objective:   Vitals: BP (!) 114/59   Pulse 71   Temp 98.1 °F (36.7 °C) (Temporal)   Resp 14   Wt 65.8 kg (145 lb)   LMP 07/25/2016 (Approximate)   SpO2 100%   BMI 26.52 kg/m²  No intake or output data in the 24 hours ending 02/01/25 1355  General appearance: alert and cooperative with exam  Extremities: extremities normal, atraumatic, no cyanosis or edema  Neurologic:  No obvious focal neurologic deficits.    Assessment and Plan:   Principal Problem (Resolved):    Hallucinations  Active Problems:    * No active hospital problems. *    MENEZES    UTI    ETOH Use    Plan:   Continue present medication(s)    Follow with Psych   Treat UTI      Discharge planning:   her home     Reviewed treatment plans with the patient and/or family.             Electronically signed by Estrada Torres MD on 2/1/2025 at 1:55 PM

## 2025-02-02 NOTE — PROGRESS NOTES
Discharge Note     Patient is discharging on this date. Patient denies SI, HI, and AVH at this time. Patient reports improvement in behavior and is leaving unit in overall good condition. SW and patient discussed patient's follow up appointments and importance of attending appointments as scheduled, patient voiced understanding and agreement. Patient and SW also discussed patient's safety plan and patient was able to verbally identify: warning signs, coping strategies, places and people that help make the patient feel better/distract negative thoughts, friends/family/agencies/professionals the patient can reach out to in a crisis, and something that is important to the patient/worth living for. Patient was provided the national suicide prevention hotline number (1-192.671.9204) as well as local community behavioral health ([facility]) crisis number for emergencies (1-198.623.2856).     Discharge Disposition: home -lives with family      Pt to follow up with:  [facility] on February 5 , 2025 at 3:00 PM for the intake appointment. Patient will follow up with [facility]   On February 7 , 2025   at 8:30 AM for the medication management appointment.     Referral to outpatient tobacco cessation counseling treatment:  Patient refused referral to outpatient tobacco cessation counseling    SW offered to assist patient with transportation, patient declined transportation assistance

## 2025-02-05 ENCOUNTER — HOSPITAL ENCOUNTER (INPATIENT)
Age: 55
LOS: 2 days | Discharge: HOME | DRG: 885 | End: 2025-02-07
Attending: STUDENT IN AN ORGANIZED HEALTH CARE EDUCATION/TRAINING PROGRAM | Admitting: PSYCHIATRY & NEUROLOGY
Payer: COMMERCIAL

## 2025-02-05 DIAGNOSIS — F29 PSYCHOSIS, UNSPECIFIED PSYCHOSIS TYPE (HCC): Primary | ICD-10-CM

## 2025-02-05 PROBLEM — F99 PSYCHIATRIC DIAGNOSIS: Status: ACTIVE | Noted: 2025-02-05

## 2025-02-05 LAB
25(OH)D3 SERPL-MCNC: >120 NG/ML
ALBUMIN SERPL-MCNC: 4.6 G/DL (ref 3.5–5.2)
ALP SERPL-CCNC: 178 U/L (ref 35–104)
ALT SERPL-CCNC: 32 U/L (ref 5–33)
AMMONIA PLAS-SCNC: 57 UMOL/L (ref 11–51)
AMPHET UR QL SCN: POSITIVE
ANION GAP SERPL CALCULATED.3IONS-SCNC: 17 MMOL/L (ref 8–16)
AST SERPL-CCNC: 38 U/L (ref 5–32)
BACTERIA URNS QL MICRO: NEGATIVE /HPF
BARBITURATES UR QL SCN: NEGATIVE
BASOPHILS # BLD: 0.1 K/UL (ref 0–0.2)
BASOPHILS NFR BLD: 0.6 % (ref 0–1)
BENZODIAZ UR QL SCN: POSITIVE
BILIRUB SERPL-MCNC: 1.6 MG/DL (ref 0.2–1.2)
BILIRUB UR QL STRIP: NEGATIVE
BUN SERPL-MCNC: 12 MG/DL (ref 6–20)
BUPRENORPHINE URINE: NEGATIVE
CALCIUM SERPL-MCNC: 10 MG/DL (ref 8.6–10)
CANNABINOIDS UR QL SCN: NEGATIVE
CHLORIDE SERPL-SCNC: 101 MMOL/L (ref 98–107)
CLARITY UR: ABNORMAL
CO2 SERPL-SCNC: 21 MMOL/L (ref 22–29)
COCAINE UR QL SCN: NEGATIVE
COLOR UR: YELLOW
CREAT SERPL-MCNC: 0.7 MG/DL (ref 0.5–0.9)
CRYSTALS URNS MICRO: ABNORMAL /HPF
DRUG SCREEN COMMENT UR-IMP: ABNORMAL
EOSINOPHIL # BLD: 0.3 K/UL (ref 0–0.6)
EOSINOPHIL NFR BLD: 2.8 % (ref 0–5)
EPI CELLS #/AREA URNS AUTO: 10 /HPF (ref 0–5)
ERYTHROCYTE [DISTWIDTH] IN BLOOD BY AUTOMATED COUNT: 13.8 % (ref 11.5–14.5)
ETHANOLAMINE SERPL-MCNC: <10 MG/DL (ref 0–10)
FENTANYL SCREEN, URINE: NEGATIVE
GLUCOSE SERPL-MCNC: 108 MG/DL (ref 70–99)
GLUCOSE UR STRIP.AUTO-MCNC: NEGATIVE MG/DL
HBA1C MFR BLD: 5.4 % (ref 4–5.6)
HCT VFR BLD AUTO: 39 % (ref 37–47)
HGB BLD-MCNC: 13.7 G/DL (ref 12–16)
HGB UR STRIP.AUTO-MCNC: NEGATIVE MG/L
HYALINE CASTS #/AREA URNS AUTO: 3 /HPF (ref 0–8)
IMM GRANULOCYTES # BLD: 0 K/UL
KETONES UR STRIP.AUTO-MCNC: NEGATIVE MG/DL
LEUKOCYTE ESTERASE UR QL STRIP.AUTO: ABNORMAL
LYMPHOCYTES # BLD: 2.4 K/UL (ref 1.1–4.5)
LYMPHOCYTES NFR BLD: 23.9 % (ref 20–40)
MCH RBC QN AUTO: 31.6 PG (ref 27–31)
MCHC RBC AUTO-ENTMCNC: 35.1 G/DL (ref 33–37)
MCV RBC AUTO: 90.1 FL (ref 81–99)
METHADONE UR QL SCN: NEGATIVE
METHAMPHETAMINE, URINE: NEGATIVE
MONOCYTES # BLD: 0.8 K/UL (ref 0–0.9)
MONOCYTES NFR BLD: 8.3 % (ref 0–10)
NEUTROPHILS # BLD: 6.4 K/UL (ref 1.5–7.5)
NEUTS SEG NFR BLD: 64.2 % (ref 50–65)
NITRITE UR QL STRIP.AUTO: NEGATIVE
OPIATES UR QL SCN: NEGATIVE
OXYCODONE UR QL SCN: NEGATIVE
PCP UR QL SCN: NEGATIVE
PH UR STRIP.AUTO: 6.5 [PH] (ref 5–8)
PLATELET # BLD AUTO: 147 K/UL (ref 130–400)
PMV BLD AUTO: 10.2 FL (ref 9.4–12.3)
POTASSIUM SERPL-SCNC: 3.8 MMOL/L (ref 3.5–5)
PROT SERPL-MCNC: 9.8 G/DL (ref 6.4–8.3)
PROT UR STRIP.AUTO-MCNC: NEGATIVE MG/DL
RBC # BLD AUTO: 4.33 M/UL (ref 4.2–5.4)
RBC #/AREA URNS AUTO: 1 /HPF (ref 0–4)
SARS-COV-2 RDRP RESP QL NAA+PROBE: NOT DETECTED
SODIUM SERPL-SCNC: 139 MMOL/L (ref 136–145)
SP GR UR STRIP.AUTO: 1.02 (ref 1–1.03)
TRICYCLIC ANTIDEPRESSANTS, UR: NEGATIVE
TSH SERPL DL<=0.005 MIU/L-ACNC: 4.34 UIU/ML (ref 0.27–4.2)
UROBILINOGEN UR STRIP.AUTO-MCNC: 1 E.U./DL
VIT B12 SERPL-MCNC: 1133 PG/ML (ref 232–1245)
WBC # BLD AUTO: 10 K/UL (ref 4.8–10.8)
WBC #/AREA URNS AUTO: 27 /HPF (ref 0–5)

## 2025-02-05 PROCEDURE — 90792 PSYCH DIAG EVAL W/MED SRVCS: CPT | Performed by: NURSE PRACTITIONER

## 2025-02-05 PROCEDURE — 6370000000 HC RX 637 (ALT 250 FOR IP): Performed by: NURSE PRACTITIONER

## 2025-02-05 PROCEDURE — 87635 SARS-COV-2 COVID-19 AMP PRB: CPT

## 2025-02-05 PROCEDURE — 81001 URINALYSIS AUTO W/SCOPE: CPT

## 2025-02-05 PROCEDURE — 6370000000 HC RX 637 (ALT 250 FOR IP): Performed by: FAMILY MEDICINE

## 2025-02-05 PROCEDURE — 83036 HEMOGLOBIN GLYCOSYLATED A1C: CPT

## 2025-02-05 PROCEDURE — 1240000000 HC EMOTIONAL WELLNESS R&B

## 2025-02-05 PROCEDURE — 36415 COLL VENOUS BLD VENIPUNCTURE: CPT

## 2025-02-05 PROCEDURE — 6370000000 HC RX 637 (ALT 250 FOR IP): Performed by: PSYCHIATRY & NEUROLOGY

## 2025-02-05 PROCEDURE — 85025 COMPLETE CBC W/AUTO DIFF WBC: CPT

## 2025-02-05 PROCEDURE — 84443 ASSAY THYROID STIM HORMONE: CPT

## 2025-02-05 PROCEDURE — 99283 EMERGENCY DEPT VISIT LOW MDM: CPT

## 2025-02-05 PROCEDURE — 87086 URINE CULTURE/COLONY COUNT: CPT

## 2025-02-05 PROCEDURE — 82306 VITAMIN D 25 HYDROXY: CPT

## 2025-02-05 PROCEDURE — 80307 DRUG TEST PRSMV CHEM ANLYZR: CPT

## 2025-02-05 PROCEDURE — G0480 DRUG TEST DEF 1-7 CLASSES: HCPCS

## 2025-02-05 PROCEDURE — 82140 ASSAY OF AMMONIA: CPT

## 2025-02-05 PROCEDURE — 82077 ASSAY SPEC XCP UR&BREATH IA: CPT

## 2025-02-05 PROCEDURE — 82607 VITAMIN B-12: CPT

## 2025-02-05 PROCEDURE — 80053 COMPREHEN METABOLIC PANEL: CPT

## 2025-02-05 RX ORDER — LAMOTRIGINE 25 MG/1
25 TABLET ORAL 2 TIMES DAILY
Status: DISCONTINUED | OUTPATIENT
Start: 2025-02-05 | End: 2025-02-07 | Stop reason: HOSPADM

## 2025-02-05 RX ORDER — LEVETIRACETAM 500 MG/1
1000 TABLET ORAL 2 TIMES DAILY
Status: DISCONTINUED | OUTPATIENT
Start: 2025-02-05 | End: 2025-02-07 | Stop reason: HOSPADM

## 2025-02-05 RX ORDER — PALIPERIDONE 6 MG/1
6 TABLET, EXTENDED RELEASE ORAL DAILY
Status: DISCONTINUED | OUTPATIENT
Start: 2025-02-05 | End: 2025-02-07 | Stop reason: HOSPADM

## 2025-02-05 RX ORDER — HYDROXYZINE HYDROCHLORIDE 25 MG/1
25 TABLET, FILM COATED ORAL 3 TIMES DAILY PRN
Status: DISCONTINUED | OUTPATIENT
Start: 2025-02-05 | End: 2025-02-07 | Stop reason: HOSPADM

## 2025-02-05 RX ORDER — POLYETHYLENE GLYCOL 3350 17 G
2 POWDER IN PACKET (EA) ORAL
Status: DISCONTINUED | OUTPATIENT
Start: 2025-02-05 | End: 2025-02-07 | Stop reason: HOSPADM

## 2025-02-05 RX ORDER — HYDROXYZINE HYDROCHLORIDE 25 MG/1
50 TABLET, FILM COATED ORAL ONCE
Status: COMPLETED | OUTPATIENT
Start: 2025-02-05 | End: 2025-02-05

## 2025-02-05 RX ORDER — PANTOPRAZOLE SODIUM 40 MG/1
40 TABLET, DELAYED RELEASE ORAL DAILY
Status: DISCONTINUED | OUTPATIENT
Start: 2025-02-05 | End: 2025-02-07 | Stop reason: HOSPADM

## 2025-02-05 RX ORDER — POLYETHYLENE GLYCOL 3350 17 G/17G
17 POWDER, FOR SOLUTION ORAL DAILY PRN
Status: DISCONTINUED | OUTPATIENT
Start: 2025-02-05 | End: 2025-02-07 | Stop reason: HOSPADM

## 2025-02-05 RX ORDER — NICOTINE 21 MG/24HR
1 PATCH, TRANSDERMAL 24 HOURS TRANSDERMAL DAILY
Status: DISCONTINUED | OUTPATIENT
Start: 2025-02-05 | End: 2025-02-07 | Stop reason: HOSPADM

## 2025-02-05 RX ORDER — MECOBALAMIN 5000 MCG
5 TABLET,DISINTEGRATING ORAL NIGHTLY
Status: DISCONTINUED | OUTPATIENT
Start: 2025-02-05 | End: 2025-02-07 | Stop reason: HOSPADM

## 2025-02-05 RX ORDER — CARVEDILOL 12.5 MG/1
12.5 TABLET ORAL 2 TIMES DAILY
Status: DISCONTINUED | OUTPATIENT
Start: 2025-02-05 | End: 2025-02-07 | Stop reason: HOSPADM

## 2025-02-05 RX ORDER — ACETAMINOPHEN 325 MG/1
650 TABLET ORAL EVERY 4 HOURS PRN
Status: DISCONTINUED | OUTPATIENT
Start: 2025-02-05 | End: 2025-02-07 | Stop reason: HOSPADM

## 2025-02-05 RX ORDER — LACTULOSE 10 G/15ML
10 SOLUTION ORAL 3 TIMES DAILY
Status: DISCONTINUED | OUTPATIENT
Start: 2025-02-05 | End: 2025-02-07 | Stop reason: HOSPADM

## 2025-02-05 RX ADMIN — LACTULOSE 10 G: 20 SOLUTION ORAL at 20:47

## 2025-02-05 RX ADMIN — Medication 5 MG: at 20:53

## 2025-02-05 RX ADMIN — HYDROXYZINE HYDROCHLORIDE 25 MG: 25 TABLET, FILM COATED ORAL at 20:52

## 2025-02-05 RX ADMIN — CARVEDILOL 12.5 MG: 12.5 TABLET, FILM COATED ORAL at 14:22

## 2025-02-05 RX ADMIN — LEVETIRACETAM 1000 MG: 500 TABLET, FILM COATED ORAL at 20:53

## 2025-02-05 RX ADMIN — LAMOTRIGINE 25 MG: 25 TABLET ORAL at 20:52

## 2025-02-05 RX ADMIN — PANTOPRAZOLE SODIUM 40 MG: 40 TABLET, DELAYED RELEASE ORAL at 14:22

## 2025-02-05 RX ADMIN — LACTULOSE 10 G: 20 SOLUTION ORAL at 14:22

## 2025-02-05 RX ADMIN — CARVEDILOL 12.5 MG: 12.5 TABLET, FILM COATED ORAL at 20:53

## 2025-02-05 RX ADMIN — HYDROXYZINE HYDROCHLORIDE 50 MG: 25 TABLET, FILM COATED ORAL at 12:10

## 2025-02-05 RX ADMIN — HYDROXYZINE HYDROCHLORIDE 25 MG: 25 TABLET, FILM COATED ORAL at 06:03

## 2025-02-05 RX ADMIN — LAMOTRIGINE 25 MG: 25 TABLET ORAL at 14:22

## 2025-02-05 RX ADMIN — PALIPERIDONE 6 MG: 6 TABLET, EXTENDED RELEASE ORAL at 12:10

## 2025-02-05 RX ADMIN — LEVETIRACETAM 1000 MG: 500 TABLET, FILM COATED ORAL at 14:22

## 2025-02-05 SDOH — HEALTH STABILITY: PHYSICAL HEALTH: ON AVERAGE, HOW MANY MINUTES DO YOU ENGAGE IN EXERCISE AT THIS LEVEL?: 30 MIN

## 2025-02-05 SDOH — HEALTH STABILITY: PHYSICAL HEALTH: ON AVERAGE, HOW MANY DAYS PER WEEK DO YOU ENGAGE IN MODERATE TO STRENUOUS EXERCISE (LIKE A BRISK WALK)?: 7 DAYS

## 2025-02-05 ASSESSMENT — SOCIAL DETERMINANTS OF HEALTH (SDOH)
WITHIN THE LAST YEAR, HAVE YOU BEEN AFRAID OF YOUR PARTNER OR EX-PARTNER?: NO
WITHIN THE LAST YEAR, HAVE TO BEEN RAPED OR FORCED TO HAVE ANY KIND OF SEXUAL ACTIVITY BY YOUR PARTNER OR EX-PARTNER?: NO
HOW OFTEN DO YOU GET TOGETHER WITH FRIENDS OR RELATIVES?: ONCE A WEEK
IN A TYPICAL WEEK, HOW MANY TIMES DO YOU TALK ON THE PHONE WITH FAMILY, FRIENDS, OR NEIGHBORS?: MORE THAN THREE TIMES A WEEK
WITHIN THE LAST YEAR, HAVE YOU BEEN HUMILIATED OR EMOTIONALLY ABUSED IN OTHER WAYS BY YOUR PARTNER OR EX-PARTNER?: NO
DO YOU BELONG TO ANY CLUBS OR ORGANIZATIONS SUCH AS CHURCH GROUPS UNIONS, FRATERNAL OR ATHLETIC GROUPS, OR SCHOOL GROUPS?: NO
HOW OFTEN DO YOU ATTENT MEETINGS OF THE CLUB OR ORGANIZATION YOU BELONG TO?: NEVER
HOW OFTEN DO YOU ATTEND CHURCH OR RELIGIOUS SERVICES?: 1 TO 4 TIMES PER YEAR
HOW HARD IS IT FOR YOU TO PAY FOR THE VERY BASICS LIKE FOOD, HOUSING, MEDICAL CARE, AND HEATING?: NOT VERY HARD
WITHIN THE LAST YEAR, HAVE YOU BEEN KICKED, HIT, SLAPPED, OR OTHERWISE PHYSICALLY HURT BY YOUR PARTNER OR EX-PARTNER?: NO

## 2025-02-05 ASSESSMENT — ENCOUNTER SYMPTOMS
ABDOMINAL PAIN: 0
CHEST TIGHTNESS: 0
EYE REDNESS: 0
DIARRHEA: 0
NAUSEA: 0
VOMITING: 0
SORE THROAT: 0
COUGH: 0
EYE PAIN: 0
SHORTNESS OF BREATH: 0

## 2025-02-05 ASSESSMENT — PAIN - FUNCTIONAL ASSESSMENT: PAIN_FUNCTIONAL_ASSESSMENT: NONE - DENIES PAIN

## 2025-02-05 ASSESSMENT — PATIENT HEALTH QUESTIONNAIRE - PHQ9
1. LITTLE INTEREST OR PLEASURE IN DOING THINGS: SEVERAL DAYS
SUM OF ALL RESPONSES TO PHQ QUESTIONS 1-9: 2
SUM OF ALL RESPONSES TO PHQ QUESTIONS 1-9: 2
SUM OF ALL RESPONSES TO PHQ9 QUESTIONS 1 & 2: 2
SUM OF ALL RESPONSES TO PHQ QUESTIONS 1-9: 2
2. FEELING DOWN, DEPRESSED OR HOPELESS: SEVERAL DAYS
SUM OF ALL RESPONSES TO PHQ QUESTIONS 1-9: 2

## 2025-02-05 ASSESSMENT — SLEEP AND FATIGUE QUESTIONNAIRES
DO YOU HAVE DIFFICULTY SLEEPING: YES
DO YOU USE A SLEEP AID: NO
SLEEP PATTERN: DIFFICULTY FALLING ASLEEP;EARLY AWAKENING;INSOMNIA
AVERAGE NUMBER OF SLEEP HOURS: 4

## 2025-02-05 NOTE — ED PROVIDER NOTES
History   Problem Relation Age of Onset    Heart Disease Mother     Kidney Disease Mother     Heart Disease Father     COPD Father     Kidney Disease Father     Lung Cancer Brother           SOCIAL HISTORY       Social History     Socioeconomic History    Marital status:      Spouse name: None    Number of children: None    Years of education: None    Highest education level: None   Tobacco Use    Smoking status: Never    Smokeless tobacco: Never   Vaping Use    Vaping status: Never Used   Substance and Sexual Activity    Alcohol use: Not Currently     Comment: 4x weekly, half pint each time    Drug use: No    Sexual activity: Yes     Partners: Male     Social Determinants of Health     Financial Resource Strain: Low Risk  (7/7/2022)    Overall Financial Resource Strain (CARDIA)     Difficulty of Paying Living Expenses: Not hard at all   Food Insecurity: No Food Insecurity (1/29/2025)    Hunger Vital Sign     Worried About Running Out of Food in the Last Year: Never true     Ran Out of Food in the Last Year: Never true   Transportation Needs: Unmet Transportation Needs (1/29/2025)    PRAPARE - Transportation     Lack of Transportation (Medical): Yes     Lack of Transportation (Non-Medical): Yes    [facility]    Family and Community Support    [facility]    Abuse Screen   Housing Stability: Low Risk  (1/29/2025)    Housing Stability Vital Sign     Unable to Pay for Housing in the Last Year: No     Number of Times Moved in the Last Year: 1     Homeless in the Last Year: No       SCREENINGS    Perla Coma Scale  Eye Opening: Spontaneous  Best Verbal Response: Oriented  Best Motor Response: Obeys commands  Perla Coma Scale Score: 15        PHYSICAL EXAM    (up to 7 for level 4, 8 or more for level 5)     ED Triage Vitals [02/05/25 0256]   BP Systolic BP Percentile Diastolic BP Percentile Temp Temp

## 2025-02-05 NOTE — GROUP NOTE
Group Therapy Note    Date: 2/5/2025    Group Start Time: 1000  Group End Time: 1030  Group Topic: Psychoeducation    [facility]    Shannon Kinsey           Patient's Goal:  Obstacles to Emotional Wellness     Notes: SW discussed grief coping skills with the group and pt shared what caused their grief, who they have in their support system to talk to about their feelings associated with their grieving, and what activities or hobbies do they enjoy that helps distract them from their feelings of sadness associated with grief. Pt shared that she has grieved her old self and one activity she really enjoys is decorating her home, even though she said to most it would seem gaudy.     Status After Intervention:  Improved    Participation Level: Active Listener and Interactive    Participation Quality: Appropriate, Attentive, and Sharing      Speech:  normal      Thought Process/Content: Logical      Affective Functioning: Congruent      Mood: euthymic      Level of consciousness:  Alert, Oriented x4, and Attentive      Response to Learning: Able to verbalize current knowledge/experience and Able to verbalize/acknowledge new learning      Endings: None Reported    Modes of Intervention: Support, Socialization, and Exploration      Discipline Responsible: Psychoeducational Specialist      Signature:  Shannon Kinsey

## 2025-02-05 NOTE — H&P
[facility]    Psychiatric Initial Evaluation    Date of Evaluation:  2/5/2025  Session Type:  78267 Psychiatric Diagnostic Interview Exam   Name:  Michelle Foster  Age:  54 y.o.  Sex:  female  Ethnicity:   Primary Care Physician:  Chandu Reynaga MD   Patient Care Team:  Patient Care Team:  Chandu Reynaga MD as PCP - General (Family Medicine)  Chandu Reynaga MD as PCP - Empaneled Provider  Chief Complaint: \" I got nosey and paranoid.\"    History of Present Illness    Historian: patient  Complaint Type: anxiety, sleep disturbance, and psychosis  Course of Symptoms: ongoing  Symptoms Onset: gradual  Onset Approximately: gradual  Precipitating Factors: history of alcohol use disorder  Severity: moderate  Risk Factors:  history of mental illness       The patient is a 54 y.o. female with previous psychiatric history of depression, anxiety, ADHD and alcohol use disorder in early remission, alcoholic encephalopathy, complicated by history of multiple medical conditions, including acoustic neuroma, complicated by meningitis, history of seizures,  shunt, hypothyroidism, hypertension, non-alcoholic steatohepatitis, who presented to the emergency department complaining of auditory and visual hallucinations.  Patient's UDS in ER was positive for prescribed amphetamines and benzodiazapine. Patient is prescribed Adderall. Reports she took 2 yesterday. She reports she has not had alcohol in over 1 week and prior to that she was sober for 6 months.      Patient is well known to psychiatry due to previous consult, and was recently discharged from the inpatient psychiatric unit last week.      Patient is currently psychotic. Today patient reports that she was \"nosey\" and went to her computer and saw \"files, symbols, and emblems that say the home group.\" \"I think its a bunch of businesses involved in a lot of money and I keep getting nosey and got into the files. \"They underestimated me and

## 2025-02-05 NOTE — RESEARCH
Collateral obtained from: Nolberto Foster-[phone]-pt's spouse    Immediate Stressors & Time Episode Began: Pt thought 3 people were in their backyard trying to get in their house at about 2 am. Pt's son has court Friday and she thought he was going to get out of [facility] this week, but now it doesn't look like he will be. Pt's spouse wants to know why we think she is still having these hallucinations after being discharged a few days ago, and what is going on with her.     Diagnosis/Hx of compliance with meds: Pt takes her medication as prescribed, \"as far as I know.\"     Tx Hx/Past hospitalizations:  caller reports previous inpatient treatment history --- history includes just here for inpatient tx last week and left on the 1st, and she has been to [facility] in the past.     Family hx of psychiatric issues: caller reports no family history of psychiatric issues    Substance Abuse: caller reports substance abuse history -- history includes heavy alcohol use, but was sober for about 6 months until the other night and slipped up she told him.     Pending Legal: caller reports no pending legal issues    Safety Issues (Weapons? Hx of attempts): Pt's spouse confirmed that there are no safety issues in the home, including no firearms. The importance of locking weapons in a secured location or removing from home was explained and recommended to collateral caller.    Support system/Medication Managed by: Pt has her spouse, her family, and coworkers for a strong support. The importance of medication management and locking extra medication in a secured location was explained and recommended to collateral caller.     Discharge Disposition: home -lives with family- and he will be able to pick her up when she is ready to be discharged.    Additional Info:

## 2025-02-05 NOTE — H&P
Behavioral Services  Medicare Certification Upon Admission    I certify that this patient's inpatient psychiatric hospital admission is medically necessary for:    [x] (1) Treatment which could reasonably be expected to improve this patient's condition,       [x] (2) Or for diagnostic study;     AND     [x](2) The inpatient psychiatric services are provided while the individual is under the care of a physician and are included in the individualized plan of care.    Estimated length of stay/service ; 3 days-5 days    Plan for post-hospital care :TBD    Electronically signed by JULI Coon on 2/5/2025 at 12:02 PM

## 2025-02-06 PROBLEM — F29 PSYCHOSIS (HCC): Status: ACTIVE | Noted: 2025-02-05

## 2025-02-06 PROBLEM — F10.21 ALCOHOL USE DISORDER, SEVERE, IN EARLY REMISSION, DEPENDENCE (HCC): Status: ACTIVE | Noted: 2025-02-06

## 2025-02-06 PROBLEM — F15.90 STIMULANT USE DISORDER: Status: ACTIVE | Noted: 2025-02-06

## 2025-02-06 LAB — T4 FREE SERPL-MCNC: 1.33 NG/DL (ref 0.93–1.7)

## 2025-02-06 PROCEDURE — 36415 COLL VENOUS BLD VENIPUNCTURE: CPT

## 2025-02-06 PROCEDURE — 6370000000 HC RX 637 (ALT 250 FOR IP): Performed by: FAMILY MEDICINE

## 2025-02-06 PROCEDURE — 1240000000 HC EMOTIONAL WELLNESS R&B

## 2025-02-06 PROCEDURE — 6370000000 HC RX 637 (ALT 250 FOR IP): Performed by: PSYCHIATRY & NEUROLOGY

## 2025-02-06 PROCEDURE — 84439 ASSAY OF FREE THYROXINE: CPT

## 2025-02-06 PROCEDURE — 99232 SBSQ HOSP IP/OBS MODERATE 35: CPT | Performed by: NURSE PRACTITIONER

## 2025-02-06 PROCEDURE — 6370000000 HC RX 637 (ALT 250 FOR IP): Performed by: NURSE PRACTITIONER

## 2025-02-06 RX ADMIN — LEVETIRACETAM 1000 MG: 500 TABLET, FILM COATED ORAL at 08:30

## 2025-02-06 RX ADMIN — LAMOTRIGINE 25 MG: 25 TABLET ORAL at 20:43

## 2025-02-06 RX ADMIN — LAMOTRIGINE 25 MG: 25 TABLET ORAL at 08:31

## 2025-02-06 RX ADMIN — LACTULOSE 10 G: 20 SOLUTION ORAL at 20:46

## 2025-02-06 RX ADMIN — PANTOPRAZOLE SODIUM 40 MG: 40 TABLET, DELAYED RELEASE ORAL at 08:30

## 2025-02-06 RX ADMIN — LEVETIRACETAM 1000 MG: 500 TABLET, FILM COATED ORAL at 20:43

## 2025-02-06 RX ADMIN — LACTULOSE 10 G: 20 SOLUTION ORAL at 16:22

## 2025-02-06 RX ADMIN — LACTULOSE 10 G: 20 SOLUTION ORAL at 08:31

## 2025-02-06 RX ADMIN — Medication 5 MG: at 20:44

## 2025-02-06 RX ADMIN — CARVEDILOL 12.5 MG: 12.5 TABLET, FILM COATED ORAL at 08:31

## 2025-02-06 RX ADMIN — PALIPERIDONE 6 MG: 6 TABLET, EXTENDED RELEASE ORAL at 08:30

## 2025-02-06 RX ADMIN — CARVEDILOL 12.5 MG: 12.5 TABLET, FILM COATED ORAL at 20:43

## 2025-02-06 NOTE — H&P
HISTORY and PHYSICAL      CHIEF COMPLAINT:  Psychosis    Reason for Admission:  Psychosis    History Obtained From:  patient, chart    HISTORY OF PRESENT ILLNESS:      The patient is a 54 y.o. female who is admitted to the [facility] unit with worsening mood issues. She denies any new medical issues.     Past Medical History:        Diagnosis Date    Acoustic neuroma (HCC)     ADD (attention deficit disorder)     Anxiety     Depression     Headache     Hypertension     Hypothyroidism (acquired)     MENEZES (nonalcoholic steatohepatitis)      Past Surgical History:        Procedure Laterality Date    ABDOMINAL ADHESION SURGERY      CRANIOTOMY      VENTRICULOPERITONEAL SHUNT           Medications Prior to Admission:    Medications Prior to Admission: sulfamethoxazole-trimethoprim (BACTRIM DS;SEPTRA DS) 800-160 MG per tablet, Take 1 tablet by mouth every 12 hours for 7 doses  paliperidone (INVEGA) 6 MG extended release tablet, Take 1 tablet by mouth daily  traZODone (DESYREL) 50 MG tablet, Take 1 tablet by mouth nightly  lamoTRIgine (LAMICTAL) 25 MG tablet, Take 1 tablet by mouth 2 times daily  rOPINIRole (REQUIP) 0.5 MG tablet, Take 1 tablet by mouth nightly  naltrexone (DEPADE) 50 MG tablet, Take 1 tablet by mouth daily  lactulose (CHRONULAC) 10 GM/15ML solution, Take 15 mLs by mouth 3 times daily To have 2-3 soft bowel movements daily (Patient not taking: Reported on 1/30/2025)  pantoprazole (PROTONIX) 20 MG tablet, Take 2 tablets by mouth daily  ondansetron (ZOFRAN) 4 MG tablet, Take 2 tablets by mouth 3 times daily as needed for Nausea or Vomiting  levETIRAcetam (KEPPRA) 1000 MG tablet, Take 1 tablet by mouth 2 times daily  potassium chloride (KLOR-CON M) 20 MEQ extended release tablet, Take 2 tablets by mouth daily  carvedilol (COREG) 12.5 MG tablet, Take 1 tablet by mouth 2 times daily  Multiple Vitamins-Minerals (THERAPEUTIC MULTIVITAMIN-MINERALS) tablet, Take 1

## 2025-02-06 NOTE — PLAN OF CARE
Problem: Depression  Goal: Will be euthymic at discharge  Description: INTERVENTIONS:  1. Administer medication as ordered  2. Provide emotional support via 1:1 interaction with staff  3. Encourage involvement in milieu/groups/activities  4. Monitor for social isolation  Outcome: Progressing     Problem: Anxiety  Goal: Will report anxiety at manageable levels  Description: INTERVENTIONS:  1. Administer medication as ordered  2. Teach and rehearse alternative coping skills  3. Provide emotional support with 1:1 interaction with staff  Outcome: Progressing     Problem: Involuntary Admit  Goal: Will cooperate with staff recommendations and doctor's orders and will demonstrate appropriate behavior  Description: INTERVENTIONS:  1. Treat underlying conditions and offer medication as ordered  2. Educate regarding involuntary admission procedures and rules  3. Contain excessive/inappropriate behavior per unit and hospital policies  Outcome: Progressing     Problem: Pain  Goal: Verbalizes/displays adequate comfort level or baseline comfort level  Outcome: Progressing     Problem: Risk for Elopement  Goal: Patient will not exit the unit/facility without proper excort  Outcome: Adequate for Discharge  Flowsheets (Taken 2/5/2025 8033 by Diana Urena RN)  Nursing Interventions for Elopement Risk:   Assist with personal care needs such as toileting, eating, dressing, as needed to reduce the risk of wandering   Collaborate with treatment team for drug withdrawal symptoms treatment   Collaborate with treatment team for nicotine replacement   Communicate/escalate to charge nurse the risk of elopement   Communicate/escalate to /other team member the risk of elopement     Problem: Psychosis  Goal: Will report no hallucinations or delusions  Description: INTERVENTIONS:  1. Administer medication as  ordered  2. Assist with reality testing to support increasing orientation  3. Assess if patient's hallucinations or

## 2025-02-06 NOTE — DISCHARGE INSTRUCTIONS
[facility]  Disability Resources*      Disability Services  [facility]   [address] [place] [id number] [phone]  Aging and Disability Resource Market, Elder Abuse Prevention Awareness, Family Caregiver Support Program, [place] Caregiver Support Program, [facility], [facility] Community Services Employment Program, State Health Insurance Program, [place] Homecare Program, Consumer-Directed Options    [facility]  [address] [place] [address], [facility] [place] [address]  [phone] -or- [phone]  Advocacy, education, and resources for individuals with intellectual and developmental disabilities    [facility]  [address] [place] [id number] [phone]  Support for Development Disabled    [facility]  [address] [place] [address]  [phone] -or- [phone]  [facility] provides care for seniors with health issues, the frail elderly, and the disabled of all ages in and around [place], [place]. SITTERS: who provide supervision and limited assistance in hospitals and nursing homes. This service is also available in assisted living facilities and private homes-but only at night in these settings. HOMEMAKERS: who provide basic housekeeping services, cooking, shopping, and errands. PERSONAL HELPERS: who assist with bathing, dressing, transfers, mobility, and incontinence care.    [facility]   [address] [place] [address]  [phone]  [employer] is a 501(c)(3) nonprofit organization whose mission is to help individuals grow and develop through recreational activities with horses. It is our mission to provide individuals with disabilities the opportunity to grow and develop through therapeutic, educational and recreational activities, while on a horse. Our state of the art facility [place] on 22 acres [place] beautifully groomed pastures. The rolling

## 2025-02-06 NOTE — PLAN OF CARE
Problem: Risk for Elopement  Goal: Patient will not exit the unit/facility without proper excort  Outcome: Progressing  Flowsheets (Taken 2/6/2025 1419)  Nursing Interventions for Elopement Risk:   Assist with personal care needs such as toileting, eating, dressing, as needed to reduce the risk of wandering   Collaborate with family members/caregivers to mitigate the elopement risk   Collaborate with treatment team for drug withdrawal symptoms treatment   Collaborate with treatment team for nicotine replacement   Communicate/escalate to charge nurse the risk of elopement   Communicate/escalate to /other team member the risk of elopement   Communicate/escalate to nursing supervisor the risk of elopement   Communicate to physician the risk for elopement   Escort with two staff members if patient must leave the unit     Problem: Depression  Goal: Will be euthymic at discharge  Description: INTERVENTIONS:  1. Administer medication as ordered  2. Provide emotional support via 1:1 interaction with staff  3. Encourage involvement in milieu/groups/activities  4. Monitor for social isolation  Outcome: Progressing     Problem: Psychosis  Goal: Will report no hallucinations or delusions  Description: INTERVENTIONS:  1. Administer medication as  ordered  2. Assist with reality testing to support increasing orientation  3. Assess if patient's hallucinations or delusions are encouraging self harm or harm to others and intervene as appropriate  Outcome: Progressing     Problem: Anxiety  Goal: Will report anxiety at manageable levels  Description: INTERVENTIONS:  1. Administer medication as ordered  2. Teach and rehearse alternative coping skills  3. Provide emotional support with 1:1 interaction with staff  Outcome: Progressing  Flowsheets (Taken 2/6/2025 1419)  Will report anxiety at manageable levels:   Administer medication as ordered   Teach and rehearse alternative coping skills   Provide emotional support with

## 2025-02-06 NOTE — PSYCHOTHERAPY
Group Therapy Note    Date: 2/5/2025  Start Time: 1330  End Time:  1400    Number of Participants: 5        Status After Intervention:  Improved    Participation Level: Active Listener and Interactive    Participation Quality: Sharing      Speech:  normal      Thought Process/Content: Logical      Affective Functioning: Congruent      Mood: Calm      Level of consciousness:  Attentive      Response to Learning: Able to verbalize current knowledge/experience         Modes of Intervention: Support      Discipline Responsible: Spiritual Care      Signature:  Marianne Huitron  Electronically signed by taylor Lopes Mai on 2/6/2025 at 9:50 AM

## 2025-02-07 VITALS
HEIGHT: 62 IN | OXYGEN SATURATION: 97 % | RESPIRATION RATE: 16 BRPM | SYSTOLIC BLOOD PRESSURE: 132 MMHG | TEMPERATURE: 98.2 F | WEIGHT: 136.03 LBS | BODY MASS INDEX: 25.03 KG/M2 | HEART RATE: 76 BPM | DIASTOLIC BLOOD PRESSURE: 61 MMHG

## 2025-02-07 LAB — BACTERIA UR CULT: NORMAL

## 2025-02-07 PROCEDURE — 6370000000 HC RX 637 (ALT 250 FOR IP): Performed by: NURSE PRACTITIONER

## 2025-02-07 PROCEDURE — 6370000000 HC RX 637 (ALT 250 FOR IP): Performed by: FAMILY MEDICINE

## 2025-02-07 PROCEDURE — 5130000000 HC BRIDGE APPOINTMENT

## 2025-02-07 PROCEDURE — 99239 HOSP IP/OBS DSCHRG MGMT >30: CPT | Performed by: NURSE PRACTITIONER

## 2025-02-07 RX ADMIN — PALIPERIDONE 6 MG: 6 TABLET, EXTENDED RELEASE ORAL at 08:30

## 2025-02-07 RX ADMIN — LEVETIRACETAM 1000 MG: 500 TABLET, FILM COATED ORAL at 08:30

## 2025-02-07 RX ADMIN — CARVEDILOL 12.5 MG: 12.5 TABLET, FILM COATED ORAL at 08:28

## 2025-02-07 RX ADMIN — PANTOPRAZOLE SODIUM 40 MG: 40 TABLET, DELAYED RELEASE ORAL at 08:29

## 2025-02-07 RX ADMIN — LACTULOSE 10 G: 20 SOLUTION ORAL at 08:31

## 2025-02-07 RX ADMIN — LAMOTRIGINE 25 MG: 25 TABLET ORAL at 08:31

## 2025-02-07 NOTE — DISCHARGE INSTR - DIET

## 2025-02-07 NOTE — DISCHARGE SUMMARY
floor. Labs were reviewed and physical exam was completed by Dr. Torres and associates. Home medications were reconciled. AUGUSTINA was obtained and reviewed. Medication changes were made and patient tolerated well with no side effects. Prior medications were restarted including Invega. Her UDS was positive for benzodiazapine's however she declines using benzos. Unclear if she is abusing her prescribed stimulant. Patient denies that she is however reported that she took 2 pills at once prior to this hospitalization. She admits to taking her  prescribed opioids \"a few times every few months.\"  Patient attended and participated in group therapy. As hospitalization progressed and medications were administered her psychosis cleared.   Patient was calm and cooperative with staff and peers. Patient was compliant with her medications. Patient was sleeping through the night. This patient is not suicidal, homicidal or psychotic at discharge. She does not present a danger to self or others. On the day of discharge and transfer of care, patient indicated readiness for discharge. On 2/7/2025 she had received maximum benefit from the inpatient psychiatric hospitalization. She was not acutely manic nor agitated with no reported symptoms of psychosis. She indicated no thoughts of self-harm or harm to others. Given resolution of presenting symptoms and patient readiness for discharge and that patient agreed to follow-up with outpatient services with [facility] and the patient was discharged with a 30 day supply of medication. She denied access to firearms or weapons. She was advised to abstain from all drugs and alcohol and to remain adherent to medication as prescribed.      Number of antipsychotic medication prescribed at discharge: 1- Invega    Referral to addiction treatment: patient declined     Prescription for Alcohol or Drug Disorder Medication: patient declined     Prescription for Tobacco Cessation

## 2025-02-07 NOTE — PLAN OF CARE
Problem: Depression  Goal: Will be euthymic at discharge  Description: INTERVENTIONS:  1. Administer medication as ordered  2. Provide emotional support via 1:1 interaction with staff  3. Encourage involvement in milieu/groups/activities  4. Monitor for social isolation  2/6/2025 2327 by Qiana Llanes RN  Outcome: Progressing  2/6/2025 1427 by Eugenio Melendez RN  Outcome: Progressing     Problem: Anxiety  Goal: Will report anxiety at manageable levels  Description: INTERVENTIONS:  1. Administer medication as ordered  2. Teach and rehearse alternative coping skills  3. Provide emotional support with 1:1 interaction with staff  2/6/2025 2327 by Qiana Llanes RN  Outcome: Progressing  2/6/2025 1427 by Eugenio Melendez RN  Outcome: Progressing  Flowsheets (Taken 2/6/2025 1419)  Will report anxiety at manageable levels:   Administer medication as ordered   Teach and rehearse alternative coping skills   Provide emotional support with 1:1 interaction with staff     Problem: Involuntary Admit  Goal: Will cooperate with staff recommendations and doctor's orders and will demonstrate appropriate behavior  Description: INTERVENTIONS:  1. Treat underlying conditions and offer medication as ordered  2. Educate regarding involuntary admission procedures and rules  3. Contain excessive/inappropriate behavior per unit and hospital policies  2/6/2025 2327 by Qiana Llanes RN  Outcome: Progressing  2/6/2025 1427 by Eugenio Melendez RN  Outcome: Progressing  Flowsheets (Taken 2/6/2025 1419)  Will cooperate with staff recommendations and doctor's orders and will demonstrate appropriate behavior:   Treat underlying conditions and offer medication as ordered   Educate regarding involuntary admission procedures and rules   Contain excessive/inappropriate behavior per unit and hospital policies     Problem: Pain  Goal: Verbalizes/displays adequate comfort level or baseline comfort level  2/6/2025 2327 by Qiana Llanes

## 2025-02-07 NOTE — PROGRESS NOTES
Group Note    Date: 02/05/25  Start Time: 4:00 PM   End Time:4:30 PM     Number of Participants: 5    Type of Group: Nursing Education     Patient's Goal:  Answer questions from the thumb ball    Notes:  patient invited but did not attend group.   
                                                                Group Note    Date: 02/05/25  Start Time: 7:30 PM   End Time:8:00 PM     Number of Participants: 7    Type of Group: Wrap-Up     Patient's Goal:  reflect on today    Notes:  see group wrap-up sheet    Status After Intervention:  Unchanged    Participation Level: Active Listener    Participation Quality: Appropriate    Speech:  normal    Thought Process/Content: Linear    Mood:  cooperative    Level of consciousness:  Alert and Oriented x4    Response to Learning: Progressing to goal    Modes of Intervention: Education and Support    Discipline Responsible: Registered Nurse     Signature:  Tara Adkins RN  
                                                                Group Note    Date: 02/05/25  Start Time: 8:30 AM   End Time:9:00 AM     Number of Participants: 6    Type of Group: Community/Goal     Patient's Goal:  \"Figuring out letting go + how to move on\"    Notes:      Status After Intervention:      Participation Level: Active Listener    Participation Quality: Appropriate    Speech:  normal    Thought Process/Content: Logical    Mood:  Calm    Level of consciousness:  Alert    Response to Learning: Able to verbalize current knowledge/experience    Modes of Intervention: Education and Support    Discipline Responsible: Behavioral Health Technician     Signature:  TERESA HURLEY  
                                                                Group Note    Date: 02/06/25  Start Time: 8:00 AM   End Time:8:30 AM     Number of Participants: 8    Type of Group: Community/Goal     Patient's Goal:  \"How to go home w/better outlook\"    Notes:      Status After Intervention:      Participation Level: Active Listener    Participation Quality: Appropriate    Speech:  normal    Thought Process/Content: Logical    Mood:  Calm    Level of consciousness:  Alert    Response to Learning: Able to verbalize current knowledge/experience    Modes of Intervention: Education and Support    Discipline Responsible: Behavioral Health Technician     Signature:  TERESA HURLEY  
                                                                Group Note    Date: 02/07/25  Start Time: 8:00 AM   End Time:8:30 AM     Number of Participants: 4    Type of Group: Community/Goal     Patient's Goal:  \"Getting myself together\"    Notes:      Status After Intervention:  Improved    Participation Level: Interactive    Participation Quality: Appropriate and Sharing    Speech:  normal    Thought Process/Content: Logical    Mood: Calm    Level of consciousness:  Alert    Response to Learning: Able to verbalize current knowledge/experience    Modes of Intervention: Education and Support    Discipline Responsible: Registered Nurse     Signature:  Emma Anaya RN  
                                                  Admission Note      Reason for admission/Target Symptom: Per nursing admission assessment - Reason for Admission: A/VH, paronoia. Patient called police thought she heard and seen people outside and in her attic. Believes that other are following her.  Reports that she has been non-compliant with psychaitric medication that was recently prescribed becuase it makes her feel doped up. Also not sleeping and the trazodone makes her feel drowsey during the day. Long-standing history of alcohol abuse, per patient.    Diagnoses: Unspecified depression   UDS: Amphetamine ,Cannabinoid   BAL:  Neg    SW will meet with treatment team to discuss patient's treatment including care planning, discharge planning, and follow-up needs. Patient has been admitted to [facility].     Treatment team will identify the patient's discharge needs. Appointments will be made for medication management and outpatient therapy/counseling. Pt confirmed the need for ongoing treatment post inpatient stay. Pt was also provided a handout of contact information for drug and alcohol treatment centers and other community support service such as [facility], AA, and [facility].  
                                                 Treatment Team Note:    Therapist met with treatment team to discuss patients treatment, progress toward treatment goals and discharge plans.    Target Symptoms/Reason for admission: Per nursing admission assessment - Reason for Admission: A/VH, paronoia. Patient called police thought she heard and seen people outside and in her attic. Believes that other are following her.  Reports that she has been non-compliant with psychaitric medication that was recently prescribed becuase it makes her feel doped up. Also not sleeping and the trazodone makes her feel drowsey during the day. Long-standing history of alcohol abuse, per patient.    Diagnoses per psych provider: Psychosis, unspecified psychosis type ([facility]) [F29]  Psychiatric diagnosis [F99]    Patient's aftercare plan is: [facility]    Aftercare appointments made: Yes    Pt lives with: spouse    Collateral obtained from:  pt's spouse Nolberto   Collateral obtained on:  2/5/25    Attending groups: Yes    Behavior: calm and cooperative    Has patient been completing ADL's:  Yes    Sleeping:Yes    Taking medication: Yes    Misc: Per night nursing note: pt in TV area, alert and oriented, social and talking with staff and peers, pt interactive with peers and staff, participated in wrap up group, is groomed and dressed casual, pt is medication compliant and affect is appropriate, not tearful this evening, pt denies suicidal and homicidal ideations and denies hallucinations here on unit, pt states that she sees things at home \"outside\". Pt stated that her  visited and is supportive of her. Pt states that she needs to stay away from sister and mother, but it is difficult, \"I am a giver\" Pt stated that she will work staying away from family and is encouraged about going to therapy after discharge. Pt affect is labile and pt is calm, cooperative and pleasant. Pt denies pain or discomfort at this time, 
                                                 Treatment Team Note:    Therapist met with treatment team to discuss patients treatment, progress toward treatment goals and discharge plans.    Target Symptoms/Reason for admission: Per nursing admission assessment - Reason for Admission: A/VH, paronoia. Patient called police thought she heard and seen people outside and in her attic. Believes that other are following her.  Reports that she has been non-compliant with psychaitric medication that was recently prescribed becuase it makes her feel doped up. Also not sleeping and the trazodone makes her feel drowsey during the day. Long-standing history of alcohol abuse, per patient.    Diagnoses per psych provider: Psychosis, unspecified psychosis type ([facility]) [F29]  Psychiatric diagnosis [F99]    Patient's aftercare plan is: [facility]    Aftercare appointments made: Yes    Pt lives with: spouse    Collateral obtained from:  pt's  Nolberto  Collateral obtained on:  2/5/25    Attending groups: Yes    Behavior: calm and cooperative    Has patient been completing ADL's:  Yes    Sleeping:Yes    Taking medication: Yes    Misc: Per night nursing note: pt in TV area with peers, and pt alert and oriented, pt states that she is tried and cannot keep eyes open and pt was in TV area and was falling asleep and nurse encouraged her to go rest for safety. Pt in room resting and she stated that her depression was better and stated \"little\" and anxiety, pt rated 0 . Pt denies suicidal and homicidal ideations and hallucinations and paranoia. Pt affect flat, pt states that she is improved, but \"so tried\" pt states that she thinks its one of day time medications. Pt is medication compliant, participates in group, is socia interm and affect flat and pt is cooperative and pleasant to staff and peers, pt is sleeping well this shift and appetite is normal for patient.                                                   
                                                 Treatment Team Note:    Therapist met with treatment team to discuss patients treatment, progress toward treatment goals and discharge plans.    Target Symptoms/Reason for admission: Per nursing admission assessment - Reason for Admission: A/VH, paronoia. Patient called police thought she heard and seen people outside and in her attic. Believes that other are following her.  Reports that she has been non-compliant with psychaitric medication that was recently prescribed becuase it makes her feel doped up. Also not sleeping and the trazodone makes her feel drowsey during the day. Long-standing history of alcohol abuse, per patient.    Diagnoses per psych provider: Psychosis, unspecified psychosis type ([facility]) [F29]  Psychiatric diagnosis [F99]    Patient's aftercare plan is: [facility]    Aftercare appointments made: No - SW will make discharge appointments    Pt lives with: spouse    Collateral obtained from: SW will meet with patient to gather information    Attending groups: New admission - unknown at this time.    Behavior:  tearful    Has patient been completing ADL's:  New admission - unknown at this time - SW will monitor    Sleeping:New admission - unknown at this time.    Taking medication: New admission - unknown at this time.    Misc:                                                   
        [facility]      Psychiatric Progress Note    Name:  Michelle Foster  Date:  2/6/2025  Age:  54 y.o.  Sex:  female  Ethnicity:   Primary Care Physician:  Chandu Reynaga MD   Patient Care Team:  Patient Care Team:  Chandu Reynaga MD as PCP - General (Family Medicine)  Chandu Reynaga MD as PCP - Empaneled Provider  Chief Complaint: \" I am feeling better.\"        Historian:patient  Complaint Type: anxiety, depression, loss of interest in favorite activities, sleep disturbance, and tobacco use  Course of Symptoms: ongoing  Precipitating Factors: alcohol use disorder, stimulant use disorder         Subjective  Nursing notes were reviewed and patient had no behavioral issues during the night. As needed medications administered during the night include Hydroxyzine. Today she denies suicidal ideations, homicidal ideations and psychosis. She is not paranoid. She is not responding to internal stimuli. She has been social with staff and peers this morning. She reports she would like to be there for her sons court hearing tomorrow at 10:30 am.  Patient reports sleep as,\" I slept all night.\" Patient has been calm and cooperative with staff and peers. Patient has been compliant with medications. Patient has been attending group therapy. Patient reports appetite as,\" I am eating all of my meals.\" Patient reports no side effects from medications.      Objective    Vital Signs:  Last set of tests and vitals:  Vitals:    02/06/25 0808   BP: 104/63   Pulse: 87   Resp: 16   Temp: 97.9 °F (36.6 °C)   SpO2: 97%       Previous Psychiatric/Substance Use History      Medical History:  Past Medical History:   Diagnosis Date    Acoustic neuroma (HCC)     ADD (attention deficit disorder)     Anxiety     Depression     Headache     Hypertension     Hypothyroidism (acquired)     MENEZES (nonalcoholic steatohepatitis)         PIERCE History:   Social History     Substance and Sexual Activity   Alcohol Use Not 
   02/05/25 1600   Encounter Summary   Encounter Overview/Reason Initial Encounter   Encounter Code  Counseling, Group, by  services   Service Provided For Patient   Complexity of Encounter Moderate   Begin Time 1330   End Time  1400   Total Time Calculated 30 min   Spiritual/Emotional needs   Type Spiritual Support   Behavioral Health    Type  Spirituality Group  ([place])   Assessment/Intervention/Outcome   Assessment Calm   Intervention Active listening;Discussed belief system/Muslim practices/nelli;Explored/Affirmed feelings, thoughts, concerns;Prayer (assurance of)/Belva;Sustaining Presence/Ministry of presence   Outcome Expressed feelings, needs, and concerns;Expressed Gratitude   Plan and Referrals   Plan/Referrals No future visits requested   Does the patient have a [facility] PCP? No     Electronically signed by Chaplain Marianne Huitron on 2/6/2025 at 9:49 AM  
 Nursing Admission Note    Reason for Admission: A/VH, paronoia. Patient called police thought she heard and seen people outside and in her attic. Believes that other are following her.  Reports that she has been non-compliant with psychaitric medication that was recently prescribed becuase it makes her feel doped up. Also not sleeping and the trazodone makes her feel drowsey during the day. Long-standing history of alcohol abuse, per patient.    Additional Notes:  Arrived to unit with security/safety sitter. Safety search conducted with no contraband discovered. Patient tearful upon arrival and request medication for anxiety.    Patient Active Problem List   Diagnosis    Hypothyroidism (acquired)    MENEZES (nonalcoholic steatohepatitis)    Acoustic neuroma (HCC)    Neuropathy    Female pattern hair loss    Abnormal SPEP    Syncope and collapse    Schwannoma    Psychiatric diagnosis       C-SSRS:  C-SSRS Completed: yes.    Risk Assessment Completed: yes.    Risk of Suicide per C-SSRS: Risk of Suicide: No Risk  Provider Notified of the C-SSRS Screening and   Risk Assessment Findings: yes.    Order for Constant Observer Continued: no.    If no, discontinued due to the following reasons:  Patient is on 15 minute safety checks yes.  Safety Features on the unit: yes.    No previous safety concerns while on unit: no.  Patient reporting no thoughts of self-harm while on unit: no.      Suicidal Ideation: no.    If yes, is there a plan?(Describe)   Homicidal Ideation:  no.   If yes, describe:   Auditory/Visual Hallucinations:  yes.    If yes, describe AVH? [place] and seen people in home    Addictive Behavior:   Addictive Behavior  In the Past 3 Months, Have You Felt or Has Someone Told You That You Have a Problem With  : None    Mental Status EXAM:  Mental Status and Behavioral Exam  Normal: No  Level of Assistance: Independent/Self  Facial Expression: Sad  Affect: Congruent  Level of Consciousness: Alert  Frequency of 
Behavioral Health   Discharge Note  Bridge Appointment completed: Reviewed Discharge Instructions with patient.    Patient verbalizes understanding and agreement with the discharge plan using the teachback method.     Referral for Outpatient Tobacco Cessation Counseling, upon discharge (samuel X if applicable and completed):    ( )  Hospital staff assisted patient to call Quit Line or faxed referral                                   during hospitalization                  ( )  Recognizing danger situations (included triggers and roadblocks), if not completed on admission                    ( )  Coping skills (new ways to manage stress, exercise, relaxation techniques, changing routine, distraction), if not completed on admission                                                           ( )  Basic information about quitting (benefits of quitting, techniques in how to quit, available resources, if not completed on admission  ( ) [facility]   ( ) Patient refused referral  ( ) Patient refused counseling  ( ) Patient refused smoking cessation medication upon discharge  (x) Patient non-tobacco use    Vaccinations (samuel X if applicable and completed):  ( ) Patient states already received influenza vaccine elsewhere  ( ) Patient received influenza vaccine during this hospitalization  (X) Patient refused influenza vaccine at this time      Pt discharged with followings belongings:      Valuables sent home with patient.   Valuables retrieved from [employer] and returned to patient.    Patient left department with Departure Mode: With spouse via Mobility at Departure: Ambulatory, discharged to Discharged to: Private Residence.   Patient education on aftercare instructions: Yes    Patient verbalize understanding of AVS:  Yes.    Suicidal Ideations? No Risk of Suicide: No Risk   HI? Negative for homicidal ideation       AVH? Denies    Status EXAM upon discharge:  Mental Status and 
[facility] Adult Unit Daily Assessment  Nursing Progress Note    Room: [facility]-01   Name: Michelle Foster   Age: 54 y.o.   Gender: female   Dx: Psychosis (HCC)  Precautions: suicide risk, seizure precautions, and Elopement precautions.  Inpatient Status: involuntary     SLEEP:  Sleep Quality Good  Sleep Medications: No   PRN Sleep Meds: No     MEDICAL:  Other PRN Meds: Yes   Med Compliant: Yes  Accu-Chek: No  Oxygen/CPAP/BiPAP: No  CIWA/CINA: No   PAIN Assessment: none  Side Effects from medication: No    Metabolic Screening:  Lab Results   Component Value Date    LABA1C 5.4 02/05/2025     Lab Results   Component Value Date    CHOL 148 01/30/2025    CHOL 163 10/05/2023    CHOL 215 (H) 03/14/2022    CHOL 235 (H) 10/12/2020    CHOL 278 (H) 04/15/2019     Lab Results   Component Value Date    TRIG 56 01/30/2025    TRIG 72 10/05/2023    TRIG 187 (H) 03/14/2022    TRIG 174 (H) 10/12/2020    TRIG 327 (H) 04/15/2019     Lab Results   Component Value Date    HDL 68 (H) 01/30/2025    HDL 48 (L) 10/05/2023    HDL 44 (L) 03/14/2022    HDL 58 (L) 10/12/2020    HDL 76 04/15/2019     No components found for: \"LDLCAL\"  No components found for: \"LABVLDL\"  Body mass index is 24.88 kg/m².  BP Readings from Last 2 Encounters:   02/06/25 104/63   02/01/25 (!) 114/59       Medical Bed:   Is patient in a medical bed? no   If medical bed is in use, has nursing secured room while patient is awake and out of the room? NA  Has safety checks by nursing been completed on the bed/room this shift? NA    Protective Factors:  Patient identifies protective factors with nursing staff as follows:   Identifies reasons for living: Yes   Supportive Social Network or family: Yes    Belief that suicide is immoral/high spirituality: No   Responsibility to family or others/living with family: Yes   Fear of death or dying due to pain and suffering: No   Engaged in work or school: No  If Patient is unable to identify, reason why?     Depression: 3   Anxiety: 3   SI 
Discharge Note     Patient is discharging on this date. Patient denies SI, HI, and AVH at this time. Patient reports improvement in behavior and is leaving unit in overall good condition. SW and patient discussed patient's follow up appointments and importance of attending appointments as scheduled, patient voiced understanding and agreement. Patient and SW also discussed patient's safety plan and patient was able to verbally identify: warning signs, coping strategies, places and people that help make the patient feel better/distract negative thoughts, friends/family/agencies/professionals the patient can reach out to in a crisis, and something that is important to the patient/worth living for. Patient was provided the national suicide prevention hotline number (1-475.918.3096) as well as local community behavioral health ([facility]) crisis number for emergencies (1-338.912.9206).     Discharge Disposition: home -lives with family-her        Pt to follow up with [facility] on February 12 , 2025 at 3:00 PM for the intake/substance abuse therapy appointment with RICHAR Champion.     Patient will follow up with [facility] on February 14 , 2025 at 8:30 AM for the medication management appointment with JULI Delacruz.     Referral to outpatient tobacco cessation counseling treatment:  Patient refused referral to outpatient tobacco cessation counseling    SW offered to assist patient with transportation, patient declined transportation assistance, her  came and picked her up at little bit ago.     
During tx team this morning, it was reported by pt's provider that her discharge should all be completed and that the pt needed to be out of here around 9:00 am so that she could get to her son's hearing at court which is at 10:30. YUN spoke with pt after tx team and she said she would be returning home with her  and he would be picking her up as soon as she called to tell him to head this way. YUN told her to ask her nurse and make sure everything was ready before she called him. YUN reminded pt that she already scheduled her follow up appointments with [facility] in [place] and that those details would be on her discharge paperwork. Her  arrived shortly after and picked her up.   
I Adult Unit Daily Assessment  Nursing Progress Note    Room: [facility]613-01   Name: Michelle Foster   Age: 54 y.o.   Gender: female   Dx: Psychiatric diagnosis  Precautions: suicide risk  Inpatient Status: involuntary     SLEEP:  Sleep Quality Poor  Sleep Medications: No   PRN Sleep Meds: No     MEDICAL:  Other PRN Meds: No   Med Compliant: Yes  Accu-Chek: No  Oxygen/CPAP/BiPAP: No  CIWA/CINA: No   PAIN Assessment: none  Side Effects from medication: No    Metabolic Screening:  Lab Results   Component Value Date    LABA1C 5.3 01/30/2025     Lab Results   Component Value Date    CHOL 148 01/30/2025    CHOL 163 10/05/2023    CHOL 215 (H) 03/14/2022    CHOL 235 (H) 10/12/2020    CHOL 278 (H) 04/15/2019     Lab Results   Component Value Date    TRIG 56 01/30/2025    TRIG 72 10/05/2023    TRIG 187 (H) 03/14/2022    TRIG 174 (H) 10/12/2020    TRIG 327 (H) 04/15/2019     Lab Results   Component Value Date    HDL 68 (H) 01/30/2025    HDL 48 (L) 10/05/2023    HDL 44 (L) 03/14/2022    HDL 58 (L) 10/12/2020    HDL 76 04/15/2019     No components found for: \"LDLCAL\"  No components found for: \"LABVLDL\"  Body mass index is 24.88 kg/m².  BP Readings from Last 2 Encounters:   02/05/25 139/77   02/01/25 (!) 114/59       Medical Bed:   Is patient in a medical bed? no   If medical bed is in use, has nursing secured room while patient is awake and out of the room? NA  Has safety checks by nursing been completed on the bed/room this shift? yes    Protective Factors:  Patient identifies protective factors with nursing staff as follows:   Identifies reasons for living: Yes   Supportive Social Network or family: Yes    Belief that suicide is immoral/high spirituality: Yes   Responsibility to family or others/living with family: Yes   Fear of death or dying due to pain and suffering: No   Engaged in work or school: Yes  If Patient is unable to identify, reason why?     Depression: 7   Anxiety: 7   SI denies suicidal ideation      HI Negative 
Patient discharged very quickly- did not complete the discharge OQ Analyst sheet.   
Progress Note  Michelle Foster  2/6/2025 10:11 PM  Subjective:   Admit Date:   2/5/2025      CC/ADMIT DX:       Interval History:   Reviewed overnight events and nursing notes. She has had no new medical issues.     I have reviewed all labs/diagnostics from the last 24hrs.       ROS:   I have done a 10 point ROS and all are negative, except what is mentioned in the HPI.    ADULT DIET; Regular; Safety Tray; Safety Tray (Disposables)    Medications:      melatonin  5 mg Oral Nightly    nicotine  1 patch TransDERmal Daily    lactulose  10 g Oral TID    paliperidone  6 mg Oral Daily    carvedilol  12.5 mg Oral BID    lamoTRIgine  25 mg Oral BID    levETIRAcetam  1,000 mg Oral BID    pantoprazole  40 mg Oral Daily           Objective:   Vitals: /66   Pulse 87   Temp 99 °F (37.2 °C) (Temporal)   Resp 18   Ht 1.575 m (5' 2\")   Wt 61.7 kg (136 lb 0.4 oz)   LMP 07/25/2016 (Approximate)   SpO2 97%   BMI 24.88 kg/m²  No intake or output data in the 24 hours ending 02/06/25 2211  General appearance: alert and cooperative with exam  Extremities: extremities normal, atraumatic, no cyanosis or edema  Neurologic:  No obvious focal neurologic deficits.    Assessment and Plan:   Principal Problem:    Psychosis (HCC)  Active Problems:    Stimulant use disorder    Alcohol use disorder, severe, in early remission, dependence (HCC)  Resolved Problems:    * No resolved hospital problems. *    MENEZES    ETOH Use    Plan:   Continue present medication(s)    Follow with Psych   Follow with care      Discharge planning:   her home     Reviewed treatment plans with the patient and/or family.             Electronically signed by Estrada Torres MD on 2/6/2025 at 10:11 PM  
Pt having fair sleep this night, with interm awakening, question what time is it. Pt worried about family  and son,  and going home. Nurse sat with patient to console patient and to calm. Will continue to monitor for safety,   
SW met with patient to complete psychosocial and lifetime CSSR-S on this date. Patients long and short-term goals discussed. Patient voiced understanding. Treatment plan sheet signed. Patient verbalized understanding of the treatment plan. Patient participated in goals and objectives of the treatment plan. Patient discussed safety plan with .    In the last 6 months has the patient been a danger to self: Yes  In the last 6 months has the patient been a danger to others: No  Legal Guardian/POA: No    Activity Assessment:  Skills:   Talents:   Interests:     Provided patient with [employer] handout entitled \"Quitting Smoking.\"  Reviewed handout with patient: addressing dangers of smoking, developing coping skills, and providing basic information about quitting.       Patient received all components practical counseling of tobacco practical counseling during the hospital stay.        
SW spoke with patient while making rounds. Patient was tearful and nonsensical.     Notified nurse in charge of patient care.  
Spiritual Health History and Assessment/Progress Note  [facility]    Loneliness/Social Isolation,  ,  , Spirituality Group ([facility])    Name: Michelle Foster MRN: [id number]    Age: 54 y.o.     Sex: female   Language: English   Druze: [facility]   Psychosis (HCC)     Date: 2/6/2025            Total Time Calculated: 40 min              Spiritual Assessment continued in [facility] ADULT [facility]        Referral/Consult From: Other    Encounter Overview/Reason: Loneliness/Social Isolation  Service Provided For: Patient    Trina, Belief, Meaning:   Patient identifies as spiritual; pt values prayer  Family/Friends No family/friends present      Importance and Influence:  Patient has spiritual/personal beliefs that influence decisions regarding their health; Her prayer is born out of her trina  Family/Friends No family/friends present    Community:  Patient feels well-supported. Support system includes: Spouse/Partner and Children  Family/Friends     Assessment and Plan of Care:     Patient Interventions include: Facilitated expression of thoughts and feelings  Family/Friends Interventions include:     Patient Plan of Care: Other: Pt seems to understand \"what she needs to do to make improvements and move forward.\"  Family/Friends Plan of Care: No family/friends present    Electronically signed by SOCORRO Lin on 2/6/2025 at 2:53 PM        02/06/25 6969   Encounter Summary   Encounter Overview/Reason Loneliness/Social Isolation   Encounter Code  Assessment by  services   Service Provided For Patient   Referral/Consult From Other    Support System Spouse;Children   Complexity of Encounter Low   Begin Time 1013   End Time  1053   Total Time Calculated 40 min   Spiritual/Emotional needs   Type Spiritual Support   Assessment/Intervention/Outcome   Assessment Calm  (Pt shared some sensitive concencers)   Intervention Active listening;Prayer (assurance of)/Raymond   Outcome Expressed 
[facility] Adult Unit Daily Assessment  Nursing Progress Note    Room: [facility]613-01   Name: Michelle Foster   Age: 54 y.o.   Gender: female   Dx: Psychiatric diagnosis  Precautions: close watch, suicide risk, fall risk, and seizure precautions  Inpatient Status: involuntary     SLEEP:  Sleep Quality Good  Sleep Medications: Yes and melatonin 5 mg   PRN Sleep Meds: No     MEDICAL:  Other PRN Meds: Yes and atarax 25 mg   Med Compliant: Yes  Accu-Chek: No  Oxygen/CPAP/BiPAP: No  CIWA/CINA: No   PAIN Assessment: none  Side Effects from medication: No    Metabolic Screening:  Lab Results   Component Value Date    LABA1C 5.4 02/05/2025     Lab Results   Component Value Date    CHOL 148 01/30/2025    CHOL 163 10/05/2023    CHOL 215 (H) 03/14/2022    CHOL 235 (H) 10/12/2020    CHOL 278 (H) 04/15/2019     Lab Results   Component Value Date    TRIG 56 01/30/2025    TRIG 72 10/05/2023    TRIG 187 (H) 03/14/2022    TRIG 174 (H) 10/12/2020    TRIG 327 (H) 04/15/2019     Lab Results   Component Value Date    HDL 68 (H) 01/30/2025    HDL 48 (L) 10/05/2023    HDL 44 (L) 03/14/2022    HDL 58 (L) 10/12/2020    HDL 76 04/15/2019     No components found for: \"LDLCAL\"  No components found for: \"LABVLDL\"  Body mass index is 24.88 kg/m².  BP Readings from Last 2 Encounters:   02/05/25 139/77   02/01/25 (!) 114/59       Medical Bed:   Is patient in a medical bed? no   If medical bed is in use, has nursing secured room while patient is awake and out of the room? NA  Has safety checks by nursing been completed on the bed/room this shift? yes    Protective Factors:  Patient identifies protective factors with nursing staff as follows:   Identifies reasons for living: Yes   Supportive Social Network or family: Yes    Belief that suicide is immoral/high spirituality: Yes   Responsibility to family or others/living with family: Yes   Fear of death or dying due to pain and suffering: Yes   Engaged in work or school: Yes  If Patient is unable to identify, 
[facility] Adult Unit Daily Assessment  Nursing Progress Note    Room: [facility]613-01   Name: Michelle Foster   Age: 54 y.o.   Gender: female   Dx: Psychosis (HCC)  Precautions: close watch, suicide risk, fall risk, and seizure precautions  Inpatient Status: involuntary     SLEEP:  Sleep Quality Good  Sleep Medications: Yes and melatonin 5 mg   PRN Sleep Meds: No     MEDICAL:  Other PRN Meds: No   Med Compliant: No  Accu-Chek: No  Oxygen/CPAP/BiPAP: No  CIWA/CINA: No   PAIN Assessment: none  Side Effects from medication: No    Metabolic Screening:  Lab Results   Component Value Date    LABA1C 5.4 02/05/2025     Lab Results   Component Value Date    CHOL 148 01/30/2025    CHOL 163 10/05/2023    CHOL 215 (H) 03/14/2022    CHOL 235 (H) 10/12/2020    CHOL 278 (H) 04/15/2019     Lab Results   Component Value Date    TRIG 56 01/30/2025    TRIG 72 10/05/2023    TRIG 187 (H) 03/14/2022    TRIG 174 (H) 10/12/2020    TRIG 327 (H) 04/15/2019     Lab Results   Component Value Date    HDL 68 (H) 01/30/2025    HDL 48 (L) 10/05/2023    HDL 44 (L) 03/14/2022    HDL 58 (L) 10/12/2020    HDL 76 04/15/2019     No components found for: \"LDLCAL\"  No components found for: \"LABVLDL\"  Body mass index is 24.88 kg/m².  BP Readings from Last 2 Encounters:   02/06/25 117/66   02/01/25 (!) 114/59       Medical Bed:   Is patient in a medical bed? no   If medical bed is in use, has nursing secured room while patient is awake and out of the room? NA  Has safety checks by nursing been completed on the bed/room this shift? yes    Protective Factors:  Patient identifies protective factors with nursing staff as follows:   Identifies reasons for living: Yes   Supportive Social Network or family: Yes    Belief that suicide is immoral/high spirituality: No   Responsibility to family or others/living with family: Yes   Fear of death or dying due to pain and suffering: Yes   Engaged in work or school: No  If Patient is unable to identify, reason why?     Depression: 
YUN spoke with pt about a safe discharge plan for when she was ready to leave the hospital, and she reports that she will be returning home where she lives with her spouse in [place] and said her  should be able to pick her up when it is time for her to discharge. YUN told pt she looked back at pt's last admission, since she was just discharged Saturday 2/1/25, and noticed that pt's therapy appointment was scheduled for this afternoon, and her medication appointment for Friday, so YUN told pt she would go ahead and contact [facility] and get her appointments pushed back a few days so she isn't flagged for no call no show for today's appointment. Pt expressed the desire to be out for her son's court date on Friday at 10:30 YUN told pt that she would have to talk with her provider as they are the only one who makes the decision on when the pt can discharge. YUN will contact [facility] today and reschedule pt's appointments for he.   
[facility]  Outpatient Psychiatric Treatment: Has an appointment on Thursday with [facility] Outpatient Services    Family History:    Family history of mental illness: no   \"Depression\",\"Anxiety\",\"Bipolar\",\"Schizophrenia\",\"Borderline\",\"ADHD\"}  Family members with suicide attempt: no   If yes explain (attempted or completed):    Substance Abuse History:     SBIRT Completed: yes  Brief Intervention completed if needed:  (Yes/No)    Current ETOH LEVELS: Less than 10 mg/dl    ETOH Usage:     Amount drinking daily: denied    Date of last drink: Last week   Longest period of sobriety: Nine months    Substance/Chemical Abuse/Recreational Drug History:  Substance used: alcohol  Date of last substance use: Last week  Tobacco Use: no   History of rehab treatment: Denies  How many times in rehab:  Last time in rehab:  Family history of substance abuse: Father was an alcoholic    Opiates: It was discussed with pt they would not be receiving opiates unless they were within 3 days post surgery/acute injury. Patient voiced understanding and agreed. Yes     Medical History:     Medical Diagnosis/Issues: Insomnia, gerd, hypertension , UTI,RLS  CT today in ED:no  Use of 02 or CPAP: no  Ambulatory: yes  Independent or Need assistance with Self Care: Independent    PCP: Chandu Reynaga MD     Current Medications:   Scheduled Meds: No current facility-administered medications for this encounter.    Current Outpatient Medications:     sulfamethoxazole-trimethoprim (BACTRIM DS;SEPTRA DS) 800-160 MG per tablet, Take 1 tablet by mouth every 12 hours for 7 doses, Disp: 7 tablet, Rfl: 0    paliperidone (INVEGA) 6 MG extended release tablet, Take 1 tablet by mouth daily, Disp: 30 tablet, Rfl: 1    traZODone (DESYREL) 50 MG tablet, Take 1 tablet by mouth nightly, Disp: 30 tablet, Rfl: 1    lamoTRIgine (LAMICTAL) 25 MG tablet, Take 1 tablet by mouth 2 times daily, Disp: , Rfl:     rOPINIRole (REQUIP) 0.5 MG tablet, Take 1 tablet by mouth nightly,

## 2025-02-11 ENCOUNTER — HOSPITAL ENCOUNTER (INPATIENT)
Age: 55
LOS: 7 days | Discharge: HOME | DRG: 885 | End: 2025-02-18
Attending: EMERGENCY MEDICINE | Admitting: PSYCHIATRY & NEUROLOGY
Payer: COMMERCIAL

## 2025-02-11 DIAGNOSIS — G40.909 SEIZURE DISORDER (HCC): Primary | ICD-10-CM

## 2025-02-11 DIAGNOSIS — R44.0 AUDITORY HALLUCINATION: ICD-10-CM

## 2025-02-11 PROBLEM — F29 PSYCHOSIS, UNSPECIFIED PSYCHOSIS TYPE (HCC): Status: ACTIVE | Noted: 2025-02-11

## 2025-02-11 LAB
ALBUMIN SERPL-MCNC: 3.9 G/DL (ref 3.5–5.2)
ALP SERPL-CCNC: 143 U/L (ref 35–104)
ALT SERPL-CCNC: 28 U/L (ref 5–33)
AMPHET UR QL SCN: POSITIVE
ANION GAP SERPL CALCULATED.3IONS-SCNC: 14 MMOL/L (ref 8–16)
AST SERPL-CCNC: 31 U/L (ref 5–32)
BARBITURATES UR QL SCN: NEGATIVE
BASOPHILS # BLD: 0.1 K/UL (ref 0–0.2)
BASOPHILS NFR BLD: 1 % (ref 0–1)
BENZODIAZ UR QL SCN: POSITIVE
BILIRUB SERPL-MCNC: 0.7 MG/DL (ref 0.2–1.2)
BUN SERPL-MCNC: 10 MG/DL (ref 6–20)
BUPRENORPHINE URINE: NEGATIVE
CALCIUM SERPL-MCNC: 9.3 MG/DL (ref 8.6–10)
CANNABINOIDS UR QL SCN: NEGATIVE
CHLORIDE SERPL-SCNC: 102 MMOL/L (ref 98–107)
CO2 SERPL-SCNC: 23 MMOL/L (ref 22–29)
COCAINE UR QL SCN: NEGATIVE
CREAT SERPL-MCNC: 0.8 MG/DL (ref 0.5–0.9)
DRUG SCREEN COMMENT UR-IMP: ABNORMAL
EOSINOPHIL # BLD: 0.2 K/UL (ref 0–0.6)
EOSINOPHIL NFR BLD: 3.1 % (ref 0–5)
ERYTHROCYTE [DISTWIDTH] IN BLOOD BY AUTOMATED COUNT: 13.4 % (ref 11.5–14.5)
ETHANOLAMINE SERPL-MCNC: 13 MG/DL (ref 0–10)
FENTANYL SCREEN, URINE: NEGATIVE
GLUCOSE SERPL-MCNC: 136 MG/DL (ref 70–99)
HCT VFR BLD AUTO: 35.6 % (ref 37–47)
HGB BLD-MCNC: 12.1 G/DL (ref 12–16)
IMM GRANULOCYTES # BLD: 0 K/UL
LYMPHOCYTES # BLD: 1.9 K/UL (ref 1.1–4.5)
LYMPHOCYTES NFR BLD: 26.2 % (ref 20–40)
MCH RBC QN AUTO: 31.3 PG (ref 27–31)
MCHC RBC AUTO-ENTMCNC: 34 G/DL (ref 33–37)
MCV RBC AUTO: 92 FL (ref 81–99)
METHADONE UR QL SCN: NEGATIVE
METHAMPHETAMINE, URINE: NEGATIVE
MONOCYTES # BLD: 0.8 K/UL (ref 0–0.9)
MONOCYTES NFR BLD: 11.6 % (ref 0–10)
NEUTROPHILS # BLD: 4.2 K/UL (ref 1.5–7.5)
NEUTS SEG NFR BLD: 57.8 % (ref 50–65)
OPIATES UR QL SCN: NEGATIVE
OXYCODONE UR QL SCN: POSITIVE
PCP UR QL SCN: NEGATIVE
PLATELET # BLD AUTO: 142 K/UL (ref 130–400)
PMV BLD AUTO: 10.2 FL (ref 9.4–12.3)
POTASSIUM SERPL-SCNC: 4.4 MMOL/L (ref 3.5–5.1)
PROT SERPL-MCNC: 8.5 G/DL (ref 6.4–8.3)
RBC # BLD AUTO: 3.87 M/UL (ref 4.2–5.4)
SARS-COV-2 RDRP RESP QL NAA+PROBE: NOT DETECTED
SODIUM SERPL-SCNC: 139 MMOL/L (ref 136–145)
TRICYCLIC ANTIDEPRESSANTS, UR: NEGATIVE
WBC # BLD AUTO: 7.2 K/UL (ref 4.8–10.8)

## 2025-02-11 PROCEDURE — 82077 ASSAY SPEC XCP UR&BREATH IA: CPT

## 2025-02-11 PROCEDURE — 85025 COMPLETE CBC W/AUTO DIFF WBC: CPT

## 2025-02-11 PROCEDURE — 87635 SARS-COV-2 COVID-19 AMP PRB: CPT

## 2025-02-11 PROCEDURE — 36415 COLL VENOUS BLD VENIPUNCTURE: CPT

## 2025-02-11 PROCEDURE — 6370000000 HC RX 637 (ALT 250 FOR IP): Performed by: PSYCHIATRY & NEUROLOGY

## 2025-02-11 PROCEDURE — 80307 DRUG TEST PRSMV CHEM ANLYZR: CPT

## 2025-02-11 PROCEDURE — G0480 DRUG TEST DEF 1-7 CLASSES: HCPCS

## 2025-02-11 PROCEDURE — 80053 COMPREHEN METABOLIC PANEL: CPT

## 2025-02-11 PROCEDURE — 1240000000 HC EMOTIONAL WELLNESS R&B

## 2025-02-11 PROCEDURE — 99283 EMERGENCY DEPT VISIT LOW MDM: CPT

## 2025-02-11 RX ORDER — OLANZAPINE 10 MG/1
10 TABLET ORAL ONCE
Status: COMPLETED | OUTPATIENT
Start: 2025-02-11 | End: 2025-02-11

## 2025-02-11 RX ORDER — LEVETIRACETAM 500 MG/1
1000 TABLET ORAL 2 TIMES DAILY
Status: DISCONTINUED | OUTPATIENT
Start: 2025-02-11 | End: 2025-02-14

## 2025-02-11 RX ORDER — CARVEDILOL 12.5 MG/1
12.5 TABLET ORAL 2 TIMES DAILY WITH MEALS
Status: DISCONTINUED | OUTPATIENT
Start: 2025-02-11 | End: 2025-02-18 | Stop reason: HOSPADM

## 2025-02-11 RX ORDER — PANTOPRAZOLE SODIUM 40 MG/1
40 TABLET, DELAYED RELEASE ORAL
Status: DISCONTINUED | OUTPATIENT
Start: 2025-02-12 | End: 2025-02-18 | Stop reason: HOSPADM

## 2025-02-11 RX ORDER — M-VIT,TX,IRON,MINS/CALC/FOLIC 27MG-0.4MG
1 TABLET ORAL DAILY
Status: DISCONTINUED | OUTPATIENT
Start: 2025-02-12 | End: 2025-02-18 | Stop reason: HOSPADM

## 2025-02-11 RX ORDER — LAMOTRIGINE 25 MG/1
25 TABLET ORAL 2 TIMES DAILY
Status: DISCONTINUED | OUTPATIENT
Start: 2025-02-11 | End: 2025-02-14

## 2025-02-11 RX ORDER — POTASSIUM CHLORIDE 1500 MG/1
40 TABLET, EXTENDED RELEASE ORAL DAILY
Status: DISCONTINUED | OUTPATIENT
Start: 2025-02-12 | End: 2025-02-18 | Stop reason: HOSPADM

## 2025-02-11 RX ORDER — ACETAMINOPHEN 325 MG/1
650 TABLET ORAL EVERY 4 HOURS PRN
Status: DISCONTINUED | OUTPATIENT
Start: 2025-02-11 | End: 2025-02-18 | Stop reason: HOSPADM

## 2025-02-11 RX ORDER — LACTULOSE 10 G/15ML
20 SOLUTION ORAL 3 TIMES DAILY
Status: DISCONTINUED | OUTPATIENT
Start: 2025-02-11 | End: 2025-02-12

## 2025-02-11 RX ORDER — POLYETHYLENE GLYCOL 3350 17 G/17G
17 POWDER, FOR SOLUTION ORAL DAILY PRN
Status: DISCONTINUED | OUTPATIENT
Start: 2025-02-11 | End: 2025-02-18 | Stop reason: HOSPADM

## 2025-02-11 RX ADMIN — CARVEDILOL 12.5 MG: 12.5 TABLET, FILM COATED ORAL at 22:16

## 2025-02-11 RX ADMIN — OLANZAPINE 10 MG: 10 TABLET, FILM COATED ORAL at 22:16

## 2025-02-11 RX ADMIN — LEVETIRACETAM 1000 MG: 500 TABLET, FILM COATED ORAL at 22:16

## 2025-02-11 RX ADMIN — LACTULOSE 20 G: 20 SOLUTION ORAL at 22:16

## 2025-02-11 RX ADMIN — LAMOTRIGINE 25 MG: 25 TABLET ORAL at 22:16

## 2025-02-11 SDOH — HEALTH STABILITY: PHYSICAL HEALTH: ON AVERAGE, HOW MANY DAYS PER WEEK DO YOU ENGAGE IN MODERATE TO STRENUOUS EXERCISE (LIKE A BRISK WALK)?: 0 DAYS

## 2025-02-11 SDOH — HEALTH STABILITY: PHYSICAL HEALTH: ON AVERAGE, HOW MANY MINUTES DO YOU ENGAGE IN EXERCISE AT THIS LEVEL?: 0 MIN

## 2025-02-11 ASSESSMENT — SOCIAL DETERMINANTS OF HEALTH (SDOH)
WITHIN THE LAST YEAR, HAVE YOU BEEN AFRAID OF YOUR PARTNER OR EX-PARTNER?: NO
HOW OFTEN DO YOU ATTENT MEETINGS OF THE CLUB OR ORGANIZATION YOU BELONG TO?: NEVER
HOW OFTEN DO YOU ATTEND CHURCH OR RELIGIOUS SERVICES?: 1 TO 4 TIMES PER YEAR
HOW HARD IS IT FOR YOU TO PAY FOR THE VERY BASICS LIKE FOOD, HOUSING, MEDICAL CARE, AND HEATING?: NOT VERY HARD
IN A TYPICAL WEEK, HOW MANY TIMES DO YOU TALK ON THE PHONE WITH FAMILY, FRIENDS, OR NEIGHBORS?: ONCE A WEEK
HOW OFTEN DO YOU GET TOGETHER WITH FRIENDS OR RELATIVES?: NEVER
WITHIN THE LAST YEAR, HAVE YOU BEEN KICKED, HIT, SLAPPED, OR OTHERWISE PHYSICALLY HURT BY YOUR PARTNER OR EX-PARTNER?: NO
DO YOU BELONG TO ANY CLUBS OR ORGANIZATIONS SUCH AS CHURCH GROUPS UNIONS, FRATERNAL OR ATHLETIC GROUPS, OR SCHOOL GROUPS?: NO
WITHIN THE LAST YEAR, HAVE TO BEEN RAPED OR FORCED TO HAVE ANY KIND OF SEXUAL ACTIVITY BY YOUR PARTNER OR EX-PARTNER?: NO
WITHIN THE LAST YEAR, HAVE YOU BEEN HUMILIATED OR EMOTIONALLY ABUSED IN OTHER WAYS BY YOUR PARTNER OR EX-PARTNER?: NO

## 2025-02-11 ASSESSMENT — PATIENT HEALTH QUESTIONNAIRE - PHQ9
1. LITTLE INTEREST OR PLEASURE IN DOING THINGS: SEVERAL DAYS
SUM OF ALL RESPONSES TO PHQ9 QUESTIONS 1 & 2: 2
SUM OF ALL RESPONSES TO PHQ QUESTIONS 1-9: 2
2. FEELING DOWN, DEPRESSED OR HOPELESS: SEVERAL DAYS
SUM OF ALL RESPONSES TO PHQ QUESTIONS 1-9: 2

## 2025-02-11 ASSESSMENT — SLEEP AND FATIGUE QUESTIONNAIRES
DO YOU HAVE DIFFICULTY SLEEPING: YES
SLEEP PATTERN: DIFFICULTY FALLING ASLEEP;RESTLESSNESS
DO YOU USE A SLEEP AID: YES
AVERAGE NUMBER OF SLEEP HOURS: 6

## 2025-02-11 ASSESSMENT — LIFESTYLE VARIABLES
HOW OFTEN DO YOU HAVE A DRINK CONTAINING ALCOHOL: MONTHLY OR LESS
HOW MANY STANDARD DRINKS CONTAINING ALCOHOL DO YOU HAVE ON A TYPICAL DAY: PATIENT DOES NOT DRINK

## 2025-02-12 PROCEDURE — 6370000000 HC RX 637 (ALT 250 FOR IP): Performed by: NURSE PRACTITIONER

## 2025-02-12 PROCEDURE — 6370000000 HC RX 637 (ALT 250 FOR IP): Performed by: PSYCHIATRY & NEUROLOGY

## 2025-02-12 PROCEDURE — 1240000000 HC EMOTIONAL WELLNESS R&B

## 2025-02-12 PROCEDURE — 90792 PSYCH DIAG EVAL W/MED SRVCS: CPT | Performed by: NURSE PRACTITIONER

## 2025-02-12 RX ORDER — LACTULOSE 10 G/15ML
10 SOLUTION ORAL 3 TIMES DAILY
Status: DISCONTINUED | OUTPATIENT
Start: 2025-02-12 | End: 2025-02-13

## 2025-02-12 RX ORDER — PALIPERIDONE 6 MG/1
6 TABLET, EXTENDED RELEASE ORAL DAILY
Status: DISCONTINUED | OUTPATIENT
Start: 2025-02-12 | End: 2025-02-13

## 2025-02-12 RX ORDER — HYDROXYZINE HYDROCHLORIDE 25 MG/1
25 TABLET, FILM COATED ORAL 3 TIMES DAILY PRN
Status: DISCONTINUED | OUTPATIENT
Start: 2025-02-12 | End: 2025-02-18 | Stop reason: HOSPADM

## 2025-02-12 RX ADMIN — PANTOPRAZOLE SODIUM 40 MG: 40 TABLET, DELAYED RELEASE ORAL at 07:01

## 2025-02-12 RX ADMIN — POTASSIUM CHLORIDE 40 MEQ: 1500 TABLET, EXTENDED RELEASE ORAL at 08:03

## 2025-02-12 RX ADMIN — Medication 1 TABLET: at 08:01

## 2025-02-12 RX ADMIN — LAMOTRIGINE 25 MG: 25 TABLET ORAL at 20:50

## 2025-02-12 RX ADMIN — LACTULOSE 10 G: 20 SOLUTION ORAL at 20:50

## 2025-02-12 RX ADMIN — HYDROXYZINE HYDROCHLORIDE 25 MG: 25 TABLET ORAL at 21:06

## 2025-02-12 RX ADMIN — PALIPERIDONE 6 MG: 6 TABLET, EXTENDED RELEASE ORAL at 10:57

## 2025-02-12 RX ADMIN — CARVEDILOL 12.5 MG: 12.5 TABLET, FILM COATED ORAL at 08:02

## 2025-02-12 RX ADMIN — CARVEDILOL 12.5 MG: 12.5 TABLET, FILM COATED ORAL at 18:45

## 2025-02-12 RX ADMIN — LEVETIRACETAM 1000 MG: 500 TABLET, FILM COATED ORAL at 20:50

## 2025-02-12 RX ADMIN — LAMOTRIGINE 25 MG: 25 TABLET ORAL at 08:03

## 2025-02-12 RX ADMIN — LACTULOSE 10 G: 20 SOLUTION ORAL at 16:22

## 2025-02-12 RX ADMIN — LACTULOSE 10 G: 20 SOLUTION ORAL at 10:57

## 2025-02-12 RX ADMIN — LEVETIRACETAM 1000 MG: 500 TABLET, FILM COATED ORAL at 08:02

## 2025-02-12 NOTE — ED PROVIDER NOTES
prior records is contained within the EMR.  Patient's vital signs have remained stable.  WBC count is 7.2.  Her electrolytes look okay.  Urine drug screen is positive for oxycodone, benzodiazepine and amphetamine.    CONSULTS:    Patient was seen and evaluated by mental health professional and after discussion with attending psychiatry, Dr. Arriaga, patient was recommended for admission to the behavioral health unit.    PROCEDURES:  Unless otherwise noted below, none     Procedures    FINAL IMPRESSION      1. Auditory hallucination          DISPOSITION/PLAN   DISPOSITION Decision To Admit 02/11/2025 08:48:57 PM   DISPOSITION CONDITION Stable       Please note that portions of this note were completed with a voice recognition program.  Efforts were made to edit thedictations but occasionally words are mis-transcribed.)    Noah Smith MD (electronically signed)  Attending Emergency Physician          Noah Smith MD  02/11/25 2044

## 2025-02-12 NOTE — H&P
Behavioral Services  Medicare Certification Upon Admission    I certify that this patient's inpatient psychiatric hospital admission is medically necessary for:    [x] (1) Treatment which could reasonably be expected to improve this patient's condition,       [x] (2) Or for diagnostic study;     AND     [x](2) The inpatient psychiatric services are provided while the individual is under the care of a physician and are included in the individualized plan of care.    Estimated length of stay/service : 3 days-5 days    Plan for post-hospital care : TBD    Electronically signed by JULI Coon on 2/12/2025 at 9:55 AM

## 2025-02-12 NOTE — PLAN OF CARE
Problem: Anxiety  Goal: Will report anxiety at manageable levels  Description: INTERVENTIONS:  1. Administer medication as ordered  2. Teach and rehearse alternative coping skills  3. Provide emotional support with 1:1 interaction with staff  Outcome: Progressing     Problem: Depression/Self Harm  Goal: Effect of psychiatric condition will be minimized and patient will be protected from self harm  Description: INTERVENTIONS:  1. Assess impact of patient's symptoms on level of functioning, self care needs and offer support as indicated  2. Assess patient/family knowledge of depression, impact on illness and need for teaching  3. Provide emotional support, presence and reassurance  4. Assess for possible suicidal thoughts or ideation. If patient expresses suicidal thoughts or statements do not leave alone, initiate Suicide Precautions, move to a room close to the nursing station and obtain sitter  5. Initiate consults as appropriate with Mental Health Professional, Spiritual Care, Psychosocial CNS, and consider a recommendation to the LIP for a Psychiatric Consultation  Outcome: Progressing  Flowsheets (Taken 2/12/2025 1421)  Effect of psychiatric condition will be minimized and patient will be protected from self harm:   Assess impact of patient’s symptoms on level of functioning, self care needs and offer support as indicated   Assess patient/family knowledge of depression, impact on illness and need for teaching   Provide emotional support, presence and reassurance   Initiate consults as appropriate with Mental Health Professional, Spiritual Care, Psychosocial CNS, and consider a recommendation to the LIP for a Psychiatric Consultation   Assess for suicidal thoughts or ideation. If patient expresses suicidal thoughts or statements do not leave alone, initiate Suicide Precautions, move near nurse station, obtain sitter     Problem: Drug Abuse/Detox  Goal: Will have no detox symptoms and will verbalize plan for

## 2025-02-12 NOTE — DISCHARGE INSTRUCTIONS

## 2025-02-12 NOTE — H&P
[facility]    Psychiatric Initial Evaluation    Date of Evaluation:  2/12/2025  Session Type:  84233 Psychiatric Diagnostic Interview Exam   Name:  Michelle Foster  Age:  54 y.o.  Sex:  female  Ethnicity:   Primary Care Physician:  Chandu Reynaga MD   Patient Care Team:  Patient Care Team:  Chandu Reynaga MD as PCP - General (Family Medicine)  Chandu Reynaga MD as PCP - Empaneled Provider  Chief Complaint: \"I started hearing things again once I got home.\"    History of Present Illness    Historian: patient  Complaint Type:  psychosis  Course of Symptoms: ongoing  Symptoms Onset: gradual  Onset Approximately: gradual  Precipitating Factors: stimulant use, history of alcohol use disorder, in early remission  Severity: moderate  Risk Factors: history of alcohol use disorder        he patient is a 54 y.o. female with previous psychiatric history of depression, anxiety, ADHD and alcohol use disorder in early remission, alcoholic encephalopathy, complicated by history of multiple medical conditions, including acoustic neuroma, complicated by meningitis, history of seizures,  shunt, hypothyroidism, hypertension, non-alcoholic steatohepatitis, who presented to the emergency department complaining of auditory and visual hallucinations.  Patient's UDS in ER was positive for prescribed amphetamines, benzodiazapine's and oxycodone. Patient is prescribed Adderall. Reports she took a half of one yesterday. She reports she has not had alcohol in over 2 weeks and prior to that she was sober for 6 months.       Patient is well known to psychiatry, and was recently discharged from the inpatient psychiatric unit last week. This will be her third admission within 3 weeks      Patient reports that when she left this unit last week she was \"doing really good\" for 3 days. She reports then she started to hear the voice of her sister, sisters boyfriend and her sisters best friend that are outside

## 2025-02-12 NOTE — ED NOTES
ED TO INPATIENT SBAR HANDOFF    Patient Name: Michelle Foster   : 1970  54 y.o.   Family/Caregiver Present: Yes  Code Status Order: Prior    C-SSRS: Risk of Suicide: No Risk  Sitter Yes  Restraints: No      Situation  Chief Complaint:   Chief Complaint   Patient presents with    Paranoid     Paranoia, recent admission for similar; denies SI/ HI, endorses auditory hallucinations     Patient Diagnosis: No admission diagnoses are documented for this encounter.     Brief Description of Patient's Condition: Michelle Foster is a 54 year-old female who presents to the emergency department for evaluation regarding symptoms of paranoia and auditory hallucinations. Patient states that she has been hearing voices outside of her house which her  cannot hear. Patient states that she is not having thoughts about wanting to harm herself or harm others. She does have a prior history of similar events with previous recent inpatient psychiatric hospitalization. In review of prior records patient appears to have underwent hospitalization about 1 week previously.  Denies recent illnesses, fever, chills, vomiting or diarrhea.   Mental Status: A&O x4  Arrived from: Home    Imaging:   No orders to display     COVID-19 Results:   Internal Administration   First Dose      Second Dose           Last COVID Lab SARS-CoV-2, PCR (no units)   Date Value   2024 Not Detected     SARS-CoV-2, NAAT (no units)   Date Value   2025 Not Detected           Abnormal labs:   Abnormal Labs Reviewed   CBC WITH AUTO DIFFERENTIAL - Abnormal; Notable for the following components:       Result Value    RBC 3.87 (*)     Hematocrit 35.6 (*)     MCH 31.3 (*)     Monocytes % 11.6 (*)     All other components within normal limits   COMPREHENSIVE METABOLIC PANEL - Abnormal; Notable for the following components:    Glucose 136 (*)     Total Protein 8.5 (*)     Alkaline Phosphatase 143 (*)     All other components within normal limits   DRUG SCRN,

## 2025-02-12 NOTE — ED NOTES
Patient changed into maroon scrubs and non-skid socks at this time. Patient's belongings bagged and given to security. Patient's watch given to .

## 2025-02-12 NOTE — PLAN OF CARE
Problem: Coping  Goal: Pt/Family able to verbalize concerns and demonstrate effective coping strategies  Description: INTERVENTIONS:  1. Assist patient/family to identify coping skills, available support systems and cultural and spiritual values  2. Provide emotional support, including active listening and acknowledgement of concerns of patient and caregivers  3. Reduce environmental stimuli, as able  4. Instruct patient/family in relaxation techniques, as appropriate  5. Assess for spiritual pain/suffering and initiate Spiritual Care, Psychosocial Clinical Specialist consults as needed  Outcome: Progressing  Note:                                                                     Group Therapy Note    Date: 2/12/2025  Start Time: 1000  End Time:  1030  Number of Participants: 8    Type of Group: Psychoeducation    Wellness Binder Information  Module Name:  Women's Issues  Session Number:  4    Group Goal for Pt:  To raise awareness of how thoughts influence feelings    Notes:  Pt demonstrated improved awareness of how thoughts influence feelings by actively participating in group discussion.    Status After Intervention:  Unchanged    Participation Level: Active Listener and Interactive    Participation Quality: Appropriate and Attentive      Speech:  normal      Thought Process/Content: Logical      Affective Functioning: Congruent      Mood: anxious and depressed      Level of consciousness:  Alert and Oriented x4      Response to Learning: Able to verbalize current knowledge/experience, Able to verbalize/acknowledge new learning, and Progressing to goal      Endings: None Reported    Modes of Intervention: Education      Discipline Responsible: Psychoeducational Specialist      Signature:  Hortensia Navas

## 2025-02-12 NOTE — PLAN OF CARE
Problem: Coping  Goal: Pt/Family able to verbalize concerns and demonstrate effective coping strategies  Description: INTERVENTIONS:  1. Assist patient/family to identify coping skills, available support systems and cultural and spiritual values  2. Provide emotional support, including active listening and acknowledgement of concerns of patient and caregivers  3. Reduce environmental stimuli, as able  4. Instruct patient/family in relaxation techniques, as appropriate  5. Assess for spiritual pain/suffering and initiate Spiritual Care, Psychosocial Clinical Specialist consults as needed  2/12/2025 1142 by Hortensia Navas  Outcome: Progressing  Note:                                                                     Group Therapy Note    Date: 2/12/2025  Start Time: 1100  End Time:  1130  Number of Participants: 7    Type of Group: Healthy Living/Wellness    Wellness Binder Information  Module Name:  Mental Health Wellness  Session Number:  1    Group Goal for Pt:  To improve knowledge of practical facts about depression    Notes:  Pt demonstrated improved knowledge of practical facts about depression by actively participating in group activity.    Status After Intervention:  Unchanged    Participation Level: Active Listener and Interactive    Participation Quality: Appropriate and Attentive      Speech:  normal      Thought Process/Content: Logical      Affective Functioning: Congruent      Mood: anxious and depressed      Level of consciousness:  Alert and Oriented x4      Response to Learning: Able to verbalize current knowledge/experience, Able to verbalize/acknowledge new learning, and Progressing to goal      Endings: None Reported    Modes of Intervention: Education      Discipline Responsible: Psychoeducational Specialist      Signature:  Hortensia Navas

## 2025-02-13 LAB — AMMONIA PLAS-SCNC: 62 UMOL/L (ref 11–51)

## 2025-02-13 PROCEDURE — 6370000000 HC RX 637 (ALT 250 FOR IP): Performed by: PSYCHIATRY & NEUROLOGY

## 2025-02-13 PROCEDURE — 82140 ASSAY OF AMMONIA: CPT

## 2025-02-13 PROCEDURE — 6370000000 HC RX 637 (ALT 250 FOR IP): Performed by: NURSE PRACTITIONER

## 2025-02-13 PROCEDURE — 1240000000 HC EMOTIONAL WELLNESS R&B

## 2025-02-13 PROCEDURE — 36415 COLL VENOUS BLD VENIPUNCTURE: CPT

## 2025-02-13 PROCEDURE — 6370000000 HC RX 637 (ALT 250 FOR IP): Performed by: FAMILY MEDICINE

## 2025-02-13 PROCEDURE — 99232 SBSQ HOSP IP/OBS MODERATE 35: CPT | Performed by: NURSE PRACTITIONER

## 2025-02-13 RX ORDER — MINERAL OIL AND WHITE PETROLATUM 150; 830 MG/G; MG/G
OINTMENT OPHTHALMIC PRN
Status: DISCONTINUED | OUTPATIENT
Start: 2025-02-13 | End: 2025-02-18 | Stop reason: HOSPADM

## 2025-02-13 RX ORDER — HYDROXYZINE HYDROCHLORIDE 25 MG/1
25 TABLET, FILM COATED ORAL ONCE
Status: COMPLETED | OUTPATIENT
Start: 2025-02-13 | End: 2025-02-13

## 2025-02-13 RX ORDER — LACTULOSE 10 G/15ML
20 SOLUTION ORAL 3 TIMES DAILY
Status: DISCONTINUED | OUTPATIENT
Start: 2025-02-13 | End: 2025-02-18 | Stop reason: HOSPADM

## 2025-02-13 RX ADMIN — LAMOTRIGINE 25 MG: 25 TABLET ORAL at 08:01

## 2025-02-13 RX ADMIN — CARVEDILOL 12.5 MG: 12.5 TABLET, FILM COATED ORAL at 16:55

## 2025-02-13 RX ADMIN — LEVETIRACETAM 1000 MG: 500 TABLET, FILM COATED ORAL at 20:21

## 2025-02-13 RX ADMIN — LACTULOSE 20 G: 20 SOLUTION ORAL at 08:01

## 2025-02-13 RX ADMIN — HYDROXYZINE HYDROCHLORIDE 25 MG: 25 TABLET ORAL at 16:57

## 2025-02-13 RX ADMIN — LEVETIRACETAM 1000 MG: 500 TABLET, FILM COATED ORAL at 08:01

## 2025-02-13 RX ADMIN — LACTULOSE 20 G: 20 SOLUTION ORAL at 14:22

## 2025-02-13 RX ADMIN — Medication 1 TABLET: at 08:01

## 2025-02-13 RX ADMIN — LACTULOSE 20 G: 20 SOLUTION ORAL at 20:21

## 2025-02-13 RX ADMIN — PANTOPRAZOLE SODIUM 40 MG: 40 TABLET, DELAYED RELEASE ORAL at 06:08

## 2025-02-13 RX ADMIN — LAMOTRIGINE 25 MG: 25 TABLET ORAL at 20:21

## 2025-02-13 RX ADMIN — POTASSIUM CHLORIDE 40 MEQ: 1500 TABLET, EXTENDED RELEASE ORAL at 08:01

## 2025-02-13 RX ADMIN — HYDROXYZINE HYDROCHLORIDE 25 MG: 25 TABLET, FILM COATED ORAL at 20:21

## 2025-02-13 RX ADMIN — PALIPERIDONE 6 MG: 6 TABLET, EXTENDED RELEASE ORAL at 08:01

## 2025-02-13 RX ADMIN — CARVEDILOL 12.5 MG: 12.5 TABLET, FILM COATED ORAL at 08:01

## 2025-02-13 NOTE — PLAN OF CARE
Problem: Depression/Self Harm  Goal: Effect of psychiatric condition will be minimized and patient will be protected from self harm  Description: INTERVENTIONS:  1. Assess impact of patient's symptoms on level of functioning, self care needs and offer support as indicated  2. Assess patient/family knowledge of depression, impact on illness and need for teaching  3. Provide emotional support, presence and reassurance  4. Assess for possible suicidal thoughts or ideation. If patient expresses suicidal thoughts or statements do not leave alone, initiate Suicide Precautions, move to a room close to the nursing station and obtain sitter  5. Initiate consults as appropriate with Mental Health Professional, Spiritual Care, Psychosocial CNS, and consider a recommendation to the LIP for a Psychiatric Consultation  2/13/2025 1140 by Ciara Caldera LPC  Outcome: Progressing  2/13/2025 1008 by Diana Urena RN  Outcome: Progressing  Flowsheets  Taken 2/13/2025 1008  Effect of psychiatric condition will be minimized and patient will be protected from self harm:   Provide emotional support, presence and reassurance   Assess patient/family knowledge of depression, impact on illness and need for teaching   Assess impact of patient’s symptoms on level of functioning, self care needs and offer support as indicated   Assess for suicidal thoughts or ideation. If patient expresses suicidal thoughts or statements do not leave alone, initiate Suicide Precautions, move near nurse station, obtain sitter  Taken 2/13/2025 1003  Effect of psychiatric condition will be minimized and patient will be protected from self harm:   Assess impact of patient’s symptoms on level of functioning, self care needs and offer support as indicated   Assess patient/family knowledge of depression, impact on illness and need for teaching   Assess for suicidal thoughts or ideation. If patient expresses suicidal thoughts or statements do not leave alone,

## 2025-02-13 NOTE — PLAN OF CARE
Problem: Anxiety  Goal: Will report anxiety at manageable levels  Description: INTERVENTIONS:  1. Administer medication as ordered  2. Teach and rehearse alternative coping skills  3. Provide emotional support with 1:1 interaction with staff  Outcome: Progressing  Flowsheets (Taken 2/13/2025 1003)  Will report anxiety at manageable levels:   Administer medication as ordered   Teach and rehearse alternative coping skills   Provide emotional support with 1:1 interaction with staff     Problem: Depression/Self Harm  Goal: Effect of psychiatric condition will be minimized and patient will be protected from self harm  Description: INTERVENTIONS:  1. Assess impact of patient's symptoms on level of functioning, self care needs and offer support as indicated  2. Assess patient/family knowledge of depression, impact on illness and need for teaching  3. Provide emotional support, presence and reassurance  4. Assess for possible suicidal thoughts or ideation. If patient expresses suicidal thoughts or statements do not leave alone, initiate Suicide Precautions, move to a room close to the nursing station and obtain sitter  5. Initiate consults as appropriate with Mental Health Professional, Spiritual Care, Psychosocial CNS, and consider a recommendation to the LIP for a Psychiatric Consultation  Outcome: Progressing  Flowsheets  Taken 2/13/2025 1008  Effect of psychiatric condition will be minimized and patient will be protected from self harm:   Provide emotional support, presence and reassurance   Assess patient/family knowledge of depression, impact on illness and need for teaching   Assess impact of patient’s symptoms on level of functioning, self care needs and offer support as indicated   Assess for suicidal thoughts or ideation. If patient expresses suicidal thoughts or statements do not leave alone, initiate Suicide Precautions, move near nurse station, obtain sitter  Taken 2/13/2025 1003  Effect of psychiatric

## 2025-02-13 NOTE — H&P
HISTORY and PHYSICAL      CHIEF COMPLAINT:  Psychosis    Reason for Admission:  Psychosis    History Obtained From:  patient, chart    HISTORY OF PRESENT ILLNESS:      The patient is a 54 y.o. female who is admitted to the [facility] unit with worsening mood issues. She denies any new medical issues. She has no c/o chest pain or SOA. She has had no abdominal pain or N/V. She has had no dysuria. She has had no fevers. She has had no new pain issues.     Past Medical History:        Diagnosis Date    Acoustic neuroma (HCC)     ADD (attention deficit disorder)     Anxiety     Depression     Headache     Hypertension     Hypothyroidism (acquired)     MENEZES (nonalcoholic steatohepatitis)      Past Surgical History:        Procedure Laterality Date    ABDOMINAL ADHESION SURGERY      CRANIOTOMY      VENTRICULOPERITONEAL SHUNT           Medications Prior to Admission:    Medications Prior to Admission: paliperidone (INVEGA) 6 MG extended release tablet, Take 1 tablet by mouth daily  lamoTRIgine (LAMICTAL) 25 MG tablet, Take 1 tablet by mouth 2 times daily  lactulose (CHRONULAC) 10 GM/15ML solution, Take 15 mLs by mouth 3 times daily To have 2-3 soft bowel movements daily (Patient not taking: Reported on 1/30/2025)  pantoprazole (PROTONIX) 20 MG tablet, Take 2 tablets by mouth daily  levETIRAcetam (KEPPRA) 1000 MG tablet, Take 1 tablet by mouth 2 times daily  potassium chloride (KLOR-CON M) 20 MEQ extended release tablet, Take 2 tablets by mouth daily  carvedilol (COREG) 12.5 MG tablet, Take 1 tablet by mouth 2 times daily  Multiple Vitamins-Minerals (THERAPEUTIC MULTIVITAMIN-MINERALS) tablet, Take 1 tablet by mouth daily    Allergies:  Patient has no known allergies.    Social History:   TOBACCO:   reports that she has never smoked. She has never used smokeless tobacco.  ETOH:   reports that she does not currently use alcohol.  DRUGS:   reports no history of drug

## 2025-02-13 NOTE — BH NOTE
Group Note    Date: 02/12/25  Start Time: 8:00 PM   End Time:8:30 PM     Number of Participants: 6    Type of Group: Wrap-Up     Patient's Goal:  None     Notes:  Pt reports that hallucinations are gone and her anxiety is on/off. She reports that her depression is the same.     Status After Intervention:      Participation Level: Active Listener    Participation Quality: Appropriate    Speech:  normal    Thought Process/Content: Logical    Mood: Calm     Level of consciousness:  Alert    Response to Learning: Able to verbalize current knowledge/experience    Modes of Intervention: Education and Support    Discipline Responsible: Behavioral Health Technician     Signature:  Roge Asher

## 2025-02-13 NOTE — GROUP NOTE
Group Note    Date: 02/13/25  Start Time: 10:00 AM   End Time:10:45 AM     Number of Participants: 3    Type of Group: Psychoeducational      Patient's Goal: Managing Depression    Notes: SW discussed with pt's ways they can try to manage their mental health which included taking their medication as prescribed, attending therapy appointments, leaning on their support system, and utilizing their coping strategies. Pt said that people in her life don't understand her mental illness and she thinks if they could just walk a day in her shoes they might have a little more empathy.     Status After Intervention:  Improved    Participation Level: Active Listener and Interactive    Participation Quality: Appropriate, Attentive, and Sharing    Speech:  normal    Thought Process/Content: Logical    Mood: euthymic    Level of consciousness:  Alert, Oriented x4, and Attentive    Response to Learning: Able to verbalize current knowledge/experience and Able to verbalize/acknowledge new learning    Modes of Intervention: Education and Support    Discipline Responsible: Psychoeducational Specialist     Signature:  Shannon Kinsey

## 2025-02-14 LAB — AMMONIA PLAS-SCNC: 37 UMOL/L (ref 11–51)

## 2025-02-14 PROCEDURE — 6370000000 HC RX 637 (ALT 250 FOR IP): Performed by: PSYCHIATRY & NEUROLOGY

## 2025-02-14 PROCEDURE — 6370000000 HC RX 637 (ALT 250 FOR IP): Performed by: FAMILY MEDICINE

## 2025-02-14 PROCEDURE — 82140 ASSAY OF AMMONIA: CPT

## 2025-02-14 PROCEDURE — 1240000000 HC EMOTIONAL WELLNESS R&B

## 2025-02-14 PROCEDURE — 36415 COLL VENOUS BLD VENIPUNCTURE: CPT

## 2025-02-14 PROCEDURE — 6370000000 HC RX 637 (ALT 250 FOR IP): Performed by: NURSE PRACTITIONER

## 2025-02-14 PROCEDURE — 99222 1ST HOSP IP/OBS MODERATE 55: CPT | Performed by: PSYCHIATRY & NEUROLOGY

## 2025-02-14 RX ORDER — DIVALPROEX SODIUM 500 MG/1
1000 TABLET, FILM COATED, EXTENDED RELEASE ORAL ONCE
Status: COMPLETED | OUTPATIENT
Start: 2025-02-14 | End: 2025-02-14

## 2025-02-14 RX ORDER — DIVALPROEX SODIUM 500 MG/1
500 TABLET, FILM COATED, EXTENDED RELEASE ORAL 2 TIMES DAILY
Status: DISCONTINUED | OUTPATIENT
Start: 2025-02-15 | End: 2025-02-17

## 2025-02-14 RX ORDER — LEVETIRACETAM 500 MG/1
500 TABLET ORAL 2 TIMES DAILY
Status: DISCONTINUED | OUTPATIENT
Start: 2025-02-14 | End: 2025-02-18 | Stop reason: HOSPADM

## 2025-02-14 RX ORDER — MINERAL OIL AND WHITE PETROLATUM 150; 830 MG/G; MG/G
OINTMENT OPHTHALMIC 2 TIMES DAILY PRN
Status: CANCELLED | OUTPATIENT
Start: 2025-02-14

## 2025-02-14 RX ADMIN — CARVEDILOL 12.5 MG: 12.5 TABLET, FILM COATED ORAL at 16:31

## 2025-02-14 RX ADMIN — CARVEDILOL 12.5 MG: 12.5 TABLET, FILM COATED ORAL at 07:45

## 2025-02-14 RX ADMIN — HYDROXYZINE HYDROCHLORIDE 25 MG: 25 TABLET ORAL at 20:38

## 2025-02-14 RX ADMIN — PANTOPRAZOLE SODIUM 40 MG: 40 TABLET, DELAYED RELEASE ORAL at 05:50

## 2025-02-14 RX ADMIN — DIVALPROEX SODIUM 1000 MG: 500 TABLET, FILM COATED, EXTENDED RELEASE ORAL at 07:46

## 2025-02-14 RX ADMIN — LACTULOSE 20 G: 20 SOLUTION ORAL at 13:44

## 2025-02-14 RX ADMIN — PALIPERIDONE 9 MG: 6 TABLET, EXTENDED RELEASE ORAL at 07:45

## 2025-02-14 RX ADMIN — HYDROXYZINE HYDROCHLORIDE 25 MG: 25 TABLET ORAL at 07:45

## 2025-02-14 RX ADMIN — LEVETIRACETAM 500 MG: 500 TABLET, FILM COATED ORAL at 12:16

## 2025-02-14 RX ADMIN — Medication 1 TABLET: at 07:46

## 2025-02-14 RX ADMIN — LACTULOSE 20 G: 20 SOLUTION ORAL at 07:44

## 2025-02-14 RX ADMIN — POTASSIUM CHLORIDE 40 MEQ: 1500 TABLET, EXTENDED RELEASE ORAL at 07:45

## 2025-02-14 RX ADMIN — LACTULOSE 20 G: 20 SOLUTION ORAL at 20:37

## 2025-02-14 RX ADMIN — LEVETIRACETAM 500 MG: 500 TABLET, FILM COATED ORAL at 20:37

## 2025-02-14 NOTE — PLAN OF CARE
Problem: Anxiety  Goal: Will report anxiety at manageable levels  Description: INTERVENTIONS:  1. Administer medication as ordered  2. Teach and rehearse alternative coping skills  3. Provide emotional support with 1:1 interaction with staff  Outcome: Progressing  Flowsheets (Taken 2/14/2025 1020)  Will report anxiety at manageable levels:   Administer medication as ordered   Provide emotional support with 1:1 interaction with staff   Teach and rehearse alternative coping skills     Problem: Depression/Self Harm  Goal: Effect of psychiatric condition will be minimized and patient will be protected from self harm  Description: INTERVENTIONS:  1. Assess impact of patient's symptoms on level of functioning, self care needs and offer support as indicated  2. Assess patient/family knowledge of depression, impact on illness and need for teaching  3. Provide emotional support, presence and reassurance  4. Assess for possible suicidal thoughts or ideation. If patient expresses suicidal thoughts or statements do not leave alone, initiate Suicide Precautions, move to a room close to the nursing station and obtain sitter  5. Initiate consults as appropriate with Mental Health Professional, Spiritual Care, Psychosocial CNS, and consider a recommendation to the LIP for a Psychiatric Consultation  Outcome: Progressing  Flowsheets  Taken 2/14/2025 1023  Effect of psychiatric condition will be minimized and patient will be protected from self harm:   Assess impact of patient’s symptoms on level of functioning, self care needs and offer support as indicated   Assess patient/family knowledge of depression, impact on illness and need for teaching   Provide emotional support, presence and reassurance   Assess for suicidal thoughts or ideation. If patient expresses suicidal thoughts or statements do not leave alone, initiate Suicide Precautions, move near nurse station, obtain sitter  Taken 2/14/2025 1020  Effect of psychiatric

## 2025-02-14 NOTE — CASE COMMUNICATION
[facility] Case Management faxed PHI form for patient's authorization to have rights to speak to someone that the patient designated. YUN Lopez reviewed form with patient, patient signed form and form has been returned to [facility] via fax.  Information from VM from CM from insurance left on  UR VM received, shared information during treatment team with Social Work team and provider, insurance recommending IOP program and wanting to coordinate with [facility] team to complete discharge planning. Recommending a group called [facility] for the patient. Review will be completed with CMJamila on this date, will ask for additional information from case management and relay  team discharge planner information to  for [facility].    Electronically signed, Chen Beck LPN, -Utilization Review , 02/14/25 10:45 AM   Patient needs daptomycin until 12/28  However, can be switched to linezolid on 12/14  Continue Diflucan until 12/29

## 2025-02-14 NOTE — CONSULTS
[facility] Consult  ?  ?  Patient: Michelle Foster  MR#: [id number]  Account Number: [id number]   Room: [facility]/612-02   YOB: 1970  Date of Progress Note: 2/14/2025  Time of Note 6:54 AM  Attending Physician: Lars Arriaga MD  Consulting Physician: Fredrick Rodríguez M.D.  ?  ?  CHIEF COMPLAINT: Psychosis   ?  HISTORY OF PRESENT ILLNESS:   This 54 y.o. female who presents with psychosis.  She was admitted to the behavioral health unit a couple of days ago for this.  Reportedly for the past 3 to 4 months she has had paranoid thinking.  I saw her few months ago when she came into the ED with a couple of seizures.  She was placed on or increased on her Keppra at that time.  She was on low-dose Lamictal.  She has a remote history of seizures as well as an old craniotomy for a right acoustic neuroma resection.  Has a  shunt.  These have been functioning normal in the past.  The only change is her paranoid behavior and thinking as best I can tell.  That has improved some since being admitted this admission.  The patient has no fever.  She also has a recovering alcoholic and has a history of polysubstance abuse.  Urine drug screen positive for amphetamines for which she takes as well as benzodiazepines and oxycodone.  She denies the latter.  REVIEW OF SYSTEMS:  Constitutional - No fever or chills. No diaphoresis or significant fatigue.  HENT - No tinnitus or significant hearing loss.  Eyes - no sudden vision change or eye pain  Respiratory - no significant shortness of breath or cough  Cardiovascular - no chest pain No palpitations or significant leg swelling  Gastrointestinal - no abdominal swelling or pain.   Genitourinary - No difficulty urinating, dysuria  Musculoskeletal - no back pain or myalgia.  Skin - no color change or rash  Neurologic - No seizures. No lateralizing weakness.  Hematologic - no easy bruising or excessive bleeding.  Psychiatric - no severe anxiety or nervousness.   All other review of

## 2025-02-15 PROCEDURE — 6370000000 HC RX 637 (ALT 250 FOR IP): Performed by: FAMILY MEDICINE

## 2025-02-15 PROCEDURE — 6370000000 HC RX 637 (ALT 250 FOR IP): Performed by: NURSE PRACTITIONER

## 2025-02-15 PROCEDURE — 6370000000 HC RX 637 (ALT 250 FOR IP): Performed by: PSYCHIATRY & NEUROLOGY

## 2025-02-15 PROCEDURE — 99232 SBSQ HOSP IP/OBS MODERATE 35: CPT | Performed by: PSYCHIATRY & NEUROLOGY

## 2025-02-15 PROCEDURE — 1240000000 HC EMOTIONAL WELLNESS R&B

## 2025-02-15 RX ORDER — MECOBALAMIN 5000 MCG
5 TABLET,DISINTEGRATING ORAL NIGHTLY
Status: DISCONTINUED | OUTPATIENT
Start: 2025-02-15 | End: 2025-02-18 | Stop reason: HOSPADM

## 2025-02-15 RX ADMIN — POTASSIUM CHLORIDE 40 MEQ: 1500 TABLET, EXTENDED RELEASE ORAL at 08:43

## 2025-02-15 RX ADMIN — LACTULOSE 20 G: 20 SOLUTION ORAL at 14:31

## 2025-02-15 RX ADMIN — LEVETIRACETAM 500 MG: 500 TABLET, FILM COATED ORAL at 08:46

## 2025-02-15 RX ADMIN — Medication 1 TABLET: at 08:43

## 2025-02-15 RX ADMIN — Medication 5 MG: at 20:31

## 2025-02-15 RX ADMIN — CARVEDILOL 12.5 MG: 12.5 TABLET, FILM COATED ORAL at 08:44

## 2025-02-15 RX ADMIN — DIVALPROEX SODIUM 500 MG: 500 TABLET, FILM COATED, EXTENDED RELEASE ORAL at 08:42

## 2025-02-15 RX ADMIN — HYDROXYZINE HYDROCHLORIDE 25 MG: 25 TABLET ORAL at 20:31

## 2025-02-15 RX ADMIN — LACTULOSE 20 G: 20 SOLUTION ORAL at 20:31

## 2025-02-15 RX ADMIN — LACTULOSE 20 G: 20 SOLUTION ORAL at 08:46

## 2025-02-15 RX ADMIN — LEVETIRACETAM 500 MG: 500 TABLET, FILM COATED ORAL at 20:31

## 2025-02-15 RX ADMIN — CARVEDILOL 12.5 MG: 12.5 TABLET, FILM COATED ORAL at 18:18

## 2025-02-15 RX ADMIN — PANTOPRAZOLE SODIUM 40 MG: 40 TABLET, DELAYED RELEASE ORAL at 06:08

## 2025-02-15 RX ADMIN — DIVALPROEX SODIUM 500 MG: 500 TABLET, FILM COATED, EXTENDED RELEASE ORAL at 18:18

## 2025-02-15 RX ADMIN — PALIPERIDONE 9 MG: 6 TABLET, EXTENDED RELEASE ORAL at 08:43

## 2025-02-15 NOTE — PLAN OF CARE
Problem: Anxiety  Goal: Will report anxiety at manageable levels  Description: INTERVENTIONS:  1. Administer medication as ordered  2. Teach and rehearse alternative coping skills  3. Provide emotional support with 1:1 interaction with staff  Outcome: Progressing  Flowsheets (Taken 2/15/2025 1112)  Will report anxiety at manageable levels:   Administer medication as ordered   Teach and rehearse alternative coping skills   Provide emotional support with 1:1 interaction with staff     Problem: Depression/Self Harm  Goal: Effect of psychiatric condition will be minimized and patient will be protected from self harm  Description: INTERVENTIONS:  1. Assess impact of patient's symptoms on level of functioning, self care needs and offer support as indicated  2. Assess patient/family knowledge of depression, impact on illness and need for teaching  3. Provide emotional support, presence and reassurance  4. Assess for possible suicidal thoughts or ideation. If patient expresses suicidal thoughts or statements do not leave alone, initiate Suicide Precautions, move to a room close to the nursing station and obtain sitter  5. Initiate consults as appropriate with Mental Health Professional, Spiritual Care, Psychosocial CNS, and consider a recommendation to the LIP for a Psychiatric Consultation  Outcome: Progressing  Flowsheets  Taken 2/15/2025 1151  Effect of psychiatric condition will be minimized and patient will be protected from self harm:   Assess impact of patient’s symptoms on level of functioning, self care needs and offer support as indicated   Assess patient/family knowledge of depression, impact on illness and need for teaching   Provide emotional support, presence and reassurance   Assess for suicidal thoughts or ideation. If patient expresses suicidal thoughts or statements do not leave alone, initiate Suicide Precautions, move near nurse station, obtain sitter   Initiate consults as appropriate with Mental

## 2025-02-16 PROBLEM — G47.00 INSOMNIA: Status: ACTIVE | Noted: 2025-02-16

## 2025-02-16 PROBLEM — F15.10 STIMULANT ABUSE (HCC): Status: ACTIVE | Noted: 2025-02-06

## 2025-02-16 LAB
ALBUMIN SERPL-MCNC: 3.3 G/DL (ref 3.5–5.2)
ALP SERPL-CCNC: 111 U/L (ref 35–104)
ALT SERPL-CCNC: 25 U/L (ref 5–33)
AMMONIA PLAS-SCNC: 95 UMOL/L (ref 11–51)
ANION GAP SERPL CALCULATED.3IONS-SCNC: 11 MMOL/L (ref 8–16)
AST SERPL-CCNC: 31 U/L (ref 5–32)
BILIRUB SERPL-MCNC: 0.9 MG/DL (ref 0.2–1.2)
BUN SERPL-MCNC: 12 MG/DL (ref 6–20)
CALCIUM SERPL-MCNC: 9 MG/DL (ref 8.6–10)
CHLORIDE SERPL-SCNC: 104 MMOL/L (ref 98–107)
CO2 SERPL-SCNC: 24 MMOL/L (ref 22–29)
CREAT SERPL-MCNC: 0.6 MG/DL (ref 0.5–0.9)
GLUCOSE SERPL-MCNC: 113 MG/DL (ref 70–99)
POTASSIUM SERPL-SCNC: 4.2 MMOL/L (ref 3.5–5.1)
PROT SERPL-MCNC: 7.1 G/DL (ref 6.4–8.3)
SODIUM SERPL-SCNC: 139 MMOL/L (ref 136–145)

## 2025-02-16 PROCEDURE — 36415 COLL VENOUS BLD VENIPUNCTURE: CPT

## 2025-02-16 PROCEDURE — 6370000000 HC RX 637 (ALT 250 FOR IP): Performed by: FAMILY MEDICINE

## 2025-02-16 PROCEDURE — 99232 SBSQ HOSP IP/OBS MODERATE 35: CPT | Performed by: PSYCHIATRY & NEUROLOGY

## 2025-02-16 PROCEDURE — 82140 ASSAY OF AMMONIA: CPT

## 2025-02-16 PROCEDURE — 80053 COMPREHEN METABOLIC PANEL: CPT

## 2025-02-16 PROCEDURE — 6370000000 HC RX 637 (ALT 250 FOR IP): Performed by: PSYCHIATRY & NEUROLOGY

## 2025-02-16 PROCEDURE — 1240000000 HC EMOTIONAL WELLNESS R&B

## 2025-02-16 PROCEDURE — 6370000000 HC RX 637 (ALT 250 FOR IP): Performed by: NURSE PRACTITIONER

## 2025-02-16 RX ADMIN — CARVEDILOL 12.5 MG: 12.5 TABLET, FILM COATED ORAL at 08:00

## 2025-02-16 RX ADMIN — CARVEDILOL 12.5 MG: 12.5 TABLET, FILM COATED ORAL at 17:36

## 2025-02-16 RX ADMIN — LACTULOSE 20 G: 20 SOLUTION ORAL at 08:03

## 2025-02-16 RX ADMIN — LEVETIRACETAM 500 MG: 500 TABLET, FILM COATED ORAL at 08:00

## 2025-02-16 RX ADMIN — LACTULOSE 20 G: 20 SOLUTION ORAL at 19:44

## 2025-02-16 RX ADMIN — DIVALPROEX SODIUM 500 MG: 500 TABLET, FILM COATED, EXTENDED RELEASE ORAL at 08:02

## 2025-02-16 RX ADMIN — POTASSIUM CHLORIDE 40 MEQ: 1500 TABLET, EXTENDED RELEASE ORAL at 07:59

## 2025-02-16 RX ADMIN — LACTULOSE 20 G: 20 SOLUTION ORAL at 14:43

## 2025-02-16 RX ADMIN — Medication 5 MG: at 19:44

## 2025-02-16 RX ADMIN — LEVETIRACETAM 500 MG: 500 TABLET, FILM COATED ORAL at 19:44

## 2025-02-16 RX ADMIN — HYDROXYZINE HYDROCHLORIDE 25 MG: 25 TABLET ORAL at 21:12

## 2025-02-16 RX ADMIN — PALIPERIDONE 9 MG: 6 TABLET, EXTENDED RELEASE ORAL at 08:01

## 2025-02-16 RX ADMIN — PANTOPRAZOLE SODIUM 40 MG: 40 TABLET, DELAYED RELEASE ORAL at 06:17

## 2025-02-16 RX ADMIN — Medication 1 TABLET: at 08:00

## 2025-02-16 RX ADMIN — DIVALPROEX SODIUM 500 MG: 500 TABLET, FILM COATED, EXTENDED RELEASE ORAL at 17:36

## 2025-02-16 NOTE — PLAN OF CARE
Problem: Anxiety  Goal: Will report anxiety at manageable levels  Description: INTERVENTIONS:  1. Administer medication as ordered  2. Teach and rehearse alternative coping skills  3. Provide emotional support with 1:1 interaction with staff  2/16/2025 1008 by Eugenio Melendez, RN  Outcome: Progressing  Flowsheets (Taken 2/16/2025 0957)  Will report anxiety at manageable levels:   Administer medication as ordered   Teach and rehearse alternative coping skills   Provide emotional support with 1:1 interaction with staff  2/15/2025 2013 by Tara Adkins RN  Outcome: Progressing     Problem: Depression/Self Harm  Goal: Effect of psychiatric condition will be minimized and patient will be protected from self harm  Description: INTERVENTIONS:  1. Assess impact of patient's symptoms on level of functioning, self care needs and offer support as indicated  2. Assess patient/family knowledge of depression, impact on illness and need for teaching  3. Provide emotional support, presence and reassurance  4. Assess for possible suicidal thoughts or ideation. If patient expresses suicidal thoughts or statements do not leave alone, initiate Suicide Precautions, move to a room close to the nursing station and obtain sitter  5. Initiate consults as appropriate with Mental Health Professional, Spiritual Care, Psychosocial CNS, and consider a recommendation to the LIP for a Psychiatric Consultation  2/16/2025 1008 by Eugenio Melendez, RN  Outcome: Progressing  Flowsheets  Taken 2/16/2025 1008  Effect of psychiatric condition will be minimized and patient will be protected from self harm:   Assess impact of patient’s symptoms on level of functioning, self care needs and offer support as indicated   Assess patient/family knowledge of depression, impact on illness and need for teaching   Provide emotional support, presence and reassurance   Assess for suicidal thoughts or ideation. If patient expresses suicidal thoughts or statements

## 2025-02-17 VITALS
DIASTOLIC BLOOD PRESSURE: 69 MMHG | BODY MASS INDEX: 25.91 KG/M2 | WEIGHT: 140.8 LBS | RESPIRATION RATE: 16 BRPM | SYSTOLIC BLOOD PRESSURE: 131 MMHG | TEMPERATURE: 98.4 F | OXYGEN SATURATION: 99 % | HEART RATE: 80 BPM | HEIGHT: 62 IN

## 2025-02-17 PROBLEM — R44.0 AUDITORY HALLUCINATION: Status: ACTIVE | Noted: 2025-01-29

## 2025-02-17 PROCEDURE — 6370000000 HC RX 637 (ALT 250 FOR IP): Performed by: PSYCHIATRY & NEUROLOGY

## 2025-02-17 PROCEDURE — 1240000000 HC EMOTIONAL WELLNESS R&B

## 2025-02-17 PROCEDURE — 6370000000 HC RX 637 (ALT 250 FOR IP): Performed by: FAMILY MEDICINE

## 2025-02-17 PROCEDURE — 99233 SBSQ HOSP IP/OBS HIGH 50: CPT | Performed by: PSYCHIATRY & NEUROLOGY

## 2025-02-17 PROCEDURE — 6370000000 HC RX 637 (ALT 250 FOR IP): Performed by: NURSE PRACTITIONER

## 2025-02-17 RX ORDER — TRAZODONE HYDROCHLORIDE 50 MG/1
50 TABLET ORAL NIGHTLY PRN
Status: DISCONTINUED | OUTPATIENT
Start: 2025-02-17 | End: 2025-02-18 | Stop reason: HOSPADM

## 2025-02-17 RX ORDER — LACOSAMIDE 50 MG/1
50 TABLET ORAL 2 TIMES DAILY
Status: DISCONTINUED | OUTPATIENT
Start: 2025-02-17 | End: 2025-02-18 | Stop reason: HOSPADM

## 2025-02-17 RX ADMIN — HYDROXYZINE HYDROCHLORIDE 25 MG: 25 TABLET ORAL at 20:33

## 2025-02-17 RX ADMIN — LACTULOSE 20 G: 20 SOLUTION ORAL at 14:24

## 2025-02-17 RX ADMIN — Medication 1 TABLET: at 08:58

## 2025-02-17 RX ADMIN — CARVEDILOL 12.5 MG: 12.5 TABLET, FILM COATED ORAL at 17:14

## 2025-02-17 RX ADMIN — PALIPERIDONE 9 MG: 6 TABLET, EXTENDED RELEASE ORAL at 08:58

## 2025-02-17 RX ADMIN — LEVETIRACETAM 500 MG: 500 TABLET, FILM COATED ORAL at 08:58

## 2025-02-17 RX ADMIN — LEVETIRACETAM 500 MG: 500 TABLET, FILM COATED ORAL at 20:33

## 2025-02-17 RX ADMIN — LACTULOSE 20 G: 20 SOLUTION ORAL at 20:33

## 2025-02-17 RX ADMIN — LACOSAMIDE 50 MG: 50 TABLET, FILM COATED ORAL at 08:58

## 2025-02-17 RX ADMIN — LACTULOSE 20 G: 20 SOLUTION ORAL at 08:58

## 2025-02-17 RX ADMIN — CARVEDILOL 12.5 MG: 12.5 TABLET, FILM COATED ORAL at 08:58

## 2025-02-17 RX ADMIN — Medication 5 MG: at 20:33

## 2025-02-17 RX ADMIN — HYDROXYZINE HYDROCHLORIDE 25 MG: 25 TABLET ORAL at 08:58

## 2025-02-17 RX ADMIN — PANTOPRAZOLE SODIUM 40 MG: 40 TABLET, DELAYED RELEASE ORAL at 06:28

## 2025-02-17 RX ADMIN — POTASSIUM CHLORIDE 40 MEQ: 1500 TABLET, EXTENDED RELEASE ORAL at 08:58

## 2025-02-17 RX ADMIN — ACETAMINOPHEN 650 MG: 325 TABLET ORAL at 20:32

## 2025-02-17 RX ADMIN — TRAZODONE HYDROCHLORIDE 50 MG: 50 TABLET ORAL at 23:41

## 2025-02-17 RX ADMIN — LACOSAMIDE 50 MG: 50 TABLET, FILM COATED ORAL at 20:33

## 2025-02-17 RX ADMIN — Medication: at 20:34

## 2025-02-17 ASSESSMENT — PAIN DESCRIPTION - ORIENTATION: ORIENTATION: RIGHT

## 2025-02-17 ASSESSMENT — PAIN DESCRIPTION - DESCRIPTORS: DESCRIPTORS: ACHING;THROBBING

## 2025-02-17 ASSESSMENT — PAIN DESCRIPTION - LOCATION: LOCATION: HEAD

## 2025-02-17 ASSESSMENT — PAIN SCALES - GENERAL: PAINLEVEL_OUTOF10: 5

## 2025-02-17 ASSESSMENT — PAIN - FUNCTIONAL ASSESSMENT: PAIN_FUNCTIONAL_ASSESSMENT: ACTIVITIES ARE NOT PREVENTED

## 2025-02-17 NOTE — PSYCHOTHERAPY
Group Therapy Note    Date: 2/17/2025  Start Time: 1330  End Time:  1400  Number of Participants: 3    Type of Group: SPIRITUALITY    Wellness Binder Information  Module Name:  Mindfulness  Session Number:      Patient's Goal:  To rest the mind    Notes:      Status After Intervention:  Improved    Participation Level: Active Listener and Interactive    Participation Quality: Appropriate, Attentive, and Sharing      Speech:  normal      Thought Process/Content:       Affective Functioning: Congruent      Mood: Calm      Level of consciousness:  Attentive      Response to Learning: Able to verbalize current knowledge/experience, Able to verbalize/acknowledge new learning, Able to retain information, and Capable of insight      Endings:     Modes of Intervention: Education, Support, and Activity      Discipline Responsible: Spiritual Care      Signature:  Philip Medina MA BCC  Electronically signed by BRYON Lin on 2/17/2025 at 2:10 PM

## 2025-02-17 NOTE — PLAN OF CARE
Problem: Coping  Goal: Pt/Family able to verbalize concerns and demonstrate effective coping strategies  Description: INTERVENTIONS:  1. Assist patient/family to identify coping skills, available support systems and cultural and spiritual values  2. Provide emotional support, including active listening and acknowledgement of concerns of patient and caregivers  3. Reduce environmental stimuli, as able  4. Instruct patient/family in relaxation techniques, as appropriate  5. Assess for spiritual pain/suffering and initiate Spiritual Care, Psychosocial Clinical Specialist consults as needed  Outcome: Progressing  Note:                                                                     Group Therapy Note    Date: 2/17/2025  Start Time: 0930  End Time:  1000  Number of Participants: 5    Type of Group: Psychoeducation    Wellness Binder Information  Module Name:  Relapse Prevention  Session Number:  5    Group Goal for Pt:  To improve knowledge of relapse prevention strategies    Notes:  Pt demonstrated improved knowledge of relapse prevention strategies by actively participating in group discussion.    Status After Intervention:  Unchanged    Participation Level: Active Listener and Interactive    Participation Quality: Appropriate and Attentive      Speech:  normal      Thought Process/Content: Logical      Affective Functioning: Congruent      Mood: anxious and depressed      Level of consciousness:  Alert and Oriented x4      Response to Learning: Able to verbalize current knowledge/experience, Able to verbalize/acknowledge new learning, and Progressing to goal      Endings: None Reported    Modes of Intervention: Education      Discipline Responsible: Psychoeducational Specialist      Signature:  Hortensia Navas

## 2025-02-18 ENCOUNTER — TELEPHONE (OUTPATIENT)
Dept: NEUROLOGY | Age: 55
End: 2025-02-18

## 2025-02-18 LAB — AMMONIA PLAS-SCNC: 59 UMOL/L (ref 11–51)

## 2025-02-18 PROCEDURE — 5130000000 HC BRIDGE APPOINTMENT

## 2025-02-18 PROCEDURE — 6370000000 HC RX 637 (ALT 250 FOR IP): Performed by: FAMILY MEDICINE

## 2025-02-18 PROCEDURE — 36415 COLL VENOUS BLD VENIPUNCTURE: CPT

## 2025-02-18 PROCEDURE — 6370000000 HC RX 637 (ALT 250 FOR IP): Performed by: NURSE PRACTITIONER

## 2025-02-18 PROCEDURE — 6370000000 HC RX 637 (ALT 250 FOR IP): Performed by: PSYCHIATRY & NEUROLOGY

## 2025-02-18 PROCEDURE — 99232 SBSQ HOSP IP/OBS MODERATE 35: CPT | Performed by: PSYCHIATRY & NEUROLOGY

## 2025-02-18 PROCEDURE — 99239 HOSP IP/OBS DSCHRG MGMT >30: CPT | Performed by: NURSE PRACTITIONER

## 2025-02-18 PROCEDURE — 82140 ASSAY OF AMMONIA: CPT

## 2025-02-18 RX ORDER — PALIPERIDONE 6 MG/1
6 TABLET, EXTENDED RELEASE ORAL DAILY
Status: DISCONTINUED | OUTPATIENT
Start: 2025-02-19 | End: 2025-02-18 | Stop reason: HOSPADM

## 2025-02-18 RX ORDER — LACOSAMIDE 50 MG/1
50 TABLET ORAL 2 TIMES DAILY
Qty: 60 TABLET | Refills: 0 | Status: SHIPPED | OUTPATIENT
Start: 2025-02-18 | End: 2025-03-20

## 2025-02-18 RX ORDER — LEVETIRACETAM 500 MG/1
500 TABLET ORAL 2 TIMES DAILY
Qty: 60 TABLET | Refills: 0 | Status: SHIPPED | OUTPATIENT
Start: 2025-02-18

## 2025-02-18 RX ADMIN — LACTULOSE 20 G: 20 SOLUTION ORAL at 07:34

## 2025-02-18 RX ADMIN — POTASSIUM CHLORIDE 40 MEQ: 1500 TABLET, EXTENDED RELEASE ORAL at 07:35

## 2025-02-18 RX ADMIN — CARVEDILOL 12.5 MG: 12.5 TABLET, FILM COATED ORAL at 07:35

## 2025-02-18 RX ADMIN — Medication 1 TABLET: at 07:35

## 2025-02-18 RX ADMIN — PANTOPRAZOLE SODIUM 40 MG: 40 TABLET, DELAYED RELEASE ORAL at 05:26

## 2025-02-18 RX ADMIN — LEVETIRACETAM 500 MG: 500 TABLET, FILM COATED ORAL at 07:34

## 2025-02-18 RX ADMIN — LACOSAMIDE 50 MG: 50 TABLET, FILM COATED ORAL at 07:35

## 2025-02-18 RX ADMIN — PALIPERIDONE 9 MG: 6 TABLET, EXTENDED RELEASE ORAL at 07:35

## 2025-02-18 NOTE — DISCHARGE INSTR - DIET

## 2025-02-18 NOTE — PLAN OF CARE
Problem: Anxiety  Goal: Will report anxiety at manageable levels  Description: INTERVENTIONS:  1. Administer medication as ordered  2. Teach and rehearse alternative coping skills  3. Provide emotional support with 1:1 interaction with staff  Outcome: Adequate for Discharge  Flowsheets (Taken 2/18/2025 0816)  Will report anxiety at manageable levels:   Administer medication as ordered   Teach and rehearse alternative coping skills   Provide emotional support with 1:1 interaction with staff     Problem: Depression/Self Harm  Goal: Effect of psychiatric condition will be minimized and patient will be protected from self harm  Description: INTERVENTIONS:  1. Assess impact of patient's symptoms on level of functioning, self care needs and offer support as indicated  2. Assess patient/family knowledge of depression, impact on illness and need for teaching  3. Provide emotional support, presence and reassurance  4. Assess for possible suicidal thoughts or ideation. If patient expresses suicidal thoughts or statements do not leave alone, initiate Suicide Precautions, move to a room close to the nursing station and obtain sitter  5. Initiate consults as appropriate with Mental Health Professional, Spiritual Care, Psychosocial CNS, and consider a recommendation to the LIP for a Psychiatric Consultation  Outcome: Adequate for Discharge  Flowsheets  Taken 2/18/2025 0819  Effect of psychiatric condition will be minimized and patient will be protected from self harm:   Assess impact of patient’s symptoms on level of functioning, self care needs and offer support as indicated   Assess patient/family knowledge of depression, impact on illness and need for teaching   Provide emotional support, presence and reassurance   Assess for suicidal thoughts or ideation. If patient expresses suicidal thoughts or statements do not leave alone, initiate Suicide Precautions, move near nurse station, obtain sitter   Initiate consults as

## 2025-02-18 NOTE — PROGRESS NOTES
Group Note    Date: 02/15/25  Start Time: 8:00 AM   End Time:9:00 AM     Number of Participants: 4    Type of Group: Psychotherapy     Type of Group: Community/Goal     Patient's Goal:  \"getting my life in order. Letting go of things that I cannot change.\"    Notes:  Patient reports needing medication to sleep fair. Woke up off and on all night. Concentration is improving. Denies AVH, SI thoughts and anxiety attacks. Rates depression as improving and anxiety as improving. Patient is developing an aftercare plan of \"taking care of herself.\"    Status After Intervention:  Improved    Participation Level: Active Listener and Interactive    Participation Quality: Appropriate, Attentive, Sharing, and Supportive    Speech:  normal    Thought Process/Content: Logical    Mood: anxious, depressed, and calm    Level of consciousness:  Alert, Attentive, and Drowsy    Response to Learning: Able to verbalize current knowledge/experience, Able to verbalize/acknowledge new learning, Able to retain information, Capable of insight, Able to change behavior, and Progressing to goal    Modes of Intervention: Education and Support    Discipline Responsible: /Counselor     Signature:  CAROLYN Mccall  
                                                                Group Note     Date: 02/16/25  Start Time: 8:30 AM   End Time:9:00 AM      Number of Participants: 5     Type of Group: Community/Goal     Patient's Goal:  \"Being better and accepting how things are\"    Notes:  Patient reports sleeping far without medication. Concentration is improving. Rates depression and anxiety the same. Denies AVH, SI thoughts and anxiety attacks.     Status After Intervention:  Improved    Participation Level: Active Listener and Interactive    Participation Quality: Appropriate, Attentive, Sharing, and Supportive    Speech:  normal    Thought Process/Content: Logical    Mood: anxious and depressed    Level of consciousness:  Alert and Attentive    Response to Learning: Able to verbalize current knowledge/experience, Able to verbalize/acknowledge new learning, Able to retain information, Capable of insight, Able to change behavior, and Progressing to goal    Modes of Intervention: Education and Support    Discipline Responsible: /Counselor     Signature:  CAROLYN Mccall  
                                                                Group Note    Date: 02/12/25  Start Time: 8:00 AM   End Time:8:30 AM     Number of Participants: 9    Type of Group: Community/Goal     Patient's Goal:  \"Accepting life as is. Getting better\"    Notes:      Status After Intervention:      Participation Level: Minimal    Participation Quality: Appropriate    Speech:  normal    Thought Process/Content: Logical    Mood:  Calm    Level of consciousness:  Alert    Response to Learning: Able to verbalize current knowledge/experience    Modes of Intervention: Education and Support    Discipline Responsible: Behavioral Health Technician     Signature:  TERESA HURLEY  
                                                                Group Note    Date: 02/13/25  Start Time: 7:30 PM   End Time:8:00 PM     Number of Participants: 3    Type of Group: Wrap-Up     Patient's Goal:  None listed     Notes:  See Wrap-up sheet     Status After Intervention:  Unchanged    Participation Level: Active Listener    Participation Quality: Appropriate    Speech:  normal    Thought Process/Content: Logical    Mood: anxious    Level of consciousness:  Alert    Response to Learning: Able to verbalize current knowledge/experience    Modes of Intervention: Education and Support    Discipline Responsible: Registered Nurse     Signature:  Mirela Beck RN  
                                                                Group Note    Date: 02/13/25  Start Time: 8:00 AM   End Time:8:30 AM     Number of Participants: 5    Type of Group: Community/Goal     Patient's Goal:  Myself and coping skills.    Notes:      Status After Intervention:      Participation Level: Active Listener    Participation Quality: Appropriate    Speech:  normal    Thought Process/Content: Logical    Mood:  calm    Level of consciousness:  Alert    Response to Learning: Able to verbalize current knowledge/experience    Modes of Intervention: Education and Support    Discipline Responsible: Behavioral Health Technician     Signature:  Brannon Simpson  
                                                                Group Note    Date: 02/14/25  Start Time: 7:00 PM   End Time:7:30 PM     Number of Participants: 2    Type of Group: Recovery; AA    Status After Intervention:  Improved    Participation Level: Active Listener and Interactive    Participation Quality: Appropriate, Sharing, and Supportive    Speech:  normal    Thought Process/Content: Logical    Mood:  Calm    Level of consciousness:  Alert    Response to Learning: Able to verbalize current knowledge/experience    Modes of Intervention: Education and Support    Discipline Responsible: Registered Nurse     Signature:  Mirela Beck RN  
                                                                Group Note    Date: 02/14/25  Start Time: 7:30 AM   End Time:8:00 AM     Number of Participants: 4    Type of Group: Community/Goal     Patient's Goal:  Same as before.    Notes:      Status After Intervention:      Participation Level: Active Listener    Participation Quality: Appropriate    Speech:  normal    Thought Process/Content: Logical    Mood:  Calm.    Level of consciousness:  Alert    Response to Learning: Able to verbalize current knowledge/experience    Modes of Intervention: Education and Support    Discipline Responsible: Behavioral Health Technician     Signature:  Brannon Simpson  
                                                                Group Note    Date: 02/15/25  Start Time: 8:00 PM   End Time:8:30 PM     Number of Participants: 5    Type of Group: Wrap-Up     Patient's Goal:  RELAX    Notes:  REPORTS THAT HER DEPRESSION/ANXIETY HAS IMPROVED, ALTHOUGH APPEARED DEPRESSED/SAD. PAST FEW NIGHTS SPENT MOST OF TIME IN HER ROOM,OUT IN [place] WITH PEERS ABOUT HALF HR.    Status After Intervention:  Improved    Participation Level: Active Listener    Participation Quality: Appropriate    Speech:  normal    Thought Process/Content: Logical    Mood: depressed    Level of consciousness:  Alert    Response to Learning: Able to verbalize current knowledge/experience, Able to verbalize/acknowledge new learning, Able to retain information, Capable of insight, Able to change behavior, and Progressing to goal    Modes of Intervention: Education and Support    Discipline Responsible: Registered Nurse     Signature:  Jaycee Guadalupe RN  
                                                                Group Note    Date: 02/16/25  Start Time: 8:00 PM   End Time:8:30 PM     Number of Participants: 7    Type of Group: Wrap-Up     Patient's Goal:  Make discharge plans    Notes:  Reports that her depression and anxiety has improved. Currently in day room with peers coloring.    Status After Intervention:  Improved    Participation Level: Active Listener    Participation Quality: Appropriate    Speech:  normal    Thought Process/Content: Logical    Mood: elevated    Level of consciousness:  Alert    Response to Learning: Able to verbalize current knowledge/experience, Able to verbalize/acknowledge new learning, Able to retain information, Capable of insight, Able to change behavior, and Progressing to goal    Modes of Intervention: Education and Support    Discipline Responsible: Registered Nurse     Signature:  Jaycee Guadalupe RN  
                                                                Group Note    Date: 02/17/25  Start Time: 8:00 PM   End Time:8:30 PM     Number of Participants: 4    Type of Group: Community/Goal     Patient's Goal:  Try relaxation exercise.    Notes:  Patient states depression is the same and the anxiety has improved.    Status After Intervention:      Participation Level: Active Listener    Participation Quality: Appropriate    Speech:  normal    Thought Process/Content: Logical    Mood:  Calm    Level of consciousness:  Alert    Response to Learning: Able to verbalize current knowledge/experience    Modes of Intervention: Education and Support    Discipline Responsible: Behavioral Health Technician     Signature:  Brannon Simpson  
                                                                Group Note    Date: 02/18/25  Start Time: 8:00 AM   End Time:8:30 AM     Number of Participants: 6    Type of Group: Community/Goal     Patient's Goal:  \"Myself\"    Notes:      Status After Intervention:  Improved    Participation Level: Active Listener and Interactive    Participation Quality: Appropriate and Attentive    Speech:  normal    Thought Process/Content: Logical    Mood: Calm    Level of consciousness:  Alert and Oriented x4    Response to Learning: Able to verbalize current knowledge/experience    Modes of Intervention: Education and Support    Discipline Responsible: Registered Nurse     Signature:  Emma Anaya RN  
                                                 Treatment Team Note:    Therapist met with treatment team to discuss patients treatment, progress toward treatment goals and discharge plans.    Target Symptoms/Reason for admission: Per nursing admission assessment - Reason for Admission: Michelle Foster is a 54 y.o. female who presents for evaluation regarding symptoms of paranoia and auditory hallucinations.  Patient states that she has been hearing voices outside of her house which her  cannot hear.  States that she is not having thoughts about wanting to harm herself or harm others.  She does have a prior history of similar events with previous recent inpatient psychiatric hospitalization.  In review of prior records patient appears to have underwent hospitalization about 1 week previously. Patient says that there are people outside of her home saying things about her.  They are trying to get in through the roof, attic, windows, and floors.  She states that these people are trying to hurt her.  \"Drive me crazy or make me kill myself.  Which will never happen\".  She says that her phones are hacked, along with her computer.  She states that she hopes these are delusions but she really doesn't think they are.  Patient's  states that there are no people and that no one is trying nor never has tried to break in the house.  He states that he does not hear the voices.  Patient denies SI and HI.  She has no history of suicide attempts.  She has appointments at [facility] that were made for her from her last admission here.  She is not compliant with medication and according to UDS, continues to take Adderall and Benzos that were discontinued by psychiatry during previous admission.    Diagnoses per psych provider: Auditory hallucination [R44.0]  Psychosis, unspecified psychosis type (HCC) [F29]    Patient's aftercare plan is: SW will meet with patient to gather information    Aftercare appointments made: No - SW 
                                                 Treatment Team Note:    Therapist met with treatment team to discuss patients treatment, progress toward treatment goals and discharge plans.    Target Symptoms/Reason for admission: Per nursing admission assessment - Reason for Admission: Michelle Foster is a 54 y.o. female who presents for evaluation regarding symptoms of paranoia and auditory hallucinations.  Patient states that she has been hearing voices outside of her house which her  cannot hear.  States that she is not having thoughts about wanting to harm herself or harm others.  She does have a prior history of similar events with previous recent inpatient psychiatric hospitalization.  In review of prior records patient appears to have underwent hospitalization about 1 week previously. Patient says that there are people outside of her home saying things about her.  They are trying to get in through the roof, attic, windows, and floors.  She states that these people are trying to hurt her.  \"Drive me crazy or make me kill myself.  Which will never happen\".  She says that her phones are hacked, along with her computer.  She states that she hopes these are delusions but she really doesn't think they are.  Patient's  states that there are no people and that no one is trying nor never has tried to break in the house.  He states that he does not hear the voices.  Patient denies SI and HI.  She has no history of suicide attempts.  She has appointments at [facility] that were made for her from her last admission here.  She is not compliant with medication and according to UDS, continues to take Adderall and Benzos that were discontinued by psychiatry during previous admission.    Diagnoses per psych provider: Auditory hallucination [R44.0]  Psychosis, unspecified psychosis type (HCC) [F29]    Patient's aftercare plan is: [facility]    Aftercare appointments made: Yes    Pt lives with: 
                                                 Treatment Team Note:    Therapist met with treatment team to discuss patients treatment, progress toward treatment goals and discharge plans.    Target Symptoms/Reason for admission: Per nursing admission assessment - Reason for Admission: Michelle Foster is a 54 y.o. female who presents for evaluation regarding symptoms of paranoia and auditory hallucinations.  Patient states that she has been hearing voices outside of her house which her  cannot hear.  States that she is not having thoughts about wanting to harm herself or harm others.  She does have a prior history of similar events with previous recent inpatient psychiatric hospitalization.  In review of prior records patient appears to have underwent hospitalization about 1 week previously. Patient says that there are people outside of her home saying things about her.  They are trying to get in through the roof, attic, windows, and floors.  She states that these people are trying to hurt her.  \"Drive me crazy or make me kill myself.  Which will never happen\".  She says that her phones are hacked, along with her computer.  She states that she hopes these are delusions but she really doesn't think they are.  Patient's  states that there are no people and that no one is trying nor never has tried to break in the house.  He states that he does not hear the voices.  Patient denies SI and HI.  She has no history of suicide attempts.  She has appointments at [facility] that were made for her from her last admission here.  She is not compliant with medication and according to UDS, continues to take Adderall and Benzos that were discontinued by psychiatry during previous admission.    Diagnoses per psych provider: Auditory hallucination [R44.0]  Psychosis, unspecified psychosis type (HCC) [F29]    Patient's aftercare plan is: [facility]    Aftercare appointments made: No - SW will make discharge 
            [facility] ADULT INITIAL INTAKE ASSESSMENT     2/11/25    Michelle Foster ,a 54 y.o. female, presents to the ED for a psychiatric assessment.     ED Arrival time: 1620  ED physician: Luis  [facility] Notification time: 1932  [facility] Assessment start time: 1945  Psychiatrist call time: 2036  Spoke with Dr. Arriaga    Patient is referred by:  drove patient to the ED    Reason for visit to ED - Presenting problem:     PT states reason for ED visit, \"I hear voices outside of my house.  They are talking about me and saying that I need to shut up.  I heard my sister out there.  There is this little bluetooth disk that you can buy.  You can speak to and hear someone up to 350 yards away.  They sell it at [facility].  I saw my sister and her boyfriend out there.  I took pictures but my phone was messed with and the pictures were gone.  I didn't draw the horns on the pictures.  They just appeared there.  I can't even use a phone.  This is my 3rd one.  People get into them and then my name is in there.  They do it to my computer too.  I didn't put all of that stuff in the computer files.    I also am concerned about the drugs in my system.  I admit that I did take 1/2 an Adderall, but I haven't taken any Benzodiazapines.  Someone has to be giving it to me.  Those people are trying to drive me crazy or make me kill myself, which will never happen.  I have an appointment at [facility] tomorrow and other than the one time, I am taking my medicine like I am supposed to and I am not drinking.  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    ED Physician reports:  Michelle Foster is a 54 y.o. female who presents to the emergency department for evaluation regarding symptoms of paranoia and auditory hallucinations.  Patient states that she has been hearing voices outside of her house which her  cannot hear.  States that she is not having thoughts 
        [facility] Neurology Progress Note      Patient:   Michelle Foster  MR#:    [id number]   Room:    [facility]/612-   YOB: 1970  Date of Progress Note: 2/18/2025  Time of Note                           8:03 AM  Consulting Physician:  González Riley DO  Attending Physician:  Lars Arriaga MD      INTERVAL HISTORY:  No acute events, no new focal complaints overnight.  No complaints this morning except that she is anxious for discharge.    REVIEW OF SYSTEMS:  Constitutional: No fevers   Neck:No stiffness  Respiratory: No shortness of breath  Cardiovascular: No chest pain   Gastrointestinal: No abdominal pain    Genitourinary: No Dysuria  Neurological: No headache    PHYSICAL EXAM:    Constitutional -   /69   Pulse 80   Temp 98.4 °F (36.9 °C)   Resp 16   Ht 1.575 m (5' 2\")   Wt 63.9 kg (140 lb 12.8 oz)   LMP 07/25/2016 (Approximate)   SpO2 99%   BMI 25.75 kg/m²   General appearance: No acute distress   EYES -   Conjunctiva normal  Pupillary exam as below, see CN exam in the neurologic exam  ENT-    No scars, masses, or lesions over external nose or ears  Hearing normal bilaterally to finger rub  Neck-   No neck masses noted  Thyroid normal   No jugular vein distension  Cardiovascular -   No clubbing, cyanosis, or edema   Pulmonary-   Good expansion, normal effort without use of accessory muscles  Musculoskeletal -   No significant wasting of muscles noted  Gait as below, see gait exam in the neurologic exam  Muscle strength, tone, stability as below see the motor exam in the neurologic exam.   No bony deformities  Skin -   Warm, dry, and intact to inspection and palpation.    No rash, erythema, or pallor      NEUROLOGICAL EXAM    Mental status   [x] Awake, alert, oriented   [x] Affect attention and concentration appear appropriate  [x] Recent and remote memory appears unremarkable  [x] Speech normal without dysarthria or aphasia, comprehension and repetition intact.   COMMENTS:   Cranial Nerves 
        [facility] Neurology Progress Note      Patient:   Michelle Foster  MR#:    [id number]   Room:    [facility]/612-   YOB: 1970  Date of Progress Note: 2/15/2025  Time of Note                           1:35 PM  Consulting Physician:  González Riley DO  Attending Physician:  Lars Arriaga MD      INTERVAL HISTORY:  No acute events, no new focal complaints.     REVIEW OF SYSTEMS:  Constitutional: No fevers   Neck:No stiffness  Respiratory: No shortness of breath  Cardiovascular: No chest pain   Gastrointestinal: No abdominal pain    Genitourinary: No Dysuria  Neurological: No headache    PHYSICAL EXAM:    Constitutional -   /63   Pulse 82   Temp 98.2 °F (36.8 °C)   Resp 20   Ht 1.575 m (5' 2\")   Wt 63.9 kg (140 lb 12.8 oz)   LMP 07/25/2016 (Approximate)   SpO2 100%   BMI 25.75 kg/m²   General appearance: No acute distress   EYES -   Conjunctiva normal  Pupillary exam as below, see CN exam in the neurologic exam  ENT-    No scars, masses, or lesions over external nose or ears  Hearing normal bilaterally to finger rub  Neck-   No neck masses noted  Thyroid normal   No jugular vein distension  Cardiovascular -   No clubbing, cyanosis, or edema   Pulmonary-   Good expansion, normal effort without use of accessory muscles  Musculoskeletal -   No significant wasting of muscles noted  Gait as below, see gait exam in the neurologic exam  Muscle strength, tone, stability as below see the motor exam in the neurologic exam.   No bony deformities  Skin -   Warm, dry, and intact to inspection and palpation.    No rash, erythema, or pallor      NEUROLOGICAL EXAM    Mental status   [x] Awake, alert, oriented   [x] Affect attention and concentration appear appropriate  [x] Recent and remote memory appears unremarkable  [x] Speech normal without dysarthria or aphasia, comprehension and repetition intact.   COMMENTS:   Cranial Nerves [x] No VF deficit to confrontation  [x] PERRLA, EOMI, no nystagmus, conjugate 
        [facility] Neurology Progress Note      Patient:   Michelle Foster  MR#:    [id number]   Room:    [facility][note 3]/612-   YOB: 1970  Date of Progress Note: 2/17/2025  Time of Note                           7:40 AM  Consulting Physician:  González Riley DO  Attending Physician:  Lars Arriaga MD      INTERVAL HISTORY:  No acute events, no new focal complaints overnight.  She has had a couple of elevated ammonia levels.     REVIEW OF SYSTEMS:  Constitutional: No fevers   Neck:No stiffness  Respiratory: No shortness of breath  Cardiovascular: No chest pain   Gastrointestinal: No abdominal pain    Genitourinary: No Dysuria  Neurological: No headache    PHYSICAL EXAM:    Constitutional -   BP (!) 142/75   Pulse 86   Temp 97.8 °F (36.6 °C) (Tympanic)   Resp 16   Ht 1.575 m (5' 2\")   Wt 63.9 kg (140 lb 12.8 oz)   LMP 07/25/2016 (Approximate)   SpO2 100%   BMI 25.75 kg/m²   General appearance: No acute distress   EYES -   Conjunctiva normal  Pupillary exam as below, see CN exam in the neurologic exam  ENT-    No scars, masses, or lesions over external nose or ears  Hearing normal bilaterally to finger rub  Neck-   No neck masses noted  Thyroid normal   No jugular vein distension  Cardiovascular -   No clubbing, cyanosis, or edema   Pulmonary-   Good expansion, normal effort without use of accessory muscles  Musculoskeletal -   No significant wasting of muscles noted  Gait as below, see gait exam in the neurologic exam  Muscle strength, tone, stability as below see the motor exam in the neurologic exam.   No bony deformities  Skin -   Warm, dry, and intact to inspection and palpation.    No rash, erythema, or pallor      NEUROLOGICAL EXAM    Mental status   [x] Awake, alert, oriented   [x] Affect attention and concentration appear appropriate  [x] Recent and remote memory appears unremarkable  [x] Speech normal without dysarthria or aphasia, comprehension and repetition intact.   COMMENTS:   Cranial Nerves [x] 
        [facility] Neurology Progress Note      Patient:   Michelle Foster  MR#:    [id number]   Room:    [facility]/612-   YOB: 1970  Date of Progress Note: 2/16/2025  Time of Note                           12:16 PM  Consulting Physician:  González Riley DO  Attending Physician:  Lars Arriaga MD      INTERVAL HISTORY:  No acute events, no new focal complaints overnight.     REVIEW OF SYSTEMS:  Constitutional: No fevers   Neck:No stiffness  Respiratory: No shortness of breath  Cardiovascular: No chest pain   Gastrointestinal: No abdominal pain    Genitourinary: No Dysuria  Neurological: No headache    PHYSICAL EXAM:    Constitutional -   /76   Pulse 90   Temp 98.3 °F (36.8 °C) (Temporal)   Resp 16   Ht 1.575 m (5' 2\")   Wt 63.9 kg (140 lb 12.8 oz)   LMP 07/25/2016 (Approximate)   SpO2 98%   BMI 25.75 kg/m²   General appearance: No acute distress   EYES -   Conjunctiva normal  Pupillary exam as below, see CN exam in the neurologic exam  ENT-    No scars, masses, or lesions over external nose or ears  Hearing normal bilaterally to finger rub  Neck-   No neck masses noted  Thyroid normal   No jugular vein distension  Cardiovascular -   No clubbing, cyanosis, or edema   Pulmonary-   Good expansion, normal effort without use of accessory muscles  Musculoskeletal -   No significant wasting of muscles noted  Gait as below, see gait exam in the neurologic exam  Muscle strength, tone, stability as below see the motor exam in the neurologic exam.   No bony deformities  Skin -   Warm, dry, and intact to inspection and palpation.    No rash, erythema, or pallor      NEUROLOGICAL EXAM    Mental status   [x] Awake, alert, oriented   [x] Affect attention and concentration appear appropriate  [x] Recent and remote memory appears unremarkable  [x] Speech normal without dysarthria or aphasia, comprehension and repetition intact.   COMMENTS:   Cranial Nerves [x] No VF deficit to confrontation  [x] PERRLA, EOMI, no 
 Nursing Admission Note    Reason for Admission: Michelle Foster is a 54 y.o. female who presents for evaluation regarding symptoms of paranoia and auditory hallucinations.  Patient states that she has been hearing voices outside of her house which her  cannot hear.  States that she is not having thoughts about wanting to harm herself or harm others.  She does have a prior history of similar events with previous recent inpatient psychiatric hospitalization.  In review of prior records patient appears to have underwent hospitalization about 1 week previously. Patient says that there are people outside of her home saying things about her.  They are trying to get in through the roof, attic, windows, and floors.  She states that these people are trying to hurt her.  \"Drive me crazy or make me kill myself.  Which will never happen\".  She says that her phones are hacked, along with her computer.  She states that she hopes these are delusions but she really doesn't think they are.  Patient's  states that there are no people and that no one is trying nor never has tried to break in the house.  He states that he does not hear the voices.  Patient denies SI and HI.  She has no history of suicide attempts.  She has appointments at [facility] that were made for her from her last admission here.  She is not compliant with medication and according to UDS, continues to take Adderall and Benzos that were discontinued by psychiatry during previous admission.    Additional Notes:  Patient arrived to unit via wheelchair, accompanied by a sitter and Security.  She was appropriately dressed in paper scrubs.  Patient was calm and cooperative with assessment and retired to her room when completed.  No complaints noted.    Patient Active Problem List   Diagnosis    Hypothyroidism (acquired)    MENEZES (nonalcoholic steatohepatitis)    Acoustic neuroma (HCC)    Neuropathy    Female pattern hair loss    Abnormal SPEP    Syncope and 
[facility] Adult Unit Daily Assessment  Nursing Progress Note    Room: [address] [place], [place] [address]   Name: Michelle Foster   Age: 54 y.o.   Gender: female   Dx: Psychosis, unspecified psychosis type (HCC)  Precautions: suicide risk, fall risk, and seizure precautions  Inpatient Status: voluntary     SLEEP:  Sleep Quality Good  Sleep Medications: Yes last night  PRN Sleep Meds: No     MEDICAL:  Other PRN Meds: Yes   Med Compliant: Yes  Accu-Chek: No  Oxygen/CPAP/BiPAP: No  CIWA/CINA: No   PAIN Assessment: none  Side Effects from medication: No    Metabolic Screening:  Lab Results   Component Value Date    LABA1C 5.4 02/05/2025     Lab Results   Component Value Date    CHOL 148 01/30/2025    CHOL 163 10/05/2023    CHOL 215 (H) 03/14/2022    CHOL 235 (H) 10/12/2020    CHOL 278 (H) 04/15/2019     Lab Results   Component Value Date    TRIG 56 01/30/2025    TRIG 72 10/05/2023    TRIG 187 (H) 03/14/2022    TRIG 174 (H) 10/12/2020    TRIG 327 (H) 04/15/2019     Lab Results   Component Value Date    HDL 68 (H) 01/30/2025    HDL 48 (L) 10/05/2023    HDL 44 (L) 03/14/2022    HDL 58 (L) 10/12/2020    HDL 76 04/15/2019     No components found for: \"LDLCAL\"  No components found for: \"LABVLDL\"  Body mass index is 25.75 kg/m².  BP Readings from Last 2 Encounters:   02/14/25 125/69   02/07/25 132/61       Medical Bed:   Is patient in a medical bed? no   If medical bed is in use, has nursing secured room while patient is awake and out of the room? NA  Has safety checks by nursing been completed on the bed/room this shift? yes    Protective Factors:  Patient identifies protective factors with nursing staff as follows:   Identifies reasons for living: Yes   Supportive Social Network or family: Yes    Belief that suicide is immoral/high spirituality: Yes   Responsibility to family or others/living with family: Yes   Fear of death or dying due to pain and suffering: No   Engaged in work or school: Yes  If Patient is unable to identify, reason why?     Depression: 
Behavioral Health   Discharge Note  Bridge Appointment completed: Reviewed Discharge Instructions with patient.    Patient verbalizes understanding and agreement with the discharge plan using the teachback method.     Referral for Outpatient Tobacco Cessation Counseling, upon discharge (samuel X if applicable and completed):    ( )  Hospital staff assisted patient to call Quit Line or faxed referral                                   during hospitalization                  ( )  Recognizing danger situations (included triggers and roadblocks), if not completed on admission                    ( )  Coping skills (new ways to manage stress, exercise, relaxation techniques, changing routine, distraction), if not completed on admission                                                           ( )  Basic information about quitting (benefits of quitting, techniques in how to quit, available resources, if not completed on admission  ( ) [facility]   ( ) Patient refused referral  ( ) Patient refused counseling  ( ) Patient refused smoking cessation medication upon discharge  (x) Patient non-tobacco use    Vaccinations (samuel X if applicable and completed):  ( ) Patient states already received influenza vaccine elsewhere  ( ) Patient received influenza vaccine during this hospitalization  (x) Patient refused influenza vaccine at this time      Pt discharged with followings belongings:  Dental Appliances: None  Vision - Corrective Lenses: Eyeglasses, At home  Hearing Aid: None  Jewelry: Sent home  Body Piercings Removed: N/A  Clothing: Other (Comment) (see belongings document)  Other Valuables: Other (Comment) (see belongings document)   Valuables sent home with patient.   Valuables retrieved from safe and returned to patient.    Patient left department with Departure Mode: With spouse via Mobility at Departure: Ambulatory, discharged to Discharged to: Private Residence.   Patient 
Collateral obtained from: [place]  [place]    She keeps thinking that people are trying to get in the house and she thinks that someone was trying to get into the attic.  She thinks that someone is talking about to get in her house. She is swearing that she is not taking anything other that she is prescribed. She has been paranoid that some is breaking in to the house and has been feeling this way for about a week. Patient  said that he  works different shifts and thinks she may be leaving the house and going to get the things that are in her system while he is at work.  reports that the windows are locked and the doors have a security lock on them at night.  reports that he tried to show her that no one is outside    Immediate Stressors & Time Episode Began:  thinks that she taking all of her medication    Diagnosis/Hx of compliance with meds:     Tx Hx/Past hospitalizations:  caller reports previous inpatient treatment history --- history includes previous admission here    Family hx of psychiatric issues: caller reports no family history of psychiatric issues    Substance Abuse: patient's history unknown to caller    Pending Legal:  patient has been sober for 6 months    Safety Issues (Weapons? Hx of attempts): The importance of locking weapons in a secured location or removing from the home was explained and recommended to collateral caller.    Support system/Medication Managed by: The importance of medication management and locking extra medication in a secured location was explained and recommended to collateral caller. No weapons in the home    Discharge Disposition: home -lives with family    Additional Info:        
[facility] Adult Unit Daily Assessment  Nursing Progress Note    Room: [address] [place] [address]   Name: Michelle Foster   Age: 54 y.o.   Gender: female   Dx: Psychosis, unspecified psychosis type (HCC)  Precautions: suicide risk and fall risk  Inpatient Status: voluntary     SLEEP:  Sleep Quality Fair  Sleep Medications: No   PRN Sleep Meds: No     MEDICAL:  Other PRN Meds: No   Med Compliant: Yes  Accu-Chek: No  Oxygen/CPAP/BiPAP: No  CIWA/CINA: No   PAIN Assessment: none  Side Effects from medication: No    Metabolic Screening:  Lab Results   Component Value Date    LABA1C 5.4 02/05/2025     Lab Results   Component Value Date    CHOL 148 01/30/2025    CHOL 163 10/05/2023    CHOL 215 (H) 03/14/2022    CHOL 235 (H) 10/12/2020    CHOL 278 (H) 04/15/2019     Lab Results   Component Value Date    TRIG 56 01/30/2025    TRIG 72 10/05/2023    TRIG 187 (H) 03/14/2022    TRIG 174 (H) 10/12/2020    TRIG 327 (H) 04/15/2019     Lab Results   Component Value Date    HDL 68 (H) 01/30/2025    HDL 48 (L) 10/05/2023    HDL 44 (L) 03/14/2022    HDL 58 (L) 10/12/2020    HDL 76 04/15/2019     No components found for: \"LDLCAL\"  No components found for: \"LABVLDL\"  Body mass index is 25.75 kg/m².  BP Readings from Last 2 Encounters:   02/16/25 122/76   02/07/25 132/61       Medical Bed:   Is patient in a medical bed? no   If medical bed is in use, has nursing secured room while patient is awake and out of the room? NA  Has safety checks by nursing been completed on the bed/room this shift? NA    Protective Factors:  Patient identifies protective factors with nursing staff as follows:   Identifies reasons for living: Yes   Supportive Social Network or family: Yes    Belief that suicide is immoral/high spirituality: No   Responsibility to family or others/living with family: Yes   Fear of death or dying due to pain and suffering: No   Engaged in work or school: No  If Patient is unable to identify, reason why?     Depression: 4   Anxiety: 4   SI denies suicidal 
[facility] Adult Unit Daily Assessment  Nursing Progress Note    Room: [address] [place] [address]   Name: Michelle Foster   Age: 54 y.o.   Gender: female   Dx: Psychosis, unspecified psychosis type (HCC)  Precautions: suicide risk, fall risk, and seizure precautions  Inpatient Status: voluntary     SLEEP:  Sleep Quality Good  Sleep Medications: No   PRN Sleep Meds: No     MEDICAL:  Other PRN Meds: Yes   Med Compliant: Yes  Accu-Chek: No  Oxygen/CPAP/BiPAP: No  CIWA/CINA: No   PAIN Assessment: none  Side Effects from medication: No    Metabolic Screening:  Lab Results   Component Value Date    LABA1C 5.4 02/05/2025     Lab Results   Component Value Date    CHOL 148 01/30/2025    CHOL 163 10/05/2023    CHOL 215 (H) 03/14/2022    CHOL 235 (H) 10/12/2020    CHOL 278 (H) 04/15/2019     Lab Results   Component Value Date    TRIG 56 01/30/2025    TRIG 72 10/05/2023    TRIG 187 (H) 03/14/2022    TRIG 174 (H) 10/12/2020    TRIG 327 (H) 04/15/2019     Lab Results   Component Value Date    HDL 68 (H) 01/30/2025    HDL 48 (L) 10/05/2023    HDL 44 (L) 03/14/2022    HDL 58 (L) 10/12/2020    HDL 76 04/15/2019     No components found for: \"LDLCAL\"  No components found for: \"LABVLDL\"  Body mass index is 25.75 kg/m².  BP Readings from Last 2 Encounters:   02/12/25 120/60   02/07/25 132/61       Medical Bed:   Is patient in a medical bed? no   If medical bed is in use, has nursing secured room while patient is awake and out of the room? NA  Has safety checks by nursing been completed on the bed/room this shift? NA    Protective Factors:  Patient identifies protective factors with nursing staff as follows:   Identifies reasons for living: Yes   Supportive Social Network or family: Yes    Belief that suicide is immoral/high spirituality: No   Responsibility to family or others/living with family: Yes   Fear of death or dying due to pain and suffering: No   Engaged in work or school: No  If Patient is unable to identify, reason why?     Depression: 4-5 
[facility] Adult Unit Daily Assessment  Nursing Progress Note    Room: [facility]02   Name: Michelle Foster   Age: 54 y.o.   Gender: female   Dx: Psychosis, unspecified psychosis type (HCC)  Precautions: suicide risk, fall risk, and seizure precautions  Inpatient Status: voluntary     SLEEP:  Sleep Quality Good  Sleep Medications: No   PRN Sleep Meds: No     MEDICAL:  Other PRN Meds: Yes; atarax 25mg   Med Compliant: Yes  Accu-Chek: No  Oxygen/CPAP/BiPAP: No  CIWA/CINA: No   PAIN Assessment: denies  Side Effects from medication: none voiced    Metabolic Screening:  Lab Results   Component Value Date    LABA1C 5.4 02/05/2025     Lab Results   Component Value Date    CHOL 148 01/30/2025    CHOL 163 10/05/2023    CHOL 215 (H) 03/14/2022    CHOL 235 (H) 10/12/2020    CHOL 278 (H) 04/15/2019     Lab Results   Component Value Date    TRIG 56 01/30/2025    TRIG 72 10/05/2023    TRIG 187 (H) 03/14/2022    TRIG 174 (H) 10/12/2020    TRIG 327 (H) 04/15/2019     Lab Results   Component Value Date    HDL 68 (H) 01/30/2025    HDL 48 (L) 10/05/2023    HDL 44 (L) 03/14/2022    HDL 58 (L) 10/12/2020    HDL 76 04/15/2019     No components found for: \"LDLCAL\"  No components found for: \"LABVLDL\"  Body mass index is 25.75 kg/m².  BP Readings from Last 2 Encounters:   02/13/25 128/66   02/07/25 132/61       Medical Bed:   Is patient in a medical bed? no   If medical bed is in use, has nursing secured room while patient is awake and out of the room? NA  Has safety checks by nursing been completed on the bed/room this shift? yes    Protective Factors:  Patient identifies protective factors with nursing staff as follows:   Identifies reasons for living: Yes   Supportive Social Network or family: Yes    Belief that suicide is immoral/high spirituality: Yes   Responsibility to family or others/living with family: Yes   Fear of death or dying due to pain and suffering: No   Engaged in work or school: Yes  If Patient is unable to identify, reason why? 
Department of Psychiatry  Attending Progress Note     Chief complaint: Auditory hallucinations    SUBJECTIVE:   Chart reviewed, discussed with the team. No major issues overnight. Patient is med-compliant. No SEs. Performs ADLs. Social and goes to groups.     Patient seen in the TV area.  Calm and cooperative.  Smiled.  Denies SI/HI/AVH. Rates depression 4/10, anxiety 3/10. Slept 5h. Denies physical complaints.  States she would like to go home soon.    OBJECTIVE    Physical  Wt Readings from Last 3 Encounters:   02/11/25 63.9 kg (140 lb 12.8 oz)   02/05/25 61.7 kg (136 lb 0.4 oz)   01/29/25 65.8 kg (145 lb)     Temp Readings from Last 3 Encounters:   02/16/25 98.3 °F (36.8 °C) (Temporal)   02/07/25 98.2 °F (36.8 °C)   02/01/25 98.1 °F (36.7 °C) (Temporal)     BP Readings from Last 3 Encounters:   02/16/25 122/76   02/07/25 132/61   02/01/25 (!) 114/59     Pulse Readings from Last 3 Encounters:   02/16/25 90   02/07/25 76   02/01/25 71        Review of Systems: 14-point review of systems negative except as described above    Mental Status Examination:   Appearance: Stated age.  Gait stable.  No abnormal movements or tremor.  Behavior: Calm and cooperative  Speech: Normal in tone, volume, and quality. No slurring, dysarthria or pressured speech noted.   Mood: \"Doing better\"   Affect: Brighter  Thought Process: Appears linear.  Thought Content: Denies suicidal or homicidal ideation. No overt delusions or paranoia appreciated.   Perceptions: Denies auditory or visual hallucinations at present time. Not responding to internal stimuli.   Concentration: Intact.   Orientation: to person, place, date, and situation.   Language: Intact.   Fund of information: Intact.   Memory: Recent and remote appear intact.   Neurovegitative: Fair appetite and poor sleep.   Insight: Some  Judgment: Limited    Data  Lab Results   Component Value Date    WBC 7.2 02/11/2025    HGB 12.1 02/11/2025    HCT 35.6 (L) 02/11/2025    MCV 92.0 
Department of Psychiatry  Progress Note    Chief Complaint: Auditory hallucinations      SUBJECTIVE:    54 years old female with previous psychiatric history of ADHD, alcohol use disorder in early remission, depression and anxiety, who has been admitted to our psychiatric unit secondary to psychosis.     Patient has been seen in treatment team room with presence of the patient's nurse.  She reported that her condition significantly improved during this hospital stay, stated that her mood is \"pretty good\" today.  Patient endorses good appetite and good quality of sleep with prescribed psychotropic medications, stated that she did not experience any sleep issues during the last weekend.  Patient is compliant with currently prescribed medications and denies any side effects.  She continues to endorse mild feeling of anxiety, due to being in the hospital.  Patient attends group activities in the unit and participates in those activities.  She is social with medical staff and other patients in the unit.  She performed her ADLs today and took a shower in the morning.  Patient denies current active suicidal and homicidal ideations, denies any plans.  Patient denies auditory and visual hallucinations.  She did not endorse any delusions or paranoid thoughts.    Patient was seen by neurology today.  Depakote was discontinued due to possible side effect of medication-elevated level of ammonia.  Patient has been started on Vimpat 50 mg twice a day to replace Depakote.  Recommended follow-up with neurology in 1-2 weeks      OBJECTIVE    BP (!) 142/75   Pulse 86   Temp 97.8 °F (36.6 °C) (Tympanic)   Resp 16   Ht 1.575 m (5' 2\")   Wt 63.9 kg (140 lb 12.8 oz)   LMP 07/25/2016 (Approximate)   SpO2 100%   BMI 25.75 kg/m²     Review of Systems: 14 point review of systems is negative    Psychological ROS: Presents for mild feeling of anxiety    Mental Status Examination:    Appearance: Appropriately groomed, wearing casual 
Department of Psychiatry  Progress Note    Chief Complaint: Auditory hallucinations      SUBJECTIVE:    54 years old female with previous psychiatric history of ADHD, alcohol use disorder in early remission, depression and anxiety, who has been admitted to our psychiatric unit secondary to psychosis.    Patient has been seen in treatment team room with presence of the patient's nurse.  Patient reported that her condition significantly improved during this hospital stay, stated that her mood is \"better, a little anxious\" today.  Patient stated that she feels anxious, because she wants to go home, as her son is visiting her and she wanted to see him.  Patient endorses good appetite and good quality of sleep during the last night.  She is compliant with currently prescribed medications and denies any side effects.  Patient denies feeling of depression and endorses feeling of anxiety and rated level of anxiety as 3 out of 10, with 10 being the worst.  Patient attends group activities in the unit and participates in those activities.  She is social with medical staff and other patients in the unit.  She performed her ADLs today and took a shower.  Patient denies current active suicidal and homicidal ideations, denies any plans.  She denies auditory and visual hallucinations.  Patient did not endorse any delusions or paranoid thoughts.    Patient has been seen and consulted by neurology, dose of Keppra was decreased from 1000 mg twice a day to 500 twice a day with goal to continue to taper off of Keppra until it will be completely discontinued, as it was suspected adverse effect of medication.  Also, Lamictal was stopped and patient has been started on Depakote for seizure disorder      OBJECTIVE    /69   Pulse 78   Temp 97.5 °F (36.4 °C)   Resp 16   Ht 1.575 m (5' 2\")   Wt 63.9 kg (140 lb 12.8 oz)   LMP 07/25/2016 (Approximate)   SpO2 100%   BMI 25.75 kg/m²     Review of Systems: 14 point review of systems 
Discharge Note     Patient is discharging on this date. Patient denies SI, HI, and AVH at this time. Patient reports improvement in behavior and is leaving unit in overall good condition. SW and patient discussed patient's follow up appointments and importance of attending appointments as scheduled, patient voiced understanding and agreement. Patient and SW also discussed patient's safety plan and patient was able to verbally identify: warning signs, coping strategies, places and people that help make the patient feel better/distract negative thoughts, friends/family/agencies/professionals the patient can reach out to in a crisis, and something that is important to the patient/worth living for. Patient was provided the national suicide prevention hotline number (1-149.737.3153) as well as local community behavioral health ([facility]) crisis number for emergencies (1-862.292.3092).     Discharge Disposition: home -lives with family      Pt to follow up with [facility] on February 18 , 2025 at 2:00 PM for the intake appointment with Nunu Hayes .     Patient will follow up with [facility] on February 20 , 2025 at 8:30 AM for the medication management appointment JULI Garay.     Referral to outpatient tobacco cessation counseling treatment:  Patient refused referral to outpatient tobacco cessation counseling    SW offered to assist patient with transportation, patient declined transportation assistance, she reports that her spouse will be picking her up today at the time of her discharge.     
Dr Rodríguez at bedside this AM for Neuro consult. This Nurse present. Bedside exam performed. Dr Rodríguez discussed pt s/s with pt. It was decided with pt to d/c Lamictal, taper off Keppra and introduce large oral dose of depakote to replace Keppra and then continue depakote in place of Keppra once complete. Pt agreeable. Report given to jc stone on consult.     Electronically signed by JOHN HASSAN RN on 2/14/2025 at 7:05 AM   
[facility] Adult Unit Daily Assessment  Nursing Progress Note    Room: [address] [place] [address]-   Name: Michelle Foster   Age: 54 y.o.   Gender: female   Dx: Psychosis, unspecified psychosis type (HCC)  Precautions: suicide risk, fall risk, and seizure precautions  Inpatient Status: voluntary     SLEEP:  Sleep Quality Fair  Sleep Medications: Yes   PRN Sleep Meds: Yes     MEDICAL:  Other PRN Meds: Yes   Med Compliant: Yes  Accu-Chek: No  Oxygen/CPAP/BiPAP: No  CIWA/CINA: No   PAIN Assessment: headaches  Side Effects from medication: No    Metabolic Screening:  Lab Results   Component Value Date    LABA1C 5.4 02/05/2025     Lab Results   Component Value Date    CHOL 148 01/30/2025    CHOL 163 10/05/2023    CHOL 215 (H) 03/14/2022    CHOL 235 (H) 10/12/2020    CHOL 278 (H) 04/15/2019     Lab Results   Component Value Date    TRIG 56 01/30/2025    TRIG 72 10/05/2023    TRIG 187 (H) 03/14/2022    TRIG 174 (H) 10/12/2020    TRIG 327 (H) 04/15/2019     Lab Results   Component Value Date    HDL 68 (H) 01/30/2025    HDL 48 (L) 10/05/2023    HDL 44 (L) 03/14/2022    HDL 58 (L) 10/12/2020    HDL 76 04/15/2019     No components found for: \"LDLCAL\"  No components found for: \"LABVLDL\"  Body mass index is 25.75 kg/m².  BP Readings from Last 2 Encounters:   02/17/25 131/69   02/07/25 132/61       Medical Bed:   Is patient in a medical bed? no   If medical bed is in use, has nursing secured room while patient is awake and out of the room? NA  Has safety checks by nursing been completed on the bed/room this shift? NA    Protective Factors:  Patient identifies protective factors with nursing staff as follows:   Identifies reasons for living: Yes   Supportive Social Network or family: Yes    Belief that suicide is immoral/high spirituality: No   Responsibility to family or others/living with family: Yes   Fear of death or dying due to pain and suffering: No   Engaged in work or school: yes  If Patient is unable to identify, reason why?     Depression: 3-4 
[facility] Adult Unit Daily Assessment  Nursing Progress Note    Room: [address] [place] [address]-   Name: Michelle Foster   Age: 54 y.o.   Gender: female   Dx: Psychosis, unspecified psychosis type (HCC)  Precautions: suicide risk  Inpatient Status: voluntary     SLEEP:  Sleep Quality Fair  Sleep Medications: Yes   PRN Sleep Meds: No     MEDICAL:  Other PRN Meds: Yes   Med Compliant: Yes  Accu-Chek: No  Oxygen/CPAP/BiPAP: No  CIWA/CINA: No   PAIN Assessment: none  Side Effects from medication: No    Metabolic Screening:  Lab Results   Component Value Date    LABA1C 5.4 02/05/2025     Lab Results   Component Value Date    CHOL 148 01/30/2025    CHOL 163 10/05/2023    CHOL 215 (H) 03/14/2022    CHOL 235 (H) 10/12/2020    CHOL 278 (H) 04/15/2019     Lab Results   Component Value Date    TRIG 56 01/30/2025    TRIG 72 10/05/2023    TRIG 187 (H) 03/14/2022    TRIG 174 (H) 10/12/2020    TRIG 327 (H) 04/15/2019     Lab Results   Component Value Date    HDL 68 (H) 01/30/2025    HDL 48 (L) 10/05/2023    HDL 44 (L) 03/14/2022    HDL 58 (L) 10/12/2020    HDL 76 04/15/2019     No components found for: \"LDLCAL\"  No components found for: \"LABVLDL\"  Body mass index is 25.75 kg/m².  BP Readings from Last 2 Encounters:   02/17/25 118/66   02/07/25 132/61       Medical Bed:   Is patient in a medical bed? no   If medical bed is in use, has nursing secured room while patient is awake and out of the room? NA  Has safety checks by nursing been completed on the bed/room this shift? yes    Protective Factors:  Patient identifies protective factors with nursing staff as follows:   Identifies reasons for living: Yes   Supportive Social Network or family: Yes    Belief that suicide is immoral/high spirituality: Yes   Responsibility to family or others/living with family: Yes   Fear of death or dying due to pain and suffering: Yes   Engaged in work or school: Yes  If Patient is unable to identify, reason why?     Depression: 0   Anxiety: 0   SI denies suicidal ideation 
[facility] Adult Unit Daily Assessment  Nursing Progress Note    Room: [address] [place] [address]   Name: Michelle Foster   Age: 54 y.o.   Gender: female   Dx: Psychosis, unspecified psychosis type (HCC)  Precautions: suicide risk and fall risk  Inpatient Status: voluntary     SLEEP:  Sleep Quality Fair  Sleep Medications: No   PRN Sleep Meds: No     MEDICAL:  Other PRN Meds: No   Med Compliant: Yes  Accu-Chek: No  Oxygen/CPAP/BiPAP: No  CIWA/CINA: No   PAIN Assessment: none  Side Effects from medication: No    Metabolic Screening:  Lab Results   Component Value Date    LABA1C 5.4 02/05/2025     Lab Results   Component Value Date    CHOL 148 01/30/2025    CHOL 163 10/05/2023    CHOL 215 (H) 03/14/2022    CHOL 235 (H) 10/12/2020    CHOL 278 (H) 04/15/2019     Lab Results   Component Value Date    TRIG 56 01/30/2025    TRIG 72 10/05/2023    TRIG 187 (H) 03/14/2022    TRIG 174 (H) 10/12/2020    TRIG 327 (H) 04/15/2019     Lab Results   Component Value Date    HDL 68 (H) 01/30/2025    HDL 48 (L) 10/05/2023    HDL 44 (L) 03/14/2022    HDL 58 (L) 10/12/2020    HDL 76 04/15/2019     No components found for: \"LDLCAL\"  No components found for: \"LABVLDL\"  Body mass index is 25.75 kg/m².  BP Readings from Last 2 Encounters:   02/16/25 138/65   02/07/25 132/61       Medical Bed:   Is patient in a medical bed? no   If medical bed is in use, has nursing secured room while patient is awake and out of the room? no  Has safety checks by nursing been completed on the bed/room this shift? yes    Protective Factors:  Patient identifies protective factors with nursing staff as follows:   Identifies reasons for living: Yes   Supportive Social Network or family: Yes    Belief that suicide is immoral/high spirituality: No   Responsibility to family or others/living with family: Yes   Fear of death or dying due to pain and suffering: No   Engaged in work or school: No  If Patient is unable to identify, reason why?     Depression: 4   Anxiety: 3   SI denies suicidal 
[facility] Adult Unit Daily Assessment  Nursing Progress Note    Room: [facility]   Name: Michelle Foster   Age: 54 y.o.   Gender: female   Dx: Psychosis, unspecified psychosis type (HCC)  Precautions: suicide risk, fall risk, and seizure precautions  Inpatient Status: voluntary     SLEEP:  Sleep Quality Good  Sleep Medications: Yes and No   PRN Sleep Meds: No     MEDICAL:  Other PRN Meds: Yes; Atarax 25 mg  Med Compliant: Yes  Accu-Chek: No  Oxygen/CPAP/BiPAP: No  CIWA/CINA: No   PAIN Assessment: none  Side Effects from medication: No    Metabolic Screening:  Lab Results   Component Value Date    LABA1C 5.4 02/05/2025     Lab Results   Component Value Date    CHOL 148 01/30/2025    CHOL 163 10/05/2023    CHOL 215 (H) 03/14/2022    CHOL 235 (H) 10/12/2020    CHOL 278 (H) 04/15/2019     Lab Results   Component Value Date    TRIG 56 01/30/2025    TRIG 72 10/05/2023    TRIG 187 (H) 03/14/2022    TRIG 174 (H) 10/12/2020    TRIG 327 (H) 04/15/2019     Lab Results   Component Value Date    HDL 68 (H) 01/30/2025    HDL 48 (L) 10/05/2023    HDL 44 (L) 03/14/2022    HDL 58 (L) 10/12/2020    HDL 76 04/15/2019     No components found for: \"LDLCAL\"  No components found for: \"LABVLDL\"  Body mass index is 25.75 kg/m².  BP Readings from Last 2 Encounters:   02/14/25 125/69   02/07/25 132/61       Medical Bed:   Is patient in a medical bed? no   If medical bed is in use, has nursing secured room while patient is awake and out of the room? NA  Has safety checks by nursing been completed on the bed/room this shift? yes    Protective Factors:  Patient identifies protective factors with nursing staff as follows:   Identifies reasons for living: Yes   Supportive Social Network or family: Yes    Belief that suicide is immoral/high spirituality: Yes   Responsibility to family or others/living with family: Yes   Fear of death or dying due to pain and suffering: No   Engaged in work or school: Yes  If Patient is unable to identify, reason why? 
Patient came to the nurses station stating \"I have a bench warrant. My sister put an EPO on me. I feel like she feed me to the wolves. I have found stuff on my computer and I haven't had a phone in years.\" Patient became tearful.   
Progress Note  Michelle Foster  2/13/2025 7:08 PM  Subjective:   Admit Date:   2/11/2025      CC/ADMIT DX:       Interval History:   Reviewed overnight events and nursing notes. She has had no new medical issues.     I have reviewed all labs/diagnostics from the last 24hrs.       ROS:   I have done a 10 point ROS and all are negative, except what is mentioned in the HPI.    ADULT DIET; Regular; Safety Tray; Safety Tray (Disposables)    Medications:      lactulose  20 g Oral TID    [START ON 2/14/2025] paliperidone  9 mg Oral Daily    lamoTRIgine  25 mg Oral BID    pantoprazole  40 mg Oral QAM AC    levETIRAcetam  1,000 mg Oral BID    potassium chloride  40 mEq Oral Daily    carvedilol  12.5 mg Oral BID WC    therapeutic multivitamin-minerals  1 tablet Oral Daily           Objective:   Vitals: /70   Pulse 85   Temp 97.7 °F (36.5 °C)   Resp 16   Ht 1.575 m (5' 2\")   Wt 63.9 kg (140 lb 12.8 oz)   LMP 07/25/2016 (Approximate)   SpO2 99%   BMI 25.75 kg/m²  No intake or output data in the 24 hours ending 02/13/25 1908  General appearance: alert and cooperative with exam  Extremities: extremities normal, atraumatic, no cyanosis or edema  Neurologic:  No obvious focal neurologic deficits.    Assessment and Plan:   Principal Problem:    Psychosis, unspecified psychosis type (HCC)  Resolved Problems:    * No resolved hospital problems. *    MENEZES    ETOH Use    Plan:   Continue present medication(s)    Follow with Psych   Increase Lactulose   Neuro consult ordered      Discharge planning:   her home     Reviewed treatment plans with the patient and/or family.             Electronically signed by Estrada Torres MD on 2/13/2025 at 7:08 PM  
Progress Note  Michelle Foster  2/14/2025 6:53 PM  Subjective:   Admit Date:   2/11/2025      CC/ADMIT DX:       Interval History:   Reviewed overnight events and nursing notes. She denies any new medical issues.     I have reviewed all labs/diagnostics from the last 24hrs.       ROS:   I have done a 10 point ROS and all are negative, except what is mentioned in the HPI.    ADULT DIET; Regular; Safety Tray; Safety Tray (Disposables)    Medications:      levETIRAcetam  500 mg Oral BID    [START ON 2/15/2025] divalproex  500 mg Oral BID    lactulose  20 g Oral TID    paliperidone  9 mg Oral Daily    pantoprazole  40 mg Oral QAM AC    potassium chloride  40 mEq Oral Daily    carvedilol  12.5 mg Oral BID WC    therapeutic multivitamin-minerals  1 tablet Oral Daily           Objective:   Vitals: /69   Pulse 78   Temp 97.5 °F (36.4 °C)   Resp 16   Ht 1.575 m (5' 2\")   Wt 63.9 kg (140 lb 12.8 oz)   LMP 07/25/2016 (Approximate)   SpO2 100%   BMI 25.75 kg/m²  No intake or output data in the 24 hours ending 02/14/25 1853  General appearance: alert and cooperative with exam  Extremities: extremities normal, atraumatic, no cyanosis or edema  Neurologic:  No obvious focal neurologic deficits.    Assessment and Plan:   Principal Problem:    Psychosis, unspecified psychosis type (HCC)  Resolved Problems:    * No resolved hospital problems. *    MENEZES    ETOH Use    Plan:   Continue present medication(s)    Follow with Psych, Neurology   Ammonia improved, follow      Discharge planning:   her home     Reviewed treatment plans with the patient and/or family.             Electronically signed by Estrada Torres MD on 2/14/2025 at 6:53 PM  
Progress Note  Michelle Foster  2/15/2025 2:47 PM  Subjective:   Admit Date:   2/11/2025      CC/ADMIT DX:       Interval History:   Reviewed overnight events and nursing notes. She has had no new medical issues.     I have reviewed all labs/diagnostics from the last 24hrs.       ROS:   I have done a 10 point ROS and all are negative, except what is mentioned in the HPI.    ADULT DIET; Regular; Safety Tray; Safety Tray (Disposables)    Medications:      melatonin  5 mg Oral Nightly    levETIRAcetam  500 mg Oral BID    divalproex  500 mg Oral BID    lactulose  20 g Oral TID    paliperidone  9 mg Oral Daily    pantoprazole  40 mg Oral QAM AC    potassium chloride  40 mEq Oral Daily    carvedilol  12.5 mg Oral BID WC    therapeutic multivitamin-minerals  1 tablet Oral Daily           Objective:   Vitals: /63   Pulse 82   Temp 98.2 °F (36.8 °C)   Resp 20   Ht 1.575 m (5' 2\")   Wt 63.9 kg (140 lb 12.8 oz)   LMP 07/25/2016 (Approximate)   SpO2 100%   BMI 25.75 kg/m²  No intake or output data in the 24 hours ending 02/15/25 1447  General appearance: alert and cooperative with exam  Extremities: extremities normal, atraumatic, no cyanosis or edema  Neurologic:  No obvious focal neurologic deficits.    Assessment and Plan:   Principal Problem:    Psychosis, unspecified psychosis type (HCC)  Resolved Problems:    * No resolved hospital problems. *    MENEZES    ETOH Use    Plan:   Continue present medication(s)    Follow with Psych, Neurology   Recheck labs in am      Discharge planning:   her home     Reviewed treatment plans with the patient and/or family.             Electronically signed by Estrada Torres MD on 2/15/2025 at 2:47 PM  
Progress Note  Michelle Foster  2/16/2025 3:16 PM  Subjective:   Admit Date:   2/11/2025      CC/ADMIT DX:       Interval History:   Reviewed overnight events and nursing notes. She denies any new medical issues.     I have reviewed all labs/diagnostics from the last 24hrs.       ROS:   I have done a 10 point ROS and all are negative, except what is mentioned in the HPI.    ADULT DIET; Regular; Safety Tray; Safety Tray (Disposables)    Medications:      melatonin  5 mg Oral Nightly    levETIRAcetam  500 mg Oral BID    divalproex  500 mg Oral BID    lactulose  20 g Oral TID    paliperidone  9 mg Oral Daily    pantoprazole  40 mg Oral QAM AC    potassium chloride  40 mEq Oral Daily    carvedilol  12.5 mg Oral BID WC    therapeutic multivitamin-minerals  1 tablet Oral Daily           Objective:   Vitals: /76   Pulse 90   Temp 98.3 °F (36.8 °C) (Temporal)   Resp 16   Ht 1.575 m (5' 2\")   Wt 63.9 kg (140 lb 12.8 oz)   LMP 07/25/2016 (Approximate)   SpO2 98%   BMI 25.75 kg/m²  No intake or output data in the 24 hours ending 02/16/25 1516  General appearance: alert and cooperative with exam  Extremities: extremities normal, atraumatic, no cyanosis or edema  Neurologic:  No obvious focal neurologic deficits.    Assessment and Plan:   Principal Problem:    Psychosis, unspecified psychosis type (HCC)  Active Problems:    Stimulant abuse (HCC)    Insomnia  Resolved Problems:    * No resolved hospital problems. *    MENEZES    ETOH Use    Plan:   Continue present medication(s)    Follow with Psych, Neurology   Monitor labs, testing      Discharge planning:   her home     Reviewed treatment plans with the patient and/or family.             Electronically signed by Estrada Torres MD on 2/16/2025 at 3:16 PM  
Progress Note  Michelle Foster  2/17/2025 5:11 PM  Subjective:   Admit Date:   2/11/2025      CC/ADMIT DX:       Interval History:   Reviewed overnight events and nursing notes. She has had no new physical complaints.     I have reviewed all labs/diagnostics from the last 24hrs.       ROS:   I have done a 10 point ROS and all are negative, except what is mentioned in the HPI.    ADULT DIET; Regular; Safety Tray; Safety Tray (Disposables)    Medications:      lacosamide  50 mg Oral BID    melatonin  5 mg Oral Nightly    levETIRAcetam  500 mg Oral BID    lactulose  20 g Oral TID    paliperidone  9 mg Oral Daily    pantoprazole  40 mg Oral QAM AC    potassium chloride  40 mEq Oral Daily    carvedilol  12.5 mg Oral BID WC    therapeutic multivitamin-minerals  1 tablet Oral Daily           Objective:   Vitals: /66   Pulse 78   Temp 98.6 °F (37 °C)   Resp 18   Ht 1.575 m (5' 2\")   Wt 63.9 kg (140 lb 12.8 oz)   LMP 07/25/2016 (Approximate)   SpO2 100%   BMI 25.75 kg/m²  No intake or output data in the 24 hours ending 02/17/25 1711  General appearance: alert and cooperative with exam  Extremities: extremities normal, atraumatic, no cyanosis or edema  Neurologic:  No obvious focal neurologic deficits.    Assessment and Plan:   Principal Problem:    Psychosis, unspecified psychosis type (HCC)  Active Problems:    Auditory hallucination    Stimulant abuse (HCC)    Insomnia  Resolved Problems:    * No resolved hospital problems. *    MENEZES    ETOH Use    Elevated BP    Plan:   Continue present medication(s)    Follow with Psych, Neurology   Monitor BP      Discharge planning:   her home     Reviewed treatment plans with the patient and/or family.             Electronically signed by Estrada Torres MD on 2/17/2025 at 5:11 PM  
[facility] Adult Unit Daily Assessment  Nursing Progress Note    Room: [address] [place] [address]   Name: Michelle Foster   Age: 54 y.o.   Gender: female   Dx: Psychosis, unspecified psychosis type (HCC)  Precautions: suicide risk, seizure precautions, and RTF  Inpatient Status: voluntary     SLEEP:  Sleep Quality Good  Sleep Medications: Yes, Melatonin 5mg   PRN Sleep Meds: No     MEDICAL:  Other PRN Meds: Yes, Atarax for c/o's anxiety   Med Compliant: Yes  Accu-Chek: No  Oxygen/CPAP/BiPAP: No  CIWA/CINA: No   PAIN Assessment: none  Side Effects from medication: No    Metabolic Screening:  Lab Results   Component Value Date    LABA1C 5.4 02/05/2025     Lab Results   Component Value Date    CHOL 148 01/30/2025    CHOL 163 10/05/2023    CHOL 215 (H) 03/14/2022    CHOL 235 (H) 10/12/2020    CHOL 278 (H) 04/15/2019     Lab Results   Component Value Date    TRIG 56 01/30/2025    TRIG 72 10/05/2023    TRIG 187 (H) 03/14/2022    TRIG 174 (H) 10/12/2020    TRIG 327 (H) 04/15/2019     Lab Results   Component Value Date    HDL 68 (H) 01/30/2025    HDL 48 (L) 10/05/2023    HDL 44 (L) 03/14/2022    HDL 58 (L) 10/12/2020    HDL 76 04/15/2019     No components found for: \"LDLCAL\"  No components found for: \"LABVLDL\"  Body mass index is 25.75 kg/m².  BP Readings from Last 2 Encounters:   02/15/25 134/66   02/07/25 132/61       Medical Bed:   Is patient in a medical bed? no   If medical bed is in use, has nursing secured room while patient is awake and out of the room? NA  Has safety checks by nursing been completed on the bed/room this shift? NA    Protective Factors:  Patient identifies protective factors with nursing staff as follows:   Identifies reasons for living: Yes   Supportive Social Network or family: Yes    Belief that suicide is immoral/high spirituality: No   Responsibility to family or others/living with family: Yes   Fear of death or dying due to pain and suffering: No   Engaged in work or school: Yes  If Patient is unable to identify, 
SW met with patient to complete psychosocial and lifetime CSSR-S on this date. Patients long and short-term goals discussed. Patient voiced understanding. Treatment plan sheet signed. Patient verbalized understanding of the treatment plan. Patient participated in goals and objectives of the treatment plan. Patient discussed safety plan with .    In the last 6 months has the patient been a danger to self: No  In the last 6 months has the patient been a danger to others: No  Legal Guardian/POA: No    Activity Assessment:  Skills: trained as a   Talents:   Interests: looking on the computer    Provided patient with [employer] handout entitled \"Quitting Smoking.\"  Reviewed handout with patient: addressing dangers of smoking, developing coping skills, and providing basic information about quitting.       Patient received all components practical counseling of tobacco practical counseling during the hospital stay.        
SW spoke with patient prior to discharge encouraging patient to call to complete an intake assessment with [facility]. Patient verbalized understanding.    
SW spoke with patient regarding [facility]. Patient is agreeable to receiving services at [facility]. SW made contact with [facility] staff reports they will reach out to provide patient with an appointment this afternoon or tomorrow. YUN provided staff with the following contact numbers: [phone] as well as [phone].    
Spiritual Health History and Assessment/Progress Note  [facility]    (P) Loneliness/Social Isolation, Emotional distress, Relationship concerns,  , (P) Spirituality Group (Mindfulness)    Name: Michelle Foster MRN: [id number]    Age: 54 y.o.     Sex: female   Language: English   Evangelical: Yazidism   Psychosis, unspecified psychosis type (HCC)     Date: 2/17/2025            Total Time Calculated: (P) 30 min              Spiritual Assessment began in [facility] 6 ADULT [facility]        Referral/Consult From: (P) Rounding   Encounter Overview/Reason: (P) Loneliness/Social Isolation  Service Provided For: (P) Patient    Trina, Belief, Meaning:   Patient Other: Joined the group  Family/Friends Other: 2 other participants      Importance and Influence:  Patient Other: Able to let go; felt depressing but had to  Family/Friends Other: participants    Community:  Patient feels well-supported. Support system includes: Other: Group participants  Family/Friends Other: participants    Assessment and Plan of Care:     Patient Interventions include: Facilitated expression of thoughts and feelings  Family/Friends Interventions include: Facilitated expression of thoughts and feelings    Patient Plan of Care: No spiritual needs identified for follow-up  Family/Friends Plan of Care: No spiritual needs identified for follow-up    Electronically signed by SOCORRO Lin on 2/17/2025 at 2:15 PM        02/17/25 1412   Encounter Summary   Encounter Overview/Reason Loneliness/Social Isolation   Encounter Code  Counseling, Group, by  services   Service Provided For Patient   Referral/Consult From Rounding   Complexity of Encounter Low   Begin Time 1330   End Time  1400   Total Time Calculated 30 min   Spiritual/Emotional needs   Type Spiritual Support   Behavioral Health    Type  Spirituality Group  (Mindfulness)   Assessment/Intervention/Outcome   Assessment Coping   Intervention Guided Imagery, Healing touch, etc. 
Spiritual Health History and Assessment/Progress Note  [facility]    Loneliness/Social Isolation, Emotional distress, Relationship concerns,  , Initial Encounter    Name: Michelle Foster MRN: [id number]    Age: 54 y.o.     Sex: female   Language: English   Presybeterian: Shinto   Psychosis, unspecified psychosis type (HCC)     Date: 2/13/2025            Total Time Calculated: 37 min              Spiritual Assessment began in [facility] 6 ADULT [facility]        Referral/Consult From: Rounding   Encounter Overview/Reason: Loneliness/Social Isolation  Service Provided For: Patient    Trina, Belief, Meaning:   Patient identifies as spiritual; patient  prayer is her practice  Family/Friends No family/friends present      Importance and Influence:  Patient has spiritual/personal beliefs that influence decisions regarding their health; pt stated that through prayer - she could be alone and free, praying for self and for family including those those who meant to harm (as she thought).  Family/Friends No family/friends present    Community:  Patient expresses feelings of isolation: feeling there is no one to turn to for help; pt indicated that her  & son are most important relationships.  Family/Friends No family/friends present    Assessment and Plan of Care:     Patient Interventions include: Explored spiritual coping/struggle/distress; point of distress: that even in the privacy of her thoughts, mind, she hears voices of those who she believes - are vent to harm her, meaning - she is powerless and violated.    Family/Friends Interventions include: No family/friends present    Patient Plan of Care: Other: Pt - said - been here a number of time, feeling desperate yet willing to join in activities - among other - she is hoping to help her .  Requested prayer/blessing.    Family/Friends Plan of Care:     Electronically signed by SOCORRO Lin on 2/13/2025 at 2:42 PM        02/13/25 3446   Encounter Summary 
[facility] Adult Unit Daily Assessment  Nursing Progress Note    Room: [address] [place] [address]   Name: Michelle Foster   Age: 54 y.o.   Gender: female   Dx: Psychosis, unspecified psychosis type (HCC)  Precautions: suicide risk, fall risk, and seizure precautions  Inpatient Status: voluntary     SLEEP:  Sleep Quality Fair  Sleep Medications: Yes   PRN Sleep Meds: No     MEDICAL:  Other PRN Meds: Yes   Med Compliant: Yes  Accu-Chek: No  Oxygen/CPAP/BiPAP: No  CIWA/CINA: No   PAIN Assessment: none  Side Effects from medication: No    Metabolic Screening:  Lab Results   Component Value Date    LABA1C 5.4 02/05/2025     Lab Results   Component Value Date    CHOL 148 01/30/2025    CHOL 163 10/05/2023    CHOL 215 (H) 03/14/2022    CHOL 235 (H) 10/12/2020    CHOL 278 (H) 04/15/2019     Lab Results   Component Value Date    TRIG 56 01/30/2025    TRIG 72 10/05/2023    TRIG 187 (H) 03/14/2022    TRIG 174 (H) 10/12/2020    TRIG 327 (H) 04/15/2019     Lab Results   Component Value Date    HDL 68 (H) 01/30/2025    HDL 48 (L) 10/05/2023    HDL 44 (L) 03/14/2022    HDL 58 (L) 10/12/2020    HDL 76 04/15/2019     No components found for: \"LDLCAL\"  No components found for: \"LABVLDL\"  Body mass index is 25.75 kg/m².  BP Readings from Last 2 Encounters:   02/17/25 131/69   02/07/25 132/61       Medical Bed:   Is patient in a medical bed? no   If medical bed is in use, has nursing secured room while patient is awake and out of the room? NA  Has safety checks by nursing been completed on the bed/room this shift? yes    Protective Factors:  Patient identifies protective factors with nursing staff as follows:   Identifies reasons for living: Yes   Supportive Social Network or family: Yes    Belief that suicide is immoral/high spirituality: Yes   Responsibility to family or others/living with family: Yes   Fear of death or dying due to pain and suffering: Yes   Engaged in work or school: Yes  If Patient is unable to identify, reason why?     Depression: 2 
This nurse reached out to Dr. Rodríguez office to make two week follow up appointment. The office informed this nurse that they were booked out further than that. They stated a nurse from their office will reach out to the patient this afternoon with an appointment time and date. Patient was informed.   
Treatment Team Note:     Therapist met with treatment team to discuss patients treatment, progress toward treatment goals and discharge plans.     Target Symptoms/Reason for admission: Per nursing admission assessment - Reason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

## 2025-02-18 NOTE — TELEPHONE ENCOUNTER
Diana with [facility] called to schedule a 2 week HFU with dr haynes. Patient is being discharged today. Please contact patient with the appointment.

## 2025-02-19 ENCOUNTER — FOLLOWUP TELEPHONE ENCOUNTER (OUTPATIENT)
Dept: PSYCHIATRY | Age: 55
End: 2025-02-19

## 2025-02-19 NOTE — DISCHARGE SUMMARY
Discharge Summary     Patient ID:  Michelle Foster  [id number]  54 y.o.  1970    Admit date: 2/11/2025  Discharge date: 2/18/2025    Admitting Physician: Lars Arriaga MD   Attending Physician: No att. providers found  Discharge Provider: JULI Coon     Admission Diagnoses: Auditory hallucination [R44.0]  Psychosis, unspecified psychosis type (HCC) [F29]    Discharge Diagnoses:   Psychosis, unspecified  R/O Substance induced psychosis (stimulants)    History of alcohol use disorder in early remission  Insomnia  Stimulant use     Admission Condition: fair    Discharged Condition: good    Indication for Admission: Psychosis    HPI:  he patient is a 54 y.o. female with previous psychiatric history of depression, anxiety, ADHD and alcohol use disorder in early remission, alcoholic encephalopathy, complicated by history of multiple medical conditions, including acoustic neuroma, complicated by meningitis, history of seizures,  shunt, hypothyroidism, hypertension, non-alcoholic steatohepatitis, who presented to the emergency department complaining of auditory and visual hallucinations.  Patient's UDS in ER was positive for prescribed amphetamines, benzodiazapine's and oxycodone. Patient is prescribed Adderall. Reports she took a half of one yesterday. She reports she has not had alcohol in over 2 weeks and prior to that she was sober for 6 months.       Patient is well known to psychiatry, and was recently discharged from the inpatient psychiatric unit last week. This will be her third admission within 3 weeks      Patient reports that when she left this unit last week she was \"doing really good\" for 3 days. She reports then she started to hear the voice of her sister, sisters boyfriend and her sisters best friend that are outside of her home. She states,\" They are saying shut the f up.\"  Today she is denying hearing any voices. She did endorse the following while in the ER. \"There is this little bluetooth

## 2025-02-19 NOTE — TELEPHONE ENCOUNTER
SW followed up with pt after she was discharged yesterday and she reported she was doing good and denies having questions or concerns at this time.

## 2025-02-20 NOTE — TELEPHONE ENCOUNTER
I have called and left patient a VM to let her know when I have her scheduled an appointment with Dr. Rodríguez. I had also called her  regarding the appointment time/date. He asked for me to call home number.

## 2025-03-14 ENCOUNTER — OFFICE VISIT (OUTPATIENT)
Dept: NEUROLOGY | Age: 55
End: 2025-03-14
Payer: COMMERCIAL

## 2025-03-14 VITALS
SYSTOLIC BLOOD PRESSURE: 118 MMHG | OXYGEN SATURATION: 98 % | WEIGHT: 140 LBS | BODY MASS INDEX: 25.76 KG/M2 | DIASTOLIC BLOOD PRESSURE: 70 MMHG | HEART RATE: 74 BPM | HEIGHT: 62 IN | RESPIRATION RATE: 16 BRPM

## 2025-03-14 DIAGNOSIS — G40.909 SEIZURE DISORDER (HCC): Primary | ICD-10-CM

## 2025-03-14 DIAGNOSIS — G62.9 NEUROPATHY: ICD-10-CM

## 2025-03-14 DIAGNOSIS — R29.810 FACIAL WEAKNESS: ICD-10-CM

## 2025-03-14 PROCEDURE — 99213 OFFICE O/P EST LOW 20 MIN: CPT | Performed by: PSYCHIATRY & NEUROLOGY

## 2025-03-14 RX ORDER — DESVENLAFAXINE 100 MG/1
100 TABLET, EXTENDED RELEASE ORAL DAILY
COMMUNITY
Start: 2024-12-31

## 2025-03-14 RX ORDER — LAMOTRIGINE 25 MG/1
TABLET ORAL
COMMUNITY

## 2025-03-14 NOTE — PROGRESS NOTES
Chief Complaint   Patient presents with    Seizures     Patient states she is here for hospital follow up, states she is doing well,wants to discuss Botox for facial droop        Michelle Foster is a 54 y.o. year old female who is seen for evaluation of seizures.  I saw her in the hospital 10/23 when she presented with an apparent seizure at home.  She came into the ED and she had a witnessed grand mal seizure where she became cyanotic and postictal.  Has a lot of stressors with chronic alcoholic liver disease and elevated ammonia for which she is on lactulose at home.  Urine drug screen was positive for amphetamine, benzodiazepine, cannabinoids and opiates.  Reportedly drinking a pint of vodka daily.  She has a remote history of acoustic neuroma resection on the right as well as a  shunt which is said to be functioning appropriately..  EEG was normal.  She was placed on Keppra as well as Lamictal with a plan to increase Lamictal and wean off of the Keppra.  As best I can tell she takes 25 mg of Lamictal and 1000 mg twice a day of Keppra.  She had some mood issues initially but no longer so.  No further seizures.  When seen here 12/23 she was asked to get off of the Lamictal and stay on Keppra and she has.  No further seizures.  Here today for driving form.  She has applied for disability.  I last saw her in the office 10/24.  She was admitted to the behavioral health unit 2/25 with some psychosis and paranoia.  It was felt to be an outside chance that the Keppra could be related to this and so it was reduced .  We had her on Depakote short-term but her ammonia levels got too high.  It was replaced with Vimpat although she is no longer on it for unclear reasons.  Currently taking Keppra 500 mg twice a day and Lamictal 50 mg at bedtime.  She feels much better since getting out of the behavioral health unit.  Active Ambulatory Problems     Diagnosis Date Noted    Hypothyroidism (acquired)     MENEZES (nonalcoholic

## 2025-03-22 RX ORDER — PALIPERIDONE 6 MG/1
6 TABLET, EXTENDED RELEASE ORAL DAILY
Qty: 30 TABLET | Refills: 1 | OUTPATIENT
Start: 2025-03-22

## 2025-05-15 RX ORDER — PALIPERIDONE 6 MG/1
6 TABLET, EXTENDED RELEASE ORAL DAILY
Qty: 30 TABLET | Refills: 1 | OUTPATIENT
Start: 2025-05-15

## 2025-05-16 RX ORDER — PALIPERIDONE 6 MG/1
6 TABLET, EXTENDED RELEASE ORAL DAILY
Qty: 30 TABLET | Refills: 1 | OUTPATIENT
Start: 2025-05-16

## 2025-06-06 RX ORDER — PALIPERIDONE 6 MG/1
6 TABLET, EXTENDED RELEASE ORAL DAILY
Qty: 30 TABLET | Refills: 1 | OUTPATIENT
Start: 2025-06-06

## 2025-06-13 ENCOUNTER — OFFICE VISIT (OUTPATIENT)
Dept: NEUROLOGY | Age: 55
End: 2025-06-13
Payer: COMMERCIAL

## 2025-06-13 VITALS
DIASTOLIC BLOOD PRESSURE: 70 MMHG | BODY MASS INDEX: 25.76 KG/M2 | HEART RATE: 86 BPM | SYSTOLIC BLOOD PRESSURE: 124 MMHG | HEIGHT: 62 IN | OXYGEN SATURATION: 98 % | WEIGHT: 140 LBS | RESPIRATION RATE: 16 BRPM

## 2025-06-13 DIAGNOSIS — H91.91 HEARING LOSS OF RIGHT EAR, UNSPECIFIED HEARING LOSS TYPE: ICD-10-CM

## 2025-06-13 DIAGNOSIS — R29.810 FACIAL WEAKNESS: ICD-10-CM

## 2025-06-13 DIAGNOSIS — G40.909 SEIZURE DISORDER (HCC): Primary | ICD-10-CM

## 2025-06-13 PROCEDURE — 99213 OFFICE O/P EST LOW 20 MIN: CPT | Performed by: PSYCHIATRY & NEUROLOGY

## 2025-06-13 NOTE — PROGRESS NOTES
REVIEW OF SYSTEMS    Constitutional: []Fever []Sweat []Chills [] Recent Injury [x] Denies all unless marked  HEENT:[]Headache  [] Head Injury/Hearing Loss  [] Sore Throat  [] Ear Ache/Dizziness  [x] Denies all unless marked  Spine:  [] Neck pain  [] Back pain  [] Sciaticia  [x] Denies all unless marked  Cardiovascular:[]Heart Disease []Chest Pain [] Palpitations  [x] Denies all unless marked  Pulmonary: []Shortness of Breath []Cough   [x] Denies all unless marke  Gastrointestinal: []Nausea  []Vomiting  []Abdominal Pain  []Constipation  []Diarrhea  []Dark Bloody Stools  [x] Denies all unless marked  Psychiatric/Behavioral:[] Depression [] Anxiety [x] Denies all unless marked  Genitourinary:   [] Frequency  [] Urgency  [] Incontinence [] Pain with Urination  [x] Denies all unless marked  Extremities: []Pain  []Swelling  [x] Denies all unless marked  Musculoskeletal: [] Muscle Pain  [] Joint Pain  [] Arthritis [] Muscle Cramps [] Muscle Twitches  [x] Denies all unless marked  Sleep: [] Insomnia [] Snoring [] Restless Legs [] Sleep Apnea  [] Daytime Sleepiness  [x] Denies all unless marked  Skin:[] Rash [] Skin Discoloration [x] Denies all unless marked   Neurological: []Visual Disturbance/Memory Loss [] Loss of Balance [] Slurred Speech/Weakness [x] Seizures  [] Vertigo/Dizziness [x] Denies all unless marked     
Lamictal as is for now and call for difficulties.  Hearing loss unchanged.    Plan    Return in about 1 year (around 6/13/2026).    (Please note that portions of this note were completed with a voice recognition program. Efforts were made to edit the dictations but occasionally words are mis-transcribed.)

## 2025-06-22 RX ORDER — PALIPERIDONE 6 MG/1
6 TABLET, EXTENDED RELEASE ORAL DAILY
Qty: 30 TABLET | Refills: 1 | OUTPATIENT
Start: 2025-06-22

## 2025-07-11 ENCOUNTER — OFFICE VISIT (OUTPATIENT)
Dept: OBSTETRICS AND GYNECOLOGY | Age: 55
End: 2025-07-11
Payer: COMMERCIAL

## 2025-07-11 VITALS
SYSTOLIC BLOOD PRESSURE: 110 MMHG | DIASTOLIC BLOOD PRESSURE: 72 MMHG | HEIGHT: 62 IN | BODY MASS INDEX: 27.23 KG/M2 | WEIGHT: 148 LBS

## 2025-07-11 DIAGNOSIS — M85.80 OSTEOPENIA, UNSPECIFIED LOCATION: ICD-10-CM

## 2025-07-11 DIAGNOSIS — Z12.31 ENCOUNTER FOR SCREENING MAMMOGRAM FOR BREAST CANCER: ICD-10-CM

## 2025-07-11 DIAGNOSIS — Z01.419 WELL WOMAN EXAM WITH ROUTINE GYNECOLOGICAL EXAM: Primary | ICD-10-CM

## 2025-07-11 DIAGNOSIS — N39.0 URINARY TRACT INFECTION WITHOUT HEMATURIA, SITE UNSPECIFIED: ICD-10-CM

## 2025-07-11 RX ORDER — DESVENLAFAXINE 100 MG/1
100 TABLET, EXTENDED RELEASE ORAL DAILY
COMMUNITY

## 2025-07-11 RX ORDER — SPIRONOLACTONE 50 MG/1
50 TABLET, FILM COATED ORAL DAILY
COMMUNITY

## 2025-07-11 RX ORDER — TRAZODONE HYDROCHLORIDE 50 MG/1
TABLET ORAL
COMMUNITY

## 2025-07-11 RX ORDER — NALTREXONE HYDROCHLORIDE 50 MG/1
50 TABLET, FILM COATED ORAL DAILY
COMMUNITY

## 2025-07-11 RX ORDER — POTASSIUM CHLORIDE 1500 MG/1
TABLET, EXTENDED RELEASE ORAL
COMMUNITY

## 2025-07-11 RX ORDER — LAMOTRIGINE 25 MG/1
TABLET ORAL
COMMUNITY

## 2025-07-11 RX ORDER — METHYLPHENIDATE HYDROCHLORIDE 54 MG/1
TABLET ORAL
COMMUNITY

## 2025-07-11 RX ORDER — CARIPRAZINE 1.5 MG/1
1.5 CAPSULE, GELATIN COATED ORAL DAILY
COMMUNITY
Start: 2025-07-07

## 2025-07-11 NOTE — PROGRESS NOTES
"Chief Complaint   Patient presents with    Gynecologic Exam     Patient here for annual, last pap 7/2/24, mammogram & dexa 9/19/24. Patient reports previous UTIs without symptoms. Patient wants urine checked. Denies symptoms.         Subjective     Denise Fields is a 55 y.o. female    History of Present Illness  Pt comes in today for annual wellness exam. Denies any complaints. Wants urine checked just to ensure normal. Denies symptoms. May not be able to urinate so if not, okay to return at any point for this.     /72 (BP Location: Left arm, Patient Position: Sitting, Cuff Size: Adult)   Ht 157.5 cm (62\")   Wt 67.1 kg (148 lb)   LMP  (LMP Unknown)   BMI 27.07 kg/m²     Outpatient Encounter Medications as of 7/11/2025   Medication Sig Dispense Refill    carvedilol (COREG) 12.5 MG tablet Take 1 tablet by mouth 2 (Two) Times a Day.      cetirizine (zyrTEC) 10 MG tablet Take 10 mg by mouth daily      desvenlafaxine (PRISTIQ) 100 MG 24 hr tablet Take 1 tablet by mouth Daily.      lamoTRIgine (LaMICtal) 25 MG tablet TAKE 1 TABLET 3 TIMES A DAY BY ORAL ROUTE AS DIRECTED FOR 90 DAYS, FOR SEIZURE DISORDER.      methylphenidate 54 MG CR tablet TAKE 1 TABLET EVERY DAY BY ORAL ROUTE IN THE MORNING FOR 30 DAYS, FOR ADHD.      multivitamin with minerals tablet tablet Take 1 tablet by mouth Daily.      naltrexone (DEPADE) 50 MG tablet Take 1 tablet by mouth Daily.      ondansetron ODT (ZOFRAN-ODT) 4 MG disintegrating tablet DISSOLVE 1 TABLET BY MOUTH 3 TIMES DAILY AS NEEDED FOR NAUSEA OR VOMITING      pantoprazole (PROTONIX) 40 MG EC tablet Take 40 mg by mouth daily      potassium chloride ER (K-TAB) 20 MEQ tablet controlled-release ER tablet TAKE 1 TABLET EVERY DAY BY ORAL ROUTE IN THE MORNING FOR 90 DAYS, FOR POTASSIUM.      spironolactone (ALDACTONE) 50 MG tablet Take 1 tablet by mouth Daily.      traZODone (DESYREL) 50 MG tablet TAKE 1 TABLET EVERY DAY BY ORAL ROUTE AT BEDTIME FOR 90 DAYS, FOR INSOMNIA.      " Vraylar 1.5 MG capsule capsule Take 1 capsule by mouth Daily.      [DISCONTINUED] amphetamine-dextroamphetamine (ADDERALL) 30 MG tablet TAKE 1 TABLET BY MOUTH TWICE A DAY AS DIRECTED FOR 30 DAYS (FOR ADHD)      [DISCONTINUED] desvenlafaxine (PRISTIQ) 50 MG 24 hr tablet Take 1 tablet by mouth Every Morning.      [DISCONTINUED] HYDROcodone-acetaminophen (NORCO)  MG per tablet PLEASE SEE ATTACHED FOR DETAILED DIRECTIONS      [DISCONTINUED] levothyroxine (SYNTHROID, LEVOTHROID) 175 MCG tablet Take 1 tablet by mouth Daily.      [DISCONTINUED] liothyronine (CYTOMEL) 5 MCG tablet       [DISCONTINUED] LORazepam (ATIVAN) 1 MG tablet Take 1 tablet by mouth Every 8 (Eight) Hours As Needed.      [DISCONTINUED] spironolactone (ALDACTONE) 25 MG tablet TAKE 1 TABLET EVERY DAY BY ORAL ROUTE IN THE MORNING FOR 90 DAYS, FOR DIURETIC.       No facility-administered encounter medications on file as of 7/11/2025.       Past Medical History  Past Medical History:   Diagnosis Date    Acoustic neuroma     ADD (attention deficit disorder)     Colon polyp     Hypertension         Surgical History  Past Surgical History:   Procedure Laterality Date    COLONOSCOPY  02/18/2016    COLONOSCOPY      CRANIOTOMY      UPPER GASTROINTESTINAL ENDOSCOPY  02/18/2016     SHUNT INSERTION         Family History  Family History   Problem Relation Age of Onset    Breast cancer Neg Hx     Ovarian cancer Neg Hx     Uterine cancer Neg Hx     Colon cancer Neg Hx     Melanoma Neg Hx        The following portions of the patient's history were reviewed and updated as appropriate: allergies, current medications, past family history, past medical history, past social history, past surgical history, and problem list.    Review of Systems   Constitutional:  Negative for activity change and unexpected weight loss.   HENT:  Negative for congestion.    Cardiovascular:  Negative for chest pain.   Gastrointestinal:  Negative for blood in stool, constipation and  diarrhea.   Endocrine: Negative for cold intolerance and heat intolerance.   Genitourinary:  Negative for dyspareunia, pelvic pain and vaginal discharge.   Musculoskeletal:  Negative for arthralgias, back pain, neck pain and neck stiffness.   Skin:  Negative for rash.   Neurological:  Negative for dizziness and headache.   Psychiatric/Behavioral:  Negative for sleep disturbance. The patient is not nervous/anxious.        Objective   Physical Exam  Vitals and nursing note reviewed. Exam conducted with a chaperone present.   Constitutional:       General: She is not in acute distress.     Appearance: She is well-developed. She is not diaphoretic.   HENT:      Head: Normocephalic.      Right Ear: External ear normal.      Left Ear: External ear normal.      Nose: Nose normal.   Eyes:      General: No scleral icterus.        Right eye: No discharge.         Left eye: No discharge.      Conjunctiva/sclera: Conjunctivae normal.      Pupils: Pupils are equal, round, and reactive to light.   Neck:      Thyroid: No thyromegaly.      Vascular: No carotid bruit.      Trachea: No tracheal deviation.   Cardiovascular:      Rate and Rhythm: Normal rate and regular rhythm.      Heart sounds: Normal heart sounds. No murmur heard.  Pulmonary:      Effort: Pulmonary effort is normal. No respiratory distress.      Breath sounds: Normal breath sounds. No wheezing.   Chest:   Breasts:     Breasts are symmetrical.      Right: Normal. No swelling, bleeding, inverted nipple, mass, nipple discharge, skin change or tenderness.      Left: Normal. No swelling, bleeding, inverted nipple, mass, nipple discharge, skin change or tenderness.   Abdominal:      General: There is no distension.      Palpations: Abdomen is soft. There is no mass.      Tenderness: There is no abdominal tenderness. There is no right CVA tenderness, left CVA tenderness or guarding.      Hernia: No hernia is present. There is no hernia in the left inguinal area or right  inguinal area.   Genitourinary:     General: Normal vulva.      Exam position: Lithotomy position.      Labia:         Right: No rash, tenderness, lesion or injury.         Left: No rash, tenderness, lesion or injury.       Vagina: Normal. No signs of injury and foreign body. No vaginal discharge, erythema, tenderness or bleeding.      Cervix: Normal.      Uterus: Normal. Not enlarged, not fixed and not tender.       Adnexa: Right adnexa normal and left adnexa normal.        Right: No mass, tenderness or fullness.          Left: No mass, tenderness or fullness.        Rectum: Normal. No mass.      Comments:   BSU normal  Urethral meatus  Normal  Perineum  Normal    Vaginal dryness  Musculoskeletal:         General: No tenderness. Normal range of motion.      Cervical back: Normal range of motion and neck supple.   Lymphadenopathy:      Head:      Right side of head: No submental, submandibular, tonsillar, preauricular, posterior auricular or occipital adenopathy.      Left side of head: No submental, submandibular, tonsillar, preauricular, posterior auricular or occipital adenopathy.      Cervical: No cervical adenopathy.      Right cervical: No superficial, deep or posterior cervical adenopathy.     Left cervical: No superficial, deep or posterior cervical adenopathy.      Upper Body:      Right upper body: No supraclavicular, axillary or pectoral adenopathy.      Left upper body: No supraclavicular, axillary or pectoral adenopathy.      Lower Body: No right inguinal adenopathy. No left inguinal adenopathy.   Skin:     General: Skin is warm and dry.      Findings: No bruising, erythema or rash.   Neurological:      Mental Status: She is alert and oriented to person, place, and time.      Coordination: Coordination normal.   Psychiatric:         Mood and Affect: Mood normal.         Behavior: Behavior normal.         Thought Content: Thought content normal.         Judgment: Judgment normal.              Assessment  & Plan   Diagnoses and all orders for this visit:    1. Well woman exam with routine gynecological exam (Primary)    2. Encounter for screening mammogram for breast cancer  -     Mammo Screening Digital Tomosynthesis Bilateral With CAD; Future    3. Osteopenia, unspecified location         Normal GYN exam. Encouraged SBE, pt is aware how to do self breast exam and the importance of same. Discussed weight management and importance of maintaining a healthy weight. Discussed Vitamin D intake and the importance of adequate vitamin D for both bone health and a healthy immune system.  Discussed daily exercise and the importance of same, in regards to a healthy heart as well as helping to maintain her weight and improving her mental health.  Colonoscopy is up to date. Mammogram will be scheduled. Bone density is up to date. Pap smear is not done per ASCCP guidelines. HPV is not done. Lab work up is up to date through PCP.      BMI is >= 25 and <30. (Overweight) The following options were offered after discussion;: weight loss educational material (shared in after visit summary)      HARRY Sagastume  7/11/2025    Return in about 1 year (around 7/11/2026) for Annual physical.

## 2025-07-13 LAB
BACTERIA UR CULT: NO GROWTH
BACTERIA UR CULT: NORMAL